# Patient Record
Sex: FEMALE | Race: WHITE | URBAN - METROPOLITAN AREA
[De-identification: names, ages, dates, MRNs, and addresses within clinical notes are randomized per-mention and may not be internally consistent; named-entity substitution may affect disease eponyms.]

---

## 2017-01-10 ENCOUNTER — OUTPATIENT (OUTPATIENT)
Dept: OUTPATIENT SERVICES | Facility: HOSPITAL | Age: 82
LOS: 1 days | Discharge: HOME | End: 2017-01-10

## 2017-01-26 ENCOUNTER — APPOINTMENT (OUTPATIENT)
Dept: CARDIOLOGY | Facility: CLINIC | Age: 82
End: 2017-01-26

## 2017-01-26 VITALS — DIASTOLIC BLOOD PRESSURE: 84 MMHG | SYSTOLIC BLOOD PRESSURE: 140 MMHG

## 2017-01-26 DIAGNOSIS — Z95.810 PRESENCE OF AUTOMATIC (IMPLANTABLE) CARDIAC DEFIBRILLATOR: ICD-10-CM

## 2017-06-15 ENCOUNTER — OUTPATIENT (OUTPATIENT)
Dept: OUTPATIENT SERVICES | Facility: HOSPITAL | Age: 82
LOS: 1 days | Discharge: HOME | End: 2017-06-15

## 2017-06-28 DIAGNOSIS — R73.01 IMPAIRED FASTING GLUCOSE: ICD-10-CM

## 2017-06-28 DIAGNOSIS — I10 ESSENTIAL (PRIMARY) HYPERTENSION: ICD-10-CM

## 2017-06-28 DIAGNOSIS — E78.5 HYPERLIPIDEMIA, UNSPECIFIED: ICD-10-CM

## 2017-07-03 ENCOUNTER — OUTPATIENT (OUTPATIENT)
Dept: OUTPATIENT SERVICES | Facility: HOSPITAL | Age: 82
LOS: 1 days | Discharge: HOME | End: 2017-07-03

## 2017-07-03 DIAGNOSIS — Z79.01 LONG TERM (CURRENT) USE OF ANTICOAGULANTS: ICD-10-CM

## 2017-07-03 DIAGNOSIS — I48.91 UNSPECIFIED ATRIAL FIBRILLATION: ICD-10-CM

## 2017-07-27 ENCOUNTER — APPOINTMENT (OUTPATIENT)
Dept: CARDIOLOGY | Facility: CLINIC | Age: 82
End: 2017-07-27

## 2017-07-27 VITALS — WEIGHT: 187 LBS | BODY MASS INDEX: 30.18 KG/M2 | DIASTOLIC BLOOD PRESSURE: 72 MMHG | SYSTOLIC BLOOD PRESSURE: 126 MMHG

## 2017-08-07 ENCOUNTER — OUTPATIENT (OUTPATIENT)
Dept: OUTPATIENT SERVICES | Facility: HOSPITAL | Age: 82
LOS: 1 days | Discharge: HOME | End: 2017-08-07

## 2017-08-07 DIAGNOSIS — I48.91 UNSPECIFIED ATRIAL FIBRILLATION: ICD-10-CM

## 2017-08-07 DIAGNOSIS — Z79.01 LONG TERM (CURRENT) USE OF ANTICOAGULANTS: ICD-10-CM

## 2017-09-11 ENCOUNTER — OUTPATIENT (OUTPATIENT)
Dept: OUTPATIENT SERVICES | Facility: HOSPITAL | Age: 82
LOS: 1 days | Discharge: HOME | End: 2017-09-11

## 2017-09-11 DIAGNOSIS — I48.91 UNSPECIFIED ATRIAL FIBRILLATION: ICD-10-CM

## 2017-09-11 DIAGNOSIS — Z79.01 LONG TERM (CURRENT) USE OF ANTICOAGULANTS: ICD-10-CM

## 2017-09-12 DIAGNOSIS — I48.91 UNSPECIFIED ATRIAL FIBRILLATION: ICD-10-CM

## 2017-09-12 DIAGNOSIS — Z79.01 LONG TERM (CURRENT) USE OF ANTICOAGULANTS: ICD-10-CM

## 2017-10-06 ENCOUNTER — OUTPATIENT (OUTPATIENT)
Dept: OUTPATIENT SERVICES | Facility: HOSPITAL | Age: 82
LOS: 1 days | Discharge: HOME | End: 2017-10-06

## 2017-10-06 ENCOUNTER — TRANSCRIPTION ENCOUNTER (OUTPATIENT)
Age: 82
End: 2017-10-06

## 2017-10-06 DIAGNOSIS — M71.22 SYNOVIAL CYST OF POPLITEAL SPACE [BAKER], LEFT KNEE: ICD-10-CM

## 2017-10-06 DIAGNOSIS — M25.472 EFFUSION, LEFT ANKLE: ICD-10-CM

## 2017-10-16 ENCOUNTER — OUTPATIENT (OUTPATIENT)
Dept: OUTPATIENT SERVICES | Facility: HOSPITAL | Age: 82
LOS: 1 days | Discharge: HOME | End: 2017-10-16

## 2017-10-16 DIAGNOSIS — I48.91 UNSPECIFIED ATRIAL FIBRILLATION: ICD-10-CM

## 2017-10-16 DIAGNOSIS — Z79.01 LONG TERM (CURRENT) USE OF ANTICOAGULANTS: ICD-10-CM

## 2017-10-20 ENCOUNTER — OUTPATIENT (OUTPATIENT)
Dept: OUTPATIENT SERVICES | Facility: HOSPITAL | Age: 82
LOS: 1 days | Discharge: HOME | End: 2017-10-20

## 2017-10-20 DIAGNOSIS — E78.5 HYPERLIPIDEMIA, UNSPECIFIED: ICD-10-CM

## 2017-10-20 DIAGNOSIS — E66.9 OBESITY, UNSPECIFIED: ICD-10-CM

## 2017-11-01 ENCOUNTER — OUTPATIENT (OUTPATIENT)
Dept: OUTPATIENT SERVICES | Facility: HOSPITAL | Age: 82
LOS: 1 days | Discharge: HOME | End: 2017-11-01

## 2017-11-01 DIAGNOSIS — E87.5 HYPERKALEMIA: ICD-10-CM

## 2017-11-20 ENCOUNTER — OUTPATIENT (OUTPATIENT)
Dept: OUTPATIENT SERVICES | Facility: HOSPITAL | Age: 82
LOS: 1 days | Discharge: HOME | End: 2017-11-20

## 2017-11-20 DIAGNOSIS — Z79.01 LONG TERM (CURRENT) USE OF ANTICOAGULANTS: ICD-10-CM

## 2017-11-20 DIAGNOSIS — I48.91 UNSPECIFIED ATRIAL FIBRILLATION: ICD-10-CM

## 2017-12-18 ENCOUNTER — OUTPATIENT (OUTPATIENT)
Dept: OUTPATIENT SERVICES | Facility: HOSPITAL | Age: 82
LOS: 1 days | Discharge: HOME | End: 2017-12-18

## 2017-12-18 DIAGNOSIS — Z79.01 LONG TERM (CURRENT) USE OF ANTICOAGULANTS: ICD-10-CM

## 2017-12-18 DIAGNOSIS — I48.91 UNSPECIFIED ATRIAL FIBRILLATION: ICD-10-CM

## 2018-01-22 ENCOUNTER — OUTPATIENT (OUTPATIENT)
Dept: OUTPATIENT SERVICES | Facility: HOSPITAL | Age: 83
LOS: 1 days | Discharge: HOME | End: 2018-01-22

## 2018-01-22 DIAGNOSIS — Z79.01 LONG TERM (CURRENT) USE OF ANTICOAGULANTS: ICD-10-CM

## 2018-01-22 DIAGNOSIS — I48.91 UNSPECIFIED ATRIAL FIBRILLATION: ICD-10-CM

## 2018-01-25 ENCOUNTER — APPOINTMENT (OUTPATIENT)
Dept: CARDIOLOGY | Facility: CLINIC | Age: 83
End: 2018-01-25

## 2018-01-25 VITALS — SYSTOLIC BLOOD PRESSURE: 124 MMHG | WEIGHT: 185 LBS | DIASTOLIC BLOOD PRESSURE: 84 MMHG | BODY MASS INDEX: 29.86 KG/M2

## 2018-02-20 ENCOUNTER — OUTPATIENT (OUTPATIENT)
Dept: OUTPATIENT SERVICES | Facility: HOSPITAL | Age: 83
LOS: 1 days | Discharge: HOME | End: 2018-02-20

## 2018-02-20 DIAGNOSIS — I48.91 UNSPECIFIED ATRIAL FIBRILLATION: ICD-10-CM

## 2018-02-20 DIAGNOSIS — Z79.01 LONG TERM (CURRENT) USE OF ANTICOAGULANTS: ICD-10-CM

## 2018-02-22 ENCOUNTER — OUTPATIENT (OUTPATIENT)
Dept: OUTPATIENT SERVICES | Facility: HOSPITAL | Age: 83
LOS: 1 days | Discharge: HOME | End: 2018-02-22

## 2018-02-22 DIAGNOSIS — E87.5 HYPERKALEMIA: ICD-10-CM

## 2018-02-22 DIAGNOSIS — E11.9 TYPE 2 DIABETES MELLITUS WITHOUT COMPLICATIONS: ICD-10-CM

## 2018-02-22 DIAGNOSIS — E55.9 VITAMIN D DEFICIENCY, UNSPECIFIED: ICD-10-CM

## 2018-03-26 ENCOUNTER — OUTPATIENT (OUTPATIENT)
Dept: OUTPATIENT SERVICES | Facility: HOSPITAL | Age: 83
LOS: 1 days | Discharge: HOME | End: 2018-03-26

## 2018-03-26 DIAGNOSIS — I48.91 UNSPECIFIED ATRIAL FIBRILLATION: ICD-10-CM

## 2018-03-26 DIAGNOSIS — Z79.01 LONG TERM (CURRENT) USE OF ANTICOAGULANTS: ICD-10-CM

## 2018-04-19 ENCOUNTER — OUTPATIENT (OUTPATIENT)
Dept: OUTPATIENT SERVICES | Facility: HOSPITAL | Age: 83
LOS: 1 days | Discharge: HOME | End: 2018-04-19

## 2018-04-19 DIAGNOSIS — Z12.31 ENCOUNTER FOR SCREENING MAMMOGRAM FOR MALIGNANT NEOPLASM OF BREAST: ICD-10-CM

## 2018-04-30 ENCOUNTER — OUTPATIENT (OUTPATIENT)
Dept: OUTPATIENT SERVICES | Facility: HOSPITAL | Age: 83
LOS: 1 days | Discharge: HOME | End: 2018-04-30

## 2018-04-30 DIAGNOSIS — Z79.01 LONG TERM (CURRENT) USE OF ANTICOAGULANTS: ICD-10-CM

## 2018-04-30 DIAGNOSIS — I48.91 UNSPECIFIED ATRIAL FIBRILLATION: ICD-10-CM

## 2018-06-04 ENCOUNTER — OUTPATIENT (OUTPATIENT)
Dept: OUTPATIENT SERVICES | Facility: HOSPITAL | Age: 83
LOS: 1 days | Discharge: HOME | End: 2018-06-04

## 2018-06-04 DIAGNOSIS — I48.91 UNSPECIFIED ATRIAL FIBRILLATION: ICD-10-CM

## 2018-06-04 DIAGNOSIS — Z79.01 LONG TERM (CURRENT) USE OF ANTICOAGULANTS: ICD-10-CM

## 2018-07-09 ENCOUNTER — OUTPATIENT (OUTPATIENT)
Dept: OUTPATIENT SERVICES | Facility: HOSPITAL | Age: 83
LOS: 1 days | Discharge: HOME | End: 2018-07-09

## 2018-07-09 DIAGNOSIS — Z79.01 LONG TERM (CURRENT) USE OF ANTICOAGULANTS: ICD-10-CM

## 2018-07-09 DIAGNOSIS — I48.91 UNSPECIFIED ATRIAL FIBRILLATION: ICD-10-CM

## 2018-07-26 ENCOUNTER — APPOINTMENT (OUTPATIENT)
Dept: CARDIOLOGY | Facility: CLINIC | Age: 83
End: 2018-07-26

## 2018-07-26 VITALS — WEIGHT: 183 LBS | BODY MASS INDEX: 29.54 KG/M2

## 2018-07-26 VITALS — DIASTOLIC BLOOD PRESSURE: 62 MMHG | SYSTOLIC BLOOD PRESSURE: 122 MMHG

## 2018-07-30 ENCOUNTER — OUTPATIENT (OUTPATIENT)
Dept: OUTPATIENT SERVICES | Facility: HOSPITAL | Age: 83
LOS: 1 days | Discharge: HOME | End: 2018-07-30

## 2018-07-30 DIAGNOSIS — Z79.01 LONG TERM (CURRENT) USE OF ANTICOAGULANTS: ICD-10-CM

## 2018-07-30 DIAGNOSIS — I48.91 UNSPECIFIED ATRIAL FIBRILLATION: ICD-10-CM

## 2018-08-06 ENCOUNTER — OUTPATIENT (OUTPATIENT)
Dept: OUTPATIENT SERVICES | Facility: HOSPITAL | Age: 83
LOS: 1 days | Discharge: HOME | End: 2018-08-06

## 2018-08-06 DIAGNOSIS — I50.22 CHRONIC SYSTOLIC (CONGESTIVE) HEART FAILURE: ICD-10-CM

## 2018-08-06 DIAGNOSIS — E11.9 TYPE 2 DIABETES MELLITUS WITHOUT COMPLICATIONS: ICD-10-CM

## 2018-08-06 DIAGNOSIS — E78.5 HYPERLIPIDEMIA, UNSPECIFIED: ICD-10-CM

## 2018-08-06 DIAGNOSIS — I48.2 CHRONIC ATRIAL FIBRILLATION: ICD-10-CM

## 2018-08-06 DIAGNOSIS — I10 ESSENTIAL (PRIMARY) HYPERTENSION: ICD-10-CM

## 2018-09-04 ENCOUNTER — OUTPATIENT (OUTPATIENT)
Dept: OUTPATIENT SERVICES | Facility: HOSPITAL | Age: 83
LOS: 1 days | Discharge: HOME | End: 2018-09-04

## 2018-09-04 DIAGNOSIS — I48.91 UNSPECIFIED ATRIAL FIBRILLATION: ICD-10-CM

## 2018-09-04 DIAGNOSIS — Z79.01 LONG TERM (CURRENT) USE OF ANTICOAGULANTS: ICD-10-CM

## 2018-09-04 LAB
POCT INR: 1.9 RATIO — HIGH (ref 0.9–1.2)
POCT PT: 23.2 SEC — HIGH (ref 10–13.4)

## 2018-09-17 ENCOUNTER — OUTPATIENT (OUTPATIENT)
Dept: OUTPATIENT SERVICES | Facility: HOSPITAL | Age: 83
LOS: 1 days | Discharge: HOME | End: 2018-09-17

## 2018-09-17 DIAGNOSIS — Z79.01 LONG TERM (CURRENT) USE OF ANTICOAGULANTS: ICD-10-CM

## 2018-09-17 DIAGNOSIS — I48.91 UNSPECIFIED ATRIAL FIBRILLATION: ICD-10-CM

## 2018-09-17 LAB
POCT INR: 1.7 RATIO — HIGH (ref 0.9–1.2)
POCT PT: 20.6 SEC — HIGH (ref 10–13.4)

## 2018-09-24 ENCOUNTER — OUTPATIENT (OUTPATIENT)
Dept: OUTPATIENT SERVICES | Facility: HOSPITAL | Age: 83
LOS: 1 days | Discharge: HOME | End: 2018-09-24

## 2018-09-24 DIAGNOSIS — I48.91 UNSPECIFIED ATRIAL FIBRILLATION: ICD-10-CM

## 2018-09-24 DIAGNOSIS — Z79.01 LONG TERM (CURRENT) USE OF ANTICOAGULANTS: ICD-10-CM

## 2018-09-24 LAB
POCT INR: 1.8 RATIO — HIGH (ref 0.9–1.2)
POCT PT: 21.2 SEC — HIGH (ref 10–13.4)

## 2018-10-01 ENCOUNTER — OUTPATIENT (OUTPATIENT)
Dept: OUTPATIENT SERVICES | Facility: HOSPITAL | Age: 83
LOS: 1 days | Discharge: HOME | End: 2018-10-01

## 2018-10-01 DIAGNOSIS — Z79.01 LONG TERM (CURRENT) USE OF ANTICOAGULANTS: ICD-10-CM

## 2018-10-01 DIAGNOSIS — I48.91 UNSPECIFIED ATRIAL FIBRILLATION: ICD-10-CM

## 2018-10-01 LAB
POCT INR: 3 RATIO — HIGH (ref 0.9–1.2)
POCT PT: 36.3 SEC — HIGH (ref 10–13.4)

## 2018-10-15 ENCOUNTER — OUTPATIENT (OUTPATIENT)
Dept: OUTPATIENT SERVICES | Facility: HOSPITAL | Age: 83
LOS: 1 days | Discharge: HOME | End: 2018-10-15

## 2018-10-15 DIAGNOSIS — I48.91 UNSPECIFIED ATRIAL FIBRILLATION: ICD-10-CM

## 2018-10-15 DIAGNOSIS — Z79.01 LONG TERM (CURRENT) USE OF ANTICOAGULANTS: ICD-10-CM

## 2018-10-15 LAB
POCT INR: 2.9 RATIO — HIGH (ref 0.9–1.2)
POCT PT: 35.1 SEC — HIGH (ref 10–13.4)

## 2018-11-05 ENCOUNTER — OUTPATIENT (OUTPATIENT)
Dept: OUTPATIENT SERVICES | Facility: HOSPITAL | Age: 83
LOS: 1 days | Discharge: HOME | End: 2018-11-05

## 2018-11-05 DIAGNOSIS — Z79.01 LONG TERM (CURRENT) USE OF ANTICOAGULANTS: ICD-10-CM

## 2018-11-05 DIAGNOSIS — I48.91 UNSPECIFIED ATRIAL FIBRILLATION: ICD-10-CM

## 2018-11-05 LAB
POCT INR: 2.7 RATIO — HIGH (ref 0.9–1.2)
POCT PT: 32.4 SEC — HIGH (ref 10–13.4)

## 2018-11-26 ENCOUNTER — OUTPATIENT (OUTPATIENT)
Dept: OUTPATIENT SERVICES | Facility: HOSPITAL | Age: 83
LOS: 1 days | Discharge: HOME | End: 2018-11-26

## 2018-11-26 DIAGNOSIS — Z79.01 LONG TERM (CURRENT) USE OF ANTICOAGULANTS: ICD-10-CM

## 2018-11-26 DIAGNOSIS — I48.91 UNSPECIFIED ATRIAL FIBRILLATION: ICD-10-CM

## 2018-11-26 LAB
POCT INR: 2.6 RATIO — HIGH (ref 0.9–1.2)
POCT PT: 31.4 SEC — HIGH (ref 10–13.4)

## 2018-12-10 ENCOUNTER — OUTPATIENT (OUTPATIENT)
Dept: OUTPATIENT SERVICES | Facility: HOSPITAL | Age: 83
LOS: 1 days | Discharge: HOME | End: 2018-12-10

## 2018-12-10 DIAGNOSIS — E78.5 HYPERLIPIDEMIA, UNSPECIFIED: ICD-10-CM

## 2018-12-10 DIAGNOSIS — Z85.831 PERSONAL HISTORY OF MALIGNANT NEOPLASM OF SOFT TISSUE: ICD-10-CM

## 2018-12-10 DIAGNOSIS — I50.22 CHRONIC SYSTOLIC (CONGESTIVE) HEART FAILURE: ICD-10-CM

## 2018-12-10 DIAGNOSIS — Z79.01 LONG TERM (CURRENT) USE OF ANTICOAGULANTS: ICD-10-CM

## 2018-12-10 DIAGNOSIS — I48.2 CHRONIC ATRIAL FIBRILLATION: ICD-10-CM

## 2018-12-10 DIAGNOSIS — I10 ESSENTIAL (PRIMARY) HYPERTENSION: ICD-10-CM

## 2018-12-17 ENCOUNTER — OUTPATIENT (OUTPATIENT)
Dept: OUTPATIENT SERVICES | Facility: HOSPITAL | Age: 83
LOS: 1 days | Discharge: HOME | End: 2018-12-17

## 2018-12-17 DIAGNOSIS — I48.91 UNSPECIFIED ATRIAL FIBRILLATION: ICD-10-CM

## 2018-12-17 DIAGNOSIS — Z79.01 LONG TERM (CURRENT) USE OF ANTICOAGULANTS: ICD-10-CM

## 2018-12-17 LAB
POCT INR: 2.9 RATIO — HIGH (ref 0.9–1.2)
POCT PT: 34.8 SEC — HIGH (ref 10–13.4)

## 2019-01-14 ENCOUNTER — OUTPATIENT (OUTPATIENT)
Dept: OUTPATIENT SERVICES | Facility: HOSPITAL | Age: 84
LOS: 1 days | Discharge: HOME | End: 2019-01-14

## 2019-01-14 DIAGNOSIS — I48.91 UNSPECIFIED ATRIAL FIBRILLATION: ICD-10-CM

## 2019-01-14 DIAGNOSIS — Z79.01 LONG TERM (CURRENT) USE OF ANTICOAGULANTS: ICD-10-CM

## 2019-01-14 LAB
POCT INR: 3 RATIO — HIGH (ref 0.9–1.2)
POCT PT: 35.5 SEC — HIGH (ref 10–13.4)

## 2019-01-24 ENCOUNTER — APPOINTMENT (OUTPATIENT)
Dept: CARDIOLOGY | Facility: CLINIC | Age: 84
End: 2019-01-24

## 2019-01-24 VITALS — DIASTOLIC BLOOD PRESSURE: 80 MMHG | SYSTOLIC BLOOD PRESSURE: 130 MMHG | WEIGHT: 183 LBS | BODY MASS INDEX: 29.54 KG/M2

## 2019-02-06 NOTE — PROCEDURE
[No] : not [Atrial Fibrillation] : atrial fibrillation [ICD] : Implantable cardioverter-defibrillator [AAIR] : AAIR [Voltage: ___ volts] : Voltage was [unfilled] volts [Charge Time: ___ sec] : charge time was [unfilled] seconds [Threshold Testing Performed] : Threshold testing was performed [Lead Imp:  ___ohms] : lead impedance was [unfilled] ohms [Sensing Amplitude ___mv] : sensing amplitude was [unfilled] mv [___V @] : [unfilled] V [___ ms] : [unfilled] ms [Programmed for Longevity] : output reprogrammed for improved battery longevity [Asense-Vsense ___ %] : Asense-Vsense [unfilled]% [Asense-Vpace ___ %] : Asense-Vpace [unfilled]% [Apace-Vsense ___ %] : Apace-Vsense [unfilled]% [Apace-Vpace ___ %] : Apace-Vpace [unfilled]% [de-identified] : Medtronic [de-identified] : Bea DWYER DR [de-identified] : [id number] [de-identified] : 6/25/13 [de-identified] : 57 [de-identified] : AT/AF [place] 64.1%

## 2019-02-08 ENCOUNTER — OUTPATIENT (OUTPATIENT)
Dept: OUTPATIENT SERVICES | Facility: HOSPITAL | Age: 84
LOS: 1 days | Discharge: HOME | End: 2019-02-08

## 2019-02-08 DIAGNOSIS — I48.91 UNSPECIFIED ATRIAL FIBRILLATION: ICD-10-CM

## 2019-02-08 DIAGNOSIS — Z79.01 LONG TERM (CURRENT) USE OF ANTICOAGULANTS: ICD-10-CM

## 2019-02-08 LAB
POCT INR: 3.3 RATIO — HIGH (ref 0.9–1.2)
POCT PT: 40.1 SEC — HIGH (ref 10–13.4)

## 2019-03-11 ENCOUNTER — OUTPATIENT (OUTPATIENT)
Dept: OUTPATIENT SERVICES | Facility: HOSPITAL | Age: 84
LOS: 1 days | Discharge: HOME | End: 2019-03-11

## 2019-03-11 DIAGNOSIS — I48.91 UNSPECIFIED ATRIAL FIBRILLATION: ICD-10-CM

## 2019-03-11 DIAGNOSIS — Z79.01 LONG TERM (CURRENT) USE OF ANTICOAGULANTS: ICD-10-CM

## 2019-03-11 LAB
POCT INR: 2.4 RATIO — HIGH (ref 0.9–1.2)
POCT PT: 29.1 SEC — HIGH (ref 10–13.4)

## 2019-03-28 ENCOUNTER — APPOINTMENT (OUTPATIENT)
Dept: CARDIOLOGY | Facility: CLINIC | Age: 84
End: 2019-03-28
Payer: MEDICARE

## 2019-03-28 VITALS — SYSTOLIC BLOOD PRESSURE: 100 MMHG | DIASTOLIC BLOOD PRESSURE: 70 MMHG

## 2019-03-28 PROCEDURE — 99213 OFFICE O/P EST LOW 20 MIN: CPT

## 2019-03-28 PROCEDURE — 93280 PM DEVICE PROGR EVAL DUAL: CPT

## 2019-04-16 ENCOUNTER — OUTPATIENT (OUTPATIENT)
Dept: OUTPATIENT SERVICES | Facility: HOSPITAL | Age: 84
LOS: 1 days | Discharge: HOME | End: 2019-04-16

## 2019-04-16 DIAGNOSIS — I48.91 UNSPECIFIED ATRIAL FIBRILLATION: ICD-10-CM

## 2019-04-16 DIAGNOSIS — Z79.01 LONG TERM (CURRENT) USE OF ANTICOAGULANTS: ICD-10-CM

## 2019-04-16 LAB
POCT INR: 3 RATIO — HIGH (ref 0.9–1.2)
POCT PT: 35.6 SEC — HIGH (ref 10–13.4)

## 2019-04-16 NOTE — PROCEDURE
[No] : not [NSR] : normal sinus rhythm [ICD] : Implantable cardioverter-defibrillator [DDD] : DDD [Voltage: ___ volts] : Voltage was [unfilled] volts [Longevity: ___ months] : The estimated remaining battery life is [unfilled] months [Threshold Testing Performed] : Threshold testing was performed [Atrial] : Atrial [Ventricular] : Ventricular [Lead Imp:  ___ohms] : lead impedance was [unfilled] ohms [Sensing Amplitude ___mv] : sensing amplitude was [unfilled] mv [___V @] : [unfilled] V [___ ms] : [unfilled] ms [None] : none [Counters Reset] : the counters were reset [Asense-Vsense ___ %] : Asense-Vsense [unfilled]% [Asense-Vpace ___ %] : Asense-Vpace [unfilled]% [Apace-Vsense ___ %] : Apace-Vsense [unfilled]% [Apace-Vpace ___ %] : Apace-Vpace [unfilled]% [de-identified] : Medtronic [de-identified] : Protecta YULIYA ROSE [id number] [de-identified] : w/ MVP [de-identified] : [id number] [de-identified] :  [de-identified] : 31 episodes of AT/A-flutter (AT/AF burden = 38.1%)\par -> longest episode 96.5hrs (1/30/19, v-rate 103bpm)\par * approx 2.5 months until battery LISANDRO

## 2019-04-16 NOTE — PHYSICAL EXAM
[Normal Appearance] : normal appearance [General Appearance - Well Developed] : well developed [No Deformities] : no deformities [Well Groomed] : well groomed [General Appearance - Well Nourished] : well nourished [General Appearance - In No Acute Distress] : no acute distress [Heart Rate And Rhythm] : heart rate and rhythm were normal [Heart Sounds] : normal S1 and S2 [Murmurs] : no murmurs present [Respiration, Rhythm And Depth] : normal respiratory rhythm and effort [Auscultation Breath Sounds / Voice Sounds] : lungs were clear to auscultation bilaterally [Exaggerated Use Of Accessory Muscles For Inspiration] : no accessory muscle use [Well-Healed] : well-healed [Clean] : clean [Dry] : dry [Abdomen Tenderness] : non-tender [Abdomen Soft] : soft [Abdomen Mass (___ Cm)] : no abdominal mass palpated [Nail Clubbing] : no clubbing of the fingernails [Petechial Hemorrhages (___cm)] : no petechial hemorrhages [Cyanosis, Localized] : no localized cyanosis [] : no ischemic changes

## 2019-04-16 NOTE — HISTORY OF PRESENT ILLNESS
[None] : The patient complains of no symptoms [Palpitations] : no palpitations [SOB] : no dyspnea [Syncope] : no syncope [Erythema at Site] : no erythema at device site [Swelling at Site] : no swelling at device site [de-identified] : Patient came for routine follow-up.  Patient has no complaints. His device is working fine.

## 2019-04-22 ENCOUNTER — OUTPATIENT (OUTPATIENT)
Dept: OUTPATIENT SERVICES | Facility: HOSPITAL | Age: 84
LOS: 1 days | Discharge: HOME | End: 2019-04-22
Payer: MEDICARE

## 2019-04-22 DIAGNOSIS — Z12.31 ENCOUNTER FOR SCREENING MAMMOGRAM FOR MALIGNANT NEOPLASM OF BREAST: ICD-10-CM

## 2019-04-22 PROCEDURE — 77067 SCR MAMMO BI INCL CAD: CPT | Mod: 26

## 2019-04-22 PROCEDURE — 77063 BREAST TOMOSYNTHESIS BI: CPT | Mod: 26

## 2019-05-20 ENCOUNTER — OUTPATIENT (OUTPATIENT)
Dept: OUTPATIENT SERVICES | Facility: HOSPITAL | Age: 84
LOS: 1 days | Discharge: HOME | End: 2019-05-20

## 2019-05-20 DIAGNOSIS — I48.91 UNSPECIFIED ATRIAL FIBRILLATION: ICD-10-CM

## 2019-05-20 DIAGNOSIS — Z79.01 LONG TERM (CURRENT) USE OF ANTICOAGULANTS: ICD-10-CM

## 2019-05-20 LAB
POCT INR: 2.4 RATIO — HIGH (ref 0.9–1.2)
POCT PT: 29.1 SEC — HIGH (ref 10–13.4)

## 2019-05-23 ENCOUNTER — APPOINTMENT (OUTPATIENT)
Dept: CARDIOLOGY | Facility: CLINIC | Age: 84
End: 2019-05-23
Payer: MEDICARE

## 2019-05-23 VITALS — HEIGHT: 66 IN | WEIGHT: 180 LBS | BODY MASS INDEX: 28.93 KG/M2

## 2019-05-23 VITALS — DIASTOLIC BLOOD PRESSURE: 78 MMHG | SYSTOLIC BLOOD PRESSURE: 108 MMHG

## 2019-05-23 VITALS — SYSTOLIC BLOOD PRESSURE: 138 MMHG | DIASTOLIC BLOOD PRESSURE: 68 MMHG

## 2019-05-23 DIAGNOSIS — Z45.02 ENCOUNTER FOR ADJUSTMENT AND MANAGEMENT OF AUTOMATIC IMPLANTABLE CARDIAC DEFIBRILLATOR: ICD-10-CM

## 2019-05-23 PROCEDURE — 99213 OFFICE O/P EST LOW 20 MIN: CPT

## 2019-05-23 PROCEDURE — 93283 PRGRMG EVAL IMPLANTABLE DFB: CPT

## 2019-05-23 NOTE — PHYSICAL EXAM
[General Appearance - Well Developed] : well developed [General Appearance - Well Nourished] : well nourished [Heart Rate And Rhythm] : heart rate and rhythm were normal [Heart Sounds] : normal S1 and S2 [Arterial Pulses Normal] : the arterial pulses were normal [Respiration, Rhythm And Depth] : normal respiratory rhythm and effort [Auscultation Breath Sounds / Voice Sounds] : lungs were clear to auscultation bilaterally [Left Infraclavicular] : left infraclavicular area [Clean] : clean [Dry] : dry [Well-Healed] : well-healed [Bowel Sounds] : normal bowel sounds [Abdomen Soft] : soft [Abdomen Tenderness] : non-tender [] : no hepato-splenomegaly [Nail Clubbing] : no clubbing of the fingernails [Normal Appearance] : normal appearance [No Deformities] : no deformities [Cyanosis, Localized] : no localized cyanosis

## 2019-05-24 NOTE — ASSESSMENT
[FreeTextEntry1] : AICD interrogation: Battery approaching  LISANDRO .  All  parameters stable.  Several episodes of Afib with RVR . \par \par She was recently seen by   , Metoprolol was increased to a total of 250 mg daily ( 100 md BID + 50 mg ) \par \par Auditory alert was demonstrated to the patient.  She is enrolled in remote monitoring services .  AP 24.8% ,  3.1% \par On Warfarin for CVA prevention , INRs monitored by the Coumadin clinic , no s/s bleeding reported. \par \par \par Plan \par -Continue current medications\par -Continue remote monitoring services \par -F/U INR with the Coumadin clinic \par -F/U in 2 months for device interrogation \par \par -

## 2019-05-24 NOTE — PROCEDURE
[NSR] : normal sinus rhythm [ICD] : Implantable cardioverter-defibrillator [Voltage: ___ volts] : Voltage was [unfilled] volts [Charge Time: ___ sec] : charge time was [unfilled] seconds [Longevity: ___ months] : The estimated remaining battery life is [unfilled] months [Threshold Testing Performed] : Threshold testing was performed [Atrial] : Atrial [Ventricular] : Ventricular [Sensing Amplitude ___mv] : sensing amplitude was [unfilled] mv [Lead Imp:  ___ohms] : lead impedance was [unfilled] ohms [___V @] : [unfilled] V [___ ms] : [unfilled] ms [Programmed for Longevity] : output reprogrammed for improved battery longevity [Asense-Vsense ___ %] : Asense-Vsense [unfilled]% [Asense-Vpace ___ %] : Asense-Vpace [unfilled]% [Apace-Vsense ___ %] : Apace-Vsense [unfilled]% [Apace-Vpace ___ %] : Apace-Vpace [unfilled]% [No] : not [See Scanned Paceart Report] : See scanned paceart report [See Device Printout] : See device printout [de-identified] : 63 BPM [de-identified] : Bea DWYER DR [de-identified] : RIRI [de-identified] : 6/25/2013 [de-identified] : [id number] [de-identified] : JUAN [de-identified] : Imminent LISANDRO, on [facility]\par AFL burden 17.7%\par Longest AT/AF 6 days, 11 NSVT episodes, \par Sensitivity set to 1.2 mV, T wave oversensing with shock x 2 12/23/13  [de-identified] :  bpm

## 2019-05-24 NOTE — HISTORY OF PRESENT ILLNESS
[de-identified] : 84 y/o female with an AICD for NICM , presents for a routine device interrogation . \par \par

## 2019-06-24 ENCOUNTER — OUTPATIENT (OUTPATIENT)
Dept: OUTPATIENT SERVICES | Facility: HOSPITAL | Age: 84
LOS: 1 days | Discharge: HOME | End: 2019-06-24

## 2019-06-24 DIAGNOSIS — I48.91 UNSPECIFIED ATRIAL FIBRILLATION: ICD-10-CM

## 2019-06-24 DIAGNOSIS — Z79.01 LONG TERM (CURRENT) USE OF ANTICOAGULANTS: ICD-10-CM

## 2019-06-24 LAB
POCT INR: 2.8 RATIO — HIGH (ref 0.9–1.2)
POCT PT: 34.2 SEC — HIGH (ref 10–13.4)

## 2019-07-16 ENCOUNTER — OUTPATIENT (OUTPATIENT)
Dept: OUTPATIENT SERVICES | Facility: HOSPITAL | Age: 84
LOS: 1 days | Discharge: HOME | End: 2019-07-16

## 2019-07-16 DIAGNOSIS — I10 ESSENTIAL (PRIMARY) HYPERTENSION: ICD-10-CM

## 2019-07-16 DIAGNOSIS — E78.00 PURE HYPERCHOLESTEROLEMIA, UNSPECIFIED: ICD-10-CM

## 2019-07-16 DIAGNOSIS — I50.22 CHRONIC SYSTOLIC (CONGESTIVE) HEART FAILURE: ICD-10-CM

## 2019-07-29 ENCOUNTER — OUTPATIENT (OUTPATIENT)
Dept: OUTPATIENT SERVICES | Facility: HOSPITAL | Age: 84
LOS: 1 days | Discharge: HOME | End: 2019-07-29

## 2019-07-29 DIAGNOSIS — I48.91 UNSPECIFIED ATRIAL FIBRILLATION: ICD-10-CM

## 2019-07-29 DIAGNOSIS — Z79.01 LONG TERM (CURRENT) USE OF ANTICOAGULANTS: ICD-10-CM

## 2019-07-29 LAB
POCT INR: 2.7 RATIO — HIGH (ref 0.9–1.2)
POCT PT: 32.4 SEC — HIGH (ref 10–13.4)

## 2019-08-01 ENCOUNTER — APPOINTMENT (OUTPATIENT)
Dept: CARDIOLOGY | Facility: CLINIC | Age: 84
End: 2019-08-01
Payer: MEDICARE

## 2019-08-01 VITALS — SYSTOLIC BLOOD PRESSURE: 118 MMHG | DIASTOLIC BLOOD PRESSURE: 64 MMHG

## 2019-08-01 PROCEDURE — 93283 PRGRMG EVAL IMPLANTABLE DFB: CPT

## 2019-08-01 PROCEDURE — 99213 OFFICE O/P EST LOW 20 MIN: CPT

## 2019-08-10 NOTE — ASSESSMENT
[FreeTextEntry1] : Generator Implant\par \par I have explained the risks and benefits of the procedure to the patient.  There is approximately 1% chance of any major cardiovascular complication to occur. Complications include, but are not limited to infection, bleeding, damage to the vessels, pneumothorax, stroke, death and heart attack.  The patient understands the risk and would like to proceed with the procedure. Patient indicated that all of his questions were answered to his satisfaction and verbalized understanding.\par

## 2019-08-10 NOTE — PHYSICAL EXAM
[General Appearance - Well Developed] : well developed [Normal Appearance] : normal appearance [Well Groomed] : well groomed [General Appearance - Well Nourished] : well nourished [General Appearance - In No Acute Distress] : no acute distress [No Deformities] : no deformities [Heart Sounds] : normal S1 and S2 [Heart Rate And Rhythm] : heart rate and rhythm were normal [Murmurs] : no murmurs present [Exaggerated Use Of Accessory Muscles For Inspiration] : no accessory muscle use [Respiration, Rhythm And Depth] : normal respiratory rhythm and effort [Clean] : clean [Auscultation Breath Sounds / Voice Sounds] : lungs were clear to auscultation bilaterally [Well-Healed] : well-healed [Dry] : dry [Abdomen Soft] : soft [Abdomen Tenderness] : non-tender [Abdomen Mass (___ Cm)] : no abdominal mass palpated [Nail Clubbing] : no clubbing of the fingernails [Cyanosis, Localized] : no localized cyanosis [] : no ischemic changes [Petechial Hemorrhages (___cm)] : no petechial hemorrhages

## 2019-08-10 NOTE — HISTORY OF PRESENT ILLNESS
[None] : The patient complains of no symptoms [Palpitations] : no palpitations [SOB] : no dyspnea [Syncope] : no syncope [Erythema at Site] : no erythema at device site [Swelling at Site] : no swelling at device site [de-identified] : Patient came for routine follow-up.  Patient has no complaints. His device is working fine.

## 2019-08-10 NOTE — PROCEDURE
[No] : not [Sinus Bradycardia] : sinus bradycardia [See Scanned Paceart Report] : See scanned paceart report [See Device Printout] : See device printout [ICD] : Implantable cardioverter-defibrillator [Voltage: ___ volts] : Voltage was [unfilled] volts [Charge Time: ___ sec] : charge time was [unfilled] seconds [Longevity: ___ months] : The estimated remaining battery life is [unfilled] months [Threshold Testing Performed] : Threshold testing was performed [Atrial] : Atrial [Ventricular] : Ventricular [Lead Imp:  ___ohms] : lead impedance was [unfilled] ohms [Sensing Amplitude ___mv] : sensing amplitude was [unfilled] mv [___V @] : [unfilled] V [___ ms] : [unfilled] ms [Programmed for Longevity] : output reprogrammed for improved battery longevity [Asense-Vsense ___ %] : Asense-Vsense [unfilled]% [Asense-Vpace ___ %] : Asense-Vpace [unfilled]% [Apace-Vsense ___ %] : Apace-Vsense [unfilled]% [Apace-Vpace ___ %] : Apace-Vpace [unfilled]% [de-identified] : 42 BPM [de-identified] : RIRI [de-identified] : Bea DWYER DR [de-identified] : [id number] [de-identified] : 6/25/2013 [de-identified] : JUAN [de-identified] :  bpm  [de-identified] : Imminent LISANDRO, on [facility]\par

## 2019-09-03 ENCOUNTER — OUTPATIENT (OUTPATIENT)
Dept: OUTPATIENT SERVICES | Facility: HOSPITAL | Age: 84
LOS: 1 days | Discharge: HOME | End: 2019-09-03

## 2019-09-03 DIAGNOSIS — Z79.01 LONG TERM (CURRENT) USE OF ANTICOAGULANTS: ICD-10-CM

## 2019-09-03 DIAGNOSIS — I48.91 UNSPECIFIED ATRIAL FIBRILLATION: ICD-10-CM

## 2019-09-03 LAB
POCT INR: 2.2 RATIO — HIGH (ref 0.9–1.2)
POCT PT: 26.5 SEC — HIGH (ref 10–13.4)

## 2019-10-07 ENCOUNTER — OUTPATIENT (OUTPATIENT)
Dept: OUTPATIENT SERVICES | Facility: HOSPITAL | Age: 84
LOS: 1 days | Discharge: HOME | End: 2019-10-07

## 2019-10-07 DIAGNOSIS — I48.91 UNSPECIFIED ATRIAL FIBRILLATION: ICD-10-CM

## 2019-10-07 DIAGNOSIS — Z79.01 LONG TERM (CURRENT) USE OF ANTICOAGULANTS: ICD-10-CM

## 2019-10-07 LAB
POCT INR: 3.1 RATIO — HIGH (ref 0.9–1.2)
POCT PT: 37.3 SEC — HIGH (ref 10–13.4)

## 2019-10-09 ENCOUNTER — OUTPATIENT (OUTPATIENT)
Dept: OUTPATIENT SERVICES | Facility: HOSPITAL | Age: 84
LOS: 1 days | Discharge: HOME | End: 2019-10-09
Payer: MEDICARE

## 2019-10-09 VITALS
TEMPERATURE: 96 F | DIASTOLIC BLOOD PRESSURE: 94 MMHG | SYSTOLIC BLOOD PRESSURE: 134 MMHG | OXYGEN SATURATION: 98 % | RESPIRATION RATE: 16 BRPM | HEART RATE: 92 BPM | HEIGHT: 66 IN | WEIGHT: 182.98 LBS

## 2019-10-09 DIAGNOSIS — Z95.2 PRESENCE OF PROSTHETIC HEART VALVE: Chronic | ICD-10-CM

## 2019-10-09 DIAGNOSIS — T82.111A BREAKDOWN (MECHANICAL) OF CARDIAC PULSE GENERATOR (BATTERY), INITIAL ENCOUNTER: ICD-10-CM

## 2019-10-09 DIAGNOSIS — Z98.890 OTHER SPECIFIED POSTPROCEDURAL STATES: Chronic | ICD-10-CM

## 2019-10-09 DIAGNOSIS — Z01.818 ENCOUNTER FOR OTHER PREPROCEDURAL EXAMINATION: ICD-10-CM

## 2019-10-09 LAB
ALBUMIN SERPL ELPH-MCNC: 4.2 G/DL — SIGNIFICANT CHANGE UP (ref 3.5–5.2)
ALP SERPL-CCNC: 98 U/L — SIGNIFICANT CHANGE UP (ref 30–115)
ALT FLD-CCNC: 15 U/L — SIGNIFICANT CHANGE UP (ref 0–41)
ANION GAP SERPL CALC-SCNC: 13 MMOL/L — SIGNIFICANT CHANGE UP (ref 7–14)
APPEARANCE UR: CLEAR — SIGNIFICANT CHANGE UP
APTT BLD: 45.6 SEC — HIGH (ref 27–39.2)
AST SERPL-CCNC: 23 U/L — SIGNIFICANT CHANGE UP (ref 0–41)
BACTERIA # UR AUTO: NEGATIVE — SIGNIFICANT CHANGE UP
BASOPHILS # BLD AUTO: 0.03 K/UL — SIGNIFICANT CHANGE UP (ref 0–0.2)
BASOPHILS NFR BLD AUTO: 0.4 % — SIGNIFICANT CHANGE UP (ref 0–1)
BILIRUB SERPL-MCNC: 0.4 MG/DL — SIGNIFICANT CHANGE UP (ref 0.2–1.2)
BILIRUB UR-MCNC: NEGATIVE — SIGNIFICANT CHANGE UP
BUN SERPL-MCNC: 14 MG/DL — SIGNIFICANT CHANGE UP (ref 10–20)
CALCIUM SERPL-MCNC: 9.4 MG/DL — SIGNIFICANT CHANGE UP (ref 8.5–10.1)
CHLORIDE SERPL-SCNC: 102 MMOL/L — SIGNIFICANT CHANGE UP (ref 98–110)
CO2 SERPL-SCNC: 26 MMOL/L — SIGNIFICANT CHANGE UP (ref 17–32)
COLOR SPEC: SIGNIFICANT CHANGE UP
CREAT SERPL-MCNC: 0.9 MG/DL — SIGNIFICANT CHANGE UP (ref 0.7–1.5)
DIFF PNL FLD: NEGATIVE — SIGNIFICANT CHANGE UP
EOSINOPHIL # BLD AUTO: 0.05 K/UL — SIGNIFICANT CHANGE UP (ref 0–0.7)
EOSINOPHIL NFR BLD AUTO: 0.6 % — SIGNIFICANT CHANGE UP (ref 0–8)
EPI CELLS # UR: 1 /HPF — SIGNIFICANT CHANGE UP (ref 0–5)
GLUCOSE SERPL-MCNC: 80 MG/DL — SIGNIFICANT CHANGE UP (ref 70–99)
GLUCOSE UR QL: NEGATIVE — SIGNIFICANT CHANGE UP
HCT VFR BLD CALC: 45.6 % — SIGNIFICANT CHANGE UP (ref 37–47)
HGB BLD-MCNC: 14.4 G/DL — SIGNIFICANT CHANGE UP (ref 12–16)
HYALINE CASTS # UR AUTO: 1 /LPF — SIGNIFICANT CHANGE UP (ref 0–7)
IMM GRANULOCYTES NFR BLD AUTO: 0.2 % — SIGNIFICANT CHANGE UP (ref 0.1–0.3)
INR BLD: 2.87 RATIO — HIGH (ref 0.65–1.3)
KETONES UR-MCNC: NEGATIVE — SIGNIFICANT CHANGE UP
LEUKOCYTE ESTERASE UR-ACNC: ABNORMAL
LYMPHOCYTES # BLD AUTO: 1.53 K/UL — SIGNIFICANT CHANGE UP (ref 1.2–3.4)
LYMPHOCYTES # BLD AUTO: 18.3 % — LOW (ref 20.5–51.1)
MCHC RBC-ENTMCNC: 26.8 PG — LOW (ref 27–31)
MCHC RBC-ENTMCNC: 31.6 G/DL — LOW (ref 32–37)
MCV RBC AUTO: 84.9 FL — SIGNIFICANT CHANGE UP (ref 81–99)
MONOCYTES # BLD AUTO: 0.67 K/UL — HIGH (ref 0.1–0.6)
MONOCYTES NFR BLD AUTO: 8 % — SIGNIFICANT CHANGE UP (ref 1.7–9.3)
MRSA PCR RESULT.: NEGATIVE — SIGNIFICANT CHANGE UP
NEUTROPHILS # BLD AUTO: 6.07 K/UL — SIGNIFICANT CHANGE UP (ref 1.4–6.5)
NEUTROPHILS NFR BLD AUTO: 72.5 % — SIGNIFICANT CHANGE UP (ref 42.2–75.2)
NITRITE UR-MCNC: NEGATIVE — SIGNIFICANT CHANGE UP
NRBC # BLD: 0 /100 WBCS — SIGNIFICANT CHANGE UP (ref 0–0)
PH UR: 6.5 — SIGNIFICANT CHANGE UP (ref 5–8)
PLATELET # BLD AUTO: 156 K/UL — SIGNIFICANT CHANGE UP (ref 130–400)
POTASSIUM SERPL-MCNC: 5.2 MMOL/L — HIGH (ref 3.5–5)
POTASSIUM SERPL-SCNC: 5.2 MMOL/L — HIGH (ref 3.5–5)
PROT SERPL-MCNC: 7 G/DL — SIGNIFICANT CHANGE UP (ref 6–8)
PROT UR-MCNC: NEGATIVE — SIGNIFICANT CHANGE UP
PROTHROM AB SERPL-ACNC: 32.7 SEC — HIGH (ref 9.95–12.87)
RBC # BLD: 5.37 M/UL — SIGNIFICANT CHANGE UP (ref 4.2–5.4)
RBC # FLD: 14.5 % — SIGNIFICANT CHANGE UP (ref 11.5–14.5)
RBC CASTS # UR COMP ASSIST: 2 /HPF — SIGNIFICANT CHANGE UP (ref 0–4)
SODIUM SERPL-SCNC: 141 MMOL/L — SIGNIFICANT CHANGE UP (ref 135–146)
SP GR SPEC: 1.01 — SIGNIFICANT CHANGE UP (ref 1.01–1.02)
UROBILINOGEN FLD QL: SIGNIFICANT CHANGE UP
WBC # BLD: 8.37 K/UL — SIGNIFICANT CHANGE UP (ref 4.8–10.8)
WBC # FLD AUTO: 8.37 K/UL — SIGNIFICANT CHANGE UP (ref 4.8–10.8)
WBC UR QL: 3 /HPF — SIGNIFICANT CHANGE UP (ref 0–5)

## 2019-10-09 PROCEDURE — 93010 ELECTROCARDIOGRAM REPORT: CPT

## 2019-10-09 NOTE — H&P PST ADULT - MALLAMPATI CLASS
Tdap Adacel 0 5ml   Lot [id number]  Exp 11/21/2020  NDC 14828-408-82  Left Deltoid      Rhogam Ultra-Filtered PLUS 300(1500IU)  Lot [id number]  Exp 03/02/2020  NDC 3170-0143-94  Right Buttock Class III - visualization of the soft palate and the base of the uvula

## 2019-10-09 NOTE — H&P PST ADULT - HISTORY OF PRESENT ILLNESS
84yr old female states " I am going to have battery change for my defibrillator". Denies c/o CP, PALP, SOB, URI, FEVER, RASH OR UTI SYMPTOMS. Exercise vaughn 1 flat block with cane -slowly

## 2019-10-10 ENCOUNTER — CLINICAL ADVICE (OUTPATIENT)
Age: 84
End: 2019-10-10

## 2019-10-10 LAB
CULTURE RESULTS: SIGNIFICANT CHANGE UP
SPECIMEN SOURCE: SIGNIFICANT CHANGE UP

## 2019-10-16 ENCOUNTER — OUTPATIENT (OUTPATIENT)
Dept: OUTPATIENT SERVICES | Facility: HOSPITAL | Age: 84
LOS: 1 days | Discharge: HOME | End: 2019-10-16

## 2019-10-16 DIAGNOSIS — I48.91 UNSPECIFIED ATRIAL FIBRILLATION: ICD-10-CM

## 2019-10-16 DIAGNOSIS — Z95.2 PRESENCE OF PROSTHETIC HEART VALVE: Chronic | ICD-10-CM

## 2019-10-16 DIAGNOSIS — Z79.01 LONG TERM (CURRENT) USE OF ANTICOAGULANTS: ICD-10-CM

## 2019-10-16 DIAGNOSIS — Z98.890 OTHER SPECIFIED POSTPROCEDURAL STATES: Chronic | ICD-10-CM

## 2019-10-16 PROBLEM — I50.9 HEART FAILURE, UNSPECIFIED: Chronic | Status: ACTIVE | Noted: 2019-10-09

## 2019-10-16 PROBLEM — I10 ESSENTIAL (PRIMARY) HYPERTENSION: Chronic | Status: ACTIVE | Noted: 2019-10-09

## 2019-10-16 LAB
POCT INR: 2.5 RATIO — HIGH (ref 0.9–1.2)
POCT PT: 29.9 SEC — HIGH (ref 10–13.4)

## 2019-10-18 ENCOUNTER — OUTPATIENT (OUTPATIENT)
Dept: OUTPATIENT SERVICES | Facility: HOSPITAL | Age: 84
LOS: 1 days | Discharge: HOME | End: 2019-10-18
Payer: MEDICARE

## 2019-10-18 VITALS
HEART RATE: 54 BPM | HEIGHT: 66 IN | RESPIRATION RATE: 16 BRPM | TEMPERATURE: 98 F | DIASTOLIC BLOOD PRESSURE: 80 MMHG | WEIGHT: 179.02 LBS | OXYGEN SATURATION: 100 % | SYSTOLIC BLOOD PRESSURE: 171 MMHG

## 2019-10-18 DIAGNOSIS — Z95.2 PRESENCE OF PROSTHETIC HEART VALVE: Chronic | ICD-10-CM

## 2019-10-18 DIAGNOSIS — Z98.890 OTHER SPECIFIED POSTPROCEDURAL STATES: Chronic | ICD-10-CM

## 2019-10-18 LAB
APTT BLD: 29.4 SEC — SIGNIFICANT CHANGE UP (ref 27–39.2)
INR BLD: 2.46 RATIO — HIGH (ref 0.65–1.3)
PROTHROM AB SERPL-ACNC: 28 SEC — HIGH (ref 9.95–12.87)

## 2019-10-18 PROCEDURE — 33263 RMVL & RPLCMT DFB GEN 2 LEAD: CPT

## 2019-10-18 RX ORDER — CEFAZOLIN SODIUM 1 G
2000 VIAL (EA) INJECTION ONCE
Refills: 0 | Status: DISCONTINUED | OUTPATIENT
Start: 2019-10-18 | End: 2019-11-04

## 2019-10-18 RX ORDER — CEPHALEXIN 500 MG
1 CAPSULE ORAL
Qty: 10 | Refills: 0
Start: 2019-10-18 | End: 2019-10-22

## 2019-10-18 RX ORDER — CEFAZOLIN SODIUM 1 G
1000 VIAL (EA) INJECTION ONCE
Refills: 0 | Status: DISCONTINUED | OUTPATIENT
Start: 2019-10-18 | End: 2019-11-04

## 2019-10-18 NOTE — PROGRESS NOTE ADULT - SUBJECTIVE AND OBJECTIVE BOX
Electrophysiology Brief Post-Op Note    I have personally seen and examined the patient.  I agree with the history and physical which I have reviewed and noted any changes below.  10-18-19 @ 8:30 AM    PRE-OP DIAGNOSIS: ICD at LISANDRO, NICM, paroxysmal AFib and MVR on coumadin    POST-OP DIAGNOSIS: ICD at LISANDRO, NICM, paroxysmal AFib and MVR on coumadin    PROCEDURE: DC ICD Generator Change    Physician: Charis Morgan MD  Assistant: None    ESTIMATED BLOOD LOSS:    5   mL    ANESTHESIA TYPE:  [  ] General Anesthesia  [ x ] Sedation  [ x ] Local/Regional    CONDITION  [  ] Critical  [  ] Serious  [  ] Fair  [ x ] Good      SPECIMENS REMOVED (IF APPLICABLE): old DC ICD Generator    IMPLANTS (IF APPLICABLE): DC ICD Generator (Medtronic)      PLAN OF CARE  - Keflex 500 mg PO BID x 5 days.  - No shower x 3 days.  - No lifting left arm above shoulder level x 1 month. No heavy lifting with left arm x 1 mo.  - No swimming or hot tubs x 1 month.  - May resume coumadin tonight  - Follow up with Dr. Matias in 3-4 weeks    Charis Morgan MD   Electrophysiology Attending

## 2019-10-18 NOTE — PRE-ANESTHESIA EVALUATION ADULT - NSANTHOSAYNRD_GEN_A_CORE
No. MARIA DE JESUS screening performed.  STOP BANG Legend: 0-2 = LOW Risk; 3-4 = INTERMEDIATE Risk; 5-8 = HIGH Risk

## 2019-10-18 NOTE — CHART NOTE - NSCHARTNOTEFT_GEN_A_CORE
Cardiac Electrophysiology Procedure Report    Date of procedure	10/18/2019  EP Attending	Charis Morgan MD  Anesthesiologist	Emeterio Wong MD  Referring Physician	Taurus Macdonald MD  Procedure	Generator Change of  DC ICD (Medtronic)    Indication: Generator at LISANDRO  85 yo F with history of NICM s/p DC ICD, paroxysmal AFib and MVR on coumadin with ICD now at LISANDRO presenting for DC ICD generator change.    EQUIPMENT AND BASELINE PARAMETERS  Pulse Generator Explanted   :    Medtronic	  Model Number:  [id number]  Serial Number:  [id number]   Implanted:          2013            Pulse Generator Implanted   :   Medtronic	  Model Number: [id number]   Serial Number:  [id number]  			  Right Atrial Lead  :     Medtronic      Model Number:	[id number]  Serial Number:    [id number]	  Date of implant:  2011 		  Location:	Right Atrial Appendage  Sensin.1 mV  Impedance:	    399 Ohms  Threshold:	0.75 V at 0.40 msec  			  Right Ventricular Lead  :    Medtronic     Model Number:  [id number]	  Serial Number:	[id number]  Date of implant: 	2013	  Location:	RV apical septum  Sensin.3 mV  Impedance:	    342 Ohms  RV Coil  68 Ohms  Threshold:	 1.0V at 0.40 msec      The patient was brought to the procedure room in a nonsedated and fasting state, having received preoperative antibiotics. Informed, written consent was obtained prior to the procedure. Conscious sedation was administered by the anesthesiologist. Blood pressure, oxygenation and level of comfort were stable throughout. The device was reprogrammed to MVP. Device was interrogated prior to procedure and patient was noted to have frequent episodes of AFib. The left shoulder region was cleaned and prepped with serial applications of Betadine and Chlorhexidine solution. Patient was then covered from head to toe with sterile drapes in the usual manner.     The left infraclavicular region was anesthetized with local anesthesia. An incision was made below the left clavicle along the prior incision site and the pocket was opened. The device was removed and the leads were detached. Bleeding points were cauterized. The visible portions of the leads had a normal appearance. The pocket was checked for hemostasis and then copiously irrigated with antibiotic solution. A new pulse generator was attached to the leads and appropriate function of the leads was verified through the device. The device and leads were placed into the pocket.     The wound margins were approximated well, without any tension or overlap. The wound was closed using an interrupted layer of 2-0 absorbable Vicryl suture for the deep fascial layer. This was followed by a continuous layer of 3-0 absorbable suture. The skin layer was approximated with Dermabond. Steri-strips were applied over this and a dry, sterile dressing was placed over this. A pressure dressing was placed over the dressing. Needle count was performed and found to be consistent at the end of the procedure. The patient was returned to a hospital room in stable condition.     COMPLICATIONS:  The patient tolerated the procedure well. There were no immediate complications.    CONCLUSIONS:  Successful generator change of DC ICD (Medtronic)    EBL:  5 cc       _______________________________  Charis Morgan MD  Cardiac Electrophysiology

## 2019-10-23 DIAGNOSIS — Z88.2 ALLERGY STATUS TO SULFONAMIDES: ICD-10-CM

## 2019-10-23 DIAGNOSIS — I10 ESSENTIAL (PRIMARY) HYPERTENSION: ICD-10-CM

## 2019-10-23 DIAGNOSIS — I48.0 PAROXYSMAL ATRIAL FIBRILLATION: ICD-10-CM

## 2019-10-23 DIAGNOSIS — Z79.82 LONG TERM (CURRENT) USE OF ASPIRIN: ICD-10-CM

## 2019-10-23 DIAGNOSIS — Z45.02 ENCOUNTER FOR ADJUSTMENT AND MANAGEMENT OF AUTOMATIC IMPLANTABLE CARDIAC DEFIBRILLATOR: ICD-10-CM

## 2019-10-23 DIAGNOSIS — Z95.2 PRESENCE OF PROSTHETIC HEART VALVE: ICD-10-CM

## 2019-10-23 DIAGNOSIS — I50.9 HEART FAILURE, UNSPECIFIED: ICD-10-CM

## 2019-10-23 DIAGNOSIS — Z79.01 LONG TERM (CURRENT) USE OF ANTICOAGULANTS: ICD-10-CM

## 2019-10-25 ENCOUNTER — OUTPATIENT (OUTPATIENT)
Dept: OUTPATIENT SERVICES | Facility: HOSPITAL | Age: 84
LOS: 1 days | Discharge: HOME | End: 2019-10-25

## 2019-10-25 DIAGNOSIS — Z79.01 LONG TERM (CURRENT) USE OF ANTICOAGULANTS: ICD-10-CM

## 2019-10-25 DIAGNOSIS — Z95.2 PRESENCE OF PROSTHETIC HEART VALVE: Chronic | ICD-10-CM

## 2019-10-25 DIAGNOSIS — I48.91 UNSPECIFIED ATRIAL FIBRILLATION: ICD-10-CM

## 2019-10-25 DIAGNOSIS — Z98.890 OTHER SPECIFIED POSTPROCEDURAL STATES: Chronic | ICD-10-CM

## 2019-10-25 LAB
POCT INR: 2.8 RATIO — HIGH (ref 0.9–1.2)
POCT PT: 34 SEC — HIGH (ref 10–13.4)

## 2019-11-11 ENCOUNTER — OUTPATIENT (OUTPATIENT)
Dept: OUTPATIENT SERVICES | Facility: HOSPITAL | Age: 84
LOS: 1 days | Discharge: HOME | End: 2019-11-11

## 2019-11-11 DIAGNOSIS — I48.91 UNSPECIFIED ATRIAL FIBRILLATION: ICD-10-CM

## 2019-11-11 DIAGNOSIS — Z98.890 OTHER SPECIFIED POSTPROCEDURAL STATES: Chronic | ICD-10-CM

## 2019-11-11 DIAGNOSIS — Z79.01 LONG TERM (CURRENT) USE OF ANTICOAGULANTS: ICD-10-CM

## 2019-11-11 DIAGNOSIS — Z95.2 PRESENCE OF PROSTHETIC HEART VALVE: Chronic | ICD-10-CM

## 2019-11-11 LAB
POCT INR: 3.2 RATIO — HIGH (ref 0.9–1.2)
POCT PT: 38.2 SEC — HIGH (ref 10–13.4)

## 2019-11-14 ENCOUNTER — APPOINTMENT (OUTPATIENT)
Dept: CARDIOLOGY | Facility: CLINIC | Age: 84
End: 2019-11-14
Payer: MEDICARE

## 2019-11-14 VITALS
DIASTOLIC BLOOD PRESSURE: 60 MMHG | HEIGHT: 66 IN | BODY MASS INDEX: 29.89 KG/M2 | WEIGHT: 186 LBS | SYSTOLIC BLOOD PRESSURE: 110 MMHG | HEART RATE: 55 BPM

## 2019-11-14 DIAGNOSIS — I10 ESSENTIAL (PRIMARY) HYPERTENSION: ICD-10-CM

## 2019-11-14 PROCEDURE — 99213 OFFICE O/P EST LOW 20 MIN: CPT

## 2019-11-14 PROCEDURE — 93280 PM DEVICE PROGR EVAL DUAL: CPT

## 2019-11-14 RX ORDER — FLUTICASONE PROPIONATE 50 UG/1
50 SPRAY, METERED NASAL
Qty: 16 | Refills: 0 | Status: COMPLETED | COMMUNITY
Start: 2017-03-17 | End: 2019-11-14

## 2019-11-14 NOTE — PROCEDURE
[No] : not [Sinus Bradycardia] : sinus bradycardia [ICD] : Implantable cardioverter-defibrillator [DDD] : DDD [Longevity: ___ months] : The estimated remaining battery life is [unfilled] months [Normal] : The battery status is normal. [Threshold Testing Performed] : Threshold testing was performed [Atrial] : Atrial [Ventricular] : Ventricular [Lead Imp:  ___ohms] : lead impedance was [unfilled] ohms [Sensing Amplitude ___mv] : sensing amplitude was [unfilled] mv [___V @] : [unfilled] V [___ ms] : [unfilled] ms [Programmed for Longevity] : output reprogrammed for improved battery longevity [Counters Reset] : the counters were reset [Asense-Vsense ___ %] : Asense-Vsense [unfilled]% [Asense-Vpace ___ %] : Asense-Vpace [unfilled]% [Apace-Vsense ___ %] : Apace-Vsense [unfilled]% [Apace-Vpace ___ %] : Apace-Vpace [unfilled]% [de-identified] : Timo MRI XT DR [de-identified] : Medtronic [de-identified] : 10/19/2019 [de-identified] : [id number] [de-identified] : w/ MVP [de-identified] :  [de-identified] : No recorded events. Normal device function.

## 2019-11-14 NOTE — HISTORY OF PRESENT ILLNESS
[None] : The patient complains of no symptoms [Palpitations] : no palpitations [SOB] : no dyspnea [Syncope] : no syncope [Swelling at Site] : no swelling at device site [Erythema at Site] : no erythema at device site [de-identified] : Patient came for routine follow-up.  Patient has no complaints. His device is working fine.

## 2019-11-14 NOTE — PHYSICAL EXAM
[General Appearance - Well Developed] : well developed [Normal Appearance] : normal appearance [No Deformities] : no deformities [Well Groomed] : well groomed [General Appearance - Well Nourished] : well nourished [Heart Rate And Rhythm] : heart rate and rhythm were normal [Heart Sounds] : normal S1 and S2 [General Appearance - In No Acute Distress] : no acute distress [Murmurs] : no murmurs present [Exaggerated Use Of Accessory Muscles For Inspiration] : no accessory muscle use [Respiration, Rhythm And Depth] : normal respiratory rhythm and effort [Auscultation Breath Sounds / Voice Sounds] : lungs were clear to auscultation bilaterally [Dry] : dry [Clean] : clean [Abdomen Tenderness] : non-tender [Abdomen Soft] : soft [Well-Healed] : well-healed [Abdomen Mass (___ Cm)] : no abdominal mass palpated [Nail Clubbing] : no clubbing of the fingernails [Cyanosis, Localized] : no localized cyanosis [] : no ischemic changes [Petechial Hemorrhages (___cm)] : no petechial hemorrhages

## 2019-11-22 ENCOUNTER — OUTPATIENT (OUTPATIENT)
Dept: OUTPATIENT SERVICES | Facility: HOSPITAL | Age: 84
LOS: 1 days | Discharge: HOME | End: 2019-11-22

## 2019-11-22 DIAGNOSIS — Z98.890 OTHER SPECIFIED POSTPROCEDURAL STATES: Chronic | ICD-10-CM

## 2019-11-22 DIAGNOSIS — I10 ESSENTIAL (PRIMARY) HYPERTENSION: ICD-10-CM

## 2019-11-22 DIAGNOSIS — I50.22 CHRONIC SYSTOLIC (CONGESTIVE) HEART FAILURE: ICD-10-CM

## 2019-11-22 DIAGNOSIS — Z95.2 PRESENCE OF PROSTHETIC HEART VALVE: Chronic | ICD-10-CM

## 2019-11-22 DIAGNOSIS — E78.5 HYPERLIPIDEMIA, UNSPECIFIED: ICD-10-CM

## 2019-11-25 ENCOUNTER — OUTPATIENT (OUTPATIENT)
Dept: OUTPATIENT SERVICES | Facility: HOSPITAL | Age: 84
LOS: 1 days | Discharge: HOME | End: 2019-11-25

## 2019-11-25 DIAGNOSIS — Z95.2 PRESENCE OF PROSTHETIC HEART VALVE: Chronic | ICD-10-CM

## 2019-11-25 DIAGNOSIS — Z98.890 OTHER SPECIFIED POSTPROCEDURAL STATES: Chronic | ICD-10-CM

## 2019-11-25 DIAGNOSIS — I48.91 UNSPECIFIED ATRIAL FIBRILLATION: ICD-10-CM

## 2019-11-25 DIAGNOSIS — Z79.01 LONG TERM (CURRENT) USE OF ANTICOAGULANTS: ICD-10-CM

## 2019-11-25 LAB
POCT INR: 2.6 RATIO — HIGH (ref 0.9–1.2)
POCT PT: 31.4 SEC — HIGH (ref 10–13.4)

## 2019-12-16 ENCOUNTER — OUTPATIENT (OUTPATIENT)
Dept: OUTPATIENT SERVICES | Facility: HOSPITAL | Age: 84
LOS: 1 days | Discharge: HOME | End: 2019-12-16

## 2019-12-16 DIAGNOSIS — Z79.01 LONG TERM (CURRENT) USE OF ANTICOAGULANTS: ICD-10-CM

## 2019-12-16 DIAGNOSIS — Z98.890 OTHER SPECIFIED POSTPROCEDURAL STATES: Chronic | ICD-10-CM

## 2019-12-16 DIAGNOSIS — Z95.2 PRESENCE OF PROSTHETIC HEART VALVE: Chronic | ICD-10-CM

## 2019-12-16 DIAGNOSIS — I48.91 UNSPECIFIED ATRIAL FIBRILLATION: ICD-10-CM

## 2019-12-16 LAB
INR PPP: 2.6 RATIO
POCT INR: 2.6 RATIO — HIGH (ref 0.9–1.2)
POCT PT: 31.5 SEC — HIGH (ref 10–13.4)
POCT-PROTHROMBIN TIME: 31.5 SECS

## 2020-01-13 ENCOUNTER — OUTPATIENT (OUTPATIENT)
Dept: OUTPATIENT SERVICES | Facility: HOSPITAL | Age: 85
LOS: 1 days | Discharge: HOME | End: 2020-01-13

## 2020-01-13 DIAGNOSIS — Z98.890 OTHER SPECIFIED POSTPROCEDURAL STATES: Chronic | ICD-10-CM

## 2020-01-13 DIAGNOSIS — I48.91 UNSPECIFIED ATRIAL FIBRILLATION: ICD-10-CM

## 2020-01-13 DIAGNOSIS — Z79.01 LONG TERM (CURRENT) USE OF ANTICOAGULANTS: ICD-10-CM

## 2020-01-13 DIAGNOSIS — Z95.2 PRESENCE OF PROSTHETIC HEART VALVE: Chronic | ICD-10-CM

## 2020-01-13 LAB
INR PPP: 2.1 RATIO
POCT INR: 2.1 RATIO — HIGH (ref 0.9–1.2)
POCT PT: 25.1 SEC — HIGH (ref 10–13.4)
POCT-PROTHROMBIN TIME: 25.1 SECS

## 2020-02-10 ENCOUNTER — OUTPATIENT (OUTPATIENT)
Dept: OUTPATIENT SERVICES | Facility: HOSPITAL | Age: 85
LOS: 1 days | Discharge: HOME | End: 2020-02-10

## 2020-02-10 DIAGNOSIS — Z95.2 PRESENCE OF PROSTHETIC HEART VALVE: Chronic | ICD-10-CM

## 2020-02-10 DIAGNOSIS — Z98.890 OTHER SPECIFIED POSTPROCEDURAL STATES: Chronic | ICD-10-CM

## 2020-02-10 DIAGNOSIS — I48.91 UNSPECIFIED ATRIAL FIBRILLATION: ICD-10-CM

## 2020-02-10 DIAGNOSIS — Z79.01 LONG TERM (CURRENT) USE OF ANTICOAGULANTS: ICD-10-CM

## 2020-02-10 LAB
POCT INR: 2 RATIO — HIGH (ref 0.9–1.2)
POCT PT: 24.5 SEC — HIGH (ref 10–13.4)

## 2020-02-11 LAB
INR PPP: 2 RATIO
POCT-PROTHROMBIN TIME: 24.5 SECS

## 2020-02-13 ENCOUNTER — APPOINTMENT (OUTPATIENT)
Dept: CARDIOLOGY | Facility: CLINIC | Age: 85
End: 2020-02-13
Payer: MEDICARE

## 2020-02-13 PROCEDURE — 93296 REM INTERROG EVL PM/IDS: CPT

## 2020-02-13 PROCEDURE — 93295 DEV INTERROG REMOTE 1/2/MLT: CPT

## 2020-03-09 ENCOUNTER — OUTPATIENT (OUTPATIENT)
Dept: OUTPATIENT SERVICES | Facility: HOSPITAL | Age: 85
LOS: 1 days | Discharge: HOME | End: 2020-03-09

## 2020-03-09 DIAGNOSIS — Z98.890 OTHER SPECIFIED POSTPROCEDURAL STATES: Chronic | ICD-10-CM

## 2020-03-09 DIAGNOSIS — I48.91 UNSPECIFIED ATRIAL FIBRILLATION: ICD-10-CM

## 2020-03-09 DIAGNOSIS — Z95.2 PRESENCE OF PROSTHETIC HEART VALVE: Chronic | ICD-10-CM

## 2020-03-09 DIAGNOSIS — Z79.01 LONG TERM (CURRENT) USE OF ANTICOAGULANTS: ICD-10-CM

## 2020-03-09 LAB
POCT INR: 2.2 RATIO — HIGH (ref 0.9–1.2)
POCT PT: 25.8 SEC — HIGH (ref 10–13.4)

## 2020-03-12 LAB
INR PPP: 2.2 RATIO
POCT-PROTHROMBIN TIME: 25.8 SECS
QUALITY CONTROL: YES

## 2020-04-14 ENCOUNTER — OUTPATIENT (OUTPATIENT)
Dept: OUTPATIENT SERVICES | Facility: HOSPITAL | Age: 85
LOS: 1 days | Discharge: HOME | End: 2020-04-14

## 2020-04-14 DIAGNOSIS — Z98.890 OTHER SPECIFIED POSTPROCEDURAL STATES: Chronic | ICD-10-CM

## 2020-04-14 DIAGNOSIS — Z79.01 LONG TERM (CURRENT) USE OF ANTICOAGULANTS: ICD-10-CM

## 2020-04-14 DIAGNOSIS — I48.91 UNSPECIFIED ATRIAL FIBRILLATION: ICD-10-CM

## 2020-04-14 DIAGNOSIS — Z95.2 PRESENCE OF PROSTHETIC HEART VALVE: Chronic | ICD-10-CM

## 2020-04-14 LAB
INR PPP: 2.3 RATIO
POCT INR: 2.3 RATIO — HIGH (ref 0.9–1.2)
POCT PT: 27.4 SEC — HIGH (ref 10–13.4)
POCT-PROTHROMBIN TIME: 27.4 SECS
QUALITY CONTROL: YES

## 2020-05-14 ENCOUNTER — APPOINTMENT (OUTPATIENT)
Dept: CARDIOLOGY | Facility: CLINIC | Age: 85
End: 2020-05-14
Payer: MEDICARE

## 2020-05-14 PROCEDURE — 93296 REM INTERROG EVL PM/IDS: CPT

## 2020-05-14 PROCEDURE — 93295 DEV INTERROG REMOTE 1/2/MLT: CPT

## 2020-05-26 ENCOUNTER — OUTPATIENT (OUTPATIENT)
Dept: OUTPATIENT SERVICES | Facility: HOSPITAL | Age: 85
LOS: 1 days | Discharge: HOME | End: 2020-05-26

## 2020-05-26 DIAGNOSIS — I48.91 UNSPECIFIED ATRIAL FIBRILLATION: ICD-10-CM

## 2020-05-26 DIAGNOSIS — Z79.01 LONG TERM (CURRENT) USE OF ANTICOAGULANTS: ICD-10-CM

## 2020-05-26 DIAGNOSIS — Z95.2 PRESENCE OF PROSTHETIC HEART VALVE: Chronic | ICD-10-CM

## 2020-05-26 DIAGNOSIS — Z98.890 OTHER SPECIFIED POSTPROCEDURAL STATES: Chronic | ICD-10-CM

## 2020-05-26 LAB
POCT INR: 2.7 RATIO — HIGH (ref 0.9–1.2)
POCT PT: 32.3 SEC — HIGH (ref 10–13.4)

## 2020-05-28 LAB
INR PPP: 2.7 RATIO
POCT-PROTHROMBIN TIME: 32.3 SECS
QUALITY CONTROL: YES

## 2020-06-10 ENCOUNTER — OUTPATIENT (OUTPATIENT)
Dept: OUTPATIENT SERVICES | Facility: HOSPITAL | Age: 85
LOS: 1 days | Discharge: HOME | End: 2020-06-10
Payer: MEDICARE

## 2020-06-10 DIAGNOSIS — Z98.890 OTHER SPECIFIED POSTPROCEDURAL STATES: Chronic | ICD-10-CM

## 2020-06-10 DIAGNOSIS — Z95.2 PRESENCE OF PROSTHETIC HEART VALVE: Chronic | ICD-10-CM

## 2020-06-10 DIAGNOSIS — Z12.31 ENCOUNTER FOR SCREENING MAMMOGRAM FOR MALIGNANT NEOPLASM OF BREAST: ICD-10-CM

## 2020-06-10 PROCEDURE — 77063 BREAST TOMOSYNTHESIS BI: CPT | Mod: 26

## 2020-06-10 PROCEDURE — 77067 SCR MAMMO BI INCL CAD: CPT | Mod: 26

## 2020-06-30 ENCOUNTER — OUTPATIENT (OUTPATIENT)
Dept: OUTPATIENT SERVICES | Facility: HOSPITAL | Age: 85
LOS: 1 days | Discharge: HOME | End: 2020-06-30

## 2020-06-30 DIAGNOSIS — Z98.890 OTHER SPECIFIED POSTPROCEDURAL STATES: Chronic | ICD-10-CM

## 2020-06-30 DIAGNOSIS — Z95.2 PRESENCE OF PROSTHETIC HEART VALVE: Chronic | ICD-10-CM

## 2020-06-30 DIAGNOSIS — I48.91 UNSPECIFIED ATRIAL FIBRILLATION: ICD-10-CM

## 2020-06-30 DIAGNOSIS — Z79.01 LONG TERM (CURRENT) USE OF ANTICOAGULANTS: ICD-10-CM

## 2020-06-30 LAB
INR PPP: 2.8 RATIO
POCT INR: 2.8 RATIO — HIGH (ref 0.9–1.2)
POCT PT: 34 SEC — HIGH (ref 10–13.4)
POCT-PROTHROMBIN TIME: 34 SECS
QUALITY CONTROL: YES

## 2020-08-05 DIAGNOSIS — H26.9 UNSPECIFIED CATARACT: ICD-10-CM

## 2020-08-05 DIAGNOSIS — I48.0 PAROXYSMAL ATRIAL FIBRILLATION: ICD-10-CM

## 2020-08-05 DIAGNOSIS — Z87.01 PERSONAL HISTORY OF PNEUMONIA (RECURRENT): ICD-10-CM

## 2020-08-05 DIAGNOSIS — Z86.73 PERSONAL HISTORY OF TRANSIENT ISCHEMIC ATTACK (TIA), AND CEREBRAL INFARCTION W/OUT RESIDUAL DEFICITS: ICD-10-CM

## 2020-08-05 DIAGNOSIS — Z95.0 PRESENCE OF CARDIAC PACEMAKER: ICD-10-CM

## 2020-08-05 DIAGNOSIS — Z86.79 PERSONAL HISTORY OF OTHER DISEASES OF THE CIRCULATORY SYSTEM: ICD-10-CM

## 2020-08-05 DIAGNOSIS — Z87.19 PERSONAL HISTORY OF OTHER DISEASES OF THE DIGESTIVE SYSTEM: ICD-10-CM

## 2020-08-10 ENCOUNTER — RESULT CHARGE (OUTPATIENT)
Age: 85
End: 2020-08-10

## 2020-08-10 ENCOUNTER — APPOINTMENT (OUTPATIENT)
Dept: MEDICATION MANAGEMENT | Facility: CLINIC | Age: 85
End: 2020-08-10

## 2020-08-10 ENCOUNTER — OUTPATIENT (OUTPATIENT)
Dept: OUTPATIENT SERVICES | Facility: HOSPITAL | Age: 85
LOS: 1 days | Discharge: HOME | End: 2020-08-10

## 2020-08-10 VITALS — HEART RATE: 60 BPM | RESPIRATION RATE: 16 BRPM | OXYGEN SATURATION: 98 %

## 2020-08-10 DIAGNOSIS — Z79.01 ENCOUNTER FOR THERAPEUTIC DRUG LVL MONITORING: ICD-10-CM

## 2020-08-10 DIAGNOSIS — Z51.81 ENCOUNTER FOR THERAPEUTIC DRUG LVL MONITORING: ICD-10-CM

## 2020-08-10 DIAGNOSIS — I48.91 UNSPECIFIED ATRIAL FIBRILLATION: ICD-10-CM

## 2020-08-10 DIAGNOSIS — Z79.01 LONG TERM (CURRENT) USE OF ANTICOAGULANTS: ICD-10-CM

## 2020-08-10 DIAGNOSIS — Z98.890 OTHER SPECIFIED POSTPROCEDURAL STATES: Chronic | ICD-10-CM

## 2020-08-10 DIAGNOSIS — Z95.2 PRESENCE OF PROSTHETIC HEART VALVE: Chronic | ICD-10-CM

## 2020-08-10 LAB
INR PPP: 2.9 RATIO
POCT-PROTHROMBIN TIME: 34.9 SECS
QUALITY CONTROL: YES

## 2020-08-13 ENCOUNTER — APPOINTMENT (OUTPATIENT)
Dept: CARDIOLOGY | Facility: CLINIC | Age: 85
End: 2020-08-13
Payer: MEDICARE

## 2020-08-13 PROCEDURE — 93295 DEV INTERROG REMOTE 1/2/MLT: CPT

## 2020-08-13 PROCEDURE — 93296 REM INTERROG EVL PM/IDS: CPT

## 2020-09-14 ENCOUNTER — OUTPATIENT (OUTPATIENT)
Dept: OUTPATIENT SERVICES | Facility: HOSPITAL | Age: 85
LOS: 1 days | Discharge: HOME | End: 2020-09-14

## 2020-09-14 ENCOUNTER — APPOINTMENT (OUTPATIENT)
Dept: MEDICATION MANAGEMENT | Facility: CLINIC | Age: 85
End: 2020-09-14

## 2020-09-14 VITALS — OXYGEN SATURATION: 98 % | HEART RATE: 55 BPM | RESPIRATION RATE: 18 BRPM

## 2020-09-14 DIAGNOSIS — Z98.890 OTHER SPECIFIED POSTPROCEDURAL STATES: Chronic | ICD-10-CM

## 2020-09-14 DIAGNOSIS — Z79.01 LONG TERM (CURRENT) USE OF ANTICOAGULANTS: ICD-10-CM

## 2020-09-14 DIAGNOSIS — I48.91 UNSPECIFIED ATRIAL FIBRILLATION: ICD-10-CM

## 2020-09-14 DIAGNOSIS — Z95.2 PRESENCE OF PROSTHETIC HEART VALVE: Chronic | ICD-10-CM

## 2020-09-14 LAB
INR PPP: 2.6 RATIO
POCT-PROTHROMBIN TIME: 31.4 SECS
QUALITY CONTROL: YES

## 2020-09-17 ENCOUNTER — APPOINTMENT (OUTPATIENT)
Dept: CARDIOLOGY | Facility: CLINIC | Age: 85
End: 2020-09-17
Payer: MEDICARE

## 2020-09-17 VITALS
WEIGHT: 185 LBS | BODY MASS INDEX: 29.73 KG/M2 | TEMPERATURE: 98.1 F | SYSTOLIC BLOOD PRESSURE: 120 MMHG | DIASTOLIC BLOOD PRESSURE: 60 MMHG | HEIGHT: 66 IN

## 2020-09-17 PROCEDURE — 93283 PRGRMG EVAL IMPLANTABLE DFB: CPT

## 2020-09-17 PROCEDURE — 93290 INTERROG DEV EVAL ICPMS IP: CPT

## 2020-09-17 PROCEDURE — 99213 OFFICE O/P EST LOW 20 MIN: CPT

## 2020-09-17 NOTE — PROCEDURE
[No] : not [Sinus Bradycardia] : sinus bradycardia [See Scanned Paceart Report] : See scanned paceart report [See Device Printout] : See device printout [ICD] : Implantable cardioverter-defibrillator [DDD] : DDD [Normal] : The battery status is normal. [Threshold Testing Performed] : Threshold testing was performed [Atrial] : Atrial [Ventricular] : Ventricular [Programmed for Longevity] : output reprogrammed for improved battery longevity [Counters Reset] : the counters were reset [Asense-Vsense ___ %] : Asense-Vsense [unfilled]% [Asense-Vpace ___ %] : Asense-Vpace [unfilled]% [Apace-Vsense ___ %] : Apace-Vsense [unfilled]% [Apace-Vpace ___ %] : Apace-Vpace [unfilled]% [de-identified] : Medtronic [de-identified] : Timo MRI XT DR [de-identified] : [id number] [de-identified] : 10/19/2019 [de-identified] : w/ MVP [de-identified] :  [de-identified] : No recorded events. Normal device function.

## 2020-09-17 NOTE — HISTORY OF PRESENT ILLNESS
[None] : The patient complains of no symptoms [Palpitations] : no palpitations [SOB] : no dyspnea [Syncope] : no syncope [Erythema at Site] : no erythema at device site [Swelling at Site] : no swelling at device site [de-identified] : Patient came for routine follow-up.  Patient has no complaints. His device is working fine.

## 2020-10-05 DIAGNOSIS — Z95.2 PRESENCE OF PROSTHETIC HEART VALVE: ICD-10-CM

## 2020-10-05 DIAGNOSIS — G45.9 TRANSIENT CEREBRAL ISCHEMIC ATTACK, UNSPECIFIED: ICD-10-CM

## 2020-10-19 ENCOUNTER — APPOINTMENT (OUTPATIENT)
Dept: MEDICATION MANAGEMENT | Facility: CLINIC | Age: 85
End: 2020-10-19

## 2020-10-19 ENCOUNTER — OUTPATIENT (OUTPATIENT)
Dept: OUTPATIENT SERVICES | Facility: HOSPITAL | Age: 85
LOS: 1 days | Discharge: HOME | End: 2020-10-19

## 2020-10-19 VITALS — RESPIRATION RATE: 14 BRPM | HEART RATE: 56 BPM | OXYGEN SATURATION: 98 %

## 2020-10-19 DIAGNOSIS — Z95.2 PRESENCE OF PROSTHETIC HEART VALVE: Chronic | ICD-10-CM

## 2020-10-19 DIAGNOSIS — I48.91 UNSPECIFIED ATRIAL FIBRILLATION: ICD-10-CM

## 2020-10-19 DIAGNOSIS — Z98.890 OTHER SPECIFIED POSTPROCEDURAL STATES: Chronic | ICD-10-CM

## 2020-10-19 DIAGNOSIS — Z79.01 LONG TERM (CURRENT) USE OF ANTICOAGULANTS: ICD-10-CM

## 2020-10-19 LAB
INR PPP: 2.4 RATIO
POCT-PROTHROMBIN TIME: 29.1 SECS
QUALITY CONTROL: YES

## 2020-11-30 ENCOUNTER — APPOINTMENT (OUTPATIENT)
Dept: MEDICATION MANAGEMENT | Facility: CLINIC | Age: 85
End: 2020-11-30

## 2020-12-17 ENCOUNTER — APPOINTMENT (OUTPATIENT)
Dept: CARDIOLOGY | Facility: CLINIC | Age: 85
End: 2020-12-17
Payer: MEDICARE

## 2020-12-17 PROCEDURE — 93295 DEV INTERROG REMOTE 1/2/MLT: CPT

## 2020-12-17 PROCEDURE — 93296 REM INTERROG EVL PM/IDS: CPT

## 2020-12-18 ENCOUNTER — NON-APPOINTMENT (OUTPATIENT)
Age: 85
End: 2020-12-18

## 2020-12-18 RX ORDER — METOPROLOL TARTRATE 50 MG/1
50 TABLET, FILM COATED ORAL DAILY
Refills: 0 | Status: DISCONTINUED | COMMUNITY
End: 2020-12-18

## 2020-12-23 ENCOUNTER — NON-APPOINTMENT (OUTPATIENT)
Age: 85
End: 2020-12-23

## 2020-12-28 ENCOUNTER — NON-APPOINTMENT (OUTPATIENT)
Age: 85
End: 2020-12-28

## 2021-01-19 ENCOUNTER — NON-APPOINTMENT (OUTPATIENT)
Age: 86
End: 2021-01-19

## 2021-03-17 ENCOUNTER — NON-APPOINTMENT (OUTPATIENT)
Age: 86
End: 2021-03-17

## 2021-03-17 ENCOUNTER — APPOINTMENT (OUTPATIENT)
Dept: CARDIOLOGY | Facility: CLINIC | Age: 86
End: 2021-03-17
Payer: MEDICARE

## 2021-03-17 PROCEDURE — 93296 REM INTERROG EVL PM/IDS: CPT

## 2021-03-17 PROCEDURE — 93295 DEV INTERROG REMOTE 1/2/MLT: CPT

## 2021-06-16 ENCOUNTER — OUTPATIENT (OUTPATIENT)
Dept: OUTPATIENT SERVICES | Facility: HOSPITAL | Age: 86
LOS: 1 days | Discharge: HOME | End: 2021-06-16
Payer: MEDICARE

## 2021-06-16 DIAGNOSIS — Z12.31 ENCOUNTER FOR SCREENING MAMMOGRAM FOR MALIGNANT NEOPLASM OF BREAST: ICD-10-CM

## 2021-06-16 DIAGNOSIS — Z98.890 OTHER SPECIFIED POSTPROCEDURAL STATES: Chronic | ICD-10-CM

## 2021-06-16 DIAGNOSIS — Z95.2 PRESENCE OF PROSTHETIC HEART VALVE: Chronic | ICD-10-CM

## 2021-06-16 PROCEDURE — 77067 SCR MAMMO BI INCL CAD: CPT | Mod: 26

## 2021-06-16 PROCEDURE — 77063 BREAST TOMOSYNTHESIS BI: CPT | Mod: 26

## 2021-06-24 ENCOUNTER — APPOINTMENT (OUTPATIENT)
Dept: CARDIOLOGY | Facility: CLINIC | Age: 86
End: 2021-06-24
Payer: MEDICARE

## 2021-06-24 VITALS
HEIGHT: 66 IN | DIASTOLIC BLOOD PRESSURE: 72 MMHG | BODY MASS INDEX: 29.73 KG/M2 | HEART RATE: 65 BPM | WEIGHT: 185 LBS | SYSTOLIC BLOOD PRESSURE: 116 MMHG | TEMPERATURE: 97.3 F

## 2021-06-24 PROCEDURE — 93284 PRGRMG EVAL IMPLANTABLE DFB: CPT

## 2021-06-24 PROCEDURE — 93290 INTERROG DEV EVAL ICPMS IP: CPT

## 2021-06-24 RX ORDER — ASPIRIN 81 MG/1
81 TABLET, COATED ORAL
Refills: 0 | Status: COMPLETED | COMMUNITY
End: 2021-06-24

## 2021-06-24 RX ORDER — ERGOCALCIFEROL (VITAMIN D2) 1250 MCG
50000 CAPSULE ORAL
Refills: 0 | Status: COMPLETED | COMMUNITY
End: 2021-06-24

## 2021-06-24 RX ORDER — FUROSEMIDE 40 MG/1
40 TABLET ORAL
Refills: 0 | Status: COMPLETED | COMMUNITY
End: 2021-06-24

## 2021-06-24 RX ORDER — ASPIRIN 325 MG/1
325 TABLET, FILM COATED ORAL
Refills: 0 | Status: COMPLETED | COMMUNITY
End: 2021-06-24

## 2021-06-24 NOTE — HISTORY OF PRESENT ILLNESS
[None] : The patient complains of no symptoms [Palpitations] : no palpitations [SOB] : no dyspnea [Syncope] : no syncope [Erythema at Site] : no erythema at device site [Swelling at Site] : no swelling at device site [de-identified] : Patient came for routine follow-up.  Patient has no complaints. His device is working fine.

## 2021-06-24 NOTE — PROCEDURE
[No] : not [Sinus Bradycardia] : sinus bradycardia [See Scanned Paceart Report] : See scanned paceart report [See Device Printout] : See device printout [ICD] : Implantable cardioverter-defibrillator [DDD] : DDD [Threshold Testing Performed] : Threshold testing was performed [Atrial] : Atrial [Ventricular] : Ventricular [Programmed for Longevity] : output reprogrammed for improved battery longevity [Counters Reset] : the counters were reset [Asense-Vsense ___ %] : Asense-Vsense [unfilled]% [Asense-Vpace ___ %] : Asense-Vpace [unfilled]% [Apace-Vsense ___ %] : Apace-Vsense [unfilled]% [Apace-Vpace ___ %] : Apace-Vpace [unfilled]% [de-identified] : Medtronic [de-identified] : Timo MRI XT DR [de-identified] : [id number] [de-identified] : 10/19/2019 [de-identified] :  [de-identified] : w/ MVP [de-identified] : No recorded events. Normal device function.

## 2021-09-24 ENCOUNTER — APPOINTMENT (OUTPATIENT)
Dept: CARDIOLOGY | Facility: CLINIC | Age: 86
End: 2021-09-24
Payer: MEDICARE

## 2021-09-24 ENCOUNTER — NON-APPOINTMENT (OUTPATIENT)
Age: 86
End: 2021-09-24

## 2021-09-24 PROCEDURE — 93295 DEV INTERROG REMOTE 1/2/MLT: CPT

## 2021-09-24 PROCEDURE — 93296 REM INTERROG EVL PM/IDS: CPT

## 2021-12-27 ENCOUNTER — NON-APPOINTMENT (OUTPATIENT)
Age: 86
End: 2021-12-27

## 2021-12-27 ENCOUNTER — APPOINTMENT (OUTPATIENT)
Dept: CARDIOLOGY | Facility: CLINIC | Age: 86
End: 2021-12-27
Payer: MEDICARE

## 2021-12-27 PROCEDURE — 93296 REM INTERROG EVL PM/IDS: CPT

## 2021-12-27 PROCEDURE — 93295 DEV INTERROG REMOTE 1/2/MLT: CPT

## 2022-03-28 ENCOUNTER — NON-APPOINTMENT (OUTPATIENT)
Age: 87
End: 2022-03-28

## 2022-03-28 ENCOUNTER — APPOINTMENT (OUTPATIENT)
Dept: CARDIOLOGY | Facility: CLINIC | Age: 87
End: 2022-03-28
Payer: MEDICARE

## 2022-03-28 PROCEDURE — 93295 DEV INTERROG REMOTE 1/2/MLT: CPT

## 2022-03-28 PROCEDURE — 93296 REM INTERROG EVL PM/IDS: CPT

## 2022-06-17 ENCOUNTER — OUTPATIENT (OUTPATIENT)
Dept: OUTPATIENT SERVICES | Facility: HOSPITAL | Age: 87
LOS: 1 days | Discharge: HOME | End: 2022-06-17
Payer: MEDICARE

## 2022-06-17 DIAGNOSIS — Z12.31 ENCOUNTER FOR SCREENING MAMMOGRAM FOR MALIGNANT NEOPLASM OF BREAST: ICD-10-CM

## 2022-06-17 DIAGNOSIS — Z98.890 OTHER SPECIFIED POSTPROCEDURAL STATES: Chronic | ICD-10-CM

## 2022-06-17 DIAGNOSIS — Z95.2 PRESENCE OF PROSTHETIC HEART VALVE: Chronic | ICD-10-CM

## 2022-06-17 PROCEDURE — 77067 SCR MAMMO BI INCL CAD: CPT | Mod: 26

## 2022-06-17 PROCEDURE — 77063 BREAST TOMOSYNTHESIS BI: CPT | Mod: 26

## 2022-06-23 ENCOUNTER — APPOINTMENT (OUTPATIENT)
Dept: CARDIOLOGY | Facility: CLINIC | Age: 87
End: 2022-06-23
Payer: MEDICARE

## 2022-06-23 VITALS
TEMPERATURE: 97.8 F | DIASTOLIC BLOOD PRESSURE: 70 MMHG | HEIGHT: 66 IN | SYSTOLIC BLOOD PRESSURE: 125 MMHG | HEART RATE: 64 BPM | WEIGHT: 183 LBS | BODY MASS INDEX: 29.41 KG/M2

## 2022-06-23 PROCEDURE — 99214 OFFICE O/P EST MOD 30 MIN: CPT

## 2022-06-23 PROCEDURE — 93284 PRGRMG EVAL IMPLANTABLE DFB: CPT

## 2022-06-23 PROCEDURE — 93290 INTERROG DEV EVAL ICPMS IP: CPT

## 2022-06-23 RX ORDER — LOSARTAN POTASSIUM 50 MG/1
50 TABLET, FILM COATED ORAL DAILY
Refills: 0 | Status: ACTIVE | COMMUNITY

## 2022-06-23 RX ORDER — FAMOTIDINE 20 MG/1
20 TABLET, FILM COATED ORAL DAILY
Refills: 0 | Status: ACTIVE | COMMUNITY

## 2022-06-23 RX ORDER — ATORVASTATIN CALCIUM 20 MG/1
20 TABLET, FILM COATED ORAL
Refills: 0 | Status: ACTIVE | COMMUNITY

## 2022-06-23 NOTE — PROCEDURE
[No] : not [Sinus Bradycardia] : sinus bradycardia [See Scanned Paceart Report] : See scanned paceart report [See Device Printout] : See device printout [ICD] : Implantable cardioverter-defibrillator [DDD] : DDD [Normal] : The battery status is normal. [Threshold Testing Performed] : Threshold testing was performed [Atrial] : Atrial [Ventricular] : Ventricular [Programmed for Longevity] : output reprogrammed for improved battery longevity [Counters Reset] : the counters were reset [Asense-Vsense ___ %] : Asense-Vsense [unfilled]% [Asense-Vpace ___ %] : Asense-Vpace [unfilled]% [Apace-Vsense ___ %] : Apace-Vsense [unfilled]% [Apace-Vpace ___ %] : Apace-Vpace [unfilled]% [de-identified] : Medtronic [de-identified] : Timo MRI XT DR [de-identified] : [id number] [de-identified] : 10/19/2019 [de-identified] : w/ MVP [de-identified] :  [de-identified] : No recorded events. Normal device function.

## 2022-06-23 NOTE — ASSESSMENT
[FreeTextEntry1] : AF - rate controlled\par - cont warfarin as directed\par \par CHF\par - cont metoprolol 100 mg qd\par ·	I have spent 35 minutes of time on the encounter excluding separately reported services.\par \par

## 2022-09-22 ENCOUNTER — APPOINTMENT (OUTPATIENT)
Dept: CARDIOLOGY | Facility: CLINIC | Age: 87
End: 2022-09-22

## 2022-09-22 ENCOUNTER — NON-APPOINTMENT (OUTPATIENT)
Age: 87
End: 2022-09-22

## 2022-09-22 PROCEDURE — 93296 REM INTERROG EVL PM/IDS: CPT

## 2022-09-22 PROCEDURE — 93295 DEV INTERROG REMOTE 1/2/MLT: CPT

## 2022-12-22 ENCOUNTER — NON-APPOINTMENT (OUTPATIENT)
Age: 87
End: 2022-12-22

## 2022-12-22 ENCOUNTER — APPOINTMENT (OUTPATIENT)
Dept: CARDIOLOGY | Facility: CLINIC | Age: 87
End: 2022-12-22

## 2022-12-22 PROCEDURE — 93295 DEV INTERROG REMOTE 1/2/MLT: CPT

## 2022-12-22 PROCEDURE — 93296 REM INTERROG EVL PM/IDS: CPT

## 2023-03-23 ENCOUNTER — APPOINTMENT (OUTPATIENT)
Dept: ELECTROPHYSIOLOGY | Facility: CLINIC | Age: 88
End: 2023-03-23
Payer: MEDICARE

## 2023-03-23 VITALS
TEMPERATURE: 98 F | HEART RATE: 62 BPM | BODY MASS INDEX: 26.36 KG/M2 | WEIGHT: 164 LBS | OXYGEN SATURATION: 95 % | DIASTOLIC BLOOD PRESSURE: 68 MMHG | HEIGHT: 66 IN | SYSTOLIC BLOOD PRESSURE: 128 MMHG

## 2023-03-23 DIAGNOSIS — I48.92 UNSPECIFIED ATRIAL FLUTTER: ICD-10-CM

## 2023-03-23 DIAGNOSIS — I50.22 CHRONIC SYSTOLIC (CONGESTIVE) HEART FAILURE: ICD-10-CM

## 2023-03-23 PROCEDURE — 93290 INTERROG DEV EVAL ICPMS IP: CPT

## 2023-03-23 PROCEDURE — 99214 OFFICE O/P EST MOD 30 MIN: CPT

## 2023-03-23 PROCEDURE — 93283 PRGRMG EVAL IMPLANTABLE DFB: CPT

## 2023-04-01 PROBLEM — I48.92 PAROXYSMAL ATRIAL FLUTTER: Status: ACTIVE | Noted: 2020-12-18

## 2023-04-01 NOTE — HISTORY OF PRESENT ILLNESS
[None] : The patient complains of no symptoms [Palpitations] : no palpitations [SOB] : no dyspnea [Syncope] : no syncope [Erythema at Site] : no erythema at device site [Swelling at Site] : no swelling at device site [de-identified] : Patient came for routine follow-up.  Patient has no complaints. His device is working fine.\par \par Patient is very pleasant 87-year-old woman with history of Hypothyroidism,\par Hypertension, Diabetes, Persistent Atrial Fibrillation S/P AF Ablation, Mitral Valve Replacement, and Mace Procedure S/P Dual Chamber Defibrillator Implant. Patient comes for routine follow-up. Patient has no complaints.

## 2023-04-01 NOTE — ASSESSMENT
[FreeTextEntry1] : Non-Ischemic Cardiomyopathy \par - Normal function Dual Chamber ICD\par  \par Chronic Systolic Heart Failure\par - Continue Cozaar 50 mg once a day. \par - Continue 100 mg twice a day.  \par \par Atrial Fibrillation/AFL\par - Continue Coumadin as directed.\par - Continue Metoprolol 100 mg twice a day.

## 2023-04-01 NOTE — ADDENDUM
[FreeTextEntry1] : I, Pio Turner assisted in documentation on 03/23/2023 acting as a scribe for Dr. Gm Matias.\par

## 2023-04-01 NOTE — PROCEDURE
[No] : not [Atrial Fibrillation] : atrial fibrillation [See Scanned Paceart Report] : See scanned paceart report [See Device Printout] : See device printout [ICD] : Implantable cardioverter-defibrillator [DDD] : DDD [Longevity: ___ months] : The estimated remaining battery life is [unfilled] months [Normal] : The battery status is normal. [Threshold Testing Performed] : Threshold testing was performed [Atrial] : Atrial [Ventricular] : Ventricular [Lead Imp:  ___ohms] : lead impedance was [unfilled] ohms [Sensing Amplitude ___mv] : sensing amplitude was [unfilled] mv [___V @] : [unfilled] V [___ ms] : [unfilled] ms [Programmed for Longevity] : output reprogrammed for improved battery longevity [Counters Reset] : the counters were reset [Asense-Vsense ___ %] : Asense-Vsense [unfilled]% [Asense-Vpace ___ %] : Asense-Vpace [unfilled]% [Apace-Vsense ___ %] : Apace-Vsense [unfilled]% [Apace-Vpace ___ %] : Apace-Vpace [unfilled]% [de-identified] : Medtronic [de-identified] : Timo MRI XT DR [de-identified] : [id number] [de-identified] : 10/19/2019 [de-identified] : w/ MVP [de-identified] :  [de-identified] : AFL Napoleon 100%\par Optivol WNL

## 2023-06-19 ENCOUNTER — OUTPATIENT (OUTPATIENT)
Dept: OUTPATIENT SERVICES | Facility: HOSPITAL | Age: 88
LOS: 1 days | End: 2023-06-19
Payer: MEDICARE

## 2023-06-19 DIAGNOSIS — Z12.31 ENCOUNTER FOR SCREENING MAMMOGRAM FOR MALIGNANT NEOPLASM OF BREAST: ICD-10-CM

## 2023-06-19 DIAGNOSIS — Z98.890 OTHER SPECIFIED POSTPROCEDURAL STATES: Chronic | ICD-10-CM

## 2023-06-19 DIAGNOSIS — Z95.2 PRESENCE OF PROSTHETIC HEART VALVE: Chronic | ICD-10-CM

## 2023-06-19 DIAGNOSIS — Z00.8 ENCOUNTER FOR OTHER GENERAL EXAMINATION: ICD-10-CM

## 2023-06-19 PROCEDURE — 77063 BREAST TOMOSYNTHESIS BI: CPT

## 2023-06-19 PROCEDURE — 77067 SCR MAMMO BI INCL CAD: CPT | Mod: 26

## 2023-06-19 PROCEDURE — 77063 BREAST TOMOSYNTHESIS BI: CPT | Mod: 26

## 2023-06-19 PROCEDURE — 77067 SCR MAMMO BI INCL CAD: CPT

## 2023-06-20 DIAGNOSIS — Z12.31 ENCOUNTER FOR SCREENING MAMMOGRAM FOR MALIGNANT NEOPLASM OF BREAST: ICD-10-CM

## 2023-06-26 ENCOUNTER — APPOINTMENT (OUTPATIENT)
Dept: CARDIOLOGY | Facility: CLINIC | Age: 88
End: 2023-06-26
Payer: MEDICARE

## 2023-06-26 ENCOUNTER — NON-APPOINTMENT (OUTPATIENT)
Age: 88
End: 2023-06-26

## 2023-06-26 PROCEDURE — 93296 REM INTERROG EVL PM/IDS: CPT

## 2023-06-26 PROCEDURE — 93295 DEV INTERROG REMOTE 1/2/MLT: CPT

## 2023-08-15 NOTE — HISTORY OF PRESENT ILLNESS
[None] : The patient complains of no symptoms [Palpitations] : no palpitations [SOB] : no dyspnea [Syncope] : no syncope [Erythema at Site] : no erythema at device site [Swelling at Site] : no swelling at device site [de-identified] : Patient came for routine follow-up.  Patient has no complaints. His device is working fine. Glycopyrrolate Pregnancy And Lactation Text: This medication is Pregnancy Category B and is considered safe during pregnancy. It is unknown if it is excreted breast milk.

## 2023-09-25 ENCOUNTER — APPOINTMENT (OUTPATIENT)
Dept: CARDIOLOGY | Facility: CLINIC | Age: 88
End: 2023-09-25
Payer: MEDICARE

## 2023-09-25 ENCOUNTER — NON-APPOINTMENT (OUTPATIENT)
Age: 88
End: 2023-09-25

## 2023-09-25 PROCEDURE — 93296 REM INTERROG EVL PM/IDS: CPT

## 2023-09-25 PROCEDURE — 93295 DEV INTERROG REMOTE 1/2/MLT: CPT

## 2023-10-06 ENCOUNTER — INPATIENT (INPATIENT)
Facility: HOSPITAL | Age: 88
LOS: 6 days | Discharge: HOME HEALTH | DRG: 291 | End: 2023-10-13
Attending: INTERNAL MEDICINE | Admitting: INTERNAL MEDICINE
Payer: MEDICARE

## 2023-10-06 VITALS
OXYGEN SATURATION: 99 % | HEART RATE: 120 BPM | RESPIRATION RATE: 22 BRPM | WEIGHT: 164.02 LBS | DIASTOLIC BLOOD PRESSURE: 81 MMHG | TEMPERATURE: 98 F | SYSTOLIC BLOOD PRESSURE: 192 MMHG | HEIGHT: 65 IN

## 2023-10-06 DIAGNOSIS — Z98.890 OTHER SPECIFIED POSTPROCEDURAL STATES: Chronic | ICD-10-CM

## 2023-10-06 DIAGNOSIS — E87.70 FLUID OVERLOAD, UNSPECIFIED: ICD-10-CM

## 2023-10-06 DIAGNOSIS — Z95.2 PRESENCE OF PROSTHETIC HEART VALVE: Chronic | ICD-10-CM

## 2023-10-06 LAB
ALBUMIN SERPL ELPH-MCNC: 3.6 G/DL — SIGNIFICANT CHANGE UP (ref 3.5–5.2)
ALP SERPL-CCNC: 103 U/L — SIGNIFICANT CHANGE UP (ref 30–115)
ALT FLD-CCNC: 18 U/L — SIGNIFICANT CHANGE UP (ref 0–41)
ANION GAP SERPL CALC-SCNC: 11 MMOL/L — SIGNIFICANT CHANGE UP (ref 7–14)
AST SERPL-CCNC: 34 U/L — SIGNIFICANT CHANGE UP (ref 0–41)
BASOPHILS # BLD AUTO: 0.04 K/UL — SIGNIFICANT CHANGE UP (ref 0–0.2)
BASOPHILS NFR BLD AUTO: 0.4 % — SIGNIFICANT CHANGE UP (ref 0–1)
BILIRUB SERPL-MCNC: 0.5 MG/DL — SIGNIFICANT CHANGE UP (ref 0.2–1.2)
BUN SERPL-MCNC: 19 MG/DL — SIGNIFICANT CHANGE UP (ref 10–20)
CALCIUM SERPL-MCNC: 9.4 MG/DL — SIGNIFICANT CHANGE UP (ref 8.4–10.5)
CHLORIDE SERPL-SCNC: 105 MMOL/L — SIGNIFICANT CHANGE UP (ref 98–110)
CO2 SERPL-SCNC: 28 MMOL/L — SIGNIFICANT CHANGE UP (ref 17–32)
CREAT SERPL-MCNC: 0.9 MG/DL — SIGNIFICANT CHANGE UP (ref 0.7–1.5)
EGFR: 62 ML/MIN/1.73M2 — SIGNIFICANT CHANGE UP
EOSINOPHIL # BLD AUTO: 0.05 K/UL — SIGNIFICANT CHANGE UP (ref 0–0.7)
EOSINOPHIL NFR BLD AUTO: 0.6 % — SIGNIFICANT CHANGE UP (ref 0–8)
GLUCOSE SERPL-MCNC: 93 MG/DL — SIGNIFICANT CHANGE UP (ref 70–99)
HCT VFR BLD CALC: 43.4 % — SIGNIFICANT CHANGE UP (ref 37–47)
HGB BLD-MCNC: 13.5 G/DL — SIGNIFICANT CHANGE UP (ref 12–16)
IMM GRANULOCYTES NFR BLD AUTO: 0.2 % — SIGNIFICANT CHANGE UP (ref 0.1–0.3)
LYMPHOCYTES # BLD AUTO: 1.85 K/UL — SIGNIFICANT CHANGE UP (ref 1.2–3.4)
LYMPHOCYTES # BLD AUTO: 20.4 % — LOW (ref 20.5–51.1)
MAGNESIUM SERPL-MCNC: 1.9 MG/DL — SIGNIFICANT CHANGE UP (ref 1.8–2.4)
MCHC RBC-ENTMCNC: 27.2 PG — SIGNIFICANT CHANGE UP (ref 27–31)
MCHC RBC-ENTMCNC: 31.1 G/DL — LOW (ref 32–37)
MCV RBC AUTO: 87.3 FL — SIGNIFICANT CHANGE UP (ref 81–99)
MONOCYTES # BLD AUTO: 0.82 K/UL — HIGH (ref 0.1–0.6)
MONOCYTES NFR BLD AUTO: 9 % — SIGNIFICANT CHANGE UP (ref 1.7–9.3)
NEUTROPHILS # BLD AUTO: 6.3 K/UL — SIGNIFICANT CHANGE UP (ref 1.4–6.5)
NEUTROPHILS NFR BLD AUTO: 69.4 % — SIGNIFICANT CHANGE UP (ref 42.2–75.2)
NRBC # BLD: 0 /100 WBCS — SIGNIFICANT CHANGE UP (ref 0–0)
NT-PROBNP SERPL-SCNC: 2874 PG/ML — HIGH (ref 0–300)
PLATELET # BLD AUTO: 238 K/UL — SIGNIFICANT CHANGE UP (ref 130–400)
PMV BLD: 11.2 FL — HIGH (ref 7.4–10.4)
POTASSIUM SERPL-MCNC: 4.4 MMOL/L — SIGNIFICANT CHANGE UP (ref 3.5–5)
POTASSIUM SERPL-SCNC: 4.4 MMOL/L — SIGNIFICANT CHANGE UP (ref 3.5–5)
PROT SERPL-MCNC: 6.2 G/DL — SIGNIFICANT CHANGE UP (ref 6–8)
RBC # BLD: 4.97 M/UL — SIGNIFICANT CHANGE UP (ref 4.2–5.4)
RBC # FLD: 16.5 % — HIGH (ref 11.5–14.5)
SODIUM SERPL-SCNC: 144 MMOL/L — SIGNIFICANT CHANGE UP (ref 135–146)
TROPONIN T SERPL-MCNC: <0.01 NG/ML — SIGNIFICANT CHANGE UP
WBC # BLD: 9.08 K/UL — SIGNIFICANT CHANGE UP (ref 4.8–10.8)
WBC # FLD AUTO: 9.08 K/UL — SIGNIFICANT CHANGE UP (ref 4.8–10.8)

## 2023-10-06 PROCEDURE — 83735 ASSAY OF MAGNESIUM: CPT

## 2023-10-06 PROCEDURE — 87070 CULTURE OTHR SPECIMN AEROBIC: CPT

## 2023-10-06 PROCEDURE — 87075 CULTR BACTERIA EXCEPT BLOOD: CPT

## 2023-10-06 PROCEDURE — 87205 SMEAR GRAM STAIN: CPT

## 2023-10-06 PROCEDURE — 83615 LACTATE (LD) (LDH) ENZYME: CPT

## 2023-10-06 PROCEDURE — 76770 US EXAM ABDO BACK WALL COMP: CPT

## 2023-10-06 PROCEDURE — 83986 ASSAY PH BODY FLUID NOS: CPT

## 2023-10-06 PROCEDURE — 93005 ELECTROCARDIOGRAM TRACING: CPT

## 2023-10-06 PROCEDURE — 85027 COMPLETE CBC AUTOMATED: CPT

## 2023-10-06 PROCEDURE — 84157 ASSAY OF PROTEIN OTHER: CPT

## 2023-10-06 PROCEDURE — 84145 PROCALCITONIN (PCT): CPT

## 2023-10-06 PROCEDURE — 82962 GLUCOSE BLOOD TEST: CPT

## 2023-10-06 PROCEDURE — 86901 BLOOD TYPING SEROLOGIC RH(D): CPT

## 2023-10-06 PROCEDURE — 82945 GLUCOSE OTHER FLUID: CPT

## 2023-10-06 PROCEDURE — 80048 BASIC METABOLIC PNL TOTAL CA: CPT

## 2023-10-06 PROCEDURE — 82042 OTHER SOURCE ALBUMIN QUAN EA: CPT

## 2023-10-06 PROCEDURE — 87102 FUNGUS ISOLATION CULTURE: CPT

## 2023-10-06 PROCEDURE — 85025 COMPLETE CBC W/AUTO DIFF WBC: CPT

## 2023-10-06 PROCEDURE — 85730 THROMBOPLASTIN TIME PARTIAL: CPT

## 2023-10-06 PROCEDURE — G1004: CPT

## 2023-10-06 PROCEDURE — 93306 TTE W/DOPPLER COMPLETE: CPT

## 2023-10-06 PROCEDURE — 89051 BODY FLUID CELL COUNT: CPT

## 2023-10-06 PROCEDURE — 84443 ASSAY THYROID STIM HORMONE: CPT

## 2023-10-06 PROCEDURE — 84100 ASSAY OF PHOSPHORUS: CPT

## 2023-10-06 PROCEDURE — 80053 COMPREHEN METABOLIC PANEL: CPT

## 2023-10-06 PROCEDURE — 71045 X-RAY EXAM CHEST 1 VIEW: CPT | Mod: 26

## 2023-10-06 PROCEDURE — 71045 X-RAY EXAM CHEST 1 VIEW: CPT

## 2023-10-06 PROCEDURE — 83036 HEMOGLOBIN GLYCOSYLATED A1C: CPT

## 2023-10-06 PROCEDURE — 86850 RBC ANTIBODY SCREEN: CPT

## 2023-10-06 PROCEDURE — 36415 COLL VENOUS BLD VENIPUNCTURE: CPT

## 2023-10-06 PROCEDURE — 86900 BLOOD TYPING SEROLOGIC ABO: CPT

## 2023-10-06 PROCEDURE — 71260 CT THORAX DX C+: CPT | Mod: 26,ME

## 2023-10-06 PROCEDURE — 85610 PROTHROMBIN TIME: CPT

## 2023-10-06 PROCEDURE — 80061 LIPID PANEL: CPT

## 2023-10-06 PROCEDURE — 83605 ASSAY OF LACTIC ACID: CPT

## 2023-10-06 PROCEDURE — 88305 TISSUE EXAM BY PATHOLOGIST: CPT

## 2023-10-06 PROCEDURE — 88112 CYTOPATH CELL ENHANCE TECH: CPT

## 2023-10-06 PROCEDURE — 97162 PT EVAL MOD COMPLEX 30 MIN: CPT | Mod: GP

## 2023-10-06 PROCEDURE — 93010 ELECTROCARDIOGRAM REPORT: CPT | Mod: 77

## 2023-10-06 PROCEDURE — 97116 GAIT TRAINING THERAPY: CPT | Mod: GP

## 2023-10-06 PROCEDURE — 93283 PRGRMG EVAL IMPLANTABLE DFB: CPT

## 2023-10-06 PROCEDURE — 99285 EMERGENCY DEPT VISIT HI MDM: CPT | Mod: FS,GC

## 2023-10-06 PROCEDURE — 97110 THERAPEUTIC EXERCISES: CPT | Mod: GP

## 2023-10-06 RX ORDER — METOPROLOL TARTRATE 50 MG
100 TABLET ORAL ONCE
Refills: 0 | Status: DISCONTINUED | OUTPATIENT
Start: 2023-10-06 | End: 2023-10-06

## 2023-10-06 RX ORDER — FUROSEMIDE 40 MG
40 TABLET ORAL ONCE
Refills: 0 | Status: COMPLETED | OUTPATIENT
Start: 2023-10-06 | End: 2023-10-06

## 2023-10-06 RX ORDER — FAMOTIDINE 10 MG/ML
20 INJECTION INTRAVENOUS DAILY
Refills: 0 | Status: DISCONTINUED | OUTPATIENT
Start: 2023-10-06 | End: 2023-10-13

## 2023-10-06 RX ORDER — LOSARTAN POTASSIUM 100 MG/1
50 TABLET, FILM COATED ORAL DAILY
Refills: 0 | Status: DISCONTINUED | OUTPATIENT
Start: 2023-10-06 | End: 2023-10-13

## 2023-10-06 RX ORDER — ASPIRIN/CALCIUM CARB/MAGNESIUM 324 MG
1 TABLET ORAL
Qty: 0 | Refills: 0 | DISCHARGE

## 2023-10-06 RX ORDER — METOPROLOL TARTRATE 50 MG
5 TABLET ORAL ONCE
Refills: 0 | Status: DISCONTINUED | OUTPATIENT
Start: 2023-10-06 | End: 2023-10-06

## 2023-10-06 RX ORDER — METOPROLOL TARTRATE 50 MG
50 TABLET ORAL EVERY 6 HOURS
Refills: 0 | Status: DISCONTINUED | OUTPATIENT
Start: 2023-10-06 | End: 2023-10-13

## 2023-10-06 RX ORDER — FUROSEMIDE 40 MG
60 TABLET ORAL
Refills: 0 | Status: DISCONTINUED | OUTPATIENT
Start: 2023-10-06 | End: 2023-10-08

## 2023-10-06 RX ORDER — METOPROLOL TARTRATE 50 MG
5 TABLET ORAL ONCE
Refills: 0 | Status: COMPLETED | OUTPATIENT
Start: 2023-10-06 | End: 2023-10-06

## 2023-10-06 RX ADMIN — Medication 40 MILLIGRAM(S): at 19:25

## 2023-10-06 NOTE — H&P ADULT - HISTORY OF PRESENT ILLNESS
88 year old female with a history of Afib and Mitral Valve replacement on Warfarin, CHF s/p AICD, HTN presents to the ED with shortness of breath, LE edema and fatigue was sent by Dr. Hidalgo.     CT chest with IV contrast showed :  * Moderate left pleural effusion with overlying compressive atelectasis.     EKG shows Aflutter with 3:1 av block vs Mobitz II     Labs noticeable for pro-BNP 2874     VS noticeable for tachycardia 120 , /81     Vital Signs Last 24 Hrs  T(C): 36.6 (06 Oct 2023 14:02), Max: 36.6 (06 Oct 2023 14:02)  T(F): 97.8 (06 Oct 2023 14:02), Max: 97.8 (06 Oct 2023 14:02)  HR: 120 (06 Oct 2023 14:02) (120 - 120)  BP: 192/81 (06 Oct 2023 14:02) (192/81 - 192/81)  RR: 22 (06 Oct 2023 14:02) (22 - 22)  SpO2: 99% (06 Oct 2023 14:02) (99% - 99%)  Patient On (Oxygen Delivery Method): room air         88 year old female with a history of Afib and Mitral Valve replacement on Warfarin, CHF s/p AICD, HTN presents to the ED with shortness of breath, LE edema and fatigue was sent by Dr. Hidalgo. History goes back to 3 weeks ago when the patient started having progressive shortness of breath and lower extremity edema. The patient denied any chest Pain , NV< diarrhea, constipation, lightheadedness or headache or any urinary symptoms. Patient was following with Dr. way today when he sent her to the ED. Patient is admitted for a left sided pleural effusion, SOB, and possible HF excacerbation     CT chest with IV contrast showed :  * Moderate left pleural effusion with overlying compressive atelectasis.     EKG shows Aflutter with block     Labs noticeable for pro-BNP 2874     VS noticeable for tachycardia 120 , /81     Vital Signs Last 24 Hrs  T(C): 36.6 (06 Oct 2023 14:02), Max: 36.6 (06 Oct 2023 14:02)  T(F): 97.8 (06 Oct 2023 14:02), Max: 97.8 (06 Oct 2023 14:02)  HR: 120 (06 Oct 2023 14:02) (120 - 120)  BP: 192/81 (06 Oct 2023 14:02) (192/81 - 192/81)  RR: 22 (06 Oct 2023 14:02) (22 - 22)  SpO2: 99% (06 Oct 2023 14:02) (99% - 99%)  Patient On (Oxygen Delivery Method): room air

## 2023-10-06 NOTE — ED PROVIDER NOTE - CONSIDERATION OF ADMISSION OBSERVATION
Consideration of Admission/Observation patient will be admitted for new pleural effusion symptomatic with exertional dyspnea

## 2023-10-06 NOTE — ED PROVIDER NOTE - ATTENDING APP SHARED VISIT CONTRIBUTION OF CARE
I personally evaluated the patient. I reviewed the Resident’s or Physician Assistant’s note (as assigned above), and agree with the findings and plan except as documented in my note.     88 female presents to ED for dyspnea worsening for several weeks without improvement. Unknown provocation. Cohabitants relate worsening ability to perform ADLs. CRUZ and mild dyspnea at rest.     PMH: CAD / CHF follows with Dr. Nielsen Rx compliant    PE: female in no distress. CV: pulses intact. CHEST: normal work of breathing. no accessory muscle use. left lung sounds diminisheed ABD: nondistended. SKIN: normal. EXT: FROM. NEURO: AAO 3 no focal deficits. HEENT: mucosa dry     Impression: dyspnea    Plan: IV labs imaging supportive care and reevaluation

## 2023-10-06 NOTE — H&P ADULT - NSHPLABSRESULTS_GEN_ALL_CORE
LABS:                        13.5   9.08  )-----------( 238      ( 06 Oct 2023 17:25 )             43.4     10-06    144  |  105  |  19  ----------------------------<  93  4.4   |  28  |  0.9    Ca    9.4      06 Oct 2023 17:25  Mg     1.9     10-06    TPro  6.2  /  Alb  3.6  /  TBili  0.5  /  DBili  x   /  AST  34  /  ALT  18  /  AlkPhos  103  10-06

## 2023-10-06 NOTE — ED PROVIDER NOTE - NS ED ATTENDING STATEMENT MOD
This was a shared visit with the REGINA. I reviewed and verified the documentation and independently performed the documented:

## 2023-10-06 NOTE — ED PROVIDER NOTE - PHYSICAL EXAMINATION
Physical Exam    Constitutional: No acute distress.   Eyes: Conjunctiva pink, Sclera clear, PERRLA, EOMI.  ENT: No sinus tenderness. No nasal discharge. No oropharyngeal erythema, edema, or exudates. Uvula midline.   Cardiovascular: tachycardic at rate of approximately 100, irregular rhythm.  Respiratory: some labored respiratory effort, crackles bilaterally  Gastrointestinal: Normal bowel sounds. soft, non distended, non-tender abdomen.   Musculoskeletal: supple neck, no midline tenderness. bilateral lower extremity edema   Integumentary: warm, dry, no rash  Neurologic: awake, alert, cranial nerves II-XII grossly intact, extremities’ motor and sensory functions grossly intact

## 2023-10-06 NOTE — H&P ADULT - ASSESSMENT
88 year old female with a history of Afib and Mitral Valve replacement on Warfarin, CHF s/p AICD, HTN presents to the ED with shortness of breath, LE edema and fatigue was sent by Dr. Hidalgo. History goes back to 3 weeks ago when the patient started having progressive shortness of breath and lower extremity edema. The patient denied any chest Pain , NV< diarrhea, constipation, lightheadedness or headache or any urinary symptoms. Patient was following with Dr. way today when he sent her to the ED. Patient is admitted for a left sided pleural effusion, SOB, and possible HF excacerbation      # SOB   # MVR   # Afib   # pleural fluid collection   - CT chest with IV contrast showed :  * Moderate left pleural effusion with overlying compressive atelectasis.   - EKG shows Aflutter with block   - HR 120s   - on admission /81   -  pro-BNP 2874   - SOB of 3 weeks duration  - follows Dr. hidalgo   - cardio fellow called for tachycardia, aflutter with block   - Fu pulm consult for thoracentesis   - will hold coumadin for now and switch to heparin in preparation for thracentesis   - Pulm consult for thora   - FU CBc, CMP TSH, A1c, lipid panel  - FU procal   - FU TTE   - EP consult for AICD interrogation   - Patient reports that she take warfarin 20 mg per day everyday except on wednesday she does not take warfarin (please confirm with [facility] in [phone])  - will contact cardiac fellow on call   - obtain INR PTT stat   - cardio consult follows with Dr. Rivera     # HTN:  - BP elevated  - restart home medications     Diet: DASH   Activity: as tolerated   DVT Prophylaxis: heparin  GI Prophylaxis: protonix   Code Status: full for now, patient is thinking about it   Disposition: medicine         88 year old female with a history of Afib and Mitral Valve replacement on Warfarin, CHF s/p AICD, HTN presents to the ED with shortness of breath, LE edema and fatigue was sent by Dr. Hidalgo. History goes back to 3 weeks ago when the patient started having progressive shortness of breath and lower extremity edema. The patient denied any chest Pain , NV< diarrhea, constipation, lightheadedness or headache or any urinary symptoms. Patient was following with Dr. way today when he sent her to the ED. Patient is admitted for a left sided pleural effusion, SOB, and possible HF excacerbation      # SOB   # MVR   # Afib   # pleural fluid collection   - CT chest with IV contrast showed :  * Moderate left pleural effusion with overlying compressive atelectasis.   - EKG shows Aflutter with block   - HR 120s   - on admission /81   -  pro-BNP 2874   - SOB of 3 weeks duration  - follows Dr. hidalgo   - cardio fellow called for tachycardia, aflutter with block   - will give lopressor push 5 now and start on lopressor 50 Q6,  - cautious with cardizem Ef is unkown  - Fu pulm consult for thoracentesis   - will hold coumadin for now and switch tomorrow to heparin in preparation for thracentesis tomorrow if needed   - Pulm consult for thora   - FU CBc, CMP TSH, A1c, lipid panel  - FU procal   - FU TTE   - EP consult for AICD interrogation   - Patient reports that she take warfarin 20 mg per day everyday except on wednesday she does not take warfarin (please confirm with [facility] in [phone])  - obtain INR PTT stat   - cardio consult follows with Dr. Rivera     # HTN:  - BP elevated  - restart home medications     Diet: DASH   Activity: as tolerated   DVT Prophylaxis: heparin  GI Prophylaxis: protonix   Code Status: full for now, patient is thinking about it   Disposition: medicine

## 2023-10-06 NOTE — ED PROVIDER NOTE - CARE PLAN
1 Principal Discharge DX:	Fluid overload  Secondary Diagnosis:	CHF exacerbation  Secondary Diagnosis:	Pleural effusion  Secondary Diagnosis:	Shortness of breath

## 2023-10-06 NOTE — H&P ADULT - ATTENDING COMMENTS
HPI as above.  Interval history: Pt seen and examined at bedside. No cp or sob.   Vital Signs (24 Hrs):  T(C): 36.9 (10-07-23 @ 07:37), Max: 36.9 (10-07-23 @ 07:37)  HR: 98 (10-07-23 @ 07:37) (98 - 140)  BP: 160/107 (10-07-23 @ 07:37) (128/90 - 189/122)  RR: 18 (10-07-23 @ 07:37) (18 - 22)  SpO2: 95% (10-07-23 @ 07:37) (94% - 96%)  Wt(kg): --  Daily Height in cm: 165.1 (06 Oct 2023 19:31)    Daily     I&O's Summary    PHYSICAL EXAM:  GENERAL: NAD, elderly  HEAD:  Atraumatic, Normocephalic  EYES: EOMI, PERRLA, conjunctiva and sclera clear  NECK: Supple, No JVD  CHEST/LUNG: left rales   HEART: Regular rate and rhythm; No murmurs, rubs, or gallops  ABDOMEN: Soft, Nontender, Nondistended; Bowel sounds present  EXTREMITIES:  2+ Peripheral Pulses, No clubbing, cyanosis, or edema  PSYCH: AAOx3  NEUROLOGY: non-focal  SKIN: No rashes or lesions  Labs reviewed  Imaging reviewed independently and reviewed read  < from: Xray Chest 1 View- PORTABLE-Routine (Xray Chest 1 View- PORTABLE-Routine .) (10.07.23 @ 11:31) >    IMPRESSION:    Unchanged left-sided opacity/effusion, better characterized on recent CT   chest.    EKG reviewed independently and reviewed read  < from: 12 Lead ECG (10.07.23 @ 00:36) >    Diagnosis Line Atrial flutter with variable A-V block  Minimal voltage criteria for LVH, may be normal variant ( Jose product )  ST & T wave abnormality, consider lateral ischemia  Abnormal ECG    < end of copied text >      Plan  #Acute resp failure 2/2 left side pleural effusion , possible CHF vs suspected malignancy, moderate AS  - lasix 60 mg bid  - cardiology appreciated  - echo reviewed   - pulm for thora, hold warfarin     #Afib- hold warfarin for thora, INr 2, follow INR daily   #hyperkalemia- lokelma, repeat BMP  #hyperna- encourage po, follow up BMP  #rest as above     #Progress Note Handoff  Pending (specify):  follow up cardiology, pulm for thora   Family discussion: house staff updated pt family  Disposition: cardiac telemonitoring   Decision to admit the pt is based on acuity as above

## 2023-10-06 NOTE — H&P ADULT - NSHPPHYSICALEXAM_GEN_ALL_CORE
General: AOx3, NAD   Chest: GBAE, decreased lung sounds on the left   heart : Tachycardic, irregular   abdomen: non-tender, non distended   Extremities: + PP, 2+ edema

## 2023-10-06 NOTE — ED PROVIDER NOTE - CLINICAL SUMMARY MEDICAL DECISION MAKING FREE TEXT BOX
88 female with known CAD valve disease and CHF presents for worsening dyspnea and reduced ADLs. Had screening labs imaging cardiac monitoring medications and reevaluation, plan is for inpatient admission to monitored setting for continued management. Found to have new left sided pleural effusion of unknown etiology, will get CT scan and admit for continued care.

## 2023-10-06 NOTE — ED PROVIDER NOTE - OBJECTIVE STATEMENT
88 year old female with a history of Afib and Mitral Valve replacement on Warfarin, CHF, HTN presents to the ED with shortness of breath. patient states that she has been short of breath for the past few weeks however her sister who lives with her noted that it has been worse as of recent and insisted she comes to the ED. Dyspnea present at rest and exertion not associated with chest pain or cough. She admits to worsening lower extremity edema and fatigue. States that she was seen by her Cardiologist Dr. Macdonald in Clinic today and was referred to the ED. Denies fever, chills, abdominal pain, nausea, vomiting, diarrhea, constipation, dysuria, hematuria, rash.

## 2023-10-07 LAB
A1C WITH ESTIMATED AVERAGE GLUCOSE RESULT: 5.7 % — HIGH (ref 4–5.6)
ALBUMIN SERPL ELPH-MCNC: 4 G/DL — SIGNIFICANT CHANGE UP (ref 3.5–5.2)
ALP SERPL-CCNC: 111 U/L — SIGNIFICANT CHANGE UP (ref 30–115)
ALT FLD-CCNC: 20 U/L — SIGNIFICANT CHANGE UP (ref 0–41)
ANION GAP SERPL CALC-SCNC: 16 MMOL/L — HIGH (ref 7–14)
APTT BLD: 36.3 SEC — SIGNIFICANT CHANGE UP (ref 27–39.2)
AST SERPL-CCNC: 32 U/L — SIGNIFICANT CHANGE UP (ref 0–41)
BASOPHILS # BLD AUTO: 0.06 K/UL — SIGNIFICANT CHANGE UP (ref 0–0.2)
BASOPHILS NFR BLD AUTO: 0.6 % — SIGNIFICANT CHANGE UP (ref 0–1)
BILIRUB SERPL-MCNC: 0.6 MG/DL — SIGNIFICANT CHANGE UP (ref 0.2–1.2)
BUN SERPL-MCNC: 20 MG/DL — SIGNIFICANT CHANGE UP (ref 10–20)
CALCIUM SERPL-MCNC: 10 MG/DL — SIGNIFICANT CHANGE UP (ref 8.4–10.5)
CHLORIDE SERPL-SCNC: 103 MMOL/L — SIGNIFICANT CHANGE UP (ref 98–110)
CHOLEST SERPL-MCNC: 161 MG/DL — SIGNIFICANT CHANGE UP
CO2 SERPL-SCNC: 29 MMOL/L — SIGNIFICANT CHANGE UP (ref 17–32)
CREAT SERPL-MCNC: 1 MG/DL — SIGNIFICANT CHANGE UP (ref 0.7–1.5)
EGFR: 55 ML/MIN/1.73M2 — LOW
EOSINOPHIL # BLD AUTO: 0.04 K/UL — SIGNIFICANT CHANGE UP (ref 0–0.7)
EOSINOPHIL NFR BLD AUTO: 0.4 % — SIGNIFICANT CHANGE UP (ref 0–8)
ESTIMATED AVERAGE GLUCOSE: 117 MG/DL — HIGH (ref 68–114)
GLUCOSE SERPL-MCNC: 99 MG/DL — SIGNIFICANT CHANGE UP (ref 70–99)
HCT VFR BLD CALC: 46.3 % — SIGNIFICANT CHANGE UP (ref 37–47)
HDLC SERPL-MCNC: 66 MG/DL — SIGNIFICANT CHANGE UP
HGB BLD-MCNC: 14.4 G/DL — SIGNIFICANT CHANGE UP (ref 12–16)
IMM GRANULOCYTES NFR BLD AUTO: 0.3 % — SIGNIFICANT CHANGE UP (ref 0.1–0.3)
INR BLD: 2.03 RATIO — HIGH (ref 0.65–1.3)
LACTATE SERPL-SCNC: 2.5 MMOL/L — HIGH (ref 0.7–2)
LIPID PNL WITH DIRECT LDL SERPL: 77 MG/DL — SIGNIFICANT CHANGE UP
LYMPHOCYTES # BLD AUTO: 1.71 K/UL — SIGNIFICANT CHANGE UP (ref 1.2–3.4)
LYMPHOCYTES # BLD AUTO: 16.9 % — LOW (ref 20.5–51.1)
MAGNESIUM SERPL-MCNC: 1.8 MG/DL — SIGNIFICANT CHANGE UP (ref 1.8–2.4)
MCHC RBC-ENTMCNC: 27 PG — SIGNIFICANT CHANGE UP (ref 27–31)
MCHC RBC-ENTMCNC: 31.1 G/DL — LOW (ref 32–37)
MCV RBC AUTO: 86.9 FL — SIGNIFICANT CHANGE UP (ref 81–99)
MONOCYTES # BLD AUTO: 0.96 K/UL — HIGH (ref 0.1–0.6)
MONOCYTES NFR BLD AUTO: 9.5 % — HIGH (ref 1.7–9.3)
NEUTROPHILS # BLD AUTO: 7.29 K/UL — HIGH (ref 1.4–6.5)
NEUTROPHILS NFR BLD AUTO: 72.3 % — SIGNIFICANT CHANGE UP (ref 42.2–75.2)
NON HDL CHOLESTEROL: 95 MG/DL — SIGNIFICANT CHANGE UP
NRBC # BLD: 0 /100 WBCS — SIGNIFICANT CHANGE UP (ref 0–0)
PHOSPHATE SERPL-MCNC: 4.7 MG/DL — SIGNIFICANT CHANGE UP (ref 2.1–4.9)
PLATELET # BLD AUTO: 240 K/UL — SIGNIFICANT CHANGE UP (ref 130–400)
PMV BLD: 11.6 FL — HIGH (ref 7.4–10.4)
POTASSIUM SERPL-MCNC: 5.8 MMOL/L — HIGH (ref 3.5–5)
POTASSIUM SERPL-SCNC: 5.8 MMOL/L — HIGH (ref 3.5–5)
PROCALCITONIN SERPL-MCNC: 0.07 NG/ML — SIGNIFICANT CHANGE UP (ref 0.02–0.1)
PROT SERPL-MCNC: 6.7 G/DL — SIGNIFICANT CHANGE UP (ref 6–8)
PROTHROM AB SERPL-ACNC: 23.7 SEC — HIGH (ref 9.95–12.87)
RBC # BLD: 5.33 M/UL — SIGNIFICANT CHANGE UP (ref 4.2–5.4)
RBC # FLD: 17.1 % — HIGH (ref 11.5–14.5)
SODIUM SERPL-SCNC: 148 MMOL/L — HIGH (ref 135–146)
TRIGL SERPL-MCNC: 94 MG/DL — SIGNIFICANT CHANGE UP
TSH SERPL-MCNC: 2.24 UIU/ML — SIGNIFICANT CHANGE UP (ref 0.27–4.2)
WBC # BLD: 10.09 K/UL — SIGNIFICANT CHANGE UP (ref 4.8–10.8)
WBC # FLD AUTO: 10.09 K/UL — SIGNIFICANT CHANGE UP (ref 4.8–10.8)

## 2023-10-07 PROCEDURE — 99223 1ST HOSP IP/OBS HIGH 75: CPT

## 2023-10-07 PROCEDURE — 93306 TTE W/DOPPLER COMPLETE: CPT | Mod: 26

## 2023-10-07 PROCEDURE — 93010 ELECTROCARDIOGRAM REPORT: CPT

## 2023-10-07 PROCEDURE — 71045 X-RAY EXAM CHEST 1 VIEW: CPT | Mod: 26

## 2023-10-07 PROCEDURE — 93283 PRGRMG EVAL IMPLANTABLE DFB: CPT | Mod: 26

## 2023-10-07 PROCEDURE — 99222 1ST HOSP IP/OBS MODERATE 55: CPT

## 2023-10-07 RX ORDER — SODIUM ZIRCONIUM CYCLOSILICATE 10 G/10G
5 POWDER, FOR SUSPENSION ORAL ONCE
Refills: 0 | Status: COMPLETED | OUTPATIENT
Start: 2023-10-07 | End: 2023-10-07

## 2023-10-07 RX ADMIN — Medication 60 MILLIGRAM(S): at 00:32

## 2023-10-07 RX ADMIN — Medication 50 MILLIGRAM(S): at 18:04

## 2023-10-07 RX ADMIN — Medication 50 MILLIGRAM(S): at 00:31

## 2023-10-07 RX ADMIN — SODIUM ZIRCONIUM CYCLOSILICATE 5 GRAM(S): 10 POWDER, FOR SUSPENSION ORAL at 11:32

## 2023-10-07 RX ADMIN — Medication 50 MILLIGRAM(S): at 23:01

## 2023-10-07 RX ADMIN — Medication 50 MILLIGRAM(S): at 11:33

## 2023-10-07 RX ADMIN — Medication 50 MILLIGRAM(S): at 05:59

## 2023-10-07 RX ADMIN — Medication 40 MILLIGRAM(S): at 13:47

## 2023-10-07 RX ADMIN — Medication 60 MILLIGRAM(S): at 05:55

## 2023-10-07 RX ADMIN — FAMOTIDINE 20 MILLIGRAM(S): 10 INJECTION INTRAVENOUS at 11:33

## 2023-10-07 RX ADMIN — LOSARTAN POTASSIUM 50 MILLIGRAM(S): 100 TABLET, FILM COATED ORAL at 05:55

## 2023-10-07 RX ADMIN — Medication 5 MILLIGRAM(S): at 00:32

## 2023-10-08 LAB
ALBUMIN SERPL ELPH-MCNC: 3.5 G/DL — SIGNIFICANT CHANGE UP (ref 3.5–5.2)
ALBUMIN SERPL ELPH-MCNC: 3.5 G/DL — SIGNIFICANT CHANGE UP (ref 3.5–5.2)
ALP SERPL-CCNC: 101 U/L — SIGNIFICANT CHANGE UP (ref 30–115)
ALP SERPL-CCNC: 107 U/L — SIGNIFICANT CHANGE UP (ref 30–115)
ALT FLD-CCNC: 20 U/L — SIGNIFICANT CHANGE UP (ref 0–41)
ALT FLD-CCNC: 20 U/L — SIGNIFICANT CHANGE UP (ref 0–41)
ANION GAP SERPL CALC-SCNC: 11 MMOL/L — SIGNIFICANT CHANGE UP (ref 7–14)
ANION GAP SERPL CALC-SCNC: 14 MMOL/L — SIGNIFICANT CHANGE UP (ref 7–14)
ANION GAP SERPL CALC-SCNC: 14 MMOL/L — SIGNIFICANT CHANGE UP (ref 7–14)
APTT BLD: 36.8 SEC — SIGNIFICANT CHANGE UP (ref 27–39.2)
AST SERPL-CCNC: 33 U/L — SIGNIFICANT CHANGE UP (ref 0–41)
AST SERPL-CCNC: 37 U/L — SIGNIFICANT CHANGE UP (ref 0–41)
BASOPHILS # BLD AUTO: 0.05 K/UL — SIGNIFICANT CHANGE UP (ref 0–0.2)
BASOPHILS NFR BLD AUTO: 0.4 % — SIGNIFICANT CHANGE UP (ref 0–1)
BILIRUB SERPL-MCNC: 0.4 MG/DL — SIGNIFICANT CHANGE UP (ref 0.2–1.2)
BILIRUB SERPL-MCNC: 0.7 MG/DL — SIGNIFICANT CHANGE UP (ref 0.2–1.2)
BUN SERPL-MCNC: 23 MG/DL — HIGH (ref 10–20)
BUN SERPL-MCNC: 24 MG/DL — HIGH (ref 10–20)
BUN SERPL-MCNC: 25 MG/DL — HIGH (ref 10–20)
CALCIUM SERPL-MCNC: 8.7 MG/DL — SIGNIFICANT CHANGE UP (ref 8.4–10.5)
CALCIUM SERPL-MCNC: 9.2 MG/DL — SIGNIFICANT CHANGE UP (ref 8.4–10.4)
CALCIUM SERPL-MCNC: 9.5 MG/DL — SIGNIFICANT CHANGE UP (ref 8.4–10.4)
CHLORIDE SERPL-SCNC: 100 MMOL/L — SIGNIFICANT CHANGE UP (ref 98–110)
CHLORIDE SERPL-SCNC: 99 MMOL/L — SIGNIFICANT CHANGE UP (ref 98–110)
CHLORIDE SERPL-SCNC: 99 MMOL/L — SIGNIFICANT CHANGE UP (ref 98–110)
CO2 SERPL-SCNC: 30 MMOL/L — SIGNIFICANT CHANGE UP (ref 17–32)
CO2 SERPL-SCNC: 31 MMOL/L — SIGNIFICANT CHANGE UP (ref 17–32)
CO2 SERPL-SCNC: 36 MMOL/L — HIGH (ref 17–32)
CREAT SERPL-MCNC: 1 MG/DL — SIGNIFICANT CHANGE UP (ref 0.7–1.5)
CREAT SERPL-MCNC: 1.2 MG/DL — SIGNIFICANT CHANGE UP (ref 0.7–1.5)
CREAT SERPL-MCNC: 1.2 MG/DL — SIGNIFICANT CHANGE UP (ref 0.7–1.5)
EGFR: 44 ML/MIN/1.73M2 — LOW
EGFR: 44 ML/MIN/1.73M2 — LOW
EGFR: 55 ML/MIN/1.73M2 — LOW
EOSINOPHIL # BLD AUTO: 0.08 K/UL — SIGNIFICANT CHANGE UP (ref 0–0.7)
EOSINOPHIL NFR BLD AUTO: 0.7 % — SIGNIFICANT CHANGE UP (ref 0–8)
GLUCOSE SERPL-MCNC: 102 MG/DL — HIGH (ref 70–99)
GLUCOSE SERPL-MCNC: 111 MG/DL — HIGH (ref 70–99)
GLUCOSE SERPL-MCNC: 115 MG/DL — HIGH (ref 70–99)
HCT VFR BLD CALC: 46.5 % — SIGNIFICANT CHANGE UP (ref 37–47)
HGB BLD-MCNC: 14.6 G/DL — SIGNIFICANT CHANGE UP (ref 12–16)
IMM GRANULOCYTES NFR BLD AUTO: 0.3 % — SIGNIFICANT CHANGE UP (ref 0.1–0.3)
INR BLD: 1.75 RATIO — HIGH (ref 0.65–1.3)
LACTATE SERPL-SCNC: 1.6 MMOL/L — SIGNIFICANT CHANGE UP (ref 0.7–2)
LYMPHOCYTES # BLD AUTO: 1.64 K/UL — SIGNIFICANT CHANGE UP (ref 1.2–3.4)
LYMPHOCYTES # BLD AUTO: 13.4 % — LOW (ref 20.5–51.1)
MAGNESIUM SERPL-MCNC: 1.6 MG/DL — LOW (ref 1.8–2.4)
MAGNESIUM SERPL-MCNC: 1.6 MG/DL — LOW (ref 1.8–2.4)
MCHC RBC-ENTMCNC: 27.2 PG — SIGNIFICANT CHANGE UP (ref 27–31)
MCHC RBC-ENTMCNC: 31.4 G/DL — LOW (ref 32–37)
MCV RBC AUTO: 86.6 FL — SIGNIFICANT CHANGE UP (ref 81–99)
MONOCYTES # BLD AUTO: 1.26 K/UL — HIGH (ref 0.1–0.6)
MONOCYTES NFR BLD AUTO: 10.3 % — HIGH (ref 1.7–9.3)
NEUTROPHILS # BLD AUTO: 9.16 K/UL — HIGH (ref 1.4–6.5)
NEUTROPHILS NFR BLD AUTO: 74.9 % — SIGNIFICANT CHANGE UP (ref 42.2–75.2)
NRBC # BLD: 0 /100 WBCS — SIGNIFICANT CHANGE UP (ref 0–0)
PLATELET # BLD AUTO: 229 K/UL — SIGNIFICANT CHANGE UP (ref 130–400)
PMV BLD: 11.7 FL — HIGH (ref 7.4–10.4)
POTASSIUM SERPL-MCNC: 3.5 MMOL/L — SIGNIFICANT CHANGE UP (ref 3.5–5)
POTASSIUM SERPL-MCNC: 4.1 MMOL/L — SIGNIFICANT CHANGE UP (ref 3.5–5)
POTASSIUM SERPL-MCNC: 5.3 MMOL/L — HIGH (ref 3.5–5)
POTASSIUM SERPL-SCNC: 3.5 MMOL/L — SIGNIFICANT CHANGE UP (ref 3.5–5)
POTASSIUM SERPL-SCNC: 4.1 MMOL/L — SIGNIFICANT CHANGE UP (ref 3.5–5)
POTASSIUM SERPL-SCNC: 5.3 MMOL/L — HIGH (ref 3.5–5)
PROT SERPL-MCNC: 6.4 G/DL — SIGNIFICANT CHANGE UP (ref 6–8)
PROT SERPL-MCNC: 6.4 G/DL — SIGNIFICANT CHANGE UP (ref 6–8)
PROTHROM AB SERPL-ACNC: 20.3 SEC — HIGH (ref 9.95–12.87)
RBC # BLD: 5.37 M/UL — SIGNIFICANT CHANGE UP (ref 4.2–5.4)
RBC # FLD: 16.1 % — HIGH (ref 11.5–14.5)
SODIUM SERPL-SCNC: 143 MMOL/L — SIGNIFICANT CHANGE UP (ref 135–146)
SODIUM SERPL-SCNC: 144 MMOL/L — SIGNIFICANT CHANGE UP (ref 135–146)
SODIUM SERPL-SCNC: 147 MMOL/L — HIGH (ref 135–146)
WBC # BLD: 12.23 K/UL — HIGH (ref 4.8–10.8)
WBC # FLD AUTO: 12.23 K/UL — HIGH (ref 4.8–10.8)

## 2023-10-08 PROCEDURE — 99233 SBSQ HOSP IP/OBS HIGH 50: CPT

## 2023-10-08 PROCEDURE — 71045 X-RAY EXAM CHEST 1 VIEW: CPT | Mod: 26

## 2023-10-08 PROCEDURE — 99232 SBSQ HOSP IP/OBS MODERATE 35: CPT

## 2023-10-08 RX ORDER — SODIUM CHLORIDE 9 MG/ML
1000 INJECTION, SOLUTION INTRAVENOUS
Refills: 0 | Status: DISCONTINUED | OUTPATIENT
Start: 2023-10-08 | End: 2023-10-09

## 2023-10-08 RX ORDER — FUROSEMIDE 40 MG
40 TABLET ORAL
Refills: 0 | Status: DISCONTINUED | OUTPATIENT
Start: 2023-10-09 | End: 2023-10-09

## 2023-10-08 RX ORDER — SODIUM ZIRCONIUM CYCLOSILICATE 10 G/10G
5 POWDER, FOR SUSPENSION ORAL ONCE
Refills: 0 | Status: COMPLETED | OUTPATIENT
Start: 2023-10-08 | End: 2023-10-08

## 2023-10-08 RX ORDER — MAGNESIUM SULFATE 500 MG/ML
2 VIAL (ML) INJECTION ONCE
Refills: 0 | Status: COMPLETED | OUTPATIENT
Start: 2023-10-08 | End: 2023-10-08

## 2023-10-08 RX ORDER — NIFEDIPINE 30 MG
60 TABLET, EXTENDED RELEASE 24 HR ORAL DAILY
Refills: 0 | Status: DISCONTINUED | OUTPATIENT
Start: 2023-10-08 | End: 2023-10-13

## 2023-10-08 RX ORDER — FUROSEMIDE 40 MG
20 TABLET ORAL ONCE
Refills: 0 | Status: COMPLETED | OUTPATIENT
Start: 2023-10-08 | End: 2023-10-08

## 2023-10-08 RX ORDER — METOPROLOL TARTRATE 50 MG
5 TABLET ORAL ONCE
Refills: 0 | Status: COMPLETED | OUTPATIENT
Start: 2023-10-08 | End: 2023-10-08

## 2023-10-08 RX ADMIN — Medication 50 MILLIGRAM(S): at 11:27

## 2023-10-08 RX ADMIN — Medication 5 MILLIGRAM(S): at 16:31

## 2023-10-08 RX ADMIN — Medication 50 MILLIGRAM(S): at 17:19

## 2023-10-08 RX ADMIN — Medication 25 GRAM(S): at 13:05

## 2023-10-08 RX ADMIN — Medication 50 MILLIGRAM(S): at 23:56

## 2023-10-08 RX ADMIN — Medication 25 GRAM(S): at 02:33

## 2023-10-08 RX ADMIN — FAMOTIDINE 20 MILLIGRAM(S): 10 INJECTION INTRAVENOUS at 11:27

## 2023-10-08 RX ADMIN — Medication 50 MILLIGRAM(S): at 05:37

## 2023-10-08 RX ADMIN — Medication 20 MILLIGRAM(S): at 17:19

## 2023-10-08 RX ADMIN — Medication 60 MILLIGRAM(S): at 05:38

## 2023-10-08 RX ADMIN — SODIUM ZIRCONIUM CYCLOSILICATE 5 GRAM(S): 10 POWDER, FOR SUSPENSION ORAL at 17:16

## 2023-10-08 RX ADMIN — SODIUM CHLORIDE 75 MILLILITER(S): 9 INJECTION, SOLUTION INTRAVENOUS at 18:09

## 2023-10-08 RX ADMIN — LOSARTAN POTASSIUM 50 MILLIGRAM(S): 100 TABLET, FILM COATED ORAL at 05:37

## 2023-10-08 NOTE — PROGRESS NOTE ADULT - ASSESSMENT
IMPRESSION:    SOB/ Large l pleural effusion  No evidence of pneumonia  A Fib  HF/ sp MVR    PLAN:    - laisx daily  - Echo noted  - plan thora tomorrow ( INR <1.5)  - Daily INR  - cardio fup  - Will follow

## 2023-10-08 NOTE — ED ADULT NURSE NOTE - NSFALLHARMRISKINTERV_ED_ALL_ED
Assistance OOB with selected safe patient handling equipment if applicable/Communicate risk of Fall with Harm to all staff, patient, and family/Provide visual cue: red socks, yellow wristband, yellow gown, etc/Reinforce activity limits and safety measures with patient and family/Bed in lowest position, wheels locked, appropriate side rails in place/Call bell, personal items and telephone in reach/Instruct patient to call for assistance before getting out of bed/chair/stretcher/Non-slip footwear applied when patient is off stretcher/Odd to call system/Physically safe environment - no spills, clutter or unnecessary equipment/Purposeful Proactive Rounding/Room/bathroom lighting operational, light cord in reach

## 2023-10-08 NOTE — PROGRESS NOTE ADULT - SUBJECTIVE AND OBJECTIVE BOX
Patient is a 88y old  Female who presents with a chief complaint of sob (10-08-23)      Pt seen and examined at bedside. No CP or SOB.      PAST MEDICAL & SURGICAL HISTORY:  Afib    HTN (hypertension)    Heart failure    H/O mitral valve replacement    H/O prior ablation treatment  afib        VITAL SIGNS (Last 24 hrs):  T(C): 36.5 (10-08-23 @ 15:18), Max: 36.6 (10-08-23 @ 07:52)  HR: 120 (10-08-23 @ 15:18) (89 - 120)  BP: 127/77 (10-08-23 @ 15:18) (124/76 - 215/175)  RR: 18 (10-08-23 @ 15:18) (18 - 19)  SpO2: 94% (10-08-23 @ 15:18) (94% - 97%)  Wt(kg): --  Daily     Daily     I&O's Summary    07 Oct 2023 07:01  -  08 Oct 2023 07:00  --------------------------------------------------------  IN: 120 mL / OUT: 0 mL / NET: 120 mL        PHYSICAL EXAM:  GENERAL: NAD, elderly   HEAD:  Atraumatic, Normocephalic  EYES: EOMI, PERRLA, conjunctiva and sclera clear  NECK: Supple, No JVD  CHEST/LUNG: rales and distant aeration on left   HEART: Regular rate and rhythm; No murmurs, rubs, or gallops  ABDOMEN: Soft, Nontender, Nondistended; Bowel sounds present  EXTREMITIES:  2+ Peripheral Pulses, No clubbing, cyanosis, or edema  PSYCH: AAOx3  NEUROLOGY: non-focal  SKIN: No rashes or lesions    Labs Reviewed  Spoke to patient in regards to abnormal labs.    CBC Full  -  ( 08 Oct 2023 08:11 )  WBC Count : 12.23 K/uL  Hemoglobin : 14.6 g/dL  Hematocrit : 46.5 %  Platelet Count - Automated : 229 K/uL  Mean Cell Volume : 86.6 fL  Mean Cell Hemoglobin : 27.2 pg  Mean Cell Hemoglobin Concentration : 31.4 g/dL  Auto Neutrophil # : 9.16 K/uL  Auto Lymphocyte # : 1.64 K/uL  Auto Monocyte # : 1.26 K/uL  Auto Eosinophil # : 0.08 K/uL  Auto Basophil # : 0.05 K/uL  Auto Neutrophil % : 74.9 %  Auto Lymphocyte % : 13.4 %  Auto Monocyte % : 10.3 %  Auto Eosinophil % : 0.7 %  Auto Basophil % : 0.4 %    BMP:    10-08 @ 08:11    Blood Urea Nitrogen - 23  Calcium - 9.5  Carbond Dioxide - 36  Chloride - 100  Creatinine - 1.0  Glucose - 111  Potassium - 5.3  Sodium - 147      Hemoglobin A1c -   PT/INR - ( 08 Oct 2023 08:11 )   PT: 20.30 sec;   INR: 1.75 ratio         PTT - ( 08 Oct 2023 08:11 )  PTT:36.8 sec  Urine Culture:        COVID Labs  CRP:    Procalcitonin, Serum: 0.07 ng/mL (10-07-23 @ 06:23)    D-Dimer:      Imaging reviewed independently and reviewed read    IMPRESSION:    Unchanged left-sided opacity/effusion, better characterized on recent CT   chest.      < from: TTE Echo Complete w/o Contrast w/ Doppler (10.07.23 @ 08:20) >  Summary:   1. Moderately decreased global left ventricular systolic function.   2. Severely enlarged left atrium.   3. LV Ejection Fraction by Sruesh's Method with a biplane EF of 41 %.   4. Increased LV wall thickness.   5. Large pleural effusion inthe left lateral region.   6. Prosthetic valve in place with mean gradient    3 mmHg suggetive no stenosis, no evidence of mitral regurigitation also.   7. Sclerotic aortic valve wtih decreased opening. mean gradient    10 mmhg suggestive of moderateaortic stenosis, however could be   underestimated, unable to evaluate adequately AS severity in this study.    < end of copied text >    MEDICATIONS  (STANDING):  famotidine    Tablet 20 milliGRAM(s) Oral daily  furosemide   Injectable 20 milliGRAM(s) IV Push once  losartan 50 milliGRAM(s) Oral daily  metoprolol tartrate 50 milliGRAM(s) Oral every 6 hours  NIFEdipine XL 60 milliGRAM(s) Oral daily    MEDICATIONS  (PRN):

## 2023-10-08 NOTE — PROGRESS NOTE ADULT - ASSESSMENT
88 year old female with a history of Afib and Mitral Valve replacement on Warfarin, CHF s/p AICD, HTN presents to the ED with shortness of breath, LE edema and fatigue was sent by Dr. Hidalgo. History goes back to 3 weeks ago when the patient started having progressive shortness of breath and lower extremity edema. The patient denied any chest Pain , NV< diarrhea, constipation, lightheadedness or headache or any urinary symptoms. Patient was following with Dr. way today when he sent her to the ED. 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, moderate AS  - lasix 60 mg bid--> decrease to 40 mg BID   - cardiology appreciated  - echo reviewed   - no events as per EP on PPM interrogation   - pulm for thora on 10/9, hold warfarin, INR 1.7  - continue Beta-Blocker therapy     #Chronic Afib- hold warfarin for thora, INr 1.7, follow INR daily, continue Beta-Blocker therapy     #hyperkalemia- lokelma, repeat BMP 2000    #hyperna- encourage po, started D5w, follow up BMP 2000    #Mag deff- repleted       #Progress Note Handoff  Pending (specify):  pulm thora in am , cardiology , electrolytes   Family discussion: house staff updated pt family  Disposition: cardiac telemonitoring   Decision to admit the pt is based on acuity as above .    88 year old female with a history of Afib and Mitral Valve replacement on Warfarin, CHF s/p AICD, HTN presents to the ED with shortness of breath, LE edema and fatigue was sent by Dr. Hidalgo. History goes back to 3 weeks ago when the patient started having progressive shortness of breath and lower extremity edema. The patient denied any chest Pain , NV< diarrhea, constipation, lightheadedness or headache or any urinary symptoms. Patient was following with Dr. way today when he sent her to the ED. 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, moderate AS  - lasix 60 mg bid--> decrease to 40 mg BID   - cardiology appreciated  - echo reviewed   - no events as per EP on PPM interrogation   - pulm for thora on 10/9, hold warfarin, INR 1.7  - continue Beta-Blocker therapy     #Chronic Afib- hold warfarin for thora, INr 1.7, follow INR daily, continue Beta-Blocker therapy     #hyperkalemia- lokelma, repeat BMP 2000    #hyperna- encourage po, started D5w, follow up BMP 2000    #Mag deff- repleted       #Progress Note Handoff  Pending (specify):  january kelleya in am , cardiology , electrolytes   Family discussion: attempted to call pt sister Ese Womack, [phone] multiple times but phone was busy   Disposition: cardiac telemonitoring   Decision to admit the pt is based on acuity as above .

## 2023-10-08 NOTE — PROGRESS NOTE ADULT - SUBJECTIVE AND OBJECTIVE BOX
Over Night Events: events noted, on RA, repeat CXR reviewed, afebrile    PHYSICAL EXAM    ICU Vital Signs Last 24 Hrs  T(C): 36.3 (08 Oct 2023 00:18), Max: 36.9 (07 Oct 2023 07:37)  T(F): 97.3 (08 Oct 2023 00:18), Max: 98.4 (07 Oct 2023 07:37)  HR: 100 (08 Oct 2023 05:30) (89 - 100)  BP: 215/175 (08 Oct 2023 05:30) (124/76 - 215/175)  BP(mean): 94 (08 Oct 2023 00:18) (94 - 94)-  RR: 19 (08 Oct 2023 05:30) (18 - 19)  SpO2: 95% (08 Oct 2023 05:30) (95% - 99%)    O2 Parameters below as of 08 Oct 2023 05:30  Patient On (Oxygen Delivery Method): room air            General: comfortable   Lungs: dec bs l base  Cardiovascular: BENI  2.6  Abdomen: Soft, Positive BS  Extremities: No clubbing   Neurological: Non focal       10-07-23 @ 07:01  -  10-08-23 @ 06:17  --------------------------------------------------------  IN:    Oral Fluid: 120 mL  Total IN: 120 mL    OUT:  Total OUT: 0 mL    Total NET: 120 mL          LABS:                          14.4   10.09 )-----------( 240      ( 07 Oct 2023 06:23 )             46.3                                               10-08    144  |  99  |  25<H>  ----------------------------<  115<H>  3.5   |  31  |  1.2    Ca    9.2      08 Oct 2023 00:35  Phos  4.7     10-07  Mg     1.6     10-08    TPro  6.4  /  Alb  3.5  /  TBili  0.4  /  DBili  x   /  AST  33  /  ALT  20  /  AlkPhos  101  10-08      PT/INR - ( 07 Oct 2023 06:23 )   PT: 23.70 sec;   INR: 2.03 ratio         PTT - ( 07 Oct 2023 06:23 )  PTT:36.3 sec                                       Urinalysis Basic - ( 08 Oct 2023 00:35 )    Color: x / Appearance: x / SG: x / pH: x  Gluc: 115 mg/dL / Ketone: x  / Bili: x / Urobili: x   Blood: x / Protein: x / Nitrite: x   Leuk Esterase: x / RBC: x / WBC x   Sq Epi: x / Non Sq Epi: x / Bacteria: x        CARDIAC MARKERS ( 06 Oct 2023 17:25 )  x     / <0.01 ng/mL / x     / x     / x                                                LIVER FUNCTIONS - ( 08 Oct 2023 00:35 )  Alb: 3.5 g/dL / Pro: 6.4 g/dL / ALK PHOS: 101 U/L / ALT: 20 U/L / AST: 33 U/L / GGT: x                                                                                                                                       MEDICATIONS  (STANDING):  famotidine    Tablet 20 milliGRAM(s) Oral daily  furosemide   Injectable 60 milliGRAM(s) IV Push two times a day  losartan 50 milliGRAM(s) Oral daily  metoprolol tartrate 50 milliGRAM(s) Oral every 6 hours

## 2023-10-08 NOTE — ED ADULT NURSE REASSESSMENT NOTE - NS ED NURSE REASSESS COMMENT FT1
per previous nurse, patient was alert, oriented x 4. during my assessment, patient is alert, oriented x 2 and trying to get out of the bed despite redirections, reoriented and counselling. safety maintained. writer reviewed chart - no diagnosis of dementia and alzhiemer's. Dr. Irizarry made aware and will review the chart. endorsed to nurse Vásquez.

## 2023-10-09 ENCOUNTER — RESULT REVIEW (OUTPATIENT)
Age: 88
End: 2023-10-09

## 2023-10-09 LAB
ALBUMIN SERPL ELPH-MCNC: 4.3 G/DL — SIGNIFICANT CHANGE UP (ref 3.5–5.2)
ALP SERPL-CCNC: 114 U/L — SIGNIFICANT CHANGE UP (ref 30–115)
ALT FLD-CCNC: 22 U/L — SIGNIFICANT CHANGE UP (ref 0–41)
ANION GAP SERPL CALC-SCNC: 17 MMOL/L — HIGH (ref 7–14)
APTT BLD: 33.1 SEC — SIGNIFICANT CHANGE UP (ref 27–39.2)
AST SERPL-CCNC: 37 U/L — SIGNIFICANT CHANGE UP (ref 0–41)
B PERT IGG+IGM PNL SER: ABNORMAL
BILIRUB SERPL-MCNC: 0.8 MG/DL — SIGNIFICANT CHANGE UP (ref 0.2–1.2)
BLD GP AB SCN SERPL QL: SIGNIFICANT CHANGE UP
BUN SERPL-MCNC: 23 MG/DL — HIGH (ref 10–20)
CALCIUM SERPL-MCNC: 9.4 MG/DL — SIGNIFICANT CHANGE UP (ref 8.4–10.5)
CHLORIDE SERPL-SCNC: 94 MMOL/L — LOW (ref 98–110)
CO2 SERPL-SCNC: 32 MMOL/L — SIGNIFICANT CHANGE UP (ref 17–32)
COLOR FLD: SIGNIFICANT CHANGE UP
CREAT SERPL-MCNC: 1 MG/DL — SIGNIFICANT CHANGE UP (ref 0.7–1.5)
EGFR: 55 ML/MIN/1.73M2 — LOW
FLUID INTAKE SUBSTANCE CLASS: SIGNIFICANT CHANGE UP
GLUCOSE SERPL-MCNC: 143 MG/DL — HIGH (ref 70–99)
HCT VFR BLD CALC: 49.7 % — HIGH (ref 37–47)
HGB BLD-MCNC: 15.8 G/DL — SIGNIFICANT CHANGE UP (ref 12–16)
INR BLD: 1.34 RATIO — HIGH (ref 0.65–1.3)
LDH SERPL L TO P-CCNC: 441 — HIGH (ref 50–242)
LYMPHOCYTES # FLD: 80 — SIGNIFICANT CHANGE UP
MAGNESIUM SERPL-MCNC: 2 MG/DL — SIGNIFICANT CHANGE UP (ref 1.8–2.4)
MCHC RBC-ENTMCNC: 27.3 PG — SIGNIFICANT CHANGE UP (ref 27–31)
MCHC RBC-ENTMCNC: 31.8 G/DL — LOW (ref 32–37)
MCV RBC AUTO: 85.8 FL — SIGNIFICANT CHANGE UP (ref 81–99)
MONOS+MACROS # FLD: 15 % — SIGNIFICANT CHANGE UP
NEUTROPHILS-BODY FLUID: 5 % — SIGNIFICANT CHANGE UP
NRBC # BLD: 0 /100 WBCS — SIGNIFICANT CHANGE UP (ref 0–0)
PLATELET # BLD AUTO: 267 K/UL — SIGNIFICANT CHANGE UP (ref 130–400)
PMV BLD: 11.4 FL — HIGH (ref 7.4–10.4)
POTASSIUM SERPL-MCNC: 3.1 MMOL/L — LOW (ref 3.5–5)
POTASSIUM SERPL-SCNC: 3.1 MMOL/L — LOW (ref 3.5–5)
PROT SERPL-MCNC: 7.1 G/DL — SIGNIFICANT CHANGE UP (ref 6–8)
PROTHROM AB SERPL-ACNC: 15.4 SEC — HIGH (ref 9.95–12.87)
RBC # BLD: 5.79 M/UL — HIGH (ref 4.2–5.4)
RBC # FLD: 16.9 % — HIGH (ref 11.5–14.5)
RCV VOL RI: HIGH /UL (ref 0–0)
SODIUM SERPL-SCNC: 143 MMOL/L — SIGNIFICANT CHANGE UP (ref 135–146)
TOTAL NUCLEATED CELL COUNT, BODY FLUID: 1542 /UL — SIGNIFICANT CHANGE UP
TUBE TYPE: SIGNIFICANT CHANGE UP
WBC # BLD: 11.83 K/UL — HIGH (ref 4.8–10.8)
WBC # FLD AUTO: 11.83 K/UL — HIGH (ref 4.8–10.8)

## 2023-10-09 PROCEDURE — 88305 TISSUE EXAM BY PATHOLOGIST: CPT | Mod: 26

## 2023-10-09 PROCEDURE — 93010 ELECTROCARDIOGRAM REPORT: CPT

## 2023-10-09 PROCEDURE — 71045 X-RAY EXAM CHEST 1 VIEW: CPT | Mod: 26

## 2023-10-09 PROCEDURE — 99233 SBSQ HOSP IP/OBS HIGH 50: CPT

## 2023-10-09 PROCEDURE — 88112 CYTOPATH CELL ENHANCE TECH: CPT | Mod: 26

## 2023-10-09 PROCEDURE — 99232 SBSQ HOSP IP/OBS MODERATE 35: CPT

## 2023-10-09 RX ORDER — POLYETHYLENE GLYCOL 3350 17 G/17G
17 POWDER, FOR SOLUTION ORAL DAILY
Refills: 0 | Status: DISCONTINUED | OUTPATIENT
Start: 2023-10-09 | End: 2023-10-13

## 2023-10-09 RX ORDER — SODIUM CHLORIDE 9 MG/ML
500 INJECTION, SOLUTION INTRAVENOUS ONCE
Refills: 0 | Status: COMPLETED | OUTPATIENT
Start: 2023-10-09 | End: 2023-10-09

## 2023-10-09 RX ORDER — SENNA PLUS 8.6 MG/1
2 TABLET ORAL AT BEDTIME
Refills: 0 | Status: DISCONTINUED | OUTPATIENT
Start: 2023-10-09 | End: 2023-10-13

## 2023-10-09 RX ORDER — POTASSIUM CHLORIDE 20 MEQ
40 PACKET (EA) ORAL ONCE
Refills: 0 | Status: COMPLETED | OUTPATIENT
Start: 2023-10-09 | End: 2023-10-09

## 2023-10-09 RX ADMIN — Medication 60 MILLIGRAM(S): at 05:45

## 2023-10-09 RX ADMIN — POLYETHYLENE GLYCOL 3350 17 GRAM(S): 17 POWDER, FOR SOLUTION ORAL at 12:26

## 2023-10-09 RX ADMIN — LOSARTAN POTASSIUM 50 MILLIGRAM(S): 100 TABLET, FILM COATED ORAL at 05:45

## 2023-10-09 RX ADMIN — FAMOTIDINE 20 MILLIGRAM(S): 10 INJECTION INTRAVENOUS at 12:24

## 2023-10-09 RX ADMIN — Medication 40 MILLIEQUIVALENT(S): at 17:48

## 2023-10-09 RX ADMIN — Medication 40 MILLIGRAM(S): at 10:04

## 2023-10-09 RX ADMIN — SENNA PLUS 2 TABLET(S): 8.6 TABLET ORAL at 23:03

## 2023-10-09 RX ADMIN — SODIUM CHLORIDE 2000 MILLILITER(S): 9 INJECTION, SOLUTION INTRAVENOUS at 12:45

## 2023-10-09 RX ADMIN — Medication 50 MILLIGRAM(S): at 23:05

## 2023-10-09 RX ADMIN — Medication 50 MILLIGRAM(S): at 05:45

## 2023-10-09 RX ADMIN — Medication 50 MILLIGRAM(S): at 12:25

## 2023-10-09 NOTE — PROGRESS NOTE ADULT - SUBJECTIVE AND OBJECTIVE BOX
JORDAN CHEEMA  88y Female    CHIEF COMPLAINT:    Patient is a 88y old  Female who presents with a chief complaint of sob (08 Oct 2023 06:17)      INTERVAL HPI/OVERNIGHT EVENTS:    Patient seen and examined.    ROS: All other systems are negative.    Vital Signs:    T(F): 97.6 (10-09-23 @ 05:00), Max: 97.8 (10-08-23 @ 07:52)  HR: 70 (10-09-23 @ 05:00) (70 - 138)  BP: 167/83 (10-09-23 @ 05:00) (122/55 - 177/77)  RR: 18 (10-09-23 @ 05:00) (18 - 18)  SpO2: 98% (10-08-23 @ 23:24) (93% - 98%)  I&O's Summary    08 Oct 2023 07:01  -  09 Oct 2023 07:00  --------------------------------------------------------  IN: 200 mL / OUT: 400 mL / NET: -200 mL      Daily     Daily   CAPILLARY BLOOD GLUCOSE          PHYSICAL EXAM:    GENERAL:  NAD  SKIN: No rashes or lesions  HENT: Atraumatic. Normocephalic. PERRL. Moist membranes.  NECK: Supple, No JVD. No lymphadenopathy.  PULMONARY: CTA B/L. No wheezing. No rales  CVS: Normal S1, S2. Rate and Rhythm are regular. No murmurs.  ABDOMEN/GI: Soft, Nontender, Nondistended; BS present  EXTREMITIES: Peripheral pulses intact. No edema B/L LE.  NEUROLOGIC:  No motor or sensory deficit.  PSYCH: Alert & oriented x 3    Consultant(s) Notes Reviewed:  [x ] YES  [ ] NO  Care Discussed with Consultants/Other Providers [ x] YES  [ ] NO    EKG reviewed  Telemetry reviewed    LABS:                        14.6   12.23 )-----------( 229      ( 08 Oct 2023 08:11 )             46.5     10-08    143  |  99  |  24<H>  ----------------------------<  102<H>  4.1   |  30  |  1.2    Ca    8.7      08 Oct 2023 20:41  Mg     1.6     10-08    TPro  6.4  /  Alb  3.5  /  TBili  0.7  /  DBili  x   /  AST  37  /  ALT  20  /  AlkPhos  107  10-08    PT/INR - ( 08 Oct 2023 08:11 )   PT: 20.30 sec;   INR: 1.75 ratio         PTT - ( 08 Oct 2023 08:11 )  PTT:36.8 sec    Trop <0.01, CKMB --, CK --, 10-06-23 @ 17:25        RADIOLOGY & ADDITIONAL TESTS:    < from: CT Chest w/ IV Cont (10.06.23 @ 19:01) >    IMPRESSION:  Moderate left pleural effusion with overlying compressive atelectasis.    < end of copied text >  < from: TTE Echo Complete w/o Contrast w/ Doppler (10.07.23 @ 08:20) >  Summary:   1. Moderately decreased global left ventricular systolic function.   2. Severely enlarged left atrium.   3. LV Ejection Fraction by Suresh's Method with a biplane EF of 41 %.   4. Increased LV wall thickness.   5. Large pleural effusion inthe left lateral region.   6. Prosthetic valve in place with mean gradient    3 mmHg suggetive no stenosis, no evidence of mitral regurigitation also.   7. Sclerotic aortic valve wtih decreased opening. mean gradient    10 mmhg suggestive of moderateaortic stenosis, however could be   underestimated, unable to evaluate adequately AS severity in this study.    < end of copied text >  < from: TTE Echo Complete w/o Contrast w/ Doppler (10.07.23 @ 08:20) >  Venous: The inferior vena cava was normal sized, with respiratory size   variation less than 50%.    < end of copied text >    Imaging or report Personally Reviewed:  [x ] YES  [ ] NO    Medications:  Standing  dextrose 5%. 1000 milliLiter(s) IV Continuous <Continuous>  famotidine    Tablet 20 milliGRAM(s) Oral daily  furosemide   Injectable 40 milliGRAM(s) IV Push two times a day  losartan 50 milliGRAM(s) Oral daily  metoprolol tartrate 50 milliGRAM(s) Oral every 6 hours  NIFEdipine XL 60 milliGRAM(s) Oral daily    PRN Meds      Case discussed with resident    Care discussed with pt/family           JORDAN CHEEMA  88y Female    CHIEF COMPLAINT:    Patient is a 88y old  Female who presents with a chief complaint of sob (08 Oct 2023 06:17)      INTERVAL HPI/OVERNIGHT EVENTS:    Patient seen and examined. C/O sob. No cough. No fever. No cp    ROS: All other systems are negative.    Vital Signs:    T(F): 97.6 (10-09-23 @ 05:00), Max: 97.8 (10-08-23 @ 07:52)  HR: 70 (10-09-23 @ 05:00) (70 - 138)  BP: 167/83 (10-09-23 @ 05:00) (122/55 - 177/77)  RR: 18 (10-09-23 @ 05:00) (18 - 18)  SpO2: 98% (10-08-23 @ 23:24) (93% - 98%)  I&O's Summary    08 Oct 2023 07:01  -  09 Oct 2023 07:00  --------------------------------------------------------  IN: 200 mL / OUT: 400 mL / NET: -200 mL      Daily     Daily   CAPILLARY BLOOD GLUCOSE          PHYSICAL EXAM:    GENERAL:  NAD  SKIN: No rashes or lesions  HENT: Atraumatic. Normocephalic. PERRL. Moist membranes.  NECK: Supple, No JVD. No lymphadenopathy.  PULMONARY: Decreased BS in the L lower chest post. No wheezing. No rales  CVS: Normal S1, S2. Rate and Rhythm are regular. No murmurs.  ABDOMEN/GI: Soft, Nontender, Nondistended; BS present  EXTREMITIES: Peripheral pulses intact. Trace edema B/L LE.  NEUROLOGIC:  No motor or sensory deficit.  PSYCH: Alert & oriented x 3    Consultant(s) Notes Reviewed:  [x ] YES  [ ] NO  Care Discussed with Consultants/Other Providers [ x] YES  [ ] NO    EKG reviewed  Telemetry reviewed    LABS:                        14.6   12.23 )-----------( 229      ( 08 Oct 2023 08:11 )             46.5     10-08    143  |  99  |  24<H>  ----------------------------<  102<H>  4.1   |  30  |  1.2    Ca    8.7      08 Oct 2023 20:41  Mg     1.6     10-08    TPro  6.4  /  Alb  3.5  /  TBili  0.7  /  DBili  x   /  AST  37  /  ALT  20  /  AlkPhos  107  10-08    PT/INR - ( 08 Oct 2023 08:11 )   PT: 20.30 sec;   INR: 1.75 ratio         PTT - ( 08 Oct 2023 08:11 )  PTT:36.8 sec    Trop <0.01, CKMB --, CK --, 10-06-23 @ 17:25        RADIOLOGY & ADDITIONAL TESTS:    < from: CT Chest w/ IV Cont (10.06.23 @ 19:01) >    IMPRESSION:  Moderate left pleural effusion with overlying compressive atelectasis.    < end of copied text >  < from: TTE Echo Complete w/o Contrast w/ Doppler (10.07.23 @ 08:20) >  Summary:   1. Moderately decreased global left ventricular systolic function.   2. Severely enlarged left atrium.   3. LV Ejection Fraction by Suresh's Method with a biplane EF of 41 %.   4. Increased LV wall thickness.   5. Large pleural effusion inthe left lateral region.   6. Prosthetic valve in place with mean gradient    3 mmHg suggetive no stenosis, no evidence of mitral regurigitation also.   7. Sclerotic aortic valve wtih decreased opening. mean gradient    10 mmhg suggestive of moderateaortic stenosis, however could be   underestimated, unable to evaluate adequately AS severity in this study.    < end of copied text >  < from: TTE Echo Complete w/o Contrast w/ Doppler (10.07.23 @ 08:20) >  Venous: The inferior vena cava was normal sized, with respiratory size   variation less than 50%.    < end of copied text >    Imaging or report Personally Reviewed:  [x ] YES  [ ] NO    Medications:  Standing  dextrose 5%. 1000 milliLiter(s) IV Continuous <Continuous>  famotidine    Tablet 20 milliGRAM(s) Oral daily  furosemide   Injectable 40 milliGRAM(s) IV Push two times a day  losartan 50 milliGRAM(s) Oral daily  metoprolol tartrate 50 milliGRAM(s) Oral every 6 hours  NIFEdipine XL 60 milliGRAM(s) Oral daily    PRN Meds      Case discussed with resident    Care discussed with pt/family

## 2023-10-09 NOTE — CHART NOTE - NSCHARTNOTEFT_GEN_A_CORE
Patient had episode A fib RVR HR: 140, BP 95/67 after she stood up   Patient was on lasix 40 BID for possible HF exacerbation   She has Left Pleural effusion with plan for TAP with pulm     500 bolus given and her home metoprolol dose was due    HR improved, pt asymptomatic     Will hold Lasix for today and reassess in AM
Electrophysiology    Pts device ___DC ICD___ was interrogated on 10-07-23  Device working properly  Events: AF RVR events 10/4 ongoing  Pt is ____NOT_ pacemaker dependent   AP   <0.1 % /   26.4  %  AT/AF burden    100 % (on coumadin)  Underlying rhythm AF 90s  Mode AAI-DDD   Battery 5.8yrs    results d/w with primary team  Reviewed by attending    Contact EP ACP with any questions [phone]

## 2023-10-09 NOTE — PROGRESS NOTE ADULT - ASSESSMENT
88 year old female with a history of Afib and Mitral Valve replacement on Warfarin, CHF s/p AICD, HTN presents to the ED with with progressively worsening shortness of breath and LE edema for the last three weeks.       L sided pleural effusion  S/P MVR  HTN  A-Fib              PLAN: 88 year old female with a history of Afib and Mitral Valve replacement on Warfarin, CHF s/p AICD, HTN presents to the ED with with progressively worsening shortness of breath and LE edema for the last three weeks.       Large L sided pleural effusion  S/P MVR  HFmrEF / s/p AICD  HTN  Chronic A-Fib              PLAN:    ·	Tele reviewed. A-Fib  ·	EKG: Atrial flutter 111/min with variable block (Interpreted by me)  ·	CT chest reviewed. Moderate L pleural effusion with overlying compressive atelectasis.   ·	Pulmonary eval for thoracentesis  ·	ECHO reviewed. EF if 41%. Prosthetic MV. Mod AS  ·	Cont IV Lasix 40 mg q 12h for now  ·	Check i's and o's and daily wt  ·	Low salt diet and water restriction to 1.5 L/D  ·	Cont Losartan, Metoprolol and Nifedipine-xl  ·	Hold Coumadin for thoracentesis. Can restart Coumadin after the procedure.     Progress Note Handoff    Pending (specify):  Consults__Pulmonary_______, Tests________, Test Results_______, Other_Waiting for L thoracentesis________  Family discussion:  Disposition: Home___/SNF___/Other________/Unknown at this time________    Tao Salmeron MD  Spectra: [phone]

## 2023-10-09 NOTE — PROGRESS NOTE ADULT - SUBJECTIVE AND OBJECTIVE BOX
Over Night Events: events noted, on RA, afebrile    PHYSICAL EXAM    ICU Vital Signs Last 24 Hrs  T(C): 34.6 (09 Oct 2023 12:31), Max: 36.4 (08 Oct 2023 22:20)  T(F): 94.3 (09 Oct 2023 12:31), Max: 97.6 (09 Oct 2023 05:00)  HR: 71 (09 Oct 2023 14:27) (70 - 140)  BP: 109/57 (09 Oct 2023 14:27) (95/67 - 167/83)  RR: 18 (09 Oct 2023 12:31) (18 - 18)  SpO2: 98% (08 Oct 2023 23:24) (93% - 98%)    O2 Parameters below as of 08 Oct 2023 23:24  Patient On (Oxygen Delivery Method): room air            General: ill looking   Lungs: dec bs l base  Cardiovascular: irregular  Abdomen: Soft, Positive BS  Extremities: No clubbing   Skin: Warm  Neurological: Non focal       10-08-23 @ 07:01  -  10-09-23 @ 07:00  --------------------------------------------------------  IN:    Oral Fluid: 200 mL  Total IN: 200 mL    OUT:    Voided (mL): 400 mL  Total OUT: 400 mL    Total NET: -200 mL      10-09-23 @ 07:01  -  10-09-23 @ 17:17  --------------------------------------------------------  IN:    Oral Fluid: 354 mL  Total IN: 354 mL    OUT:    Other (mL): 1000 mL    Voided (mL): 450 mL  Total OUT: 1450 mL    Total NET: -1096 mL          LABS:                          15.8   11.83 )-----------( 267      ( 09 Oct 2023 09:50 )             49.7                                               10-09    143  |  94<L>  |  23<H>  ----------------------------<  143<H>  3.1<L>   |  32  |  1.0    Ca    9.4      09 Oct 2023 09:50  Mg     2.0     10-09    TPro  7.1  /  Alb  4.3  /  TBili  0.8  /  DBili  x   /  AST  37  /  ALT  22  /  AlkPhos  114  10-09      PT/INR - ( 09 Oct 2023 09:50 )   PT: 15.40 sec;   INR: 1.34 ratio         PTT - ( 09 Oct 2023 09:50 )  PTT:33.1 sec                                       Urinalysis Basic - ( 09 Oct 2023 09:50 )    Color: x / Appearance: x / SG: x / pH: x  Gluc: 143 mg/dL / Ketone: x  / Bili: x / Urobili: x   Blood: x / Protein: x / Nitrite: x   Leuk Esterase: x / RBC: x / WBC x   Sq Epi: x / Non Sq Epi: x / Bacteria: x                                                  LIVER FUNCTIONS - ( 09 Oct 2023 09:50 )  Alb: 4.3 g/dL / Pro: 7.1 g/dL / ALK PHOS: 114 U/L / ALT: 22 U/L / AST: 37 U/L / GGT: x                                                                                                                                       MEDICATIONS  (STANDING):  famotidine    Tablet 20 milliGRAM(s) Oral daily  losartan 50 milliGRAM(s) Oral daily  metoprolol tartrate 50 milliGRAM(s) Oral every 6 hours  NIFEdipine XL 60 milliGRAM(s) Oral daily  polyethylene glycol 3350 17 Gram(s) Oral daily  potassium chloride    Tablet ER 40 milliEquivalent(s) Oral once  senna 2 Tablet(s) Oral at bedtime

## 2023-10-09 NOTE — PROGRESS NOTE ADULT - ASSESSMENT
IMPRESSION:    SOB/ Large l pleural effusion  No evidence of pneumonia  A Fib  HF/ sp MVR    PLAN:    - Hold lasix dc nifedipine  - Echo noted  - restart coumadin  - thora today  - Daily INR  - cardio fup  - Will follow

## 2023-10-10 LAB
ALBUMIN FLD-MCNC: 2 G/DL — SIGNIFICANT CHANGE UP
ANION GAP SERPL CALC-SCNC: 14 MMOL/L — SIGNIFICANT CHANGE UP (ref 7–14)
BUN SERPL-MCNC: 26 MG/DL — HIGH (ref 10–20)
CALCIUM SERPL-MCNC: 8.7 MG/DL — SIGNIFICANT CHANGE UP (ref 8.4–10.5)
CHLORIDE SERPL-SCNC: 99 MMOL/L — SIGNIFICANT CHANGE UP (ref 98–110)
CO2 SERPL-SCNC: 33 MMOL/L — HIGH (ref 17–32)
CREAT SERPL-MCNC: 0.9 MG/DL — SIGNIFICANT CHANGE UP (ref 0.7–1.5)
EGFR: 62 ML/MIN/1.73M2 — SIGNIFICANT CHANGE UP
GLUCOSE FLD-MCNC: 130 MG/DL — SIGNIFICANT CHANGE UP
GLUCOSE SERPL-MCNC: 91 MG/DL — SIGNIFICANT CHANGE UP (ref 70–99)
GRAM STN FLD: SIGNIFICANT CHANGE UP
HCT VFR BLD CALC: 48.2 % — HIGH (ref 37–47)
HGB BLD-MCNC: 14.9 G/DL — SIGNIFICANT CHANGE UP (ref 12–16)
LDH SERPL L TO P-CCNC: 184 U/L — SIGNIFICANT CHANGE UP
MAGNESIUM SERPL-MCNC: 1.8 MG/DL — SIGNIFICANT CHANGE UP (ref 1.8–2.4)
MCHC RBC-ENTMCNC: 26.9 PG — LOW (ref 27–31)
MCHC RBC-ENTMCNC: 30.9 G/DL — LOW (ref 32–37)
MCV RBC AUTO: 87 FL — SIGNIFICANT CHANGE UP (ref 81–99)
NRBC # BLD: 0 /100 WBCS — SIGNIFICANT CHANGE UP (ref 0–0)
PH FLD: 8 — SIGNIFICANT CHANGE UP
PLATELET # BLD AUTO: 214 K/UL — SIGNIFICANT CHANGE UP (ref 130–400)
PMV BLD: 11.8 FL — HIGH (ref 7.4–10.4)
POTASSIUM SERPL-MCNC: 4 MMOL/L — SIGNIFICANT CHANGE UP (ref 3.5–5)
POTASSIUM SERPL-SCNC: 4 MMOL/L — SIGNIFICANT CHANGE UP (ref 3.5–5)
PROT FLD-MCNC: 3.6 G/DL — SIGNIFICANT CHANGE UP
RBC # BLD: 5.54 M/UL — HIGH (ref 4.2–5.4)
RBC # FLD: 16.6 % — HIGH (ref 11.5–14.5)
SODIUM SERPL-SCNC: 146 MMOL/L — SIGNIFICANT CHANGE UP (ref 135–146)
SPECIMEN SOURCE: SIGNIFICANT CHANGE UP
WBC # BLD: 11.94 K/UL — HIGH (ref 4.8–10.8)
WBC # FLD AUTO: 11.94 K/UL — HIGH (ref 4.8–10.8)

## 2023-10-10 PROCEDURE — 99232 SBSQ HOSP IP/OBS MODERATE 35: CPT

## 2023-10-10 RX ORDER — ENOXAPARIN SODIUM 100 MG/ML
75 INJECTION SUBCUTANEOUS EVERY 12 HOURS
Refills: 0 | Status: DISCONTINUED | OUTPATIENT
Start: 2023-10-10 | End: 2023-10-13

## 2023-10-10 RX ORDER — WARFARIN SODIUM 2.5 MG/1
2.5 TABLET ORAL AT BEDTIME
Refills: 0 | Status: DISCONTINUED | OUTPATIENT
Start: 2023-10-10 | End: 2023-10-11

## 2023-10-10 RX ORDER — WARFARIN SODIUM 2.5 MG/1
2.5 TABLET ORAL AT BEDTIME
Refills: 0 | Status: DISCONTINUED | OUTPATIENT
Start: 2023-10-10 | End: 2023-10-10

## 2023-10-10 RX ADMIN — Medication 60 MILLIGRAM(S): at 05:47

## 2023-10-10 RX ADMIN — LOSARTAN POTASSIUM 50 MILLIGRAM(S): 100 TABLET, FILM COATED ORAL at 05:47

## 2023-10-10 RX ADMIN — ENOXAPARIN SODIUM 75 MILLIGRAM(S): 100 INJECTION SUBCUTANEOUS at 17:59

## 2023-10-10 RX ADMIN — FAMOTIDINE 20 MILLIGRAM(S): 10 INJECTION INTRAVENOUS at 12:09

## 2023-10-10 RX ADMIN — Medication 50 MILLIGRAM(S): at 12:09

## 2023-10-10 RX ADMIN — POLYETHYLENE GLYCOL 3350 17 GRAM(S): 17 POWDER, FOR SOLUTION ORAL at 12:09

## 2023-10-10 RX ADMIN — Medication 50 MILLIGRAM(S): at 05:48

## 2023-10-10 NOTE — PROGRESS NOTE ADULT - ASSESSMENT
IMPRESSION:    SOB/ Large l pleural effusion sp thora exudate  No evidence of pneumonia  A Fib  HF/ sp MVR    PLAN:      - Echo noted  - restart coumadin. full AC  - fup cyto  - Daily INR  - cardio fup  - Will follow  ambulate, pul standpoint op fup           IMPRESSION:    SOB/ Large l pleural effusion sp thora   No evidence of pneumonia  A Fib  HF/ sp MVR    PLAN:      - Echo noted  - restart coumadin. full AC  - fup cyto  - Daily INR  - cardio fup  - Will follow  ambulate, pul standpoint op fup

## 2023-10-10 NOTE — PROGRESS NOTE ADULT - SUBJECTIVE AND OBJECTIVE BOX
SUBJECTIVE:    Patient is a 88y old Female who presents with a chief complaint of sob (10 Oct 2023 08:56)    Currently admitted to medicine with the primary diagnosis of: sob    Today is hospital day 4d.     Overnight Events:     patient doing well. no complaints. no pain at the sight of the procedure.     PAST MEDICAL & SURGICAL HISTORY  Afib    HTN (hypertension)    Heart failure    H/O mitral valve replacement    H/O prior ablation treatment  afib    ALLERGIES:  sulfamethoxazole (Rash)    MEDICATIONS:  STANDING MEDICATIONS  enoxaparin Injectable 75 milliGRAM(s) SubCutaneous every 12 hours  famotidine    Tablet 20 milliGRAM(s) Oral daily  losartan 50 milliGRAM(s) Oral daily  metoprolol tartrate 50 milliGRAM(s) Oral every 6 hours  NIFEdipine XL 60 milliGRAM(s) Oral daily  polyethylene glycol 3350 17 Gram(s) Oral daily  senna 2 Tablet(s) Oral at bedtime  warfarin 2.5 milliGRAM(s) Oral at bedtime    PRN MEDICATIONS    VITALS:   ICU Vital Signs Last 24 Hrs  T(C): 32.3 (10 Oct 2023 12:49), Max: 36.4 (09 Oct 2023 20:29)  T(F): 90.1 (10 Oct 2023 12:49), Max: 97.6 (09 Oct 2023 20:29)  HR: 85 (10 Oct 2023 12:49) (84 - 132)  BP: 120/66 (10 Oct 2023 12:49) (118/72 - 121/72)  RR: 18 (10 Oct 2023 12:49) (17 - 18)  SpO2: --        LABS:                        14.9   11.94 )-----------( 214      ( 10 Oct 2023 06:51 )             48.2     10-10    146  |  99  |  26<H>  ----------------------------<  91  4.0   |  33<H>  |  0.9    Ca    8.7      10 Oct 2023 06:51  Mg     1.8     10-10    TPro  7.1  /  Alb  4.3  /  TBili  0.8  /  DBili  x   /  AST  37  /  ALT  22  /  AlkPhos  114  10-09    PT/INR - ( 09 Oct 2023 09:50 )   PT: 15.40 sec;   INR: 1.34 ratio         PTT - ( 09 Oct 2023 09:50 )  PTT:33.1 sec  Urinalysis Basic - ( 10 Oct 2023 06:51 )    Color: x / Appearance: x / SG: x / pH: x  Gluc: 91 mg/dL / Ketone: x  / Bili: x / Urobili: x   Blood: x / Protein: x / Nitrite: x   Leuk Esterase: x / RBC: x / WBC x   Sq Epi: x / Non Sq Epi: x / Bacteria: x            Culture - Fungal, Body Fluid (collected 09 Oct 2023 14:07)  Source: Pleural Fl Pleural Fluid  Preliminary Report (10 Oct 2023 06:54):    Testing in progress    Culture - Body Fluid with Gram Stain (collected 09 Oct 2023 14:07)  Source: Pleural Fl Pleural Fluid  Gram Stain (10 Oct 2023 01:57):    polymorphonuclear leukocytes seen    No organisms seen    by cytocentrifuge            RADIOLOGY:    < from: Xray Chest 1 View-PORTABLE IMMEDIATE (Xray Chest 1 View-PORTABLE IMMEDIATE .) (10.09.23 @ 15:50) >  Findings/  impression:    Left ICD stable position    Stable cardiac silhouette.    Diminishing left basilar opacity/effusion. No pneumothorax.    .    --- End of Report ---    < end of copied text >      PHYSICAL EXAM:  GENERAL: NAD, lying in bed comfortably  CHEST/LUNG: clear, bs b/l   HEART: Regular rate and rhythm; systolic murmur at apex  ABDOMEN: Bowel sounds present; Soft, Nontender, Nondistended. No hepatomegally  EXTREMITIES:  no edema

## 2023-10-10 NOTE — PROGRESS NOTE ADULT - ASSESSMENT
88 year old female with a history of Afib and Mitral Valve replacement on Warfarin, CHF s/p AICD, HTN presents to the ED with with progressively worsening shortness of breath and LE edema for the last three weeks.     Large L sided pleural effusion s/p thoracentesis   S/P bioprosthetic  MVR  HFmrEF / s/p AICD  HTN  Chronic A-Fib CHADVASC 5              PLAN:    Tele reviewed. A-Fib  EKG: Atrial flutter 111/min with variable block (Interpreted by me)  CT chest reviewed. Moderate L pleural effusion with overlying compressive atelectasis.   Pulmonary follow up outpatient   ECHO reviewed. EF if 41%. Prosthetic MV. Mod AS  Cont IV Lasix 40 mg q 12h for now  Check i's and o's and daily wt  Low salt diet and water restriction to 1.5 L/D  Cont Losartan, Metoprolol and Nifedipine-xl  restart coumadin with Lovenox bridge high risk of stroke due chadvasc    Progress Note Handoff    Pending (specify):  Consults______, Tests________, Test Results_______, Other  Family discussion: spoke with patient   Disposition: Home__x_/SNF___/Other________/Unknown at this time________

## 2023-10-10 NOTE — PROGRESS NOTE ADULT - SUBJECTIVE AND OBJECTIVE BOX
Over Night Events: barbara reyes feels better, afebrile    PHYSICAL EXAM    ICU Vital Signs Last 24 Hrs  T(C): 35.8 (10 Oct 2023 05:09), Max: 36.4 (09 Oct 2023 20:29)  T(F): 96.5 (10 Oct 2023 05:09), Max: 97.6 (09 Oct 2023 20:29)  HR: 132 (10 Oct 2023 05:09) (71 - 140)  BP: 118/72 (10 Oct 2023 05:09) (95/67 - 121/72)  RR: 18 (10 Oct 2023 05:09) (17 - 18)  SpO2: 95%        General: comfortable  Lungs: dec bs l side  Cardiovascular: Regular   Abdomen: Soft, Positive BS  Extremities: No clubbing   Skin: Warm  Neurological: Non focal       10-09-23 @ 07:01  -  10-10-23 @ 07:00  --------------------------------------------------------  IN:    IV PiggyBack: 180 mL    Oral Fluid: 354 mL  Total IN: 534 mL    OUT:    Other (mL): 1000 mL    Voided (mL): 700 mL  Total OUT: 1700 mL    Total NET: -1166 mL      10-10-23 @ 07:01  -  10-10-23 @ 08:56  --------------------------------------------------------  IN:    Oral Fluid: 210 mL  Total IN: 210 mL    OUT:  Total OUT: 0 mL    Total NET: 210 mL          LABS:                          14.9   11.94 )-----------( 214      ( 10 Oct 2023 06:51 )             48.2                                               10-10    146  |  99  |  26<H>  ----------------------------<  91  4.0   |  33<H>  |  0.9    Ca    8.7      10 Oct 2023 06:51  Mg     1.8     10-10    TPro  7.1  /  Alb  4.3  /  TBili  0.8  /  DBili  x   /  AST  37  /  ALT  22  /  AlkPhos  114  10-09      PT/INR - ( 09 Oct 2023 09:50 )   PT: 15.40 sec;   INR: 1.34 ratio         PTT - ( 09 Oct 2023 09:50 )  PTT:33.1 sec                                       Urinalysis Basic - ( 10 Oct 2023 06:51 )    Color: x / Appearance: x / SG: x / pH: x  Gluc: 91 mg/dL / Ketone: x  / Bili: x / Urobili: x   Blood: x / Protein: x / Nitrite: x   Leuk Esterase: x / RBC: x / WBC x   Sq Epi: x / Non Sq Epi: x / Bacteria: x                                                  LIVER FUNCTIONS - ( 09 Oct 2023 09:50 )  Alb: 4.3 g/dL / Pro: 7.1 g/dL / ALK PHOS: 114 U/L / ALT: 22 U/L / AST: 37 U/L / GGT: x                                                  Culture - Fungal, Body Fluid (collected 09 Oct 2023 14:07)  Source: Pleural Fl Pleural Fluid  Preliminary Report (10 Oct 2023 06:54):    Testing in progress    Culture - Body Fluid with Gram Stain (collected 09 Oct 2023 14:07)  Source: Pleural Fl Pleural Fluid  Gram Stain (10 Oct 2023 01:57):    polymorphonuclear leukocytes seen    No organisms seen    by cytocentrifuge                                                                                           MEDICATIONS  (STANDING):  famotidine    Tablet 20 milliGRAM(s) Oral daily  losartan 50 milliGRAM(s) Oral daily  metoprolol tartrate 50 milliGRAM(s) Oral every 6 hours  NIFEdipine XL 60 milliGRAM(s) Oral daily  polyethylene glycol 3350 17 Gram(s) Oral daily  senna 2 Tablet(s) Oral at bedtime

## 2023-10-11 ENCOUNTER — TRANSCRIPTION ENCOUNTER (OUTPATIENT)
Age: 88
End: 2023-10-11

## 2023-10-11 LAB
ANION GAP SERPL CALC-SCNC: 15 MMOL/L — HIGH (ref 7–14)
APTT BLD: 40.8 SEC — HIGH (ref 27–39.2)
APTT BLD: 42.1 SEC — HIGH (ref 27–39.2)
BUN SERPL-MCNC: 29 MG/DL — HIGH (ref 10–20)
CALCIUM SERPL-MCNC: 8.5 MG/DL — SIGNIFICANT CHANGE UP (ref 8.4–10.5)
CHLORIDE SERPL-SCNC: 95 MMOL/L — LOW (ref 98–110)
CO2 SERPL-SCNC: 28 MMOL/L — SIGNIFICANT CHANGE UP (ref 17–32)
CREAT SERPL-MCNC: 1 MG/DL — SIGNIFICANT CHANGE UP (ref 0.7–1.5)
EGFR: 55 ML/MIN/1.73M2 — LOW
GLUCOSE BLDC GLUCOMTR-MCNC: 87 MG/DL — SIGNIFICANT CHANGE UP (ref 70–99)
GLUCOSE SERPL-MCNC: 84 MG/DL — SIGNIFICANT CHANGE UP (ref 70–99)
HCT VFR BLD CALC: 42.4 % — SIGNIFICANT CHANGE UP (ref 37–47)
HGB BLD-MCNC: 13.6 G/DL — SIGNIFICANT CHANGE UP (ref 12–16)
INR BLD: 1.36 RATIO — HIGH (ref 0.65–1.3)
INR BLD: 1.39 RATIO — HIGH (ref 0.65–1.3)
MAGNESIUM SERPL-MCNC: 1.9 MG/DL — SIGNIFICANT CHANGE UP (ref 1.8–2.4)
MCHC RBC-ENTMCNC: 27.2 PG — SIGNIFICANT CHANGE UP (ref 27–31)
MCHC RBC-ENTMCNC: 32.1 G/DL — SIGNIFICANT CHANGE UP (ref 32–37)
MCV RBC AUTO: 84.8 FL — SIGNIFICANT CHANGE UP (ref 81–99)
NRBC # BLD: 0 /100 WBCS — SIGNIFICANT CHANGE UP (ref 0–0)
PLATELET # BLD AUTO: 214 K/UL — SIGNIFICANT CHANGE UP (ref 130–400)
PMV BLD: 12.1 FL — HIGH (ref 7.4–10.4)
POTASSIUM SERPL-MCNC: 3.7 MMOL/L — SIGNIFICANT CHANGE UP (ref 3.5–5)
POTASSIUM SERPL-SCNC: 3.7 MMOL/L — SIGNIFICANT CHANGE UP (ref 3.5–5)
PROTHROM AB SERPL-ACNC: 15.7 SEC — HIGH (ref 9.95–12.87)
PROTHROM AB SERPL-ACNC: 16 SEC — HIGH (ref 9.95–12.87)
RBC # BLD: 5 M/UL — SIGNIFICANT CHANGE UP (ref 4.2–5.4)
RBC # FLD: 16.1 % — HIGH (ref 11.5–14.5)
SODIUM SERPL-SCNC: 138 MMOL/L — SIGNIFICANT CHANGE UP (ref 135–146)
WBC # BLD: 12.67 K/UL — HIGH (ref 4.8–10.8)
WBC # FLD AUTO: 12.67 K/UL — HIGH (ref 4.8–10.8)

## 2023-10-11 PROCEDURE — 99232 SBSQ HOSP IP/OBS MODERATE 35: CPT

## 2023-10-11 RX ORDER — ATORVASTATIN CALCIUM 80 MG/1
20 TABLET, FILM COATED ORAL AT BEDTIME
Refills: 0 | Status: DISCONTINUED | OUTPATIENT
Start: 2023-10-11 | End: 2023-10-13

## 2023-10-11 RX ORDER — POTASSIUM CHLORIDE 20 MEQ
40 PACKET (EA) ORAL ONCE
Refills: 0 | Status: COMPLETED | OUTPATIENT
Start: 2023-10-11 | End: 2023-10-11

## 2023-10-11 RX ORDER — FUROSEMIDE 40 MG
40 TABLET ORAL DAILY
Refills: 0 | Status: DISCONTINUED | OUTPATIENT
Start: 2023-10-11 | End: 2023-10-12

## 2023-10-11 RX ORDER — WARFARIN SODIUM 2.5 MG/1
3 TABLET ORAL AT BEDTIME
Refills: 0 | Status: COMPLETED | OUTPATIENT
Start: 2023-10-11 | End: 2023-10-11

## 2023-10-11 RX ORDER — MAGNESIUM SULFATE 500 MG/ML
2 VIAL (ML) INJECTION ONCE
Refills: 0 | Status: COMPLETED | OUTPATIENT
Start: 2023-10-11 | End: 2023-10-11

## 2023-10-11 RX ORDER — NIFEDIPINE 30 MG
1 TABLET, EXTENDED RELEASE 24 HR ORAL
Qty: 30 | Refills: 0
Start: 2023-10-11 | End: 2023-11-09

## 2023-10-11 RX ORDER — SENNA PLUS 8.6 MG/1
2 TABLET ORAL
Qty: 60 | Refills: 0
Start: 2023-10-11 | End: 2023-11-09

## 2023-10-11 RX ORDER — POLYETHYLENE GLYCOL 3350 17 G/17G
17 POWDER, FOR SOLUTION ORAL
Qty: 1 | Refills: 0
Start: 2023-10-11 | End: 2023-11-09

## 2023-10-11 RX ORDER — FUROSEMIDE 40 MG
40 TABLET ORAL DAILY
Refills: 0 | Status: DISCONTINUED | OUTPATIENT
Start: 2023-10-11 | End: 2023-10-11

## 2023-10-11 RX ADMIN — ENOXAPARIN SODIUM 75 MILLIGRAM(S): 100 INJECTION SUBCUTANEOUS at 17:25

## 2023-10-11 RX ADMIN — FAMOTIDINE 20 MILLIGRAM(S): 10 INJECTION INTRAVENOUS at 11:31

## 2023-10-11 RX ADMIN — Medication 50 MILLIGRAM(S): at 17:24

## 2023-10-11 RX ADMIN — ENOXAPARIN SODIUM 75 MILLIGRAM(S): 100 INJECTION SUBCUTANEOUS at 05:25

## 2023-10-11 RX ADMIN — WARFARIN SODIUM 3 MILLIGRAM(S): 2.5 TABLET ORAL at 21:50

## 2023-10-11 RX ADMIN — Medication 40 MILLIEQUIVALENT(S): at 16:00

## 2023-10-11 RX ADMIN — Medication 40 MILLIGRAM(S): at 11:30

## 2023-10-11 RX ADMIN — Medication 50 MILLIGRAM(S): at 11:31

## 2023-10-11 RX ADMIN — ATORVASTATIN CALCIUM 20 MILLIGRAM(S): 80 TABLET, FILM COATED ORAL at 21:49

## 2023-10-11 RX ADMIN — Medication 50 MILLIGRAM(S): at 05:25

## 2023-10-11 RX ADMIN — LOSARTAN POTASSIUM 50 MILLIGRAM(S): 100 TABLET, FILM COATED ORAL at 05:25

## 2023-10-11 RX ADMIN — Medication 25 GRAM(S): at 16:00

## 2023-10-11 RX ADMIN — Medication 60 MILLIGRAM(S): at 05:25

## 2023-10-11 NOTE — DISCHARGE NOTE PROVIDER - NSDCMRMEDTOKEN_GEN_ALL_CORE_FT
atorvastatin 20 mg oral tablet: 1 tab(s) orally once a day  Coumadin: 2 milligram(s) orally once a day every day except Wednesdays (skip warfarin dose)  famotidine 20 mg oral tablet: 1 tab(s) orally once a day  furosemide 40 mg oral tablet: 0.5 tab(s) orally once a day  losartan 50 mg oral tablet: 1 tab(s) orally once a day  metoprolol tartrate 100 mg oral tablet: 1 tab(s) orally 2 times a day and 50mg mid-day  NIFEdipine 60 mg oral tablet, extended release: 1 tab(s) orally once a day  polyethylene glycol 3350 oral powder for reconstitution: 17 gram(s) orally once a day as needed for  constipation  senna leaf extract oral tablet: 2 tab(s) orally once a day (at bedtime) as needed for  constipation   atorvastatin 20 mg oral tablet: 1 tab(s) orally once a day  Coumadin: 2 milligram(s) orally once a day every day except Wednesdays (skip warfarin dose)  famotidine 20 mg oral tablet: 1 tab(s) orally once a day  furosemide 40 mg oral tablet: 0.5 tab(s) orally once a day  losartan 50 mg oral tablet: 1 tab(s) orally once a day  Lovenox 80 mg/0.8 mL injectable solution: 80 milligram(s) subcutaneously once a day bridging for coumadin  metoprolol tartrate 100 mg oral tablet: 1 tab(s) orally 2 times a day and 50mg mid-day  NIFEdipine 60 mg oral tablet, extended release: 1 tab(s) orally once a day  polyethylene glycol 3350 oral powder for reconstitution: 17 gram(s) orally once a day as needed for  constipation  senna leaf extract oral tablet: 2 tab(s) orally once a day (at bedtime) as needed for  constipation   atorvastatin 20 mg oral tablet: 1 tab(s) orally once a day  Coumadin: 3 milligram(s) orally once a day (at bedtime) Until apoitment with Coumadin clinic  famotidine 20 mg oral tablet: 1 tab(s) orally once a day  furosemide 40 mg oral tablet: 0.5 tab(s) orally once a day  losartan 50 mg oral tablet: 1 tab(s) orally once a day  Lovenox 80 mg/0.8 mL injectable solution: 80 milligram(s) subcutaneously once a day bridging for coumadin  metoprolol tartrate 100 mg oral tablet: 1 tab(s) orally 2 times a day and 50mg mid-day  NIFEdipine 60 mg oral tablet, extended release: 1 tab(s) orally once a day  polyethylene glycol 3350 oral powder for reconstitution: 17 gram(s) orally once a day as needed for  constipation  senna leaf extract oral tablet: 2 tab(s) orally once a day (at bedtime) as needed for  constipation   atorvastatin 20 mg oral tablet: 1 tab(s) orally once a day  famotidine 20 mg oral tablet: 1 tab(s) orally once a day  furosemide 40 mg oral tablet: 0.5 tab(s) orally once a day  losartan 50 mg oral tablet: 1 tab(s) orally once a day  Lovenox 100 mg/mL injectable solution: 100 milligram(s) subcutaneously once a day  metoprolol tartrate 100 mg oral tablet: 1 tab(s) orally 2 times a day and 50mg mid-day  NIFEdipine 60 mg oral tablet, extended release: 1 tab(s) orally once a day  polyethylene glycol 3350 oral powder for reconstitution: 17 gram(s) orally once a day as needed for  constipation  senna leaf extract oral tablet: 2 tab(s) orally once a day (at bedtime) as needed for  constipation  warfarin 3 mg oral tablet: 1 tab(s) orally once a day (at bedtime) Until Coumadin clinic appoittment   atorvastatin 20 mg oral tablet: 1 tab(s) orally once a day  famotidine 20 mg oral tablet: 1 tab(s) orally once a day  furosemide 40 mg oral tablet: 0.5 tab(s) orally once a day  losartan 50 mg oral tablet: 1 tab(s) orally once a day  Lovenox 100 mg/mL injectable solution: 100 milligram(s) subcutaneously once a day  metoprolol succinate 200 mg oral tablet, extended release: 1 tab(s) orally once a day  metoprolol succinate 50 mg oral capsule, extended release: 1 cap(s) orally once a day  NIFEdipine 60 mg oral tablet, extended release: 1 tab(s) orally once a day  polyethylene glycol 3350 oral powder for reconstitution: 17 gram(s) orally once a day as needed for  constipation  senna leaf extract oral tablet: 2 tab(s) orally once a day (at bedtime) as needed for  constipation  warfarin 3 mg oral tablet: 1 tab(s) orally once a day (at bedtime) Until Coumadin clinic appoittment

## 2023-10-11 NOTE — PROGRESS NOTE ADULT - ASSESSMENT
88 year old female with a history of Afib and Mitral Valve replacement on Warfarin, CHF s/p AICD, HTN presents to the ED with with progressively worsening shortness of breath and LE edema for the last three weeks.       Large L sided pleural effusion s/p thoracocentesis.   S/P MVR  Acute HFmrEF / s/p AICD  HTN  Chronic A-Fib  No acute respiratory failure              PLAN:    ·	Tele reviewed. A-Fib  ·	EKG: Atrial flutter 111/min with variable block (Interpreted by me)  ·	CT chest reviewed. Moderate L pleural effusion with overlying compressive atelectasis.   ·	S/P thoracocentesis on 10/9 and removal of 1L of fluid  ·	Albumin gradient is 2.3. Fluid is transudate based on this. Pt was on diuretics.   ·	Check fluid cytology  ·	Fluid cx is negative so far.   ·	Cont IV Lasix 40 mg daily. Will switch to PO Lasix in AM  ·	ECHO reviewed. EF if 41%. Prosthetic MV. Mod AS  ·	Check i's and o's and daily wt  ·	Low salt diet and water restriction to 1.5 L/D  ·	Cont Losartan, Metoprolol and Nifedipine-xl  ·	Restart Coumadin and bridge with Lovenox.   ·	INR is 1.36. Give Coumadin 5 mg po tonight. Check INR in AM    Progress Note Handoff    Pending (specify):  Consults________, Tests________, Test Results_______, Other_Anticipate d/c in AM________  Family discussion:  Disposition: Home___/SNF___/Other________/Unknown at this time________    Tao Salmeron MD  Spectra: [phone]

## 2023-10-11 NOTE — DISCHARGE NOTE PROVIDER - PROVIDER TOKENS
PROVIDER:[TOKEN:[[id number]],FOLLOWUP:[1 week]] PROVIDER:[TOKEN:[[id number]],FOLLOWUP:[1 week]],PROVIDER:[TOKEN:[[id number]],FOLLOWUP:[1-3 days]] PROVIDER:[TOKEN:[[id number]],FOLLOWUP:[1 week]],PROVIDER:[TOKEN:[[id number]],FOLLOWUP:[1-3 days]],PROVIDER:[TOKEN:[[id number]],FOLLOWUP:[2 weeks]]

## 2023-10-11 NOTE — DISCHARGE NOTE PROVIDER - NSDCFUSCHEDAPPT_GEN_ALL_CORE_FT
[facility]  ELECTROPH [address] [place] [address]  Scheduled Appointment: 12/21/2023     Jayjay Frazier  [facility]  UROLOGY [address]  Scheduled Appointment: 10/23/2023    [facility]  ELECTROPH [address] [place] [address] Av  Scheduled Appointment: 12/21/2023

## 2023-10-11 NOTE — DISCHARGE NOTE PROVIDER - NSDCCPCAREPLAN_GEN_ALL_CORE_FT
PRINCIPAL DISCHARGE DIAGNOSIS  Diagnosis: Fluid overload  Assessment and Plan of Treatment: Congestive Heart Failure (CHF)  Congestive heart failure is a chronic condition in which the heart has trouble pumping blood. In some cases of heart failure, fluid may back up into your lungs or you may have swelling (edema) in your lower legs. There are many causes of heart failure including high blood pressure, coronary artery disease, abnormal heart valves, heart muscle disease, lung disease, diabetes, etc. Symptoms include shortness of breath with activity or when lying flat, cough, swelling of the legs, fatigue, or increased urination during the night.   Treatment is aimed at managing the symptoms of heart failure and may include lifestyle changes, medications, or surgical procedures. Take medicines only as directed by your health care provider and do not stop unless instructed to do so. Eat heart-healthy foods with low or no trans/saturated fats, cholesterol and salt. Weigh yourself every day for early recognition of fluid accumulation.  SEEK IMMEDIATE MEDICAL CARE IF YOU HAVE ANY OF THE FOLLOWING SYMPTOMS: shortness of breath, change in mental status, chest pain, lightheadedness/dizziness/fainting, or worsening of symptoms including not being able to conduct normal physical activity.      SECONDARY DISCHARGE DIAGNOSES  Diagnosis: CHF exacerbation  Assessment and Plan of Treatment:     Diagnosis: Pleural effusion  Assessment and Plan of Treatment:     Diagnosis: Shortness of breath  Assessment and Plan of Treatment:      PRINCIPAL DISCHARGE DIAGNOSIS  Diagnosis: Fluid overload  Assessment and Plan of Treatment: Congestive Heart Failure (CHF)  Congestive heart failure is a chronic condition in which the heart has trouble pumping blood. In some cases of heart failure, fluid may back up into your lungs or you may have swelling (edema) in your lower legs. There are many causes of heart failure including high blood pressure, coronary artery disease, abnormal heart valves, heart muscle disease, lung disease, diabetes, etc. Symptoms include shortness of breath with activity or when lying flat, cough, swelling of the legs, fatigue, or increased urination during the night.   Treatment is aimed at managing the symptoms of heart failure and may include lifestyle changes, medications, or surgical procedures. Take medicines only as directed by your health care provider and do not stop unless instructed to do so. Eat heart-healthy foods with low or no trans/saturated fats, cholesterol and salt. Weigh yourself every day for early recognition of fluid accumulation.  SEEK IMMEDIATE MEDICAL CARE IF YOU HAVE ANY OF THE FOLLOWING SYMPTOMS: shortness of breath, change in mental status, chest pain, lightheadedness/dizziness/fainting, or worsening of symptoms including not being able to conduct normal physical activity.     PRINCIPAL DISCHARGE DIAGNOSIS  Diagnosis: Fluid overload  Assessment and Plan of Treatment: Congestive Heart Failure (CHF)  Congestive heart failure is a chronic condition in which the heart has trouble pumping blood. In some cases of heart failure, fluid may back up into your lungs or you may have swelling (edema) in your lower legs. There are many causes of heart failure including high blood pressure, coronary artery disease, abnormal heart valves, heart muscle disease, lung disease, diabetes, etc. Symptoms include shortness of breath with activity or when lying flat, cough, swelling of the legs, fatigue, or increased urination during the night.   Treatment is aimed at managing the symptoms of heart failure and may include lifestyle changes, medications, or surgical procedures. Take medicines only as directed by your health care provider and do not stop unless instructed to do so. Eat heart-healthy foods with low or no trans/saturated fats, cholesterol and salt. Weigh yourself every day for early recognition of fluid accumulation.  You developed urine retention and our urology team evaluated and recommend placing a quevedo catheter, you have an appointment with them on 10/23 at 9:30 with Dr. Frazier  SEEK IMMEDIATE MEDICAL CARE IF YOU HAVE ANY OF THE FOLLOWING SYMPTOMS: shortness of breath, change in mental status, chest pain, lightheadedness/dizziness/fainting, or worsening of symptoms including not being able to conduct normal physical activity.

## 2023-10-11 NOTE — DISCHARGE NOTE PROVIDER - HOSPITAL COURSE
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 Hidalgo. History goes back to 3 weeks ago when the patient started having progressive shortness of breath and lower extremity edema. The patient denied any chest Pain , NV< diarrhea, constipation, lightheadedness or headache or any urinary symptoms. Patient was following with Dr. way today when he sent her to the ED. Patient is admitted for a left sided pleural effusion, SOB, and possible HF exacerbation     CT chest with IV contrast showed :  * Moderate left pleural effusion with overlying compressive atelectasis.     EKG shows Aflutter with block     Labs noticeable for pro-BNP 2874     VS noticeable for tachycardia 120 , /81   Patient was admitted to Tele , thoracocentesis was done with 1 L of dark red fluid removed, fluid studies in favor of transudate   cytology pending     A&P:  Large L sided pleural effusion s/p thoracentesis 1L  S/P bioprosthetic  MVR  HFmrEF / s/p AICD  HTN  Chronic A-Fib CHADVASC 5              PLAN:    Tele reviewed. A-Fib  EKG: Atrial flutter 111/min with variable block (Interpreted by me)  CT chest reviewed. Moderate L pleural effusion with overlying compressive atelectasis.   Pulmonary follow up outpatient   ECHO reviewed. EF if 41%. Prosthetic MV. 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 Hidalgo. History goes back to 3 weeks ago when the patient started having progressive shortness of breath and lower extremity edema. The patient denied any chest Pain , NV< diarrhea, constipation, lightheadedness or headache or any urinary symptoms. Patient was following with Dr. way today when he sent her to the ED. Patient is admitted for a left sided pleural effusion, SOB, and possible HF exacerbation     CT chest with IV contrast showed :  * Moderate left pleural effusion with overlying compressive atelectasis.     EKG shows Aflutter with block     Labs noticeable for pro-BNP 2874     VS noticeable for tachycardia 120 , /81   Patient was admitted to Tele , thoracocentesis was done with 1 L of dark red fluid removed, fluid studies in favor of transudate   cytology pending     A&P:  Large L sided pleural effusion s/p thoracentesis 1L  S/P bioprosthetic  MVR  HFmrEF / s/p AICD  HTN  Chronic A-Fib CHADVASC 5              PLAN:    Tele reviewed. A-Fib  EKG: Atrial flutter 111/min with variable block (Interpreted by me)  CT chest reviewed. Moderate L pleural effusion with overlying compressive atelectasis.   Pulmonary follow up outpatient   ECHO reviewed. EF if 41%. Prosthetic MV. 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 Hidalgo. History goes back to 3 weeks ago when the patient started having progressive shortness of breath and lower extremity edema. The patient denied any chest Pain , NV< diarrhea, constipation, lightheadedness or headache or any urinary symptoms. Patient was following with Dr. way today when he sent her to the ED. Patient is admitted for a left sided pleural effusion, SOB, and possible HF exacerbation     CT chest with IV contrast showed :  * Moderate left pleural effusion with overlying compressive atelectasis.     EKG shows Aflutter with block     Labs noticeable for pro-BNP 2874     VS noticeable for tachycardia 120 , /81   Patient was admitted to Tele , thoracocentesis was done with 1 L of dark red fluid removed, fluid studies in favor of transudate   cytology pending     A&P:  Large L sided pleural effusion s/p thoracentesis 1L  S/P bioprosthetic  MVR  HFmrEF / s/p AICD  HTN  Chronic A-Fib CHADVASC 5              PLAN:    Tele reviewed. A-Fib  EKG: Atrial flutter 111/min with variable block (Interpreted by me)  CT chest reviewed. Moderate L pleural effusion with overlying compressive atelectasis.   Pulmonary follow up outpatient   ECHO reviewed. EF if 41%. Prosthetic MV. Mod AS  d/c on home Lasix 20 QD PO  Low salt diet and water restriction to 1.5 L/D  Cont Losartan, Metoprolol and Nifedipine-xl  restarted coumadin with Lovenox bridge high risk of stroke due chadvasc  Will do Lovenox 80 QD (as not able to set up nursing for 2 times daily  Will c/w Coumadin 5 mg HS  Dr. 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 Hidalgo. History goes back to 3 weeks ago when the patient started having progressive shortness of breath and lower extremity edema. The patient denied any chest Pain , NV< diarrhea, constipation, lightheadedness or headache or any urinary symptoms. Patient was following with Dr. way today when he sent her to the ED. Patient is admitted for a left sided pleural effusion, SOB, and possible HF exacerbation     CT chest with IV contrast showed :  * Moderate left pleural effusion with overlying compressive atelectasis.     EKG shows Aflutter with block     Labs noticeable for pro-BNP 2874     VS noticeable for tachycardia 120 , /81   Patient was admitted to Tele , thoracocentesis was done with 1 L of dark red fluid removed, fluid studies in favor of transudate   cytology pending     A&P:  Large L sided pleural effusion s/p thoracentesis 1L  S/P bioprosthetic  MVR  HFmrEF / s/p AICD  HTN  Chronic A-Fib CHADVASC 5              PLAN:    Tele reviewed. A-Fib  EKG: Atrial flutter 111/min with variable block (Interpreted by me)  CT chest reviewed. Moderate L pleural effusion with overlying compressive atelectasis.   Pulmonary follow up outpatient   ECHO reviewed. EF if 41%. Prosthetic MV. Mod AS  d/c on home Lasix 20 QD PO  Low salt diet and water restriction to 1.5 L/D  Cont Losartan, Metoprolol and Nifedipine-xl  restarted coumadin with Lovenox bridge high risk of stroke due chadvasc  Will do Lovenox 80 QD (as not able to set up nursing for 2 times daily  Will c/w Coumadin 5 mg HS  Dr. Price contacted, patient will follow with him on Monday     Patient developed urine retention and had to be straight cathed twice, urology consulted and....  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 Hidalgo. History goes back to 3 weeks ago when the patient started having progressive shortness of breath and lower extremity edema. The patient denied any chest Pain , NV< diarrhea, constipation, lightheadedness or headache or any urinary symptoms. Patient was following with Dr. way today when he sent her to the ED. Patient is admitted for a left sided pleural effusion, SOB, and possible HF exacerbation     CT chest with IV contrast showed :  * Moderate left pleural effusion with overlying compressive atelectasis.     EKG shows Aflutter with block     Labs noticeable for pro-BNP 2874     VS noticeable for tachycardia 120 , /81   Patient was admitted to Tele , thoracocentesis was done with 1 L of dark red fluid removed, fluid studies in favor of transudate   cytology pending     A&P:  Large L sided pleural effusion s/p thoracentesis 1L  S/P bioprosthetic  MVR  HFmrEF / s/p AICD  HTN  Chronic A-Fib CHADVASC 5              PLAN:    Tele reviewed. A-Fib  EKG: Atrial flutter 111/min with variable block (Interpreted by me)  CT chest reviewed. Moderate L pleural effusion with overlying compressive atelectasis.   Pulmonary follow up outpatient   ECHO reviewed. EF if 41%. Prosthetic MV. Mod AS  d/c on home Lasix 20 QD PO  Low salt diet and water restriction to 1.5 L/D  Cont Losartan, Metoprolol and Nifedipine-xl  restarted coumadin with Lovenox bridge high risk of stroke due chadvasc  Will do Lovenox 80 QD (as not able to set up nursing for 2 times daily  Will c/w Coumadin 5 mg HS  Dr. Price contacted, patient will follow with him on Monday     Patient developed urine retention and had to be straight cathed twice, urology consulted and recommended quevedo  follow up with urology outpatient

## 2023-10-11 NOTE — DISCHARGE NOTE NURSING/CASE MANAGEMENT/SOCIAL WORK - NSDCFUADDAPPT_GEN_ALL_CORE_FT
DR Frazier, Jayjay Cash,Urology ,F/U Appt on 10/23/23   [address] [place] [id number]  Phone: [phone]

## 2023-10-11 NOTE — CDI QUERY NOTE - NSCDIOTHERTXTBX_GEN_ALL_CORE_HH
Based on your professional judgment and the clinical indicators, please clarify if HFmrEF can be further specified as:    •   Acute on chronic HFmrEF was evaluated and ruled in  •   Acute on chronic HFmrEF was evaluated and ruled out  •   Other(please specify):  •   Clinically unable to specify      10/8 Progress Note Adult-Hospitalist Attending: … Assessment: 88-year-old female with a history of A fib and Mitral Valve replacement, CHF and HTN presents to the ED with shortness of breath, LE edema and fatigue. History goes back to 3 weeks ago when the patient started having progressive shortness of breath and lower extremity edema… Patient is admitted for a left sided pleural effusion, SOB, and possible HF exacerbation.  #Acute resp failure 2/2 left side pleural effusion , possible acute on chronic HFmrEF vs suspected malignancy…         10/7 Consult Note Adult-Pulmonology Attending: …Impression: SOB/ Large l pleural effusion… highly cardiac etiology… Plan: -Continue diuresis/ keep - balance accurate I/O…          10/9 Progress Note Adult-Hospitalist Attending: …Assessment: 1. Large L sided pleural effusion. 2. HFmrEF…          10/6 CT Chest with IV Contrast: … Impression: Moderate left pleural effusion with overlying compressive atelectasis…          10/7 TTE ECHO: …Summary: 1. Moderately decreased global left ventricular systolic function.   2. Severely enlarged left atrium. 3. LV Ejection Fraction by Suresh's Method with a biplane EF of 41 %. 4. Increased LV wall thickness. 5. Large pleural effusion in the left lateral region…            Medications: …        10/6 Lasix 40 mg IV push x 1, received   10/7-10/8 Lasix  60 mg IV  push 2 times per day, received 4 doses  10/8 Lasix 20 mg IV push x 1, received   10/9 Lasix  40 mg IV  push 2 times per day, received 1 dose  10/11 Lasix 40 mg IV push daily, received 1 dose …        Labs: …        10/6 BNP 2874 …

## 2023-10-11 NOTE — PHYSICAL THERAPY INITIAL EVALUATION ADULT - FOLLOWS COMMANDS/ANSWERS QUESTIONS, REHAB EVAL
100% of the time

## 2023-10-11 NOTE — DISCHARGE NOTE NURSING/CASE MANAGEMENT/SOCIAL WORK - CASE MANAGER'S NAME
Sameera Ma/NURA/EMMANUEL/[phone] Abdomen soft, nontender, nondistended, bowel sounds present in all 4 quadrants.

## 2023-10-11 NOTE — DISCHARGE NOTE PROVIDER - CARE PROVIDERS DIRECT ADDRESSES
,[email or web address],DirectAddress_Unknown ,[email or web address],DirectAddress_Unknown,DirectAddress_Unknown

## 2023-10-11 NOTE — PHYSICAL THERAPY INITIAL EVALUATION ADULT - GENERAL OBSERVATIONS, REHAB EVAL
+Patient had bowel movement during therapy. She reported she takes too much laxative; RN Kait cobb. PT IE 9:30-10am. Patient left in supine in NAD. +call bell, +bed alarm, +telemetry, +prima fit.

## 2023-10-11 NOTE — DISCHARGE NOTE NURSING/CASE MANAGEMENT/SOCIAL WORK - PATIENT PORTAL LINK FT
You can access the FollowMyHealth Patient Portal offered by [facility] by registering at the following website: [email or web address]. By joining [employer]’s FollowMyHealth portal, you will also be able to view your health information using other applications (apps) compatible with our system.

## 2023-10-11 NOTE — CDI QUERY NOTE - NSCDIOTHERTXTBX2_GEN_ALL_CORE_FT
Based on your professional judgement and the clinical indicators, please clarify if acute respiratory failure can be further specified as:    •   Acute respiratory failure was evaluated and ruled out  •   Other(please specify):  •   Clinically unable to specify        CLINICAL INDICATORS      10/6 H&P Adult: …Assessment: … 88-year-old female with a history of A fib , Mitral Valve replacement, and CHF presents to the ED with shortness of breath, LE edema and fatigue. History goes back to 3 weeks ago when the patient started having progressive shortness of breath and lower extremity edema. Patient is admitted for a left sided pleural effusion, SOB, and possible HF exacerbation… Attestation statement: Interval history: Pt seen and examine: … Assessment: d at bedside. No cp or sob… Physical exam: General: No apparent distress… Chest/Lungs: Left rales… #Acute resp failure 2/2 left side pleural effusion…      10/7 Consult Note Adult-Pulmonology Attending: …Impression: … SOB/ Large l pleural effusion. No evidence of pneumonia. Highly cardiac etiology… HF…         10/8 Progress Note Adult-Hospitalist Attending:… Physical exam: General: No apparent distress… Chest/Lungs:  Rales and distant aeration on left… Assessment: #Acute resp failure 2/2 left side pleural effusion…        10/9 Progress Note Adult-Hospitalist Attending:… Assessment: Large L sided pleural effusion. S/P MVR. HFmrEF / s/p AICD. HTN. Chronic A-Fib…          109-10/11 Progress Note Adult-Pulmonology Attending: … Impression: … SOB/ Large l pleural effusion. No evidence of pneumonia. Highly cardiac etiology… HF…         Labs:…        VBG’s or ABG’s: None        ED Nursing Flow Sheet: …        10/6 SpO2 99% on room air, Respiratory rate 22...        Nursing Flow Sheet: …        10/6 SpO2 96-94% on room air, Respiratory rate 22-18  10/7 SpO2 99% on 2 liters NC , Respiratory rate 18  10/7 SpO2 95% on room air, Respiratory rate 18  10/8 SpO2 98-93% on room air, Respiratory rate 18…      Thank you,  Sowmya Escalante, RN, BSN, CCDS, CCS  [phone]

## 2023-10-11 NOTE — DISCHARGE NOTE PROVIDER - ATTENDING ATTESTATION STATEMENT
I have personally seen and examined the patient. I have collaborated with and supervised the fair balance

## 2023-10-11 NOTE — PROGRESS NOTE ADULT - SUBJECTIVE AND OBJECTIVE BOX
Over Night Events: events noted, on RA, afebrile, cyto P      PHYSICAL EXAM    ICU Vital Signs Last 24 Hrs  T(C): 36.2 (11 Oct 2023 04:52), Max: 36.2 (11 Oct 2023 04:52)  T(F): 97.2 (11 Oct 2023 04:52), Max: 97.2 (11 Oct 2023 04:52)  HR: 122 (11 Oct 2023 04:52) (80 - 122)  BP: 126/73 (11 Oct 2023 04:52) (100/61 - 128/60)--  RR: 18 (11 Oct 2023 04:52) (18 - 18)  SpO2: 94%        General: comfortable  Lungs: Bilateral BS  Cardiovascular: irregular, BENI 2.6  Abdomen: Soft, Positive BS  Extremities: No clubbing   Neurological: Non focal       10-10-23 @ 07:01  -  10-11-23 @ 07:00  --------------------------------------------------------  IN:    Oral Fluid: 570 mL  Total IN: 570 mL    OUT:    Intermittent Catheterization - Urethral (mL): 600 mL    Voided (mL): 500 mL  Total OUT: 1100 mL    Total NET: -530 mL          LABS:                          13.6   12.67 )-----------( 214      ( 11 Oct 2023 07:13 )             42.4                                               10-10    146  |  99  |  26<H>  ----------------------------<  91  4.0   |  33<H>  |  0.9    Ca    8.7      10 Oct 2023 06:51  Mg     1.8     10-10    TPro  7.1  /  Alb  4.3  /  TBili  0.8  /  DBili  x   /  AST  37  /  ALT  22  /  AlkPhos  114  10-09      PT/INR - ( 09 Oct 2023 09:50 )   PT: 15.40 sec;   INR: 1.34 ratio         PTT - ( 09 Oct 2023 09:50 )  PTT:33.1 sec                                       Urinalysis Basic - ( 10 Oct 2023 06:51 )    Color: x / Appearance: x / SG: x / pH: x  Gluc: 91 mg/dL / Ketone: x  / Bili: x / Urobili: x   Blood: x / Protein: x / Nitrite: x   Leuk Esterase: x / RBC: x / WBC x   Sq Epi: x / Non Sq Epi: x / Bacteria: x                                                  LIVER FUNCTIONS - ( 09 Oct 2023 09:50 )  Alb: 4.3 g/dL / Pro: 7.1 g/dL / ALK PHOS: 114 U/L / ALT: 22 U/L / AST: 37 U/L / GGT: x                                                  Culture - Fungal, Body Fluid (collected 09 Oct 2023 14:07)  Source: Pleural Fl Pleural Fluid  Preliminary Report (10 Oct 2023 06:54):    Testing in progress    Culture - Body Fluid with Gram Stain (collected 09 Oct 2023 14:07)  Source: Pleural Fl Pleural Fluid  Gram Stain (10 Oct 2023 01:57):    polymorphonuclear leukocytes seen    No organisms seen    by cytocentrifuge  Preliminary Report (10 Oct 2023 19:02):    No growth to date.                                                                                           MEDICATIONS  (STANDING):  enoxaparin Injectable 75 milliGRAM(s) SubCutaneous every 12 hours  famotidine    Tablet 20 milliGRAM(s) Oral daily  furosemide   Injectable 40 milliGRAM(s) IV Push daily  losartan 50 milliGRAM(s) Oral daily  metoprolol tartrate 50 milliGRAM(s) Oral every 6 hours  NIFEdipine XL 60 milliGRAM(s) Oral daily  polyethylene glycol 3350 17 Gram(s) Oral daily  senna 2 Tablet(s) Oral at bedtime  warfarin 2.5 milliGRAM(s) Oral at bedtime

## 2023-10-11 NOTE — PROGRESS NOTE ADULT - SUBJECTIVE AND OBJECTIVE BOX
JORDAN CHEEMA  88y Female    CHIEF COMPLAINT:    Patient is a 88y old  Female who presents with a chief complaint of Pleural effusion  (11 Oct 2023 14:37)      INTERVAL HPI/OVERNIGHT EVENTS:    Patient seen and examined. Feels much better. Breathing has improved. On RA. No cp. No fever.     ROS: All other systems are negative.    Vital Signs:    T(F): 97.2 (10-11-23 @ 04:52), Max: 97.2 (10-11-23 @ 04:52)  HR: 85 (10-11-23 @ 12:10) (80 - 122)  BP: 130/57 (10-11-23 @ 12:10) (100/61 - 130/57)  RR: 18 (10-11-23 @ 12:10) (18 - 18)  SpO2: --  I&O's Summary    10 Oct 2023 07:  -  11 Oct 2023 07:00  --------------------------------------------------------  IN: 570 mL / OUT: 1100 mL / NET: -530 mL    11 Oct 2023 07:  -  11 Oct 2023 14:51  --------------------------------------------------------  IN: 240 mL / OUT: 0 mL / NET: 240 mL      Daily     Daily Weight in k (11 Oct 2023 04:52)  CAPILLARY BLOOD GLUCOSE      POCT Blood Glucose.: 87 mg/dL (11 Oct 2023 11:11)      PHYSICAL EXAM:    GENERAL:  NAD  SKIN: No rashes or lesions  HENT: Atraumatic. Normocephalic. PERRL. Moist membranes.  NECK: Supple, No JVD. No lymphadenopathy.  PULMONARY: Decreased BS in the L base. No wheezing. No rales  CVS: Normal S1, S2. Rate and Rhythm are regular. No murmurs.  ABDOMEN/GI: Soft, Nontender, Nondistended; BS present  EXTREMITIES: Peripheral pulses intact. No edema B/L LE.  NEUROLOGIC:  No motor or sensory deficit.  PSYCH: Alert & oriented x 3    Consultant(s) Notes Reviewed:  [x ] YES  [ ] NO  Care Discussed with Consultants/Other Providers [ x] YES  [ ] NO    EKG reviewed  Telemetry reviewed    LABS:                        13.6   12.67 )-----------( 214      ( 11 Oct 2023 07:13 )             42.4     10-11    138  |  95<L>  |  29<H>  ----------------------------<  84  3.7   |  28  |  1.0    Ca    8.5      11 Oct 2023 07:13  Mg     1.9     10-11      PT/INR - ( 11 Oct 2023 12:33 )   PT: 15.70 sec;   INR: 1.36 ratio         PTT - ( 11 Oct 2023 12:33 )  PTT:42.1 sec        Culture - Fungal, Body Fluid (collected 09 Oct 2023 14:07)  Source: Pleural Fl Pleural Fluid  Preliminary Report (10 Oct 2023 06:54):    Testing in progress    Culture - Body Fluid with Gram Stain (collected 09 Oct 2023 14:07)  Source: Pleural Fl Pleural Fluid  Gram Stain (10 Oct 2023 01:57):    polymorphonuclear leukocytes seen    No organisms seen    by cytocentrifuge  Preliminary Report (10 Oct 2023 19:02):    No growth to date.        RADIOLOGY & ADDITIONAL TESTS:    < from: Xray Chest 1 View-PORTABLE IMMEDIATE (Xray Chest 1 View-PORTABLE IMMEDIATE .) (10.09.23 @ 15:50) >  impression:    Left ICD stable position    Stable cardiac silhouette.    Diminishing left basilar opacity/effusion. No pneumothorax.    < end of copied text >    Imaging or report Personally Reviewed:  [x ] YES  [ ] NO    Medications:  Standing  atorvastatin 20 milliGRAM(s) Oral at bedtime  enoxaparin Injectable 75 milliGRAM(s) SubCutaneous every 12 hours  famotidine    Tablet 20 milliGRAM(s) Oral daily  furosemide   Injectable 40 milliGRAM(s) IV Push daily  losartan 50 milliGRAM(s) Oral daily  magnesium sulfate  IVPB 2 Gram(s) IV Intermittent once  metoprolol tartrate 50 milliGRAM(s) Oral every 6 hours  NIFEdipine XL 60 milliGRAM(s) Oral daily  polyethylene glycol 3350 17 Gram(s) Oral daily  potassium chloride   Powder 40 milliEquivalent(s) Oral once  senna 2 Tablet(s) Oral at bedtime  warfarin 3 milliGRAM(s) Oral at bedtime    PRN Meds      Case discussed with resident    Care discussed with pt/family

## 2023-10-11 NOTE — PHYSICAL THERAPY INITIAL EVALUATION ADULT - NSPTDISCHREC_GEN_A_CORE
Rehab facility versus Home PT depending on improvement of strength/endurance and availability of supervision at home.

## 2023-10-11 NOTE — DISCHARGE NOTE PROVIDER - NSDCFUADDINST_GEN_ALL_CORE_FT
Make sure to follow up with Coumadin Clinic  Appointment take for Monday, October 16 at 10:30 AM with Carmen Gallagher   Make sure to follow up with Dr. Biggs on Monday, to check your INR level and adjust your Warfarin  dose  As of now you are on 2 blood thinner until warfarin gives its effect , this is called "BRIDGING"  Make sure to follow up with Dr. Biggs as there's is increased risk of bleeding

## 2023-10-11 NOTE — PHYSICAL THERAPY INITIAL EVALUATION ADULT - NSACTIVITYREC_GEN_A_PT
Unable to educate patient on bed therex due to patient having a bowel movement. PCA Tonneda tended to hygiene care.

## 2023-10-11 NOTE — PROGRESS NOTE ADULT - ASSESSMENT
IMPRESSION:    SOB/ Large l pleural effusion sp thora exudate  No evidence of pneumonia  A Fib  HF/ sp MVR    PLAN:      - Echo noted  - Full AC  - fup cyto  - Repeat CXR  - cardio fup  - Will follow             IMPRESSION:    SOB/ Large l pleural effusion sp thora   No evidence of pneumonia  A Fib  HF/ sp MVR    PLAN:      - Echo noted  - Full AC  - fup cyto  - Repeat CXR  - cardio fup  - Will follow

## 2023-10-11 NOTE — DISCHARGE NOTE PROVIDER - CARE PROVIDER_API CALL
Chip Bobo  [address] [address]  Phone: [phone]  Fax: [phone]  Follow Up Time: 1 week   Chip Bobo  [facility] Medicine  [address] [place] [address]  Phone: [phone]  Fax: [phone]  Follow Up Time: 1 week    Taurus Macdonald  Cardiology  [address] [place] [address] [address]  [place] [address]  Phone: [phone]  Fax: [phone]  Follow Up Time: 1-3 days   Chip Bobo  Family Medicine  [address] [place] [address]  Phone: [phone]  Fax: [phone]  Follow Up Time: 1 week    Taurus Macdonald  Cardiology  [address] [place] [address] [address]  [place] [address]  Phone: [phone]  Fax: [phone]  Follow Up Time: 1-3 days    Jayjay Frazier  Urology  [address] [place] [address]  Phone: [phone]  Fax: [phone]  Follow Up Time: 2 weeks

## 2023-10-12 LAB
APTT BLD: 40.7 SEC — HIGH (ref 27–39.2)
HCT VFR BLD CALC: 42.5 % — SIGNIFICANT CHANGE UP (ref 37–47)
HGB BLD-MCNC: 13.7 G/DL — SIGNIFICANT CHANGE UP (ref 12–16)
INR BLD: 1.28 RATIO — SIGNIFICANT CHANGE UP (ref 0.65–1.3)
MCHC RBC-ENTMCNC: 27 PG — SIGNIFICANT CHANGE UP (ref 27–31)
MCHC RBC-ENTMCNC: 32.2 G/DL — SIGNIFICANT CHANGE UP (ref 32–37)
MCV RBC AUTO: 83.7 FL — SIGNIFICANT CHANGE UP (ref 81–99)
NON-GYNECOLOGICAL CYTOLOGY STUDY: SIGNIFICANT CHANGE UP
NRBC # BLD: 0 /100 WBCS — SIGNIFICANT CHANGE UP (ref 0–0)
PLATELET # BLD AUTO: 211 K/UL — SIGNIFICANT CHANGE UP (ref 130–400)
PMV BLD: 12.6 FL — HIGH (ref 7.4–10.4)
PROTHROM AB SERPL-ACNC: 14.7 SEC — HIGH (ref 9.95–12.87)
RBC # BLD: 5.08 M/UL — SIGNIFICANT CHANGE UP (ref 4.2–5.4)
RBC # FLD: 15.9 % — HIGH (ref 11.5–14.5)
WBC # BLD: 12.56 K/UL — HIGH (ref 4.8–10.8)
WBC # FLD AUTO: 12.56 K/UL — HIGH (ref 4.8–10.8)

## 2023-10-12 PROCEDURE — 99232 SBSQ HOSP IP/OBS MODERATE 35: CPT

## 2023-10-12 PROCEDURE — 99239 HOSP IP/OBS DSCHRG MGMT >30: CPT

## 2023-10-12 PROCEDURE — 71045 X-RAY EXAM CHEST 1 VIEW: CPT | Mod: 26

## 2023-10-12 RX ORDER — ENOXAPARIN SODIUM 100 MG/ML
75 INJECTION SUBCUTANEOUS
Qty: 30 | Refills: 0
Start: 2023-10-12 | End: 2023-10-26

## 2023-10-12 RX ORDER — WARFARIN SODIUM 2.5 MG/1
5 TABLET ORAL AT BEDTIME
Refills: 0 | Status: COMPLETED | OUTPATIENT
Start: 2023-10-12 | End: 2023-10-12

## 2023-10-12 RX ORDER — WARFARIN SODIUM 2.5 MG/1
1 TABLET ORAL
Qty: 30 | Refills: 0
Start: 2023-10-12 | End: 2023-11-10

## 2023-10-12 RX ORDER — ENOXAPARIN SODIUM 100 MG/ML
100 INJECTION SUBCUTANEOUS
Qty: 30 | Refills: 0
Start: 2023-10-12 | End: 2023-11-10

## 2023-10-12 RX ORDER — WARFARIN SODIUM 2.5 MG/1
3 TABLET ORAL
Qty: 0 | Refills: 0 | DISCHARGE

## 2023-10-12 RX ORDER — FUROSEMIDE 40 MG
40 TABLET ORAL DAILY
Refills: 0 | Status: DISCONTINUED | OUTPATIENT
Start: 2023-10-12 | End: 2023-10-13

## 2023-10-12 RX ORDER — ENOXAPARIN SODIUM 100 MG/ML
80 INJECTION SUBCUTANEOUS
Qty: 30 | Refills: 0
Start: 2023-10-12 | End: 2023-11-10

## 2023-10-12 RX ADMIN — Medication 60 MILLIGRAM(S): at 06:06

## 2023-10-12 RX ADMIN — ENOXAPARIN SODIUM 75 MILLIGRAM(S): 100 INJECTION SUBCUTANEOUS at 06:06

## 2023-10-12 RX ADMIN — ENOXAPARIN SODIUM 75 MILLIGRAM(S): 100 INJECTION SUBCUTANEOUS at 17:53

## 2023-10-12 RX ADMIN — ATORVASTATIN CALCIUM 20 MILLIGRAM(S): 80 TABLET, FILM COATED ORAL at 22:03

## 2023-10-12 RX ADMIN — LOSARTAN POTASSIUM 50 MILLIGRAM(S): 100 TABLET, FILM COATED ORAL at 06:06

## 2023-10-12 RX ADMIN — WARFARIN SODIUM 5 MILLIGRAM(S): 2.5 TABLET ORAL at 22:03

## 2023-10-12 RX ADMIN — Medication 40 MILLIGRAM(S): at 06:06

## 2023-10-12 RX ADMIN — Medication 50 MILLIGRAM(S): at 11:40

## 2023-10-12 RX ADMIN — FAMOTIDINE 20 MILLIGRAM(S): 10 INJECTION INTRAVENOUS at 11:40

## 2023-10-12 RX ADMIN — Medication 50 MILLIGRAM(S): at 06:06

## 2023-10-12 RX ADMIN — Medication 50 MILLIGRAM(S): at 17:53

## 2023-10-12 RX ADMIN — Medication 50 MILLIGRAM(S): at 23:45

## 2023-10-12 RX ADMIN — Medication 50 MILLIGRAM(S): at 00:27

## 2023-10-12 RX ADMIN — SENNA PLUS 2 TABLET(S): 8.6 TABLET ORAL at 22:03

## 2023-10-12 RX ADMIN — POLYETHYLENE GLYCOL 3350 17 GRAM(S): 17 POWDER, FOR SOLUTION ORAL at 11:39

## 2023-10-12 NOTE — PROGRESS NOTE ADULT - SUBJECTIVE AND OBJECTIVE BOX
JORDAN CHEEMA  88y Female    CHIEF COMPLAINT:    Patient is a 88y old  Female who presents with a chief complaint of sob (12 Oct 2023 08:18)      INTERVAL HPI/OVERNIGHT EVENTS:    Patient seen and examined. Sitting in a chair. Denies sob. On RA. No cough. No fever.     ROS: All other systems are negative.    Vital Signs:    T(F): 95.6 (10-12-23 @ 05:03), Max: 96.4 (10-11-23 @ 21:40)  HR: 117 (10-12-23 @ 05:03) (65 - 117)  BP: 131/80 (10-12-23 @ 05:03) (109/53 - 131/80)  RR: 18 (10-12-23 @ 05:03) (18 - 18)  SpO2: 98% (10-11-23 @ 21:53) (98% - 98%)  I&O's Summary    11 Oct 2023 07:01  -  12 Oct 2023 07:00  --------------------------------------------------------  IN: 720 mL / OUT: 480 mL / NET: 240 mL      Daily     Daily Weight in k (12 Oct 2023 05:03)  CAPILLARY BLOOD GLUCOSE      POCT Blood Glucose.: 87 mg/dL (11 Oct 2023 11:11)      PHYSICAL EXAM:    GENERAL:  NAD  SKIN: No rashes or lesions  HENT: Atraumatic. Normocephalic. PERRL. Moist membranes.  NECK: Supple, No JVD. No lymphadenopathy.  PULMONARY: Decreased BS in the L base. No wheezing. No rales  CVS: Normal S1, S2. Rate and Rhythm are regular. No murmurs.  ABDOMEN/GI: Soft, Nontender, Nondistended; BS present  EXTREMITIES: Peripheral pulses intact. No edema B/L LE.  NEUROLOGIC:  No motor or sensory deficit.  PSYCH: Alert & oriented x 3    Consultant(s) Notes Reviewed:  [x ] YES  [ ] NO  Care Discussed with Consultants/Other Providers [ x] YES  [ ] NO    EKG reviewed  Telemetry reviewed    LABS:                        13.7   12.56 )-----------( 211      ( 12 Oct 2023 05:42 )             42.5     10-11    138  |  95<L>  |  29<H>  ----------------------------<  84  3.7   |  28  |  1.0    Ca    8.5      11 Oct 2023 07:13  Mg     1.9     10-11      PT/INR - ( 12 Oct 2023 05:42 )   PT: 14.70 sec;   INR: 1.28 ratio         PTT - ( 12 Oct 2023 05:42 )  PTT:40.7 sec        Culture - Fungal, Body Fluid (collected 09 Oct 2023 14:07)  Source: Pleural Fl Pleural Fluid  Preliminary Report (11 Oct 2023 15:02):    Culture is being performed. Fungal cultures are held for 4 weeks.    Culture - Body Fluid with Gram Stain (collected 09 Oct 2023 14:07)  Source: Pleural Fl Pleural Fluid  Gram Stain (10 Oct 2023 01:57):    polymorphonuclear leukocytes seen    No organisms seen    by cytocentrifuge  Preliminary Report (10 Oct 2023 19:02):    No growth to date.        RADIOLOGY & ADDITIONAL TESTS:      Imaging or report Personally Reviewed:  [ ] YES  [ ] NO    Medications:  Standing  atorvastatin 20 milliGRAM(s) Oral at bedtime  enoxaparin Injectable 75 milliGRAM(s) SubCutaneous every 12 hours  famotidine    Tablet 20 milliGRAM(s) Oral daily  furosemide   Injectable 40 milliGRAM(s) IV Push daily  losartan 50 milliGRAM(s) Oral daily  metoprolol tartrate 50 milliGRAM(s) Oral every 6 hours  NIFEdipine XL 60 milliGRAM(s) Oral daily  polyethylene glycol 3350 17 Gram(s) Oral daily  senna 2 Tablet(s) Oral at bedtime  warfarin 5 milliGRAM(s) Oral at bedtime    PRN Meds      Case discussed with resident    Care discussed with pt/family

## 2023-10-12 NOTE — PROGRESS NOTE ADULT - SUBJECTIVE AND OBJECTIVE BOX
Over Night Events: events noted, on RA, afebrile    PHYSICAL EXAM    ICU Vital Signs Last 24 Hrs  T(C): 35.3 (12 Oct 2023 05:03), Max: 35.8 (11 Oct 2023 21:40)  T(F): 95.6 (12 Oct 2023 05:03), Max: 96.4 (11 Oct 2023 21:40)  HR: 117 (12 Oct 2023 05:03) (65 - 117)  BP: 131/80 (12 Oct 2023 05:03) (109/53 - 131/80)  RR: 18 (12 Oct 2023 05:03) (18 - 18)  SpO2: 98% (11 Oct 2023 21:53) (98% - 98%)    O2 Parameters below as of 11 Oct 2023 21:53  Patient On (Oxygen Delivery Method): room air            General: comfortable  Lungs: dec bs l base  Cardiovascular: irregular  Abdomen: Soft, Positive BS  Extremities: No clubbing   Neurological: Non focal       10-11-23 @ 07:01  -  10-12-23 @ 07:00  --------------------------------------------------------  IN:    Oral Fluid: 720 mL  Total IN: 720 mL    OUT:    Intermittent Catheterization - Urethral (mL): 480 mL    Voided (mL): 0 mL  Total OUT: 480 mL    Total NET: 240 mL          LABS:                          13.7   12.56 )-----------( 211      ( 12 Oct 2023 05:42 )             42.5                                               10-11    138  |  95<L>  |  29<H>  ----------------------------<  84  3.7   |  28  |  1.0    Ca    8.5      11 Oct 2023 07:13  Mg     1.9     10-11        PT/INR - ( 12 Oct 2023 05:42 )   PT: 14.70 sec;   INR: 1.28 ratio         PTT - ( 12 Oct 2023 05:42 )  PTT:40.7 sec                                       Urinalysis Basic - ( 11 Oct 2023 07:13 )    Color: x / Appearance: x / SG: x / pH: x  Gluc: 84 mg/dL / Ketone: x  / Bili: x / Urobili: x   Blood: x / Protein: x / Nitrite: x   Leuk Esterase: x / RBC: x / WBC x   Sq Epi: x / Non Sq Epi: x / Bacteria: x                                                                                           Culture - Fungal, Body Fluid (collected 09 Oct 2023 14:07)  Source: Pleural Fl Pleural Fluid  Preliminary Report (11 Oct 2023 15:02):    Culture is being performed. Fungal cultures are held for 4 weeks.    Culture - Body Fluid with Gram Stain (collected 09 Oct 2023 14:07)  Source: Pleural Fl Pleural Fluid  Gram Stain (10 Oct 2023 01:57):    polymorphonuclear leukocytes seen    No organisms seen    by cytocentrifuge  Preliminary Report (10 Oct 2023 19:02):    No growth to date.                                                                                           MEDICATIONS  (STANDING):  atorvastatin 20 milliGRAM(s) Oral at bedtime  enoxaparin Injectable 75 milliGRAM(s) SubCutaneous every 12 hours  famotidine    Tablet 20 milliGRAM(s) Oral daily  furosemide   Injectable 40 milliGRAM(s) IV Push daily  losartan 50 milliGRAM(s) Oral daily  metoprolol tartrate 50 milliGRAM(s) Oral every 6 hours  NIFEdipine XL 60 milliGRAM(s) Oral daily  polyethylene glycol 3350 17 Gram(s) Oral daily  senna 2 Tablet(s) Oral at bedtime  warfarin 5 milliGRAM(s) Oral at bedtime

## 2023-10-12 NOTE — PHARMACOTHERAPY INTERVENTION NOTE - COMMENTS
Patient currently on Lovenox bridge (75mg BID) and received warfarin 2.5mg last night with INR of 1.36 today. Per anticoagulation policy in highly sensitive patients, warfarin dose of 2-5mg is recommended. After discussion with provider, more conservative dose of 3mg ordered as 1x dose ordered for tonight 10/11.   Standing order of 2.5mg DC'd. Pharmacy to continue following warfarin dosing.     
Patient identified via STAR list; per med rec with [facility] pharmacy and patient, she is taking atorvastatin 20mg and warfarin 2mg 6/7 days of the week (does not take warfarin on Wednesdays) per doctor at [address]. Updated med rec appropriately and recommended to initiate atorvastatin 20mg. 
INR this morning (10/12) came back at 1.28. Because it takes 3 days for INR to be reflective of warfarin dose, recommended 5mg x 1 dose ok for tonight 10/12, then continue 3mg daily outpatient until follow up with warfarin clinic on Monday 10/16. 
Patient receiving coumadin INR not drawn since 10/9. Recommended ordering INR for today. Per policy need to dose coumadin based on daily INR readings.

## 2023-10-12 NOTE — PROGRESS NOTE ADULT - ASSESSMENT
88 year old female with a history of Afib and Mitral Valve replacement on Warfarin, CHF s/p AICD, HTN presents to the ED with with progressively worsening shortness of breath and LE edema for the last three weeks.       Large L sided pleural effusion s/p thoracocentesis.   S/P MVR  Acute HFmrEF / s/p AICD  HTN  Chronic A-Fib  No acute respiratory failure              PLAN:    ·	Repeat CXR from today noted. Decreasing L sided pleural effusion. Check official report  ·	Care d/w the pulmonary. Recommended out pt f/u  ·	Tele reviewed. A-Fib  ·	EKG: Atrial flutter 111/min with variable block (Interpreted by me)  ·	CT chest on admission reviewed. Moderate L pleural effusion with overlying compressive atelectasis.   ·	S/P thoracocentesis on 10/9 and removal of 1L of fluid  ·	Albumin gradient is 2.3. Fluid is transudate based on this. Pt was on diuretics.   ·	Check fluid cytology  ·	Fluid cx is negative so far.   ·	On d/c will switch her to Lasix 40 mg po daily  ·	ECHO reviewed. EF if 41%. Prosthetic MV. Mod AS  ·	Check i's and o's and daily wt  ·	Low salt diet and water restriction to 1.5 L/D  ·	Cont Losartan, Metoprolol and Nifedipine-xl  ·	Restarted Coumadin and bridging with Lovenox. INR today is 1.28. On discharge will need Lovenox bridging. D/W the  to make arrangements.     Progress Note Handoff    Pending (specify):  Consults________, Tests________, Test Results_______, Other_Social services for d/c planning________  Family discussion:  Disposition: Home___/SNF___/Other________/Unknown at this time________    Tao Salmeron MD  Spectra: [phone] 88 year old female with a history of Afib and Mitral Valve replacement on Warfarin, CHF s/p AICD, HTN presents to the ED with with progressively worsening shortness of breath and LE edema for the last three weeks.       Large L sided pleural effusion s/p thoracocentesis.   S/P MVR  Acute HFmrEF / s/p AICD  HTN  Chronic A-Fib  No acute respiratory failure              PLAN:    ·	Repeat CXR from today noted. Decreasing L sided pleural effusion. Check official report  ·	Care d/w the pulmonary. Recommended out pt f/u  ·	Tele reviewed. A-Fib  ·	EKG: Atrial flutter 111/min with variable block (Interpreted by me)  ·	CT chest on admission reviewed. Moderate L pleural effusion with overlying compressive atelectasis.   ·	S/P thoracocentesis on 10/9 and removal of 1L of fluid  ·	Albumin gradient is 2.3. Fluid is transudate based on this. Pt was on diuretics.   ·	Check fluid cytology  ·	Fluid cx is negative so far.   ·	On d/c will switch her to Lasix 40 mg po daily  ·	ECHO reviewed. EF if 41%. Prosthetic MV. Mod AS  ·	Check i's and o's and daily wt  ·	Low salt diet and water restriction to 1.5 L/D  ·	Cont Losartan, Metoprolol and Nifedipine-xl  ·	Restarted Coumadin and bridging with Lovenox. INR today is 1.28. On discharge will need Lovenox bridging. D/W the  to make arrangements.     * Med rec reviewed. Plan of care d/w the pt. Time spent 38 minutes.     Progress Note Handoff    Pending (specify):  Consults________, Tests________, Test Results_______, Other_Social services for d/c planning________  Family discussion:  Disposition: Home___/SNF___/Other________/Unknown at this time________    Tao Salmeron MD  Spectra: [phone]

## 2023-10-12 NOTE — PROGRESS NOTE ADULT - ASSESSMENT
IMPRESSION:    SOB/ Large l pleural effusion sp thora  No evidence of pneumonia  A Fib  HF/ sp MVR    PLAN:      - Echo noted  - Full AC  - fup cyto  - Repeat CXR  - cardio fup  - Will follow  - OP fup

## 2023-10-13 VITALS
DIASTOLIC BLOOD PRESSURE: 52 MMHG | RESPIRATION RATE: 18 BRPM | SYSTOLIC BLOOD PRESSURE: 99 MMHG | TEMPERATURE: 97 F | HEART RATE: 110 BPM

## 2023-10-13 LAB
ALBUMIN SERPL ELPH-MCNC: 3.6 G/DL — SIGNIFICANT CHANGE UP (ref 3.5–5.2)
ALP SERPL-CCNC: 86 U/L — SIGNIFICANT CHANGE UP (ref 30–115)
ALT FLD-CCNC: 27 U/L — SIGNIFICANT CHANGE UP (ref 0–41)
ANION GAP SERPL CALC-SCNC: 14 MMOL/L — SIGNIFICANT CHANGE UP (ref 7–14)
APTT BLD: 45.3 SEC — HIGH (ref 27–39.2)
AST SERPL-CCNC: 44 U/L — HIGH (ref 0–41)
BILIRUB SERPL-MCNC: 0.6 MG/DL — SIGNIFICANT CHANGE UP (ref 0.2–1.2)
BUN SERPL-MCNC: 37 MG/DL — HIGH (ref 10–20)
CALCIUM SERPL-MCNC: 8.7 MG/DL — SIGNIFICANT CHANGE UP (ref 8.4–10.5)
CHLORIDE SERPL-SCNC: 96 MMOL/L — LOW (ref 98–110)
CO2 SERPL-SCNC: 27 MMOL/L — SIGNIFICANT CHANGE UP (ref 17–32)
CREAT SERPL-MCNC: 1.2 MG/DL — SIGNIFICANT CHANGE UP (ref 0.7–1.5)
EGFR: 44 ML/MIN/1.73M2 — LOW
GLUCOSE SERPL-MCNC: 95 MG/DL — SIGNIFICANT CHANGE UP (ref 70–99)
INR BLD: 1.5 RATIO — HIGH (ref 0.65–1.3)
MAGNESIUM SERPL-MCNC: 1.8 MG/DL — SIGNIFICANT CHANGE UP (ref 1.8–2.4)
POTASSIUM SERPL-MCNC: 4.3 MMOL/L — SIGNIFICANT CHANGE UP (ref 3.5–5)
POTASSIUM SERPL-SCNC: 4.3 MMOL/L — SIGNIFICANT CHANGE UP (ref 3.5–5)
PROT SERPL-MCNC: 6.2 G/DL — SIGNIFICANT CHANGE UP (ref 6–8)
PROTHROM AB SERPL-ACNC: 17.3 SEC — HIGH (ref 9.95–12.87)
SODIUM SERPL-SCNC: 137 MMOL/L — SIGNIFICANT CHANGE UP (ref 135–146)

## 2023-10-13 PROCEDURE — 99232 SBSQ HOSP IP/OBS MODERATE 35: CPT

## 2023-10-13 PROCEDURE — 76770 US EXAM ABDO BACK WALL COMP: CPT | Mod: 26

## 2023-10-13 RX ORDER — LOSARTAN POTASSIUM 100 MG/1
1 TABLET, FILM COATED ORAL
Qty: 90 | Refills: 0
Start: 2023-10-13 | End: 2024-01-10

## 2023-10-13 RX ORDER — METOPROLOL TARTRATE 50 MG
1 TABLET ORAL
Qty: 90 | Refills: 0
Start: 2023-10-13 | End: 2024-01-10

## 2023-10-13 RX ORDER — FAMOTIDINE 10 MG/ML
1 INJECTION INTRAVENOUS
Qty: 0 | Refills: 0 | DISCHARGE

## 2023-10-13 RX ORDER — CHLORHEXIDINE GLUCONATE 213 G/1000ML
1 SOLUTION TOPICAL
Refills: 0 | Status: DISCONTINUED | OUTPATIENT
Start: 2023-10-13 | End: 2023-10-13

## 2023-10-13 RX ORDER — METOPROLOL TARTRATE 50 MG
200 TABLET ORAL ONCE
Refills: 0 | Status: COMPLETED | OUTPATIENT
Start: 2023-10-13 | End: 2023-10-13

## 2023-10-13 RX ORDER — METOPROLOL TARTRATE 50 MG
1 TABLET ORAL
Qty: 0 | Refills: 0 | DISCHARGE

## 2023-10-13 RX ORDER — POLYETHYLENE GLYCOL 3350 17 G/17G
17 POWDER, FOR SOLUTION ORAL
Qty: 1 | Refills: 0
Start: 2023-10-13 | End: 2023-11-11

## 2023-10-13 RX ORDER — METOPROLOL TARTRATE 50 MG
250 TABLET ORAL DAILY
Refills: 0 | Status: DISCONTINUED | OUTPATIENT
Start: 2023-10-14 | End: 2023-10-13

## 2023-10-13 RX ORDER — ATORVASTATIN CALCIUM 80 MG/1
1 TABLET, FILM COATED ORAL
Refills: 0 | DISCHARGE

## 2023-10-13 RX ORDER — FUROSEMIDE 40 MG
0.5 TABLET ORAL
Qty: 45 | Refills: 0
Start: 2023-10-13 | End: 2024-01-10

## 2023-10-13 RX ORDER — LOSARTAN POTASSIUM 100 MG/1
1 TABLET, FILM COATED ORAL
Qty: 0 | Refills: 0 | DISCHARGE

## 2023-10-13 RX ORDER — ENOXAPARIN SODIUM 100 MG/ML
100 INJECTION SUBCUTANEOUS
Qty: 30 | Refills: 0
Start: 2023-10-13 | End: 2023-10-14

## 2023-10-13 RX ORDER — FAMOTIDINE 10 MG/ML
1 INJECTION INTRAVENOUS
Qty: 90 | Refills: 0
Start: 2023-10-13 | End: 2024-01-10

## 2023-10-13 RX ORDER — NIFEDIPINE 30 MG
1 TABLET, EXTENDED RELEASE 24 HR ORAL
Qty: 30 | Refills: 0
Start: 2023-10-13 | End: 2023-11-11

## 2023-10-13 RX ORDER — SENNA PLUS 8.6 MG/1
2 TABLET ORAL
Qty: 60 | Refills: 0
Start: 2023-10-13 | End: 2023-11-11

## 2023-10-13 RX ORDER — FUROSEMIDE 40 MG
0.5 TABLET ORAL
Refills: 0 | DISCHARGE

## 2023-10-13 RX ORDER — ATORVASTATIN CALCIUM 80 MG/1
1 TABLET, FILM COATED ORAL
Qty: 90 | Refills: 0
Start: 2023-10-13 | End: 2024-01-10

## 2023-10-13 RX ADMIN — Medication 60 MILLIGRAM(S): at 05:55

## 2023-10-13 RX ADMIN — Medication 200 MILLIGRAM(S): at 08:49

## 2023-10-13 RX ADMIN — LOSARTAN POTASSIUM 50 MILLIGRAM(S): 100 TABLET, FILM COATED ORAL at 05:55

## 2023-10-13 RX ADMIN — FAMOTIDINE 20 MILLIGRAM(S): 10 INJECTION INTRAVENOUS at 11:27

## 2023-10-13 RX ADMIN — ENOXAPARIN SODIUM 75 MILLIGRAM(S): 100 INJECTION SUBCUTANEOUS at 05:55

## 2023-10-13 RX ADMIN — Medication 40 MILLIGRAM(S): at 05:56

## 2023-10-13 RX ADMIN — POLYETHYLENE GLYCOL 3350 17 GRAM(S): 17 POWDER, FOR SOLUTION ORAL at 11:27

## 2023-10-13 RX ADMIN — Medication 50 MILLIGRAM(S): at 05:55

## 2023-10-13 NOTE — PROGRESS NOTE ADULT - SUBJECTIVE AND OBJECTIVE BOX
Over Night Events: events noted, on RA, afebrile    PHYSICAL EXAM    ICU Vital Signs Last 24 Hrs  T(C): 35.4 (13 Oct 2023 04:40), Max: 35.9 (12 Oct 2023 20:15)  T(F): 95.8 (13 Oct 2023 04:40), Max: 96.6 (12 Oct 2023 20:15)  HR: 100 (13 Oct 2023 04:40) (66 - 130)  BP: 125/81 (13 Oct 2023 04:40) (97/52 - 125/81)  RR: 18 (13 Oct 2023 04:40) (18 - 18)  SpO2: 96% (13 Oct 2023 04:40) (96% - 96%)    O2 Parameters below as of 13 Oct 2023 04:40  Patient On (Oxygen Delivery Method): room air            General: ill looking  Lungs: dec bs l base  Cardiovascular: Regular   Abdomen: Soft, Positive BS  Extremities: No clubbing   Neurological: Non focal       10-12-23 @ 07:01  -  10-13-23 @ 07:00  --------------------------------------------------------  IN:    Oral Fluid: 280 mL  Total IN: 280 mL    OUT:    Intermittent Catheterization - Urethral (mL): 550 mL  Total OUT: 550 mL    Total NET: -270 mL          LABS:                          13.7   12.56 )-----------( 211      ( 12 Oct 2023 05:42 )             42.5                                                       PT/INR - ( 12 Oct 2023 05:42 )   PT: 14.70 sec;   INR: 1.28 ratio         PTT - ( 12 Oct 2023 05:42 )  PTT:40.7 sec                                                                                                                                                                                                                   MEDICATIONS  (STANDING):  atorvastatin 20 milliGRAM(s) Oral at bedtime  chlorhexidine 2% Cloths 1 Application(s) Topical <User Schedule>  enoxaparin Injectable 75 milliGRAM(s) SubCutaneous every 12 hours  famotidine    Tablet 20 milliGRAM(s) Oral daily  furosemide    Tablet 40 milliGRAM(s) Oral daily  losartan 50 milliGRAM(s) Oral daily  metoprolol succinate  milliGRAM(s) Oral once  NIFEdipine XL 60 milliGRAM(s) Oral daily  polyethylene glycol 3350 17 Gram(s) Oral daily  senna 2 Tablet(s) Oral at bedtime

## 2023-10-13 NOTE — CONSULT NOTE ADULT - ASSESSMENT
IMPRESSION:    SOB/ Large l pleural effusion  No evidence of pneumonia  highly cardiac etiology/ Doubt cant RO malignancy  A Fib  HF/ sp MVR    PLAN:    -Continue diuresis/ keep - balance accurate I/O  - Echo  - repeat CXR in AM if no improvement thora  - Daily INR  - cardio fup  - Will follow          
88 year old female with a history of Afib and Mitral Valve replacement on Warfarin, CHF s/p AICD, HTN presented with SOB, LE edema and fatigue admitted with Large L sided pleural effusion s/p thoracocentesis (1L of fluid) -  consulted for urinary retention. Patient straight catheterized x4, recent PVR 383cc and CIC output 450cc     Plan:   - Serial Bladder scan with prevoid and post void residual q 6hrs, if PVR >350, then continue CIC vs quevedo placement with outpatient TOV.   - Recommend UA, UCx  - Start Flomax if not contraindicated  - patient f/u OP w/ Dr. Diaz  for further urologic management and UDS, cystoscopy,   - Cont care per primary team  - Will discuss with attending 
History  CHF  AICD  AVR/MVR  AFIB      on coumadin    presents SOB      large  left  pleural effusion       pulm  consult

## 2023-10-13 NOTE — PROGRESS NOTE ADULT - SUBJECTIVE AND OBJECTIVE BOX
ANETTE JORDAN  88y Female    CHIEF COMPLAINT:    Patient is a 88y old  Female who presents with a chief complaint of sob (13 Oct 2023 08:32)      INTERVAL HPI/OVERNIGHT EVENTS:    Patient seen and examined. Having urinary retention. No sob.     ROS: All other systems are negative.    Vital Signs:    T(F): 95.8 (10-13-23 @ 04:40), Max: 96.6 (10-12-23 @ 20:15)  HR: 100 (10-13-23 @ 04:40) (66 - 130)  BP: 125/81 (10-13-23 @ 04:40) (97/52 - 125/81)  RR: 18 (10-13-23 @ 04:40) (18 - 18)  SpO2: 96% (10-13-23 @ 04:40) (96% - 96%)  I&O's Summary    12 Oct 2023 07:  -  13 Oct 2023 07:00  --------------------------------------------------------  IN: 280 mL / OUT: 550 mL / NET: -270 mL    13 Oct 2023 07:  -  13 Oct 2023 11:54  --------------------------------------------------------  IN: 0 mL / OUT: 928 mL / NET: -928 mL      Daily     Daily Weight in k.9 (13 Oct 2023 06:00)  CAPILLARY BLOOD GLUCOSE          PHYSICAL EXAM:    GENERAL:  NAD  SKIN: No rashes or lesions  HENT: Atraumatic. Normocephalic. PERRL. Moist membranes.  NECK: Supple, No JVD. No lymphadenopathy.  PULMONARY: Decreased BS in L base. No wheezing. No rales  CVS: Normal S1, S2. Rate and Rhythm are regular. No murmurs.  ABDOMEN/GI: Soft, Nontender, Nondistended; BS present  EXTREMITIES: Peripheral pulses intact. No edema B/L LE.  NEUROLOGIC:  No motor or sensory deficit.  PSYCH: Alert & oriented x 3    Consultant(s) Notes Reviewed:  [x ] YES  [ ] NO  Care Discussed with Consultants/Other Providers [ x] YES  [ ] NO    EKG reviewed  Telemetry reviewed    LABS:                        13.7   12.56 )-----------( 211      ( 12 Oct 2023 05:42 )             42.5           PT/INR - ( 12 Oct 2023 05:42 )   PT: 14.70 sec;   INR: 1.28 ratio         PTT - ( 12 Oct 2023 05:42 )  PTT:40.7 sec          RADIOLOGY & ADDITIONAL TESTS:    < from: US Kidney and Bladder (10.13.23 @ 10:10) >    IMPRESSION:    Postvoid residual volume of 126 cc    Multiple nonobstructing left renal calculi as above.    Incidentally noted left pleural effusion, partially visualized.    < end of copied text >  < from: Xray Chest 1 View- PORTABLE-Urgent (Xray Chest 1 View- PORTABLE-Urgent .) (10.12.23 @ 09:41) >  IMPRESSION:    Left effusion without change. Decreased left mid lung opacity. No   pneumothorax.    Stable cardiomediastinal silhouette. Unchanged left chest wall AICD.    Unchanged osseous structures.    < end of copied text >    Imaging or report Personally Reviewed:  [x ] YES  [ ] NO    Medications:  Standing  atorvastatin 20 milliGRAM(s) Oral at bedtime  chlorhexidine 2% Cloths 1 Application(s) Topical <User Schedule>  enoxaparin Injectable 75 milliGRAM(s) SubCutaneous every 12 hours  famotidine    Tablet 20 milliGRAM(s) Oral daily  furosemide    Tablet 40 milliGRAM(s) Oral daily  losartan 50 milliGRAM(s) Oral daily  NIFEdipine XL 60 milliGRAM(s) Oral daily  polyethylene glycol 3350 17 Gram(s) Oral daily  senna 2 Tablet(s) Oral at bedtime    PRN Meds      Case discussed with resident    Care discussed with pt/family

## 2023-10-13 NOTE — CONSULT NOTE ADULT - SUBJECTIVE AND OBJECTIVE BOX
UROLOGY CONSULT:     88 year old female with a history of Afib and Mitral Valve replacement on Warfarin, CHF s/p AICD, HTN presented with SOB, LE edema and fatigue admitted with Large L sided pleural effusion s/p thoracocentesis (1L of fluid) -  consulted for urinary retention. Patient seen and examined at bedside. Patient reports she had never any issues voiding prior to this admission. Admits to nocturia x 2. She does not follow an urologist for any urinary issues. Per RN, patient has been straight catheterized 4 times with PVR >350 and recently patient voided 100cc and bladder scan showed , straight catheterized 450cc. Patient also had RBUS this morning post catheterization noted with 126. Denies any frequency, urgency, incomplete bladder emptying. Denies any fever, chills, SOB/difficulty breathing, abdominal pain, flank pain, dysuria, hematuria.     HPI:  88 year old female with a history of Afib and Mitral Valve replacement on Warfarin, CHF s/p AICD, HTN presents to the ED with shortness of breath, LE edema and fatigue was sent by Dr. Hidalgo. History goes back to 3 weeks ago when the patient started having progressive shortness of breath and lower extremity edema. The patient denied any chest Pain , NV< diarrhea, constipation, lightheadedness or headache or any urinary symptoms. Patient was following with Dr. way today when he sent her to the ED. Patient is admitted for a left sided pleural effusion, SOB, and possible HF excacerbation     CT chest with IV contrast showed :  * Moderate left pleural effusion with overlying compressive atelectasis.     EKG shows Aflutter with block      (06 Oct 2023 20:59)      PAST MEDICAL & SURGICAL HISTORY:  Afib      HTN (hypertension)      Heart failure      H/O mitral valve replacement      H/O prior ablation treatment  afib          MEDICATIONS  (STANDING):  atorvastatin 20 milliGRAM(s) Oral at bedtime  chlorhexidine 2% Cloths 1 Application(s) Topical <User Schedule>  enoxaparin Injectable 75 milliGRAM(s) SubCutaneous every 12 hours  famotidine    Tablet 20 milliGRAM(s) Oral daily  furosemide    Tablet 40 milliGRAM(s) Oral daily  losartan 50 milliGRAM(s) Oral daily  NIFEdipine XL 60 milliGRAM(s) Oral daily  polyethylene glycol 3350 17 Gram(s) Oral daily  senna 2 Tablet(s) Oral at bedtime    MEDICATIONS  (PRN):      Allergies    sulfamethoxazole (Rash)    Intolerances        SOCIAL HISTORY: No illicit drug use    FAMILY HISTORY:  No pertinent family history in first degree relatives        REVIEW OF SYSTEMS:  [ ] A ten-point review of systems was otherwise negative except as noted.   [ ] Due to altered mental status/intubation, subjective information were not able to be obtained from patient. History was obtained, to the extent possible, from review of the chart and collateral sources of information.     Vital Signs Last 24 Hrs  T(C): 35.4 (13 Oct 2023 04:40), Max: 35.9 (12 Oct 2023 20:15)  T(F): 95.8 (13 Oct 2023 04:40), Max: 96.6 (12 Oct 2023 20:15)  HR: 100 (13 Oct 2023 04:40) (66 - 130)  BP: 125/81 (13 Oct 2023 04:40) (97/52 - 125/81)  BP(mean): --  RR: 18 (13 Oct 2023 04:40) (18 - 18)  SpO2: 96% (13 Oct 2023 04:40) (96% - 96%)    Parameters below as of 13 Oct 2023 04:40  Patient On (Oxygen Delivery Method): room air        PHYSICAL EXAM:    GEN: NAD, well-developed, awake and alert.  SKIN: Good color, non diaphoretic.  HEENT: NC/AT.  RESP: No dyspnea, non-labored breathing. No use of accessory muscles.  CARDIO: +S1/S2  ABDO: Soft, NT/ND, no palpable bladder, no suprapubic tenderness  BACK: No CVAT B/L  : + Premafit in place draining clear yellow urine, 200cc in cannister       I&O's Summary    12 Oct 2023 07:01  -  13 Oct 2023 07:00  --------------------------------------------------------  IN: 280 mL / OUT: 550 mL / NET: -270 mL    13 Oct 2023 07:01  -  13 Oct 2023 12:37  --------------------------------------------------------  IN: 0 mL / OUT: 928 mL / NET: -928 mL        LABS:                        13.7   12.56 )-----------( 211      ( 12 Oct 2023 05:42 )             42.5           PT/INR - ( 12 Oct 2023 05:42 )   PT: 14.70 sec;   INR: 1.28 ratio         PTT - ( 12 Oct 2023 05:42 )  PTT:40.7 sec    RADIOLOGY & ADDITIONAL STUDIES:  < from: US Kidney and Bladder (10.13.23 @ 10:10) >  ACC: [id number] EXAM:  US KIDNEYS AND BLADDER   ORDERED BY: WILFREDO MCMAHON     PROCEDURE DATE:  10/13/2023          INTERPRETATION:  CLINICAL INFORMATION: Urinary retention    COMPARISON: CT chest 10/6/2023    TECHNIQUE: Sonography of the kidneys and bladder.    FINDINGS:    Right kidney: 9.8 cm. No hydronephrosis or calculi. Right midpole   exophytic cyst measuring up to 2 cm    Left kidney:  9.2 cm. No hydronephrosis or calculi. Multiple   nonobstructing renal calculi measuring up to 0.3 cm.    Urinary bladder: No debris or calculus. Bilateral ureteral jets are   visualized. Patient voided immediately prior to exam. Postvoid volume of   approximately 126 cc.    Other: Partially visualized left pleural effusion.    IMPRESSION:    Postvoid residual volume of 126 cc    Multiple nonobstructing left renal calculi as above.    Incidentally noted left pleural effusion, partially visualized.    --- End of Report ---      DEO BROWN MD; Resident Radiologist  This document has been electronically signed.  DARNELL ZELAYA MD; Attending Interventional Radiologist  This document has been electronically signed. Oct 13 2023 11:18AM    < end of copied text >  
Patient is a 88y old  Female who presents with a chief complaint of SOB    HPI:  88 year old female with a history of Afib and Mitral Valve replacement on Warfarin, CHF s/p AICD, HTN presents to the ED with shortness of breath, LE edema and fatigue was sent by Dr. Hidalgo. History goes back to 3 weeks ago when the patient started having progressive shortness of breath and lower extremity edema. The patient denied any chest Pain , NV< diarrhea, constipation, lightheadedness or headache or any urinary symptoms. Patient was following with Dr. way today when he sent her to the ED. Patient is admitted for a left sided pleural effusion, SOB, and possible HF excacerbation     CT chest with IV contrast showed :  * Moderate left pleural effusion with overlying compressive atelectasis.     EKG shows Aflutter with block     Labs noticeable for pro-BNP 2874     VS noticeable for tachycardia 120 , /81     Vital Signs Last 24 Hrs  T(C): 36.6 (06 Oct 2023 14:02), Max: 36.6 (06 Oct 2023 14:02)  T(F): 97.8 (06 Oct 2023 14:02), Max: 97.8 (06 Oct 2023 14:02)  HR: 120 (06 Oct 2023 14:02) (120 - 120)  BP: 192/81 (06 Oct 2023 14:02) (192/81 - 192/81)  RR: 22 (06 Oct 2023 14:02) (22 - 22)  SpO2: 99% (06 Oct 2023 14:02) (99% - 99%)  Patient On (Oxygen Delivery Method): room air    called to evaluate for thora, patient on RA    PAST MEDICAL & SURGICAL HISTORY:  Afib      HTN (hypertension)      Heart failure      H/O mitral valve replacement      H/O prior ablation treatment  afib          SOCIAL HX:   Smoking -      REVIEW OF SYSTEMS SEE HPI    Allergies    sulfamethoxazole (Rash)    Intolerances        famotidine    Tablet 20 milliGRAM(s) Oral daily  furosemide   Injectable 60 milliGRAM(s) IV Push two times a day  losartan 50 milliGRAM(s) Oral daily  metoprolol tartrate 50 milliGRAM(s) Oral every 6 hours  : Home Meds:      PHYSICAL EXAM    ICU Vital Signs Last 24 Hrs  T(C): 36.4 (07 Oct 2023 00:30), Max: 36.6 (06 Oct 2023 14:02)  T(F): 97.6 (07 Oct 2023 00:30), Max: 97.8 (06 Oct 2023 14:02)  HR: 109 (07 Oct 2023 00:39) (109 - 140)  BP: 166/95 (07 Oct 2023 00:30) (128/90 - 192/81)  RR: 18 (07 Oct 2023 00:30) (18 - 22)  SpO2: 94% (07 Oct 2023 00:30) (94% - 99%)    O2 Parameters below as of 06 Oct 2023 21:15  Patient On (Oxygen Delivery Method): room air            General: comfortable, speaking full sentences  Lungs: dec bs l side  Cardiovascular: Irregular, BENI 2.6  Abdomen: Soft, Positive BS  Extremities: No clubbing  Skin: Warm  Neurological: Non focal         LABS:                          13.5   9.08  )-----------( 238      ( 06 Oct 2023 17:25 )             43.4                                               10-06    144  |  105  |  19  ----------------------------<  93  4.4   |  28  |  0.9    Ca    9.4      06 Oct 2023 17:25  Mg     1.9     10-06    TPro  6.2  /  Alb  3.6  /  TBili  0.5  /  DBili  x   /  AST  34  /  ALT  18  /  AlkPhos  103  10-06                                             Urinalysis Basic - ( 06 Oct 2023 17:25 )    Color: x / Appearance: x / SG: x / pH: x  Gluc: 93 mg/dL / Ketone: x  / Bili: x / Urobili: x   Blood: x / Protein: x / Nitrite: x   Leuk Esterase: x / RBC: x / WBC x   Sq Epi: x / Non Sq Epi: x / Bacteria: x        CARDIAC MARKERS ( 06 Oct 2023 17:25 )  x     / <0.01 ng/mL / x     / x     / x                                                LIVER FUNCTIONS - ( 06 Oct 2023 17:25 )  Alb: 3.6 g/dL / Pro: 6.2 g/dL / ALK PHOS: 103 U/L / ALT: 18 U/L / AST: 34 U/L / GGT: x                                                                                                        MEDICATIONS  (STANDING):  famotidine    Tablet 20 milliGRAM(s) Oral daily  furosemide   Injectable 60 milliGRAM(s) IV Push two times a day  losartan 50 milliGRAM(s) Oral daily  metoprolol tartrate 50 milliGRAM(s) Oral every 6 hours    MEDICATIONS  (PRN):      CXR/ CHEST CT noted  
Patient is a 88y old  Female who presents with a chief complaint of     HPI:      PAST MEDICAL & SURGICAL HISTORY:  Afib      HTN (hypertension)      Heart failure      H/O mitral valve replacement      H/O prior ablation treatment  afib          PREVIOUS DIAGNOSTIC TESTING:      ECHO  FINDINGS:    STRESS  FINDINGS:    CATHETERIZATION  FINDINGS:    MEDICATIONS  (STANDING):    MEDICATIONS  (PRN):      FAMILY HISTORY:      SOCIAL HISTORY:  CIGARETTES:    ALCOHOL:    REVIEW OF SYSTEMS:  CONSTITUTIONAL: No fever, weight loss, or fatigue  NECK: No pain or stiffness  RESPIRATORY: No cough, wheezing, chills or hemoptysis; No shortness of breath  CARDIOVASCULAR: No chest pain, palpitations, dizziness, or leg swelling  GASTROINTESTINAL: No abdominal or epigastric pain. No nausea, vomiting, or hematemesis; No diarrhea or constipation. No melena or hematochezia.  GENITOURINARY: No dysuria, frequency, hematuria, or incontinence  NEUROLOGICAL: No headaches, memory loss, loss of strength, numbness, or tremors  SKIN: No itching, burning, rashes, or lesions   ENDOCRINE: No heat or cold intolerance; No hair loss  MUSCULOSKELETAL: No joint pain or swelling; No muscle, back, or extremity pain  HEME/LYMPH: No easy bruising, or bleeding gums          Vital Signs Last 24 Hrs  T(C): 36.6 (06 Oct 2023 14:02), Max: 36.6 (06 Oct 2023 14:02)  T(F): 97.8 (06 Oct 2023 14:02), Max: 97.8 (06 Oct 2023 14:02)  HR: 120 (06 Oct 2023 14:02) (120 - 120)  BP: 192/81 (06 Oct 2023 14:02) (192/81 - 192/81)  BP(mean): --  RR: 22 (06 Oct 2023 14:02) (22 - 22)  SpO2: 99% (06 Oct 2023 14:02) (99% - 99%)    Parameters below as of 06 Oct 2023 14:02  Patient On (Oxygen Delivery Method): room air            PHYSICAL EXAM:  GENERAL: NAD, well-groomed, well-developed  HEAD:  Atraumatic, Normocephalic  NECK: Supple, No JVD, Normal thyroid  NERVOUS SYSTEM:  Alert & Oriented X3, Good concentration  CHEST/LUNG: Clear to percussion bilaterally; No rales, rhonchi, wheezing, or rubs  HEART: Regular rate and rhythm; No murmurs, rubs, or gallops  ABDOMEN: Soft, Nontender, Nondistended; Bowel sounds present  EXTREMITIES:  2+ Peripheral Pulses, No clubbing, cyanosis, or edema  SKIN: No rashes or lesions    INTERPRETATION OF TELEMETRY:    ECG:    I&O's Detail      LABS:                        13.5   9.08  )-----------( 238      ( 06 Oct 2023 17:25 )             43.4     10-06    144  |  105  |  19  ----------------------------<  93  4.4   |  28  |  0.9    Ca    9.4      06 Oct 2023 17:25  Mg     1.9     10-06    TPro  6.2  /  Alb  3.6  /  TBili  0.5  /  DBili  x   /  AST  34  /  ALT  18  /  AlkPhos  103  10-06    CARDIAC MARKERS ( 06 Oct 2023 17:25 )  x     / <0.01 ng/mL / x     / x     / x            Urinalysis Basic - ( 06 Oct 2023 17:25 )    Color: x / Appearance: x / SG: x / pH: x  Gluc: 93 mg/dL / Ketone: x  / Bili: x / Urobili: x   Blood: x / Protein: x / Nitrite: x   Leuk Esterase: x / RBC: x / WBC x   Sq Epi: x / Non Sq Epi: x / Bacteria: x      I&O's Summary      RADIOLOGY & ADDITIONAL STUDIES:

## 2023-10-13 NOTE — PROGRESS NOTE ADULT - ASSESSMENT
88 year old female with a history of Afib and Mitral Valve replacement on Warfarin, CHF s/p AICD, HTN presents to the ED with with progressively worsening shortness of breath and LE edema for the last three weeks.       Large L sided pleural effusion s/p thoracocentesis.   S/P MVR  Acute HFmrEF / s/p AICD  HTN  Chronic A-Fib  No acute respiratory failure  Urinary retention              PLAN:    ·	Having urinary retention. Straight cath showed 400 cc urine in the bladder.   ·	Urology eval.   ·	Repeat CXR from today noted. Decreased L lung opacity  ·	Pleural fluid cytology is negative for malignant cells.   ·	Care d/w the pulmonary. Recommended out pt f/u  ·	Tele reviewed. A-Fib  ·	EKG: Atrial flutter 111/min with variable block (Interpreted by me)  ·	CT chest on admission reviewed. Moderate L pleural effusion with overlying compressive atelectasis.   ·	S/P thoracocentesis on 10/9 and removal of 1L of fluid  ·	Albumin gradient is 2.3. Fluid is transudate based on this. Pt was on diuretics.   ·	Fluid cx is negative so far.   ·	Cont Lasix 40 mg po daily  ·	ECHO reviewed. EF if 41%. Prosthetic MV. Mod AS  ·	Check i's and o's and daily wt  ·	Low salt diet and water restriction to 1.5 L/D  ·	Cont Losartan, Metoprolol and Nifedipine-xl  ·	Check INR today and give her coumadin accordingly. Bridging with Lovenox  ·	Called pt's cardiologist. Has appt with him on Monday 10/16. INR will be checked in his office.   ·	Will d/c home after seen by Urology      * Med rec reviewed. Plan of care d/w the pt. Time spent 38 minutes.     Progress Note Handoff    Pending (specify):  Consults__Urology______, Tests________, Test Results_______, Other_Social services for d/c planning________  Family discussion:  Disposition: Home___/SNF___/Other________/Unknown at this time________    Tao Salmeron MD  Spectra: [phone]

## 2023-10-13 NOTE — PROGRESS NOTE ADULT - ASSESSMENT
IMPRESSION:    SOB/ Large l pleural effusion sp thora  No evidence of pneumonia  A Fib  HF/ sp MVR    PLAN:      - Echo noted  - Full AC  - fup cyto -  - Repeat CXR  - cardio fup  - Will follow  - OP fup

## 2023-10-13 NOTE — PROGRESS NOTE ADULT - PROVIDER SPECIALTY LIST ADULT
Hospitalist
Pulmonology
Hospitalist
Internal Medicine
Pulmonology
Hospitalist

## 2023-10-14 ENCOUNTER — TRANSCRIPTION ENCOUNTER (OUTPATIENT)
Age: 88
End: 2023-10-14

## 2023-10-14 LAB
CULTURE RESULTS: NO GROWTH — SIGNIFICANT CHANGE UP
SPECIMEN SOURCE: SIGNIFICANT CHANGE UP

## 2023-10-16 ENCOUNTER — TRANSCRIPTION ENCOUNTER (OUTPATIENT)
Age: 88
End: 2023-10-16

## 2023-10-17 ENCOUNTER — APPOINTMENT (OUTPATIENT)
Dept: CARE COORDINATION | Facility: HOME HEALTH | Age: 88
End: 2023-10-17
Payer: MEDICARE

## 2023-10-17 ENCOUNTER — TRANSCRIPTION ENCOUNTER (OUTPATIENT)
Age: 88
End: 2023-10-17

## 2023-10-17 VITALS — RESPIRATION RATE: 16 BRPM | SYSTOLIC BLOOD PRESSURE: 112 MMHG | DIASTOLIC BLOOD PRESSURE: 70 MMHG | HEART RATE: 150 BPM

## 2023-10-17 VITALS — HEART RATE: 120 BPM

## 2023-10-17 PROCEDURE — 99495 TRANSJ CARE MGMT MOD F2F 14D: CPT

## 2023-10-17 RX ORDER — ASPIRIN 81 MG/1
81 TABLET ORAL DAILY
Refills: 0 | Status: DISCONTINUED | COMMUNITY
End: 2023-10-17

## 2023-10-17 RX ORDER — METOPROLOL TARTRATE 100 MG/1
100 TABLET, FILM COATED ORAL TWICE DAILY
Refills: 0 | Status: DISCONTINUED | COMMUNITY
End: 2023-10-17

## 2023-10-17 RX ORDER — WARFARIN 3 MG/1
3 TABLET ORAL DAILY
Refills: 0 | Status: ACTIVE | COMMUNITY
Start: 2023-10-17

## 2023-10-17 RX ORDER — ENOXAPARIN SODIUM 100 MG/ML
100 INJECTION SUBCUTANEOUS
Refills: 0 | Status: ACTIVE | COMMUNITY
Start: 2023-10-17

## 2023-10-17 RX ORDER — METOPROLOL SUCCINATE 100 MG/1
100 TABLET, EXTENDED RELEASE ORAL DAILY
Refills: 0 | Status: ACTIVE | COMMUNITY
Start: 2023-10-17

## 2023-10-18 ENCOUNTER — TRANSCRIPTION ENCOUNTER (OUTPATIENT)
Age: 88
End: 2023-10-18

## 2023-10-19 DIAGNOSIS — I11.0 HYPERTENSIVE HEART DISEASE WITH HEART FAILURE: ICD-10-CM

## 2023-10-19 DIAGNOSIS — E87.0 HYPEROSMOLALITY AND HYPERNATREMIA: ICD-10-CM

## 2023-10-19 DIAGNOSIS — E87.5 HYPERKALEMIA: ICD-10-CM

## 2023-10-19 DIAGNOSIS — I48.20 CHRONIC ATRIAL FIBRILLATION, UNSPECIFIED: ICD-10-CM

## 2023-10-19 DIAGNOSIS — I50.23 ACUTE ON CHRONIC SYSTOLIC (CONGESTIVE) HEART FAILURE: ICD-10-CM

## 2023-10-19 DIAGNOSIS — E83.42 HYPOMAGNESEMIA: ICD-10-CM

## 2023-10-19 DIAGNOSIS — J90 PLEURAL EFFUSION, NOT ELSEWHERE CLASSIFIED: ICD-10-CM

## 2023-10-19 DIAGNOSIS — Z79.82 LONG TERM (CURRENT) USE OF ASPIRIN: ICD-10-CM

## 2023-10-19 DIAGNOSIS — Z95.3 PRESENCE OF XENOGENIC HEART VALVE: ICD-10-CM

## 2023-10-19 DIAGNOSIS — Z45.02 ENCOUNTER FOR ADJUSTMENT AND MANAGEMENT OF AUTOMATIC IMPLANTABLE CARDIAC DEFIBRILLATOR: ICD-10-CM

## 2023-10-19 DIAGNOSIS — Z79.01 LONG TERM (CURRENT) USE OF ANTICOAGULANTS: ICD-10-CM

## 2023-10-19 DIAGNOSIS — I35.0 NONRHEUMATIC AORTIC (VALVE) STENOSIS: ICD-10-CM

## 2023-10-19 DIAGNOSIS — Z88.2 ALLERGY STATUS TO SULFONAMIDES: ICD-10-CM

## 2023-10-23 ENCOUNTER — APPOINTMENT (OUTPATIENT)
Dept: UROLOGY | Facility: CLINIC | Age: 88
End: 2023-10-23
Payer: MEDICARE

## 2023-10-23 VITALS
OXYGEN SATURATION: 98 % | RESPIRATION RATE: 16 BRPM | BODY MASS INDEX: 27.32 KG/M2 | TEMPERATURE: 97.6 F | HEART RATE: 85 BPM | DIASTOLIC BLOOD PRESSURE: 72 MMHG | SYSTOLIC BLOOD PRESSURE: 113 MMHG | WEIGHT: 170 LBS | HEIGHT: 66 IN

## 2023-10-23 DIAGNOSIS — R33.9 RETENTION OF URINE, UNSPECIFIED: ICD-10-CM

## 2023-10-23 PROCEDURE — 99204 OFFICE O/P NEW MOD 45 MIN: CPT

## 2023-10-24 ENCOUNTER — TRANSCRIPTION ENCOUNTER (OUTPATIENT)
Age: 88
End: 2023-10-24

## 2023-10-24 ENCOUNTER — APPOINTMENT (OUTPATIENT)
Dept: CARE COORDINATION | Facility: HOME HEALTH | Age: 88
End: 2023-10-24
Payer: MEDICARE

## 2023-10-24 VITALS
SYSTOLIC BLOOD PRESSURE: 122 MMHG | DIASTOLIC BLOOD PRESSURE: 70 MMHG | HEART RATE: 62 BPM | OXYGEN SATURATION: 96 % | RESPIRATION RATE: 15 BRPM

## 2023-10-24 DIAGNOSIS — I10 ESSENTIAL (PRIMARY) HYPERTENSION: ICD-10-CM

## 2023-10-24 DIAGNOSIS — I50.9 HEART FAILURE, UNSPECIFIED: ICD-10-CM

## 2023-10-24 PROBLEM — R33.9 RETENTION OF URINE: Status: ACTIVE | Noted: 2023-10-24

## 2023-10-24 PROCEDURE — 99348 HOME/RES VST EST LOW MDM 30: CPT

## 2023-10-31 ENCOUNTER — APPOINTMENT (OUTPATIENT)
Dept: UROLOGY | Facility: CLINIC | Age: 88
End: 2023-10-31

## 2023-11-01 ENCOUNTER — TRANSCRIPTION ENCOUNTER (OUTPATIENT)
Age: 88
End: 2023-11-01

## 2023-11-08 LAB
CULTURE RESULTS: SIGNIFICANT CHANGE UP
CULTURE RESULTS: SIGNIFICANT CHANGE UP
SPECIMEN SOURCE: SIGNIFICANT CHANGE UP
SPECIMEN SOURCE: SIGNIFICANT CHANGE UP

## 2023-11-09 ENCOUNTER — TRANSCRIPTION ENCOUNTER (OUTPATIENT)
Age: 88
End: 2023-11-09

## 2023-11-15 ENCOUNTER — APPOINTMENT (OUTPATIENT)
Dept: PULMONOLOGY | Facility: CLINIC | Age: 88
End: 2023-11-15
Payer: MEDICARE

## 2023-11-15 VITALS
HEART RATE: 69 BPM | SYSTOLIC BLOOD PRESSURE: 120 MMHG | OXYGEN SATURATION: 94 % | BODY MASS INDEX: 25.07 KG/M2 | HEIGHT: 66 IN | RESPIRATION RATE: 12 BRPM | WEIGHT: 156 LBS | DIASTOLIC BLOOD PRESSURE: 80 MMHG

## 2023-11-15 DIAGNOSIS — J90 PLEURAL EFFUSION, NOT ELSEWHERE CLASSIFIED: ICD-10-CM

## 2023-11-15 PROCEDURE — 99214 OFFICE O/P EST MOD 30 MIN: CPT | Mod: 25

## 2023-11-15 PROCEDURE — 71046 X-RAY EXAM CHEST 2 VIEWS: CPT

## 2023-11-29 ENCOUNTER — APPOINTMENT (OUTPATIENT)
Dept: ORTHOPEDIC SURGERY | Facility: CLINIC | Age: 88
End: 2023-11-29
Payer: MEDICARE

## 2023-11-29 ENCOUNTER — RESULT CHARGE (OUTPATIENT)
Age: 88
End: 2023-11-29

## 2023-11-29 DIAGNOSIS — M17.11 UNILATERAL PRIMARY OSTEOARTHRITIS, RIGHT KNEE: ICD-10-CM

## 2023-11-29 DIAGNOSIS — Z79.01 LONG TERM (CURRENT) USE OF ANTICOAGULANTS: ICD-10-CM

## 2023-11-29 DIAGNOSIS — M85.89 OTHER SPECIFIED DISORDERS OF BONE DENSITY AND STRUCTURE, MULTIPLE SITES: ICD-10-CM

## 2023-11-29 PROCEDURE — 73560 X-RAY EXAM OF KNEE 1 OR 2: CPT | Mod: 50

## 2023-11-29 PROCEDURE — 20610 DRAIN/INJ JOINT/BURSA W/O US: CPT | Mod: LT

## 2023-11-29 PROCEDURE — 99204 OFFICE O/P NEW MOD 45 MIN: CPT | Mod: 25

## 2023-11-29 RX ORDER — CHOLECALCIFEROL (VITAMIN D3) 50 MCG
50 MCG TABLET ORAL
Qty: 60 | Refills: 0 | Status: ACTIVE | COMMUNITY
Start: 2023-11-29 | End: 1900-01-01

## 2023-12-18 RX ORDER — LIDOCAINE 5% 700 MG/1
5 PATCH TOPICAL
Qty: 30 | Refills: 1 | Status: ACTIVE | COMMUNITY
Start: 2023-11-29 | End: 1900-01-01

## 2023-12-21 ENCOUNTER — INPATIENT (INPATIENT)
Facility: HOSPITAL | Age: 88
LOS: 6 days | Discharge: NURSING, REHAB OR LONG-TERM CARE | DRG: 291 | End: 2023-12-28
Attending: INTERNAL MEDICINE | Admitting: INTERNAL MEDICINE
Payer: MEDICARE

## 2023-12-21 ENCOUNTER — APPOINTMENT (OUTPATIENT)
Dept: ELECTROPHYSIOLOGY | Facility: CLINIC | Age: 88
End: 2023-12-21

## 2023-12-21 VITALS
TEMPERATURE: 99 F | OXYGEN SATURATION: 100 % | HEART RATE: 151 BPM | RESPIRATION RATE: 28 BRPM | SYSTOLIC BLOOD PRESSURE: 138 MMHG | DIASTOLIC BLOOD PRESSURE: 95 MMHG

## 2023-12-21 DIAGNOSIS — J90 PLEURAL EFFUSION, NOT ELSEWHERE CLASSIFIED: ICD-10-CM

## 2023-12-21 DIAGNOSIS — Z98.890 OTHER SPECIFIED POSTPROCEDURAL STATES: Chronic | ICD-10-CM

## 2023-12-21 DIAGNOSIS — Z95.2 PRESENCE OF PROSTHETIC HEART VALVE: Chronic | ICD-10-CM

## 2023-12-21 LAB
ALBUMIN SERPL ELPH-MCNC: 3.7 G/DL — SIGNIFICANT CHANGE UP (ref 3.5–5.2)
ALBUMIN SERPL ELPH-MCNC: 3.7 G/DL — SIGNIFICANT CHANGE UP (ref 3.5–5.2)
ALP SERPL-CCNC: 91 U/L — SIGNIFICANT CHANGE UP (ref 30–115)
ALP SERPL-CCNC: 91 U/L — SIGNIFICANT CHANGE UP (ref 30–115)
ALT FLD-CCNC: 13 U/L — SIGNIFICANT CHANGE UP (ref 0–41)
ALT FLD-CCNC: 13 U/L — SIGNIFICANT CHANGE UP (ref 0–41)
ANION GAP SERPL CALC-SCNC: 10 MMOL/L — SIGNIFICANT CHANGE UP (ref 7–14)
ANION GAP SERPL CALC-SCNC: 10 MMOL/L — SIGNIFICANT CHANGE UP (ref 7–14)
APTT BLD: 29.6 SEC — SIGNIFICANT CHANGE UP (ref 27–39.2)
APTT BLD: 29.6 SEC — SIGNIFICANT CHANGE UP (ref 27–39.2)
AST SERPL-CCNC: 25 U/L — SIGNIFICANT CHANGE UP (ref 0–41)
AST SERPL-CCNC: 25 U/L — SIGNIFICANT CHANGE UP (ref 0–41)
BASOPHILS # BLD AUTO: 0.04 K/UL — SIGNIFICANT CHANGE UP (ref 0–0.2)
BASOPHILS # BLD AUTO: 0.04 K/UL — SIGNIFICANT CHANGE UP (ref 0–0.2)
BASOPHILS NFR BLD AUTO: 0.4 % — SIGNIFICANT CHANGE UP (ref 0–1)
BASOPHILS NFR BLD AUTO: 0.4 % — SIGNIFICANT CHANGE UP (ref 0–1)
BILIRUB SERPL-MCNC: 0.7 MG/DL — SIGNIFICANT CHANGE UP (ref 0.2–1.2)
BILIRUB SERPL-MCNC: 0.7 MG/DL — SIGNIFICANT CHANGE UP (ref 0.2–1.2)
BUN SERPL-MCNC: 29 MG/DL — HIGH (ref 10–20)
BUN SERPL-MCNC: 29 MG/DL — HIGH (ref 10–20)
CALCIUM SERPL-MCNC: 9.4 MG/DL — SIGNIFICANT CHANGE UP (ref 8.4–10.5)
CALCIUM SERPL-MCNC: 9.4 MG/DL — SIGNIFICANT CHANGE UP (ref 8.4–10.5)
CHLORIDE SERPL-SCNC: 109 MMOL/L — SIGNIFICANT CHANGE UP (ref 98–110)
CHLORIDE SERPL-SCNC: 109 MMOL/L — SIGNIFICANT CHANGE UP (ref 98–110)
CO2 SERPL-SCNC: 25 MMOL/L — SIGNIFICANT CHANGE UP (ref 17–32)
CO2 SERPL-SCNC: 25 MMOL/L — SIGNIFICANT CHANGE UP (ref 17–32)
CREAT SERPL-MCNC: 1.2 MG/DL — SIGNIFICANT CHANGE UP (ref 0.7–1.5)
CREAT SERPL-MCNC: 1.2 MG/DL — SIGNIFICANT CHANGE UP (ref 0.7–1.5)
EGFR: 44 ML/MIN/1.73M2 — LOW
EGFR: 44 ML/MIN/1.73M2 — LOW
EOSINOPHIL # BLD AUTO: 0.03 K/UL — SIGNIFICANT CHANGE UP (ref 0–0.7)
EOSINOPHIL # BLD AUTO: 0.03 K/UL — SIGNIFICANT CHANGE UP (ref 0–0.7)
EOSINOPHIL NFR BLD AUTO: 0.3 % — SIGNIFICANT CHANGE UP (ref 0–8)
EOSINOPHIL NFR BLD AUTO: 0.3 % — SIGNIFICANT CHANGE UP (ref 0–8)
FLUAV AG NPH QL: SIGNIFICANT CHANGE UP
FLUAV AG NPH QL: SIGNIFICANT CHANGE UP
FLUBV AG NPH QL: SIGNIFICANT CHANGE UP
FLUBV AG NPH QL: SIGNIFICANT CHANGE UP
GAS PNL BLDV: SIGNIFICANT CHANGE UP
GAS PNL BLDV: SIGNIFICANT CHANGE UP
GLUCOSE SERPL-MCNC: 94 MG/DL — SIGNIFICANT CHANGE UP (ref 70–99)
GLUCOSE SERPL-MCNC: 94 MG/DL — SIGNIFICANT CHANGE UP (ref 70–99)
HCT VFR BLD CALC: 37.9 % — SIGNIFICANT CHANGE UP (ref 37–47)
HCT VFR BLD CALC: 37.9 % — SIGNIFICANT CHANGE UP (ref 37–47)
HGB BLD-MCNC: 11.8 G/DL — LOW (ref 12–16)
HGB BLD-MCNC: 11.8 G/DL — LOW (ref 12–16)
IMM GRANULOCYTES NFR BLD AUTO: 0.3 % — SIGNIFICANT CHANGE UP (ref 0.1–0.3)
IMM GRANULOCYTES NFR BLD AUTO: 0.3 % — SIGNIFICANT CHANGE UP (ref 0.1–0.3)
INR BLD: 1.29 RATIO — SIGNIFICANT CHANGE UP (ref 0.65–1.3)
INR BLD: 1.29 RATIO — SIGNIFICANT CHANGE UP (ref 0.65–1.3)
LYMPHOCYTES # BLD AUTO: 1.3 K/UL — SIGNIFICANT CHANGE UP (ref 1.2–3.4)
LYMPHOCYTES # BLD AUTO: 1.3 K/UL — SIGNIFICANT CHANGE UP (ref 1.2–3.4)
LYMPHOCYTES # BLD AUTO: 14.3 % — LOW (ref 20.5–51.1)
LYMPHOCYTES # BLD AUTO: 14.3 % — LOW (ref 20.5–51.1)
MAGNESIUM SERPL-MCNC: 1.8 MG/DL — SIGNIFICANT CHANGE UP (ref 1.8–2.4)
MAGNESIUM SERPL-MCNC: 1.8 MG/DL — SIGNIFICANT CHANGE UP (ref 1.8–2.4)
MCHC RBC-ENTMCNC: 27.1 PG — SIGNIFICANT CHANGE UP (ref 27–31)
MCHC RBC-ENTMCNC: 27.1 PG — SIGNIFICANT CHANGE UP (ref 27–31)
MCHC RBC-ENTMCNC: 31.1 G/DL — LOW (ref 32–37)
MCHC RBC-ENTMCNC: 31.1 G/DL — LOW (ref 32–37)
MCV RBC AUTO: 86.9 FL — SIGNIFICANT CHANGE UP (ref 81–99)
MCV RBC AUTO: 86.9 FL — SIGNIFICANT CHANGE UP (ref 81–99)
MONOCYTES # BLD AUTO: 0.81 K/UL — HIGH (ref 0.1–0.6)
MONOCYTES # BLD AUTO: 0.81 K/UL — HIGH (ref 0.1–0.6)
MONOCYTES NFR BLD AUTO: 8.9 % — SIGNIFICANT CHANGE UP (ref 1.7–9.3)
MONOCYTES NFR BLD AUTO: 8.9 % — SIGNIFICANT CHANGE UP (ref 1.7–9.3)
NEUTROPHILS # BLD AUTO: 6.87 K/UL — HIGH (ref 1.4–6.5)
NEUTROPHILS # BLD AUTO: 6.87 K/UL — HIGH (ref 1.4–6.5)
NEUTROPHILS NFR BLD AUTO: 75.8 % — HIGH (ref 42.2–75.2)
NEUTROPHILS NFR BLD AUTO: 75.8 % — HIGH (ref 42.2–75.2)
NRBC # BLD: 0 /100 WBCS — SIGNIFICANT CHANGE UP (ref 0–0)
NRBC # BLD: 0 /100 WBCS — SIGNIFICANT CHANGE UP (ref 0–0)
PLATELET # BLD AUTO: 207 K/UL — SIGNIFICANT CHANGE UP (ref 130–400)
PLATELET # BLD AUTO: 207 K/UL — SIGNIFICANT CHANGE UP (ref 130–400)
PMV BLD: 10.8 FL — HIGH (ref 7.4–10.4)
PMV BLD: 10.8 FL — HIGH (ref 7.4–10.4)
POTASSIUM SERPL-MCNC: 4.8 MMOL/L — SIGNIFICANT CHANGE UP (ref 3.5–5)
POTASSIUM SERPL-MCNC: 4.8 MMOL/L — SIGNIFICANT CHANGE UP (ref 3.5–5)
POTASSIUM SERPL-SCNC: 4.8 MMOL/L — SIGNIFICANT CHANGE UP (ref 3.5–5)
POTASSIUM SERPL-SCNC: 4.8 MMOL/L — SIGNIFICANT CHANGE UP (ref 3.5–5)
PROT SERPL-MCNC: 6.5 G/DL — SIGNIFICANT CHANGE UP (ref 6–8)
PROT SERPL-MCNC: 6.5 G/DL — SIGNIFICANT CHANGE UP (ref 6–8)
PROTHROM AB SERPL-ACNC: 14.7 SEC — HIGH (ref 9.95–12.87)
PROTHROM AB SERPL-ACNC: 14.7 SEC — HIGH (ref 9.95–12.87)
RBC # BLD: 4.36 M/UL — SIGNIFICANT CHANGE UP (ref 4.2–5.4)
RBC # BLD: 4.36 M/UL — SIGNIFICANT CHANGE UP (ref 4.2–5.4)
RBC # FLD: 17.2 % — HIGH (ref 11.5–14.5)
RBC # FLD: 17.2 % — HIGH (ref 11.5–14.5)
RSV RNA NPH QL NAA+NON-PROBE: SIGNIFICANT CHANGE UP
RSV RNA NPH QL NAA+NON-PROBE: SIGNIFICANT CHANGE UP
SARS-COV-2 RNA SPEC QL NAA+PROBE: SIGNIFICANT CHANGE UP
SARS-COV-2 RNA SPEC QL NAA+PROBE: SIGNIFICANT CHANGE UP
SODIUM SERPL-SCNC: 144 MMOL/L — SIGNIFICANT CHANGE UP (ref 135–146)
SODIUM SERPL-SCNC: 144 MMOL/L — SIGNIFICANT CHANGE UP (ref 135–146)
TROPONIN T, HIGH SENSITIVITY RESULT: 43 NG/L — HIGH (ref 6–13)
TROPONIN T, HIGH SENSITIVITY RESULT: 43 NG/L — HIGH (ref 6–13)
TROPONIN T, HIGH SENSITIVITY RESULT: 49 NG/L — HIGH (ref 6–13)
TROPONIN T, HIGH SENSITIVITY RESULT: 49 NG/L — HIGH (ref 6–13)
WBC # BLD: 9.08 K/UL — SIGNIFICANT CHANGE UP (ref 4.8–10.8)
WBC # BLD: 9.08 K/UL — SIGNIFICANT CHANGE UP (ref 4.8–10.8)
WBC # FLD AUTO: 9.08 K/UL — SIGNIFICANT CHANGE UP (ref 4.8–10.8)
WBC # FLD AUTO: 9.08 K/UL — SIGNIFICANT CHANGE UP (ref 4.8–10.8)

## 2023-12-21 PROCEDURE — 84484 ASSAY OF TROPONIN QUANT: CPT

## 2023-12-21 PROCEDURE — 80048 BASIC METABOLIC PNL TOTAL CA: CPT

## 2023-12-21 PROCEDURE — 76604 US EXAM CHEST: CPT

## 2023-12-21 PROCEDURE — 76937 US GUIDE VASCULAR ACCESS: CPT | Mod: 26

## 2023-12-21 PROCEDURE — 71045 X-RAY EXAM CHEST 1 VIEW: CPT | Mod: 26

## 2023-12-21 PROCEDURE — 82945 GLUCOSE OTHER FLUID: CPT

## 2023-12-21 PROCEDURE — 84157 ASSAY OF PROTEIN OTHER: CPT

## 2023-12-21 PROCEDURE — 89051 BODY FLUID CELL COUNT: CPT

## 2023-12-21 PROCEDURE — 0225U NFCT DS DNA&RNA 21 SARSCOV2: CPT

## 2023-12-21 PROCEDURE — 99223 1ST HOSP IP/OBS HIGH 75: CPT

## 2023-12-21 PROCEDURE — 93005 ELECTROCARDIOGRAM TRACING: CPT

## 2023-12-21 PROCEDURE — 83615 LACTATE (LD) (LDH) ENZYME: CPT

## 2023-12-21 PROCEDURE — 87205 SMEAR GRAM STAIN: CPT

## 2023-12-21 PROCEDURE — 83986 ASSAY PH BODY FLUID NOS: CPT

## 2023-12-21 PROCEDURE — 93010 ELECTROCARDIOGRAM REPORT: CPT | Mod: 77

## 2023-12-21 PROCEDURE — 87635 SARS-COV-2 COVID-19 AMP PRB: CPT

## 2023-12-21 PROCEDURE — 85610 PROTHROMBIN TIME: CPT

## 2023-12-21 PROCEDURE — 88305 TISSUE EXAM BY PATHOLOGIST: CPT

## 2023-12-21 PROCEDURE — 83735 ASSAY OF MAGNESIUM: CPT

## 2023-12-21 PROCEDURE — 84100 ASSAY OF PHOSPHORUS: CPT

## 2023-12-21 PROCEDURE — 97162 PT EVAL MOD COMPLEX 30 MIN: CPT | Mod: GP

## 2023-12-21 PROCEDURE — 85025 COMPLETE CBC W/AUTO DIFF WBC: CPT

## 2023-12-21 PROCEDURE — 36415 COLL VENOUS BLD VENIPUNCTURE: CPT

## 2023-12-21 PROCEDURE — 87070 CULTURE OTHR SPECIMN AEROBIC: CPT

## 2023-12-21 PROCEDURE — 80053 COMPREHEN METABOLIC PANEL: CPT

## 2023-12-21 PROCEDURE — 88112 CYTOPATH CELL ENHANCE TECH: CPT

## 2023-12-21 PROCEDURE — 93308 TTE F-UP OR LMTD: CPT | Mod: 26

## 2023-12-21 PROCEDURE — 93308 TTE F-UP OR LMTD: CPT

## 2023-12-21 PROCEDURE — 85730 THROMBOPLASTIN TIME PARTIAL: CPT

## 2023-12-21 PROCEDURE — 76604 US EXAM CHEST: CPT | Mod: 26

## 2023-12-21 PROCEDURE — 71045 X-RAY EXAM CHEST 1 VIEW: CPT

## 2023-12-21 PROCEDURE — 99291 CRITICAL CARE FIRST HOUR: CPT

## 2023-12-21 PROCEDURE — 82962 GLUCOSE BLOOD TEST: CPT

## 2023-12-21 PROCEDURE — 76937 US GUIDE VASCULAR ACCESS: CPT

## 2023-12-21 PROCEDURE — 87075 CULTR BACTERIA EXCEPT BLOOD: CPT

## 2023-12-21 PROCEDURE — 82042 OTHER SOURCE ALBUMIN QUAN EA: CPT

## 2023-12-21 PROCEDURE — 85027 COMPLETE CBC AUTOMATED: CPT

## 2023-12-21 RX ORDER — DILTIAZEM HCL 120 MG
10 CAPSULE, EXT RELEASE 24 HR ORAL ONCE
Refills: 0 | Status: COMPLETED | OUTPATIENT
Start: 2023-12-21 | End: 2023-12-21

## 2023-12-21 RX ORDER — POLYETHYLENE GLYCOL 3350 17 G/17G
17 POWDER, FOR SOLUTION ORAL DAILY
Refills: 0 | Status: DISCONTINUED | OUTPATIENT
Start: 2023-12-21 | End: 2023-12-28

## 2023-12-21 RX ORDER — METOPROLOL TARTRATE 50 MG
50 TABLET ORAL EVERY 6 HOURS
Refills: 0 | Status: DISCONTINUED | OUTPATIENT
Start: 2023-12-21 | End: 2023-12-23

## 2023-12-21 RX ORDER — SENNA PLUS 8.6 MG/1
2 TABLET ORAL AT BEDTIME
Refills: 0 | Status: DISCONTINUED | OUTPATIENT
Start: 2023-12-21 | End: 2023-12-28

## 2023-12-21 RX ORDER — FUROSEMIDE 40 MG
40 TABLET ORAL ONCE
Refills: 0 | Status: DISCONTINUED | OUTPATIENT
Start: 2023-12-21 | End: 2023-12-21

## 2023-12-21 RX ORDER — HEPARIN SODIUM 5000 [USP'U]/ML
INJECTION INTRAVENOUS; SUBCUTANEOUS
Qty: 25000 | Refills: 0 | Status: DISCONTINUED | OUTPATIENT
Start: 2023-12-21 | End: 2023-12-21

## 2023-12-21 RX ORDER — PANTOPRAZOLE SODIUM 20 MG/1
40 TABLET, DELAYED RELEASE ORAL
Refills: 0 | Status: DISCONTINUED | OUTPATIENT
Start: 2023-12-21 | End: 2023-12-28

## 2023-12-21 RX ORDER — DILTIAZEM HCL 120 MG
5 CAPSULE, EXT RELEASE 24 HR ORAL
Qty: 125 | Refills: 0 | Status: DISCONTINUED | OUTPATIENT
Start: 2023-12-21 | End: 2023-12-21

## 2023-12-21 RX ORDER — DILTIAZEM HCL 120 MG
20 CAPSULE, EXT RELEASE 24 HR ORAL ONCE
Refills: 0 | Status: COMPLETED | OUTPATIENT
Start: 2023-12-21 | End: 2023-12-21

## 2023-12-21 RX ORDER — ENOXAPARIN SODIUM 100 MG/ML
80 INJECTION SUBCUTANEOUS EVERY 12 HOURS
Refills: 0 | Status: DISCONTINUED | OUTPATIENT
Start: 2023-12-21 | End: 2023-12-23

## 2023-12-21 RX ORDER — FUROSEMIDE 40 MG
40 TABLET ORAL DAILY
Refills: 0 | Status: DISCONTINUED | OUTPATIENT
Start: 2023-12-21 | End: 2023-12-25

## 2023-12-21 RX ORDER — FUROSEMIDE 40 MG
40 TABLET ORAL ONCE
Refills: 0 | Status: COMPLETED | OUTPATIENT
Start: 2023-12-21 | End: 2023-12-21

## 2023-12-21 RX ORDER — ATORVASTATIN CALCIUM 80 MG/1
20 TABLET, FILM COATED ORAL AT BEDTIME
Refills: 0 | Status: DISCONTINUED | OUTPATIENT
Start: 2023-12-21 | End: 2023-12-28

## 2023-12-21 RX ORDER — DILTIAZEM HCL 120 MG
10 CAPSULE, EXT RELEASE 24 HR ORAL
Qty: 125 | Refills: 0 | Status: DISCONTINUED | OUTPATIENT
Start: 2023-12-21 | End: 2023-12-22

## 2023-12-21 RX ORDER — WARFARIN SODIUM 2.5 MG/1
3 TABLET ORAL ONCE
Refills: 0 | Status: COMPLETED | OUTPATIENT
Start: 2023-12-21 | End: 2023-12-21

## 2023-12-21 RX ORDER — LOSARTAN POTASSIUM 100 MG/1
50 TABLET, FILM COATED ORAL DAILY
Refills: 0 | Status: DISCONTINUED | OUTPATIENT
Start: 2023-12-21 | End: 2023-12-28

## 2023-12-21 RX ADMIN — HEPARIN SODIUM 1400 UNIT(S)/HR: 5000 INJECTION INTRAVENOUS; SUBCUTANEOUS at 10:44

## 2023-12-21 RX ADMIN — WARFARIN SODIUM 3 MILLIGRAM(S): 2.5 TABLET ORAL at 21:18

## 2023-12-21 RX ADMIN — Medication 10 MILLIGRAM(S): at 11:44

## 2023-12-21 RX ADMIN — Medication 50 MILLIGRAM(S): at 23:38

## 2023-12-21 RX ADMIN — Medication 50 MILLIGRAM(S): at 17:17

## 2023-12-21 RX ADMIN — ENOXAPARIN SODIUM 80 MILLIGRAM(S): 100 INJECTION SUBCUTANEOUS at 17:20

## 2023-12-21 RX ADMIN — Medication 40 MILLIGRAM(S): at 10:44

## 2023-12-21 RX ADMIN — Medication 20 MILLIGRAM(S): at 09:22

## 2023-12-21 RX ADMIN — Medication 5 MG/HR: at 11:44

## 2023-12-21 RX ADMIN — Medication 10 MG/HR: at 12:50

## 2023-12-21 RX ADMIN — ATORVASTATIN CALCIUM 20 MILLIGRAM(S): 80 TABLET, FILM COATED ORAL at 21:18

## 2023-12-21 NOTE — ED PROVIDER NOTE - ATTENDING CONTRIBUTION TO CARE
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 No reported fevers or vomiting. Pt reports chronic LE edema but worsening today as well.    CONSTITUTIONAL: NAD.   SKIN: warm, dry  HEAD: Normocephalic; atraumatic.  EYES: no conjunctival injection.   ENT: dry MM.   NECK: Supple.  CARD: +tachycardia   RESP: No wheezes, rales or rhonchi. decreased breath sounds bilaterally.   ABD: soft ntnd.   EXT: Normal ROM.  +b/l 2+ pitting edema bilaterally. distal pulses intact b/l.   NEURO: Alert, oriented, grossly unremarkable.   PSYCH: Cooperative, appropriate.

## 2023-12-21 NOTE — ED PROVIDER NOTE - PHYSICAL EXAMINATION
VITAL SIGNS: I have reviewed nursing notes and confirm.  CONSTITUTIONAL: ill appearing male in NAD  SKIN: Skin exam is warm and dry, no acute rash.  EYES: PERRL, conjunctiva and sclera clear.  ENT: mmm  CARD: S1S2 tachycardic, irregular, well perfused  RESP: O2 sat 99% on RA, tachypneic, supraclavicular retractions, good air entry with clear lung sounds  ABD: soft, NT/ND.  EXT: normal ROM, 2+ pitting edema to b/l LE  NEURO: Alert, oriented. Grossly unremarkable. No focal deficits.  PSYCH: Cooperative, appropriate.

## 2023-12-21 NOTE — CHART NOTE - NSCHARTNOTEFT_GEN_A_CORE
Blayne is [facility]   Ahsan [facility] [phone] Blayne is [facility]   Ahsan [facility] [phone] Blayne is HCP   Ahsan Bucio [phone]    Dr. Bucio expresses that she has a prior DNR and wishes for trial of intubation.     Shirley filled out Blayne is HCP   Ahsan Bucio [phone]    Dr. Bucio expresses that she has a prior DNR and wishes for trial of intubation.     Shirley filled out

## 2023-12-21 NOTE — ED ADULT NURSE NOTE - NSFALLHARMRISKINTERV_ED_ALL_ED
Assistance OOB with selected safe patient handling equipment if applicable/Communicate risk of Fall with Harm to all staff, patient, and family/Monitor gait and stability/Provide patient with walking aids/Provide visual cue: red socks, yellow wristband, yellow gown, etc/Reinforce activity limits and safety measures with patient and family/Bed in lowest position, wheels locked, appropriate side rails in place/Call bell, personal items and telephone in reach/Instruct patient to call for assistance before getting out of bed/chair/stretcher/Non-slip footwear applied when patient is off stretcher/[place] to call system/Physically safe environment - no spills, clutter or unnecessary equipment/Purposeful Proactive Rounding/Room/bathroom lighting operational, light cord in reach Assistance OOB with selected safe patient handling equipment if applicable/Communicate risk of Fall with Harm to all staff, patient, and family/Monitor gait and stability/Provide patient with walking aids/Provide visual cue: red socks, yellow wristband, yellow gown, etc/Reinforce activity limits and safety measures with patient and family/Bed in lowest position, wheels locked, appropriate side rails in place/Call bell, personal items and telephone in reach/Instruct patient to call for assistance before getting out of bed/chair/stretcher/Non-slip footwear applied when patient is off stretcher/[place] to call system/Physically safe environment - no spills, clutter or unnecessary equipment/Purposeful Proactive Rounding/Room/bathroom lighting operational, light cord in reach

## 2023-12-21 NOTE — ED PROVIDER NOTE - CLINICAL SUMMARY MEDICAL DECISION MAKING FREE TEXT BOX
S/o received from ED attending Dr. Cesar    89 y/o F h/o AFib/AFlutter, mitral valve replacement, on warfarin, CHF s/p AICD, HTN, h/o large L pleural effusion s/p drainage p/w SOB and cough since this morning with worsening LE edema. No F/C, no N/V/D, no abd pain, no constipation, no urinary sxs, no rash, no falls.    Prior team ordered labs, EKG, CXR. I, Michi Cross, independently interpreted the Xray films and noted she has a large L pleural effusion. Pt was placed on BiPAP by prior team and was given cardizem as her EKG showed Aflutter rates of 150s. Sign out to me pending follow up full lab results.     Labs notable for elevated trop likely from tachycardia, will send repeat. BNP elevated. MOst c/w CHF and large L pleural effusion. She does not need emergent thoracentesis given her tachypnea improved with BIPAP, vitals improving. Will admit to med-tele. S/o received from ED attending Dr. Cesar    87 y/o F h/o AFib/AFlutter, mitral valve replacement, on warfarin, CHF s/p AICD, HTN, h/o large L pleural effusion s/p drainage p/w SOB and cough since this morning with worsening LE edema. No F/C, no N/V/D, no abd pain, no constipation, no urinary sxs, no rash, no falls.    Prior team ordered labs, EKG, CXR. I, Michi Cross, independently interpreted the Xray films and noted she has a large L pleural effusion. Pt was placed on BiPAP by prior team and was given cardizem as her EKG showed Aflutter rates of 150s. Sign out to me pending follow up full lab results.     Labs notable for elevated trop likely from tachycardia, will send repeat. BNP elevated. Most c/w CHF and large L pleural effusion. She does not need emergent thoracentesis given her tachypnea improved with BIPAP, vitals improving. Will admit to med-tele.

## 2023-12-21 NOTE — ED PROVIDER NOTE - OBJECTIVE STATEMENT
88-year-old female with PMH of A-fib/A-flutter on Coumadin, mitral valve replacement, CHF s/p AICD, HTN, BIBEMS from home for evaluation of shortness of breath since yesterday.  Patient reports shortness of breath worse than her baseline associated with increased lower extremity swelling.  Notes chronic dry cough that is not new or worsened from baseline.  States she has not taken her Lasix in 2 days because she is afraid it is going to hurt her.  Denies fever/chills, sore throat, cough productivity, CP, abdominal pain, N/V/D, urinary symptoms.  Lives at home with her sister.

## 2023-12-21 NOTE — H&P ADULT - ASSESSMENT
88-year-old female with PMH of A-fib/A-flutter on Coumadin, mitral valve replacement, CHF s/p AICD, HTN, BIBEMS from home for evaluation of shortness of breath since yesterday.   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     #Troponemia   -suspect TII NSTEMI   -trend CE     HTN   -continue with home nifedipine and proper metoprolol dose     HLD   continue home sattin (Lipitor 20)    Constipation  senna/miralax    DVT ppx, on heparin drip now   GI ppx protonix      88-year-old female with PMH of A-fib/A-flutter on Coumadin, mitral valve replacement, CHF s/p AICD, HTN, BIBEMS from home for evaluation of shortness of breath since yesterday.   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   -s/p thora on last admission transudate   -start lasix 40 iv daily, daily weights, i/o, and fluid restriction to 1.2L/24hr  -wean cardizem drip start metoprolol 50q6 first dose now.     #Troponemia   -suspect TII NSTEMI   -trend CE     HTN   -continue with home nifedipine and proper metoprolol dose     HLD   continue home sattin (Lipitor 20)    Constipation  senna/miralax    DVT ppx, on heparin drip now   GI ppx protonix   Diet: dash   Dispo TELEMETRY   GOC: DNR        88-year-old female with PMH of A-fib/A-flutter on Coumadin, mitral valve replacement, CHF s/p AICD, HTN, BIBEMS from home for evaluation of shortness of breath since yesterday.   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   -s/p thora on last admission transudate   -start lasix 40 iv daily, daily weights, i/o, and fluid restriction to 1.2L/24hr  -wean cardizem drip start metoprolol 50q6 first dose now.     #Troponemia   -suspect TII NSTEMI   -trend CE     HTN   -continue with home nifedipine and proper metoprolol dose     HLD   continue home sattin (Lipitor 20)    Constipation  senna/miralax    DVT ppx, Lovenox theraputic for now  GI ppx protonix   Diet: dash   Dispo TELEMETRY   GOC: DNR

## 2023-12-21 NOTE — H&P ADULT - NSHPPHYSICALEXAM_GEN_ALL_CORE
VITALS:     GENERAL: NAD, lying in bed comfortably  HEAD:  Atraumatic, Normocephalic  EYES: EOMI, PERRLA, conjunctiva and sclera clear  ENT: Moist mucous membranes  NECK: Supple, No JVD  CHEST/LUNG: Clear to auscultation bilaterally; No rales, rhonchi, wheezing, or rubs. Unlabored respirations  HEART: No murmurs, rubs, or gallops  ABDOMEN: Bowel sounds present; Soft, Nontender, Nondistended. No hepatomegaly  EXTREMITIES:  2+ Peripheral Pulses, brisk capillary refill. No clubbing, pitting edema lower extremities bilaterally  NERVOUS SYSTEM:  Alert & Oriented X3, speech clear. No deficits   MSK: FROM all 4 extremities, full and equal strength  SKIN: No rashes or lesions

## 2023-12-21 NOTE — ED ADULT NURSE NOTE - OBJECTIVE STATEMENT
Patient stated he is feeling better and slept well last night. Mood is improved. Medication compliant. Less irritable. Was seen by Dr. Newton. Patient's sister called to speak with psychiatrist. Message via text was given to Dr. Newton. Attended groups.   Pt presents to ED with c/o shortness of breath. Pt states shortness of breath started yesterday, denies fever, chest pain, N/V/D. Pt also states she has not taken her Lasix yesterday or today and reports increased lower extremity swelling.

## 2023-12-21 NOTE — ED PROVIDER NOTE - CARE PLAN
Assessment and plan of treatment:	plan- labs xr meds reassess   1 Principal Discharge DX:	Large pleural effusion  Assessment and plan of treatment:	plan- labs xr meds reassess  Secondary Diagnosis:	Acute on chronic systolic congestive heart failure

## 2023-12-21 NOTE — H&P ADULT - NSHPREVIEWOFSYSTEMS_GEN_ALL_CORE
REVIEW OF SYSTEMS:    CONSTITUTIONAL: No weakness, fevers or chills  EYES/ENT: No visual changes;  No vertigo or throat pain   NECK: No pain or stiffness  RESPIRATORY: shortness of breath X 3 days  CARDIOVASCULAR: No chest pain or palpitations  GASTROINTESTINAL: No abdominal or epigastric pain. No nausea, vomiting, or hematemesis; No diarrhea or constipation. No melena or hematochezia.  GENITOURINARY: No dysuria, frequency or hematuria  NEUROLOGICAL: No numbness or weakness  SKIN: No itching, rashes

## 2023-12-21 NOTE — H&P ADULT - ATTENDING COMMENTS
88 year old female with PMH of  HTN, AFIB/Flutter, Prosthetic MVR, AS, HFmrEF  s/p AICD was brought to ED from home for evaluation of shortness of breath for few days, she reports has SOB for many weeks, she was discharged from the hospital in Oct 2023 and since discharge she didn't take lasix, she has worsening leg edema, no chest pain, In the ED CXR showed worsening left pleural effusion, EKG showed Aflutter with Rapid Ventricular Response, she was started on Cardizem drip.     PHYSICAL EXAM:  GENERAL: NAD, well-developed.  HEAD:  Atraumatic, Normocephalic.  EYES: EOMI, PERRLA, conjunctiva and sclera clear.  NECK: Supple, No JVD.  CHEST/LUNG: bilateral lower lung rales, decrease breath sound on left lower lung  HEART: Irregular rate and rhythm; S1 S2.   ABDOMEN: Soft, Nontender, Nondistended; Bowel sounds present.  EXTREMITIES:  2+ Peripheral Pulses, leg edema 2+  PSYCH: AAOx3.  NEUROLOGY: non-focal.  SKIN: No rashes or lesions.    A/P:   Acute HFmrEF:   Prosthetic mitral valve Replacement:   Aortic Stenosis:   Patient with worsening SOB, leg edema, left pleural effusion, Pro-BNP 3800  Not compliant with medication, even not taking coumadin.  Start on Lasix 40mg Iv daily, increase the dose as needed, output goal >2L  Low sodium diet. Fluid restriction 1.2L/day  Start on Lovenox for MVR, bridge with Coumadin.     Chronic atrial Flutter with Rapid Ventricular Response:   on Cardizem drip, wean off and resume Metoprolol  Anticoagulation as above.     HTN:   On Nifedipine, switch to Losartan (avoid in low EF)

## 2023-12-21 NOTE — H&P ADULT - NSHPLABSRESULTS_GEN_ALL_CORE
.  LABS:                         11.8   9.08  )-----------( 207      ( 21 Dec 2023 08:50 )             37.9     12-21    144  |  109  |  29<H>  ----------------------------<  94  4.8   |  25  |  1.2    Ca    9.4      21 Dec 2023 08:50  Mg     1.8     12-21    TPro  6.5  /  Alb  3.7  /  TBili  0.7  /  DBili  x   /  AST  25  /  ALT  13  /  AlkPhos  91  12-21    PT/INR - ( 21 Dec 2023 08:50 )   PT: 14.70 sec;   INR: 1.29 ratio         PTT - ( 21 Dec 2023 08:50 )  PTT:29.6 sec  Urinalysis Basic - ( 21 Dec 2023 08:50 )    Color: x / Appearance: x / SG: x / pH: x  Gluc: 94 mg/dL / Ketone: x  / Bili: x / Urobili: x   Blood: x / Protein: x / Nitrite: x   Leuk Esterase: x / RBC: x / WBC x   Sq Epi: x / Non Sq Epi: x / Bacteria: x            RADIOLOGY, EKG & ADDITIONAL TESTS: Reviewed.

## 2023-12-21 NOTE — ED PROVIDER NOTE - EKG ADDITIONAL INFORMATION FREE TEXT
I Thalia Cesar interpreted and reviewed this EKG - rate 151 I Thalia Cesar interpreted and reviewed this EKG - rate 151, regular hx afib and a flutter likely flutter, qtc 510 will rpt ekg I Thalia Cesar interpreted and reviewed this EKG - rate 151, regular hx afib and a flutter likely flutter, qtc 510 will rpt ekg    2nd ekg at 09:24 shows rate 114bpm, atrial flutter, no st/t changes

## 2023-12-21 NOTE — PATIENT PROFILE ADULT - FALL HARM RISK - HARM RISK INTERVENTIONS
Assistance OOB with selected safe patient handling equipment/Communicate Risk of Fall with Harm to all staff/Discuss with provider need for PT consult/Monitor gait and stability/Provide patient with walking aids - walker, cane, crutches/Reinforce activity limits and safety measures with patient and family/Tailored Fall Risk Interventions/Visual Cue: Yellow wristband and red socks/Bed in lowest position, wheels locked, appropriate side rails in place/Call bell, personal items and telephone in reach/Instruct patient to call for assistance before getting out of bed or chair/Non-slip footwear when patient is out of bed/[place] to call system/Physically safe environment - no spills, clutter or unnecessary equipment/Purposeful Proactive Rounding/Room/bathroom lighting operational, light cord in reach Assistance OOB with selected safe patient handling equipment/Communicate Risk of Fall with Harm to all staff/Discuss with provider need for PT consult/Monitor gait and stability/Provide patient with walking aids - walker, cane, crutches/Reinforce activity limits and safety measures with patient and family/Tailored Fall Risk Interventions/Visual Cue: Yellow wristband and red socks/Bed in lowest position, wheels locked, appropriate side rails in place/Call bell, personal items and telephone in reach/Instruct patient to call for assistance before getting out of bed or chair/Non-slip footwear when patient is out of bed/[place] to call system/Physically safe environment - no spills, clutter or unnecessary equipment/Purposeful Proactive Rounding/Room/bathroom lighting operational, light cord in reach

## 2023-12-21 NOTE — H&P ADULT - HISTORY OF PRESENT ILLNESS
88-year-old female with PMH of HFmrEF, A-fib/A-flutter on Coumadin, mitral valve replacement, CHF s/p AICD, HTN, BIBEMS from home for evaluation of shortness of breath since yesterday.  Patient reports shortness of breath worse than her baseline associated with increased lower extremity swelling.  missed 2 days of Lasix.  Denies fever/chills, sore throat, cough productivity, CP, abdominal pain, N/V/D, urinary symptoms.  Lives at home with her sister.  CONVERSATION WITH PHARMACIST AT [facility] PATIENT DID NOT  NEW HIGHER DOSE OF METOPROLOL WHICH WAS PRESCRIBED IN OCTOBER. (250MG SUCCINATE)  In the ED   /95  afebrile spo2 91 on RA   CXR with obvious L sided large effusion .  ECG 2:1 aflutter   Prolonged QTC  510   [facility] INR 1.29, BNP 3847 RVP negative high sensitivity troponin 49   patient started on a cardizem drip now weaning  88-year-old female with PMH of HFmrEF, A-fib/A-flutter on Coumadin, mitral valve replacement, CHF s/p AICD, HTN, BIBEMS from home for evaluation of shortness of breath since yesterday.  Patient reports shortness of breath worse than her baseline associated with increased lower extremity swelling.  missed 2 days of Lasix.  Denies fever/chills, sore throat, cough productivity, CP, abdominal pain, N/V/D, urinary symptoms.  Lives at home with her sister.  CONVERSATION WITH PHARMACIST AT [facility] PATIENT DID NOT  NEW HIGHER DOSE OF METOPROLOL WHICH WAS PRESCRIBED IN OCTOBER. (250MG SUCCINATE)  In the ED   /95  afebrile spo2 91 on RA   CXR with obvious L sided large effusion .  ECG 2:1 aflutter   Prolonged QTC  510   [facility] INR 1.29, BNP 3847 RVP negative high sensitivity troponin 49   patient started on a cardizem drip now weaning  88-year-old female with PMH of HFmrEF, A-fib/A-flutter, prosthetic mitral valve replacement on Coumadin, CHF s/p AICD, HTN, BIBEMS from home for evaluation of shortness of breath since yesterday.  Patient reports shortness of breath worse than her baseline associated with increased lower extremity swelling.  missed 2 days of Lasix.  Denies fever/chills, sore throat, cough productivity, CP, abdominal pain, N/V/D, urinary symptoms.  Lives at home with her sister.  CONVERSATION WITH PHARMACIST AT [facility] PATIENT DID NOT  NEW HIGHER DOSE OF METOPROLOL WHICH WAS PRESCRIBED IN OCTOBER. (250MG SUCCINATE)  In the ED   /95  afebrile spo2 91 on RA   CXR with obvious L sided large effusion .  ECG 2:1 aflutter   Prolonged QTC  510   [facility] INR 1.29, BNP 3847 RVP negative high sensitivity troponin 49   patient started on a cardizem drip now weaning  88-year-old female with PMH of HFmrEF, A-fib/A-flutter, prosthetic mitral valve replacement on Coumadin, CHF s/p AICD, HTN, BIBEMS from home for evaluation of shortness of breath since yesterday.  Patient reports shortness of breath worse than her baseline associated with increased lower extremity swelling.  missed 2 days of Lasix.  Denies fever/chills, sore throat, cough productivity, CP, abdominal pain, N/V/D, urinary symptoms.  Lives at home with her sister.  CONVERSATION WITH PHARMACIST AT [facility] PATIENT DID NOT  NEW HIGHER DOSE OF METOPROLOL WHICH WAS PRESCRIBED IN OCTOBER. (250MG SUCCINATE)  In the ED   /95  afebrile spo2 91 on RA   CXR with obvious L sided large effusion .  ECG 2:1 aflutter   Prolonged QTC  510   [facility] INR 1.29, BNP 3847 RVP negative high sensitivity troponin 49   patient started on a cardizem drip now weaning

## 2023-12-22 DIAGNOSIS — I10 ESSENTIAL (PRIMARY) HYPERTENSION: ICD-10-CM

## 2023-12-22 DIAGNOSIS — Z95.2 PRESENCE OF PROSTHETIC HEART VALVE: ICD-10-CM

## 2023-12-22 DIAGNOSIS — Z79.899 OTHER LONG TERM (CURRENT) DRUG THERAPY: ICD-10-CM

## 2023-12-22 DIAGNOSIS — I48.92 UNSPECIFIED ATRIAL FLUTTER: ICD-10-CM

## 2023-12-22 DIAGNOSIS — R06.02 SHORTNESS OF BREATH: ICD-10-CM

## 2023-12-22 LAB
ALBUMIN SERPL ELPH-MCNC: 3.2 G/DL — LOW (ref 3.5–5.2)
ALBUMIN SERPL ELPH-MCNC: 3.2 G/DL — LOW (ref 3.5–5.2)
ALP SERPL-CCNC: 81 U/L — SIGNIFICANT CHANGE UP (ref 30–115)
ALP SERPL-CCNC: 81 U/L — SIGNIFICANT CHANGE UP (ref 30–115)
ALT FLD-CCNC: 12 U/L — SIGNIFICANT CHANGE UP (ref 0–41)
ALT FLD-CCNC: 12 U/L — SIGNIFICANT CHANGE UP (ref 0–41)
ANION GAP SERPL CALC-SCNC: 7 MMOL/L — SIGNIFICANT CHANGE UP (ref 7–14)
ANION GAP SERPL CALC-SCNC: 7 MMOL/L — SIGNIFICANT CHANGE UP (ref 7–14)
AST SERPL-CCNC: 24 U/L — SIGNIFICANT CHANGE UP (ref 0–41)
AST SERPL-CCNC: 24 U/L — SIGNIFICANT CHANGE UP (ref 0–41)
BASOPHILS # BLD AUTO: 0.05 K/UL — SIGNIFICANT CHANGE UP (ref 0–0.2)
BASOPHILS # BLD AUTO: 0.05 K/UL — SIGNIFICANT CHANGE UP (ref 0–0.2)
BASOPHILS NFR BLD AUTO: 0.7 % — SIGNIFICANT CHANGE UP (ref 0–1)
BASOPHILS NFR BLD AUTO: 0.7 % — SIGNIFICANT CHANGE UP (ref 0–1)
BILIRUB SERPL-MCNC: 0.6 MG/DL — SIGNIFICANT CHANGE UP (ref 0.2–1.2)
BILIRUB SERPL-MCNC: 0.6 MG/DL — SIGNIFICANT CHANGE UP (ref 0.2–1.2)
BUN SERPL-MCNC: 27 MG/DL — HIGH (ref 10–20)
BUN SERPL-MCNC: 27 MG/DL — HIGH (ref 10–20)
CALCIUM SERPL-MCNC: 8.7 MG/DL — SIGNIFICANT CHANGE UP (ref 8.4–10.5)
CALCIUM SERPL-MCNC: 8.7 MG/DL — SIGNIFICANT CHANGE UP (ref 8.4–10.5)
CHLORIDE SERPL-SCNC: 105 MMOL/L — SIGNIFICANT CHANGE UP (ref 98–110)
CHLORIDE SERPL-SCNC: 105 MMOL/L — SIGNIFICANT CHANGE UP (ref 98–110)
CO2 SERPL-SCNC: 28 MMOL/L — SIGNIFICANT CHANGE UP (ref 17–32)
CO2 SERPL-SCNC: 28 MMOL/L — SIGNIFICANT CHANGE UP (ref 17–32)
CREAT SERPL-MCNC: 1 MG/DL — SIGNIFICANT CHANGE UP (ref 0.7–1.5)
CREAT SERPL-MCNC: 1 MG/DL — SIGNIFICANT CHANGE UP (ref 0.7–1.5)
EGFR: 55 ML/MIN/1.73M2 — LOW
EGFR: 55 ML/MIN/1.73M2 — LOW
EOSINOPHIL # BLD AUTO: 0.19 K/UL — SIGNIFICANT CHANGE UP (ref 0–0.7)
EOSINOPHIL # BLD AUTO: 0.19 K/UL — SIGNIFICANT CHANGE UP (ref 0–0.7)
EOSINOPHIL NFR BLD AUTO: 2.6 % — SIGNIFICANT CHANGE UP (ref 0–8)
EOSINOPHIL NFR BLD AUTO: 2.6 % — SIGNIFICANT CHANGE UP (ref 0–8)
GLUCOSE SERPL-MCNC: 93 MG/DL — SIGNIFICANT CHANGE UP (ref 70–99)
GLUCOSE SERPL-MCNC: 93 MG/DL — SIGNIFICANT CHANGE UP (ref 70–99)
HCT VFR BLD CALC: 37.8 % — SIGNIFICANT CHANGE UP (ref 37–47)
HCT VFR BLD CALC: 37.8 % — SIGNIFICANT CHANGE UP (ref 37–47)
HGB BLD-MCNC: 11.8 G/DL — LOW (ref 12–16)
HGB BLD-MCNC: 11.8 G/DL — LOW (ref 12–16)
IMM GRANULOCYTES NFR BLD AUTO: 0.3 % — SIGNIFICANT CHANGE UP (ref 0.1–0.3)
IMM GRANULOCYTES NFR BLD AUTO: 0.3 % — SIGNIFICANT CHANGE UP (ref 0.1–0.3)
LYMPHOCYTES # BLD AUTO: 1.18 K/UL — LOW (ref 1.2–3.4)
LYMPHOCYTES # BLD AUTO: 1.18 K/UL — LOW (ref 1.2–3.4)
LYMPHOCYTES # BLD AUTO: 15.9 % — LOW (ref 20.5–51.1)
LYMPHOCYTES # BLD AUTO: 15.9 % — LOW (ref 20.5–51.1)
MAGNESIUM SERPL-MCNC: 1.7 MG/DL — LOW (ref 1.8–2.4)
MAGNESIUM SERPL-MCNC: 1.7 MG/DL — LOW (ref 1.8–2.4)
MCHC RBC-ENTMCNC: 26.8 PG — LOW (ref 27–31)
MCHC RBC-ENTMCNC: 26.8 PG — LOW (ref 27–31)
MCHC RBC-ENTMCNC: 31.2 G/DL — LOW (ref 32–37)
MCHC RBC-ENTMCNC: 31.2 G/DL — LOW (ref 32–37)
MCV RBC AUTO: 85.7 FL — SIGNIFICANT CHANGE UP (ref 81–99)
MCV RBC AUTO: 85.7 FL — SIGNIFICANT CHANGE UP (ref 81–99)
MONOCYTES # BLD AUTO: 0.76 K/UL — HIGH (ref 0.1–0.6)
MONOCYTES # BLD AUTO: 0.76 K/UL — HIGH (ref 0.1–0.6)
MONOCYTES NFR BLD AUTO: 10.2 % — HIGH (ref 1.7–9.3)
MONOCYTES NFR BLD AUTO: 10.2 % — HIGH (ref 1.7–9.3)
NEUTROPHILS # BLD AUTO: 5.23 K/UL — SIGNIFICANT CHANGE UP (ref 1.4–6.5)
NEUTROPHILS # BLD AUTO: 5.23 K/UL — SIGNIFICANT CHANGE UP (ref 1.4–6.5)
NEUTROPHILS NFR BLD AUTO: 70.3 % — SIGNIFICANT CHANGE UP (ref 42.2–75.2)
NEUTROPHILS NFR BLD AUTO: 70.3 % — SIGNIFICANT CHANGE UP (ref 42.2–75.2)
NRBC # BLD: 0 /100 WBCS — SIGNIFICANT CHANGE UP (ref 0–0)
NRBC # BLD: 0 /100 WBCS — SIGNIFICANT CHANGE UP (ref 0–0)
PHOSPHATE SERPL-MCNC: 3.3 MG/DL — SIGNIFICANT CHANGE UP (ref 2.1–4.9)
PHOSPHATE SERPL-MCNC: 3.3 MG/DL — SIGNIFICANT CHANGE UP (ref 2.1–4.9)
PLATELET # BLD AUTO: 220 K/UL — SIGNIFICANT CHANGE UP (ref 130–400)
PLATELET # BLD AUTO: 220 K/UL — SIGNIFICANT CHANGE UP (ref 130–400)
PMV BLD: 11.7 FL — HIGH (ref 7.4–10.4)
PMV BLD: 11.7 FL — HIGH (ref 7.4–10.4)
POTASSIUM SERPL-MCNC: 4.1 MMOL/L — SIGNIFICANT CHANGE UP (ref 3.5–5)
POTASSIUM SERPL-MCNC: 4.1 MMOL/L — SIGNIFICANT CHANGE UP (ref 3.5–5)
POTASSIUM SERPL-SCNC: 4.1 MMOL/L — SIGNIFICANT CHANGE UP (ref 3.5–5)
POTASSIUM SERPL-SCNC: 4.1 MMOL/L — SIGNIFICANT CHANGE UP (ref 3.5–5)
PROT SERPL-MCNC: 6 G/DL — SIGNIFICANT CHANGE UP (ref 6–8)
PROT SERPL-MCNC: 6 G/DL — SIGNIFICANT CHANGE UP (ref 6–8)
RBC # BLD: 4.41 M/UL — SIGNIFICANT CHANGE UP (ref 4.2–5.4)
RBC # BLD: 4.41 M/UL — SIGNIFICANT CHANGE UP (ref 4.2–5.4)
RBC # FLD: 17.2 % — HIGH (ref 11.5–14.5)
RBC # FLD: 17.2 % — HIGH (ref 11.5–14.5)
SODIUM SERPL-SCNC: 140 MMOL/L — SIGNIFICANT CHANGE UP (ref 135–146)
SODIUM SERPL-SCNC: 140 MMOL/L — SIGNIFICANT CHANGE UP (ref 135–146)
WBC # BLD: 7.43 K/UL — SIGNIFICANT CHANGE UP (ref 4.8–10.8)
WBC # BLD: 7.43 K/UL — SIGNIFICANT CHANGE UP (ref 4.8–10.8)
WBC # FLD AUTO: 7.43 K/UL — SIGNIFICANT CHANGE UP (ref 4.8–10.8)
WBC # FLD AUTO: 7.43 K/UL — SIGNIFICANT CHANGE UP (ref 4.8–10.8)

## 2023-12-22 PROCEDURE — 99233 SBSQ HOSP IP/OBS HIGH 50: CPT

## 2023-12-22 PROCEDURE — 71045 X-RAY EXAM CHEST 1 VIEW: CPT | Mod: 26

## 2023-12-22 RX ORDER — MAGNESIUM SULFATE 500 MG/ML
2 VIAL (ML) INJECTION ONCE
Refills: 0 | Status: COMPLETED | OUTPATIENT
Start: 2023-12-22 | End: 2023-12-22

## 2023-12-22 RX ORDER — METOPROLOL TARTRATE 50 MG
5 TABLET ORAL ONCE
Refills: 0 | Status: COMPLETED | OUTPATIENT
Start: 2023-12-22 | End: 2023-12-22

## 2023-12-22 RX ADMIN — Medication 50 MILLIGRAM(S): at 23:03

## 2023-12-22 RX ADMIN — Medication 40 MILLIGRAM(S): at 05:03

## 2023-12-22 RX ADMIN — ENOXAPARIN SODIUM 80 MILLIGRAM(S): 100 INJECTION SUBCUTANEOUS at 17:38

## 2023-12-22 RX ADMIN — ENOXAPARIN SODIUM 80 MILLIGRAM(S): 100 INJECTION SUBCUTANEOUS at 05:03

## 2023-12-22 RX ADMIN — Medication 50 MILLIGRAM(S): at 05:03

## 2023-12-22 RX ADMIN — Medication 50 MILLIGRAM(S): at 17:38

## 2023-12-22 RX ADMIN — LOSARTAN POTASSIUM 50 MILLIGRAM(S): 100 TABLET, FILM COATED ORAL at 05:03

## 2023-12-22 RX ADMIN — ATORVASTATIN CALCIUM 20 MILLIGRAM(S): 80 TABLET, FILM COATED ORAL at 23:03

## 2023-12-22 RX ADMIN — Medication 50 MILLIGRAM(S): at 11:11

## 2023-12-22 RX ADMIN — Medication 110 MILLIGRAM(S): at 18:01

## 2023-12-22 RX ADMIN — Medication 25 GRAM(S): at 11:02

## 2023-12-22 NOTE — CONSULT NOTE ADULT - SUBJECTIVE AND OBJECTIVE BOX
Patient is a 88y old  Female who presents with a chief complaint of HFpEF Exacerbation (22 Dec 2023 11:08)      HPI:  88-year-old female with PMH of HFmrEF, A-fib/A-flutter, prosthetic mitral valve replacement on Coumadin, CHF s/p AICD, HTN, BIBEMS from home for evaluation of shortness of breath since yesterday.  Patient reports shortness of breath worse than her baseline associated with increased lower extremity swelling.  missed 2 days of Lasix.  Denies fever/chills, sore throat, cough productivity, CP, abdominal pain, N/V/D, urinary symptoms.  Lives at home with her sister.  CONVERSATION WITH PHARMACIST AT [facility] PATIENT DID NOT  NEW HIGHER DOSE OF METOPROLOL WHICH WAS PRESCRIBED IN OCTOBER. (250MG SUCCINATE)  In the ED   /95  afebrile spo2 91 on RA   CXR with obvious L sided large effusion .  ECG 2:1 aflutter   Prolonged QTC  510   [facility] INR 1.29, BNP 3847 RVP negative high sensitivity troponin 49   patient started on a cardizem drip now weaning  (21 Dec 2023 15:45)      PAST MEDICAL & SURGICAL HISTORY:  Afib      HTN (hypertension)      Heart failure      H/O mitral valve replacement      H/O prior ablation treatment  afib          PREVIOUS DIAGNOSTIC TESTING:      ECHO  FINDINGS:    STRESS  FINDINGS:    CATHETERIZATION  FINDINGS:    MEDICATIONS  (STANDING):  atorvastatin 20 milliGRAM(s) Oral at bedtime  enoxaparin Injectable 80 milliGRAM(s) SubCutaneous every 12 hours  furosemide   Injectable 40 milliGRAM(s) IV Push daily  losartan 50 milliGRAM(s) Oral daily  metoprolol tartrate 50 milliGRAM(s) Oral every 6 hours  pantoprazole    Tablet 40 milliGRAM(s) Oral before breakfast    MEDICATIONS  (PRN):  polyethylene glycol 3350 17 Gram(s) Oral daily PRN for constipation  senna 2 Tablet(s) Oral at bedtime PRN for constipation      FAMILY HISTORY:      SOCIAL HISTORY:  CIGARETTES:    ALCOHOL:    REVIEW OF SYSTEMS:  CONSTITUTIONAL: No fever, weight loss, or fatigue  NECK: No pain or stiffness  RESPIRATORY: No cough, wheezing, chills or hemoptysis; No shortness of breath  CARDIOVASCULAR: No chest pain, palpitations, dizziness, or leg swelling  GASTROINTESTINAL: No abdominal or epigastric pain. No nausea, vomiting, or hematemesis; No diarrhea or constipation. No melena or hematochezia.  GENITOURINARY: No dysuria, frequency, hematuria, or incontinence  NEUROLOGICAL: No headaches, memory loss, loss of strength, numbness, or tremors  SKIN: No itching, burning, rashes, or lesions   ENDOCRINE: No heat or cold intolerance; No hair loss  MUSCULOSKELETAL: No joint pain or swelling; No muscle, back, or extremity pain  HEME/LYMPH: No easy bruising, or bleeding gums          Vital Signs Last 24 Hrs  T(C): 36.2 (22 Dec 2023 15:32), Max: 36.5 (21 Dec 2023 21:54)  T(F): 97.1 (22 Dec 2023 15:32), Max: 97.7 (21 Dec 2023 21:54)  HR: 116 (22 Dec 2023 15:32) (72 - 116)  BP: 151/97 (22 Dec 2023 15:32) (139/84 - 174/74)  BP(mean): --  RR: 18 (22 Dec 2023 15:32) (18 - 19)  SpO2: 99% (22 Dec 2023 15:32) (95% - 99%)    Parameters below as of 22 Dec 2023 15:32  Patient On (Oxygen Delivery Method): room air            PHYSICAL EXAM:  GENERAL: NAD, well-groomed, well-developed  HEAD:  Atraumatic, Normocephalic  NECK: Supple, No JVD, Normal thyroid  NERVOUS SYSTEM:  Alert & Oriented X3, Good concentration  CHEST/LUNG: Clear to percussion bilaterally; No rales, rhonchi, wheezing, or rubs  HEART: Regular rate and rhythm; No murmurs, rubs, or gallops  ABDOMEN: Soft, Nontender, Nondistended; Bowel sounds present  EXTREMITIES:  2+ Peripheral Pulses, No clubbing, cyanosis, or edema  SKIN: No rashes or lesions    INTERPRETATION OF TELEMETRY:    ECG:    I&O's Detail    21 Dec 2023 07:01  -  22 Dec 2023 07:00  --------------------------------------------------------  IN:  Total IN: 0 mL    OUT:    Voided (mL): 900 mL  Total OUT: 900 mL    Total NET: -900 mL          LABS:                        11.8   7.43  )-----------( 220      ( 22 Dec 2023 07:09 )             37.8     12-22    140  |  105  |  27<H>  ----------------------------<  93  4.1   |  28  |  1.0    Ca    8.7      22 Dec 2023 07:09  Phos  3.3     12-22  Mg     1.7     12-22    TPro  6.0  /  Alb  3.2<L>  /  TBili  0.6  /  DBili  x   /  AST  24  /  ALT  12  /  AlkPhos  81  12-22        PT/INR - ( 21 Dec 2023 08:50 )   PT: 14.70 sec;   INR: 1.29 ratio         PTT - ( 21 Dec 2023 08:50 )  PTT:29.6 sec  Urinalysis Basic - ( 22 Dec 2023 07:09 )    Color: x / Appearance: x / SG: x / pH: x  Gluc: 93 mg/dL / Ketone: x  / Bili: x / Urobili: x   Blood: x / Protein: x / Nitrite: x   Leuk Esterase: x / RBC: x / WBC x   Sq Epi: x / Non Sq Epi: x / Bacteria: x      I&O's Summary    21 Dec 2023 07:01  -  22 Dec 2023 07:00  --------------------------------------------------------  IN: 0 mL / OUT: 900 mL / NET: -900 mL        RADIOLOGY & ADDITIONAL STUDIES: Patient is a 88y old  Female who presents with a chief complaint of HFpEF Exacerbation (22 Dec 2023 11:08)      HPI:  88-year-old female with PMH of HFmrEF, A-fib/A-flutter, prosthetic mitral valve replacement on Coumadin, CHF s/p AICD, HTN, BIBEMS from home for evaluation of shortness of breath since yesterday.  Patient reports shortness of breath worse than her baseline associated with increased lower extremity swelling.  missed 2 days of Lasix.  Denies fever/chills, sore throat, cough productivity, CP, abdominal pain, N/V/D, urinary symptoms.  Lives at home with her sister.  CONVERSATION WITH PHARMACIST AT [facility] PATIENT DID NOT  NEW HIGHER DOSE OF METOPROLOL WHICH WAS PRESCRIBED IN OCTOBER. (250MG SUCCINATE)  In the ED   /95  afebrile spo2 91 on RA   CXR with obvious L sided large effusion .  ECG 2:1 aflutter   Prolonged QTC  510   [facility] INR 1.29, BNP 3847 RVP negative high sensitivity troponin 49   patient started on a cardizem drip now weaning  (21 Dec 2023 15:45)      PAST MEDICAL & SURGICAL HISTORY:  Afib      HTN (hypertension)      Heart failure      H/O mitral valve replacement      H/O prior ablation treatment  afib          PREVIOUS DIAGNOSTIC TESTING:      ECHO  FINDINGS:    STRESS  FINDINGS:    CATHETERIZATION  FINDINGS:    MEDICATIONS  (STANDING):  atorvastatin 20 milliGRAM(s) Oral at bedtime  enoxaparin Injectable 80 milliGRAM(s) SubCutaneous every 12 hours  furosemide   Injectable 40 milliGRAM(s) IV Push daily  losartan 50 milliGRAM(s) Oral daily  metoprolol tartrate 50 milliGRAM(s) Oral every 6 hours  pantoprazole    Tablet 40 milliGRAM(s) Oral before breakfast    MEDICATIONS  (PRN):  polyethylene glycol 3350 17 Gram(s) Oral daily PRN for constipation  senna 2 Tablet(s) Oral at bedtime PRN for constipation      FAMILY HISTORY:      SOCIAL HISTORY:  CIGARETTES:    ALCOHOL:    REVIEW OF SYSTEMS:  CONSTITUTIONAL: No fever, weight loss, or fatigue  NECK: No pain or stiffness  RESPIRATORY: No cough, wheezing, chills or hemoptysis; No shortness of breath  CARDIOVASCULAR: No chest pain, palpitations, dizziness, or leg swelling  GASTROINTESTINAL: No abdominal or epigastric pain. No nausea, vomiting, or hematemesis; No diarrhea or constipation. No melena or hematochezia.  GENITOURINARY: No dysuria, frequency, hematuria, or incontinence  NEUROLOGICAL: No headaches, memory loss, loss of strength, numbness, or tremors  SKIN: No itching, burning, rashes, or lesions   ENDOCRINE: No heat or cold intolerance; No hair loss  MUSCULOSKELETAL: No joint pain or swelling; No muscle, back, or extremity pain  HEME/LYMPH: No easy bruising, or bleeding gums          Vital Signs Last 24 Hrs  T(C): 36.2 (22 Dec 2023 15:32), Max: 36.5 (21 Dec 2023 21:54)  T(F): 97.1 (22 Dec 2023 15:32), Max: 97.7 (21 Dec 2023 21:54)  HR: 116 (22 Dec 2023 15:32) (72 - 116)  BP: 151/97 (22 Dec 2023 15:32) (139/84 - 174/74)  BP(mean): --  RR: 18 (22 Dec 2023 15:32) (18 - 19)  SpO2: 99% (22 Dec 2023 15:32) (95% - 99%)    Parameters below as of 22 Dec 2023 15:32  Patient On (Oxygen Delivery Method): room air            PHYSICAL EXAM:  GENERAL: NAD, well-groomed, well-developed  HEAD:  Atraumatic, Normocephalic  NECK: Supple, No JVD, Normal thyroid  NERVOUS SYSTEM:  Alert & Oriented X3, Good concentration  CHEST/LUNG: Clear to percussion bilaterally; No rales, rhonchi, wheezing, or rubs  HEART: Regular rate and rhythm; No murmurs, rubs, or gallops  ABDOMEN: Soft, Nontender, Nondistended; Bowel sounds present  EXTREMITIES:  2+ Peripheral Pulses, No clubbing, cyanosis, or edema  SKIN: No rashes or lesions    INTERPRETATION OF TELEMETRY:    ECG:    I&O's Detail    21 Dec 2023 07:01  -  22 Dec 2023 07:00  --------------------------------------------------------  IN:  Total IN: 0 mL    OUT:    Voided (mL): 900 mL  Total OUT: 900 mL    Total NET: -900 mL          LABS:                        11.8   7.43  )-----------( 220      ( 22 Dec 2023 07:09 )             37.8     12-22    140  |  105  |  27<H>  ----------------------------<  93  4.1   |  28  |  1.0    Ca    8.7      22 Dec 2023 07:09  Phos  3.3     12-22  Mg     1.7     12-22    TPro  6.0  /  Alb  3.2<L>  /  TBili  0.6  /  DBili  x   /  AST  24  /  ALT  12  /  AlkPhos  81  12-22        PT/INR - ( 21 Dec 2023 08:50 )   PT: 14.70 sec;   INR: 1.29 ratio         PTT - ( 21 Dec 2023 08:50 )  PTT:29.6 sec  Urinalysis Basic - ( 22 Dec 2023 07:09 )    Color: x / Appearance: x / SG: x / pH: x  Gluc: 93 mg/dL / Ketone: x  / Bili: x / Urobili: x   Blood: x / Protein: x / Nitrite: x   Leuk Esterase: x / RBC: x / WBC x   Sq Epi: x / Non Sq Epi: x / Bacteria: x      I&O's Summary    21 Dec 2023 07:01  -  22 Dec 2023 07:00  --------------------------------------------------------  IN: 0 mL / OUT: 900 mL / NET: -900 mL        RADIOLOGY & ADDITIONAL STUDIES:

## 2023-12-22 NOTE — PROGRESS NOTE ADULT - SUBJECTIVE AND OBJECTIVE BOX
INTERVAL HPI/OVERNIGHT EVENTS:    SUBJECTIVE: Patient seen and examined at bedside.     no cp, abd pain, fever  +sob, no cough, orthopnea, pnd    OBJECTIVE:    VITAL SIGNS:  Vital Signs Last 24 Hrs  T(C): 36.3 (22 Dec 2023 08:07), Max: 36.6 (21 Dec 2023 15:48)  T(F): 97.3 (22 Dec 2023 08:07), Max: 97.9 (21 Dec 2023 15:48)  HR: 72 (22 Dec 2023 08:07) (72 - 150)  BP: 139/84 (22 Dec 2023 08:07) (121/62 - 174/74)  BP(mean): --  RR: 19 (22 Dec 2023 08:07) (18 - 20)  SpO2: 95% (22 Dec 2023 08:07) (95% - 99%)    Parameters below as of 22 Dec 2023 08:07  Patient On (Oxygen Delivery Method): room air          PHYSICAL EXAM:    General: NAD  HEENT: NC/AT; PERRL, clear conjunctiva  Neck: supple  Respiratory: L crackles  Cardiovascular: +S1/S2; RRR  Abdomen: soft, NT/ND; +BS x4  Extremities: WWP, 2+ peripheral pulses b/l; no LE edema  Skin: normal color and turgor; no rash  Neurological:    MEDICATIONS:  MEDICATIONS  (STANDING):  atorvastatin 20 milliGRAM(s) Oral at bedtime  enoxaparin Injectable 80 milliGRAM(s) SubCutaneous every 12 hours  furosemide   Injectable 40 milliGRAM(s) IV Push daily  losartan 50 milliGRAM(s) Oral daily  metoprolol tartrate 50 milliGRAM(s) Oral every 6 hours  pantoprazole    Tablet 40 milliGRAM(s) Oral before breakfast    MEDICATIONS  (PRN):  polyethylene glycol 3350 17 Gram(s) Oral daily PRN for constipation  senna 2 Tablet(s) Oral at bedtime PRN for constipation      ALLERGIES:  Allergies    sulfamethoxazole (Rash)    Intolerances        LABS:                        11.8   7.43  )-----------( 220      ( 22 Dec 2023 07:09 )             37.8     Hemoglobin: 11.8 g/dL (12-22 @ 07:09)  Hemoglobin: 11.8 g/dL (12-21 @ 08:50)    CBC Full  -  ( 22 Dec 2023 07:09 )  WBC Count : 7.43 K/uL  RBC Count : 4.41 M/uL  Hemoglobin : 11.8 g/dL  Hematocrit : 37.8 %  Platelet Count - Automated : 220 K/uL  Mean Cell Volume : 85.7 fL  Mean Cell Hemoglobin : 26.8 pg  Mean Cell Hemoglobin Concentration : 31.2 g/dL  Auto Neutrophil # : 5.23 K/uL  Auto Lymphocyte # : 1.18 K/uL  Auto Monocyte # : 0.76 K/uL  Auto Eosinophil # : 0.19 K/uL  Auto Basophil # : 0.05 K/uL  Auto Neutrophil % : 70.3 %  Auto Lymphocyte % : 15.9 %  Auto Monocyte % : 10.2 %  Auto Eosinophil % : 2.6 %  Auto Basophil % : 0.7 %    12-22    140  |  105  |  27<H>  ----------------------------<  93  4.1   |  28  |  1.0    Ca    8.7      22 Dec 2023 07:09  Phos  3.3     12-22  Mg     1.7     12-22    TPro  6.0  /  Alb  3.2<L>  /  TBili  0.6  /  DBili  x   /  AST  24  /  ALT  12  /  AlkPhos  81  12-22    Creatinine Trend: 1.0<--, 1.2<--  LIVER FUNCTIONS - ( 22 Dec 2023 07:09 )  Alb: 3.2 g/dL / Pro: 6.0 g/dL / ALK PHOS: 81 U/L / ALT: 12 U/L / AST: 24 U/L / GGT: x           PT/INR - ( 21 Dec 2023 08:50 )   PT: 14.70 sec;   INR: 1.29 ratio         PTT - ( 21 Dec 2023 08:50 )  PTT:29.6 sec    hs Troponin:                43 <<== 12-21-23 @ 10:50                49 <<== 12-21-23 @ 08:50            Urinalysis Basic - ( 22 Dec 2023 07:09 )    Color: x / Appearance: x / SG: x / pH: x  Gluc: 93 mg/dL / Ketone: x  / Bili: x / Urobili: x   Blood: x / Protein: x / Nitrite: x   Leuk Esterase: x / RBC: x / WBC x   Sq Epi: x / Non Sq Epi: x / Bacteria: x      CSF:                      EKG:   MICROBIOLOGY:    IMAGING:      Labs, imaging, EKG personally reviewed    RADIOLOGY & ADDITIONAL TESTS: Reviewed.

## 2023-12-22 NOTE — CHART NOTE - NSCHARTNOTEFT_GEN_A_CORE
Patient with hx of chronic afib, was previously on cardizem drip, recently dced and transitioned to PO metoprolol. Was notified by nursing staff that patient's HR in the 140s, asymptomatic. Patient was stat IV metoprolol 5. Will continue to monitor. Patient with hx of chronic afib, was previously on cardizem drip, recently dced and transitioned to PO metoprolol. Was notified by nursing staff that patient's HR in the 140s, asymptomatic. Patient was stat IV metoprolol 5. Will continue to monitor.    Repeat HR s/p IV metoprolol 112. Patient saturating at 95% on RA.

## 2023-12-22 NOTE — PROGRESS NOTE ADULT - PROBLEM SELECTOR PLAN 1
possible acute on chronic HFpEF  cxr with L opacity/ effusion  cont lasix 40 iv; home dose 20 po daily  no hypoxia, satting well on ra  bipap prn, qhs  full rvp

## 2023-12-22 NOTE — CONSULT NOTE ADULT - ASSESSMENT
History   AIB   CHF  AICD     AVR/MVR     with CHF   Afib RVR        rate  control     keep INR  2.5-3.5          cont diuresis      trend  trops       echo

## 2023-12-22 NOTE — PROGRESS NOTE ADULT - NSPROGADDITIONALINFOA_GEN_ALL_CORE
#Progress Note Handoff:  Pending (specify):  Consults_________, Tests________, Test Results_______, Other____iv lasix_____  Family discussion: d/w pt at bedside  Disposition: Home___/SNF___/Other________/Unknown at this time_____x___

## 2023-12-22 NOTE — PROGRESS NOTE ADULT - ASSESSMENT
88F PMHx HFpEF s/p AICD, AFib/ flutter on coumadin, s/p MVR, HTN here with shortness of breath. AFlutter with RVR.

## 2023-12-23 LAB
INR BLD: 1.32 RATIO — HIGH (ref 0.65–1.3)
INR BLD: 1.32 RATIO — HIGH (ref 0.65–1.3)
PROTHROM AB SERPL-ACNC: 15.1 SEC — HIGH (ref 9.95–12.87)
PROTHROM AB SERPL-ACNC: 15.1 SEC — HIGH (ref 9.95–12.87)
RAPID RVP RESULT: SIGNIFICANT CHANGE UP
RAPID RVP RESULT: SIGNIFICANT CHANGE UP
SARS-COV-2 RNA SPEC QL NAA+PROBE: SIGNIFICANT CHANGE UP
SARS-COV-2 RNA SPEC QL NAA+PROBE: SIGNIFICANT CHANGE UP

## 2023-12-23 PROCEDURE — 93010 ELECTROCARDIOGRAM REPORT: CPT

## 2023-12-23 PROCEDURE — 99233 SBSQ HOSP IP/OBS HIGH 50: CPT

## 2023-12-23 RX ORDER — WARFARIN SODIUM 2.5 MG/1
5 TABLET ORAL ONCE
Refills: 0 | Status: DISCONTINUED | OUTPATIENT
Start: 2023-12-23 | End: 2023-12-23

## 2023-12-23 RX ORDER — ENOXAPARIN SODIUM 100 MG/ML
80 INJECTION SUBCUTANEOUS ONCE
Refills: 0 | Status: COMPLETED | OUTPATIENT
Start: 2023-12-23 | End: 2023-12-23

## 2023-12-23 RX ORDER — METOPROLOL TARTRATE 50 MG
75 TABLET ORAL
Refills: 0 | Status: DISCONTINUED | OUTPATIENT
Start: 2023-12-23 | End: 2023-12-28

## 2023-12-23 RX ORDER — DILTIAZEM HCL 120 MG
10 CAPSULE, EXT RELEASE 24 HR ORAL ONCE
Refills: 0 | Status: COMPLETED | OUTPATIENT
Start: 2023-12-23 | End: 2023-12-23

## 2023-12-23 RX ORDER — WARFARIN SODIUM 2.5 MG/1
3 TABLET ORAL ONCE
Refills: 0 | Status: DISCONTINUED | OUTPATIENT
Start: 2023-12-23 | End: 2023-12-23

## 2023-12-23 RX ADMIN — Medication 10 MILLIGRAM(S): at 10:06

## 2023-12-23 RX ADMIN — Medication 50 MILLIGRAM(S): at 05:22

## 2023-12-23 RX ADMIN — Medication 75 MILLIGRAM(S): at 12:56

## 2023-12-23 RX ADMIN — Medication 40 MILLIGRAM(S): at 05:23

## 2023-12-23 RX ADMIN — LOSARTAN POTASSIUM 50 MILLIGRAM(S): 100 TABLET, FILM COATED ORAL at 05:23

## 2023-12-23 RX ADMIN — ATORVASTATIN CALCIUM 20 MILLIGRAM(S): 80 TABLET, FILM COATED ORAL at 21:08

## 2023-12-23 RX ADMIN — Medication 75 MILLIGRAM(S): at 17:59

## 2023-12-23 RX ADMIN — WARFARIN SODIUM 3 MILLIGRAM(S): 2.5 TABLET ORAL at 05:23

## 2023-12-23 RX ADMIN — Medication 75 MILLIGRAM(S): at 23:57

## 2023-12-23 RX ADMIN — PANTOPRAZOLE SODIUM 40 MILLIGRAM(S): 20 TABLET, DELAYED RELEASE ORAL at 05:22

## 2023-12-23 RX ADMIN — ENOXAPARIN SODIUM 80 MILLIGRAM(S): 100 INJECTION SUBCUTANEOUS at 17:41

## 2023-12-23 RX ADMIN — ENOXAPARIN SODIUM 80 MILLIGRAM(S): 100 INJECTION SUBCUTANEOUS at 05:22

## 2023-12-23 NOTE — CONSULT NOTE ADULT - ASSESSMENT
IMPRESSION:     Moderate L pleural effusion   SP thora in 10/23 - transudative   Acute on chronic HF[EF exacerbation   Chronic a. fib on coumadin   Moderate AS  HF SP MVR  HO DVT     PLAN:     CXR noted with L effusion   Patient with with increased SOB at rest   TTE 10/2023 noted.   Optimize diuresis and cardiac status.   Rate control.  Check RVP and procalcitonin  Subtherapeutic INR on admission, check D-Dimer.   L lung POCUS with moderate anechoic effusion   Plan for possible thoracentesis tomorrow.  Will need to hold therapeutic lovenox in AM and coumadin prior to procedure.    DW patient, family, and primary.         IMPRESSION:     Moderate L pleural effusion   SP thora in 10/23 - transudative   Acute on chronic HFPEF exacerbation   Chronic a. fib on coumadin   Moderate AS  HF SP MVR  HO DVT     PLAN:     CXR noted with L effusion   TTE 10/2023 noted.   Optimize diuresis and cardiac status.   Rate control.  Check RVP and procalcitonin  Subtherapeutic INR on admission, check D-Dimer.   L lung POCUS with moderate anechoic effusion   Plan for possible thoracentesis tomorrow.  Will need to hold therapeutic lovenox in AM and coumadin prior to procedure.  DW patient, family, and primary.

## 2023-12-23 NOTE — CONSULT NOTE ADULT - ATTENDING COMMENTS
IMPRESSION:     Moderate L pleural effusion   SP thora in 10/23 - transudative   Acute on chronic HFPEF exacerbation   Chronic a. fib on coumadin   Moderate AS  HF SP MVR  HO DVT       Plan as outlined aberi

## 2023-12-23 NOTE — CONSULT NOTE ADULT - SUBJECTIVE AND OBJECTIVE BOX
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  Patient reports shortness of breath worse than her baseline associated with increased lower extremity swelling.  missed 2 days of Lasix.  Denies fever/chills, sore throat, cough productivity, CP, abdominal pain, N/V/D, urinary symptoms.  Lives at home with her sister.  CONVERSATION WITH PHARMACIST AT [facility] PATIENT DID NOT  NEW HIGHER DOSE OF METOPROLOL WHICH WAS PRESCRIBED IN OCTOBER. (250MG SUCCINATE)  In the ED   /95  afebrile spo2 91 on RA   CXR with obvious L sided large effusion .  ECG 2:1 aflutter   Prolonged QTC  510   [facility] INR 1.29, BNP 3847 RVP negative high sensitivity troponin 49   patient started on a cardizem drip now weaning  (21 Dec 2023 15:45)      PAST MEDICAL & SURGICAL HISTORY:  Afib      HTN (hypertension)      Heart failure      H/O mitral valve replacement      H/O prior ablation treatment  afib          SOCIAL HX:   Smoking        denies                 ETOH                            Other    FAMILY HISTORY:  .  No cardiovascular or pulmonary family history     REVIEW OF SYSTEMS:    All ROS are negative exept per HPI       Allergies    sulfamethoxazole (Rash)    Intolerances          PHYSICAL EXAM  Vital Signs Last 24 Hrs  T(C): 36.1 (23 Dec 2023 05:04), Max: 36.3 (22 Dec 2023 19:35)  T(F): 96.9 (23 Dec 2023 05:04), Max: 97.4 (22 Dec 2023 19:35)  HR: 70 (23 Dec 2023 10:25) (70 - 140)  BP: 142/77 (23 Dec 2023 09:39) (131/95 - 181/80)  BP(mean): --  RR: 20 (23 Dec 2023 09:39) (18 - 20)  SpO2: 98% (23 Dec 2023 09:39) (95% - 99%)    Parameters below as of 23 Dec 2023 09:39  Patient On (Oxygen Delivery Method): room air        CONSTITUTIONAL:  in NAD    ENT:   Airway patent,   No thrush    EYES:   Clear bilaterally,   pupils equal,   round and reactive to light.    CARDIAC:   Normal rate,   irregular   1+ LE edema     RESPIRATORY:   decreased BS on L   mildly increased WOB   Normal chest expansion  Not tachypneic,  No use of accessory muscles    GASTROINTESTINAL:  Abdomen soft, non-tender,   No guarding,   Positive BS    MUSCULOSKELETAL:   Range of motion is not limited,  No clubbing, cyanosis    NEUROLOGICAL:   Alert and oriented   No motor deficits.    SKIN:   Skin normal color for race,   No evidence of rash.        LABS:                          11.8   7.43  )-----------( 220      ( 22 Dec 2023 07:09 )             37.8                                               12-22    140  |  105  |  27<H>  ----------------------------<  93  4.1   |  28  |  1.0    Ca    8.7      22 Dec 2023 07:09  Phos  3.3     12-22  Mg     1.7     12-22    TPro  6.0  /  Alb  3.2<L>  /  TBili  0.6  /  DBili  x   /  AST  24  /  ALT  12  /  AlkPhos  81  12-22      PT/INR - ( 23 Dec 2023 01:59 )   PT: 15.10 sec;   INR: 1.32 ratio                                                Urinalysis Basic - ( 22 Dec 2023 07:09 )    Color: x / Appearance: x / SG: x / pH: x  Gluc: 93 mg/dL / Ketone: x  / Bili: x / Urobili: x   Blood: x / Protein: x / Nitrite: x   Leuk Esterase: x / RBC: x / WBC x   Sq Epi: x / Non Sq Epi: x / Bacteria: x                                                  LIVER FUNCTIONS - ( 22 Dec 2023 07:09 )  Alb: 3.2 g/dL / Pro: 6.0 g/dL / ALK PHOS: 81 U/L / ALT: 12 U/L / AST: 24 U/L / GGT: x                                                                                                MEDICATIONS  (STANDING):  atorvastatin 20 milliGRAM(s) Oral at bedtime  enoxaparin Injectable 80 milliGRAM(s) SubCutaneous once  furosemide   Injectable 40 milliGRAM(s) IV Push daily  losartan 50 milliGRAM(s) Oral daily  metoprolol tartrate 75 milliGRAM(s) Oral four times a day  pantoprazole    Tablet 40 milliGRAM(s) Oral before breakfast    MEDICATIONS  (PRN):  polyethylene glycol 3350 17 Gram(s) Oral daily PRN for constipation  senna 2 Tablet(s) Oral at bedtime PRN for constipation      X-Rays reviewed:     Patient is a 88y old  Female who presents with a chief complaint of HFpEF Exacerbation (23 Dec 2023 08:21)      HPI:  88-year-old female with PMH of HFmrEF, A-fib/A-flutter, prosthetic mitral valve replacement on Coumadin, CHF s/p AICD, HTN, BIBEMS from home for evaluation of shortness of breath since yesterday.  Patient reports shortness of breath worse than her baseline associated with increased lower extremity swelling.  missed 2 days of Lasix.  Denies fever/chills, sore throat, cough productivity, CP, abdominal pain, N/V/D, urinary symptoms.  Lives at home with her sister.  CONVERSATION WITH PHARMACIST AT [facility] PATIENT DID NOT  NEW HIGHER DOSE OF METOPROLOL WHICH WAS PRESCRIBED IN OCTOBER. (250MG SUCCINATE)  In the ED   /95  afebrile spo2 91 on RA   CXR with obvious L sided large effusion .  ECG 2:1 aflutter   Prolonged QTC  510   [facility] INR 1.29, BNP 3847 RVP negative high sensitivity troponin 49   patient started on a cardizem drip now weaning  (21 Dec 2023 15:45)      PAST MEDICAL & SURGICAL HISTORY:  Afib      HTN (hypertension)      Heart failure      H/O mitral valve replacement      H/O prior ablation treatment  afib          SOCIAL HX:   Smoking        denies                 ETOH                            Other    FAMILY HISTORY:  .  No cardiovascular or pulmonary family history     REVIEW OF SYSTEMS:    All ROS are negative exept per HPI       Allergies    sulfamethoxazole (Rash)    Intolerances          PHYSICAL EXAM  Vital Signs Last 24 Hrs  T(C): 36.1 (23 Dec 2023 05:04), Max: 36.3 (22 Dec 2023 19:35)  T(F): 96.9 (23 Dec 2023 05:04), Max: 97.4 (22 Dec 2023 19:35)  HR: 70 (23 Dec 2023 10:25) (70 - 140)  BP: 142/77 (23 Dec 2023 09:39) (131/95 - 181/80)  BP(mean): --  RR: 20 (23 Dec 2023 09:39) (18 - 20)  SpO2: 98% (23 Dec 2023 09:39) (95% - 99%)    Parameters below as of 23 Dec 2023 09:39  Patient On (Oxygen Delivery Method): room air        CONSTITUTIONAL:  in NAD    ENT:   Airway patent,   No thrush    EYES:   Clear bilaterally,   pupils equal,   round and reactive to light.    CARDIAC:   Normal rate,   irregular   1+ LE edema     RESPIRATORY:   decreased BS on L   mildly increased WOB   Normal chest expansion  Not tachypneic,  No use of accessory muscles    GASTROINTESTINAL:  Abdomen soft, non-tender,   No guarding,   Positive BS    MUSCULOSKELETAL:   Range of motion is not limited,  No clubbing, cyanosis    NEUROLOGICAL:   Alert and oriented   No motor deficits.    SKIN:   Skin normal color for race,   No evidence of rash.        LABS:                          11.8   7.43  )-----------( 220      ( 22 Dec 2023 07:09 )             37.8                                               12-22    140  |  105  |  27<H>  ----------------------------<  93  4.1   |  28  |  1.0    Ca    8.7      22 Dec 2023 07:09  Phos  3.3     12-22  Mg     1.7     12-22    TPro  6.0  /  Alb  3.2<L>  /  TBili  0.6  /  DBili  x   /  AST  24  /  ALT  12  /  AlkPhos  81  12-22      PT/INR - ( 23 Dec 2023 01:59 )   PT: 15.10 sec;   INR: 1.32 ratio                                                Urinalysis Basic - ( 22 Dec 2023 07:09 )    Color: x / Appearance: x / SG: x / pH: x  Gluc: 93 mg/dL / Ketone: x  / Bili: x / Urobili: x   Blood: x / Protein: x / Nitrite: x   Leuk Esterase: x / RBC: x / WBC x   Sq Epi: x / Non Sq Epi: x / Bacteria: x                                                  LIVER FUNCTIONS - ( 22 Dec 2023 07:09 )  Alb: 3.2 g/dL / Pro: 6.0 g/dL / ALK PHOS: 81 U/L / ALT: 12 U/L / AST: 24 U/L / GGT: x                                                                                                MEDICATIONS  (STANDING):  atorvastatin 20 milliGRAM(s) Oral at bedtime  enoxaparin Injectable 80 milliGRAM(s) SubCutaneous once  furosemide   Injectable 40 milliGRAM(s) IV Push daily  losartan 50 milliGRAM(s) Oral daily  metoprolol tartrate 75 milliGRAM(s) Oral four times a day  pantoprazole    Tablet 40 milliGRAM(s) Oral before breakfast    MEDICATIONS  (PRN):  polyethylene glycol 3350 17 Gram(s) Oral daily PRN for constipation  senna 2 Tablet(s) Oral at bedtime PRN for constipation      X-Rays reviewed:

## 2023-12-23 NOTE — PROGRESS NOTE ADULT - SUBJECTIVE AND OBJECTIVE BOX
JORDAN CHEEMA  88y Female    CHIEF COMPLAINT:    Patient is a 88y old  Female who presents with a chief complaint of HFpEF Exacerbation (22 Dec 2023 19:35)      INTERVAL HPI/OVERNIGHT EVENTS:    Patient seen and examined.    ROS: All other systems are negative.    Vital Signs:    T(F): 96.9 (23 @ 05:04), Max: 97.4 (23 @ 19:35)  HR: 91 (23 @ 06:34) (81 - 140)  BP: 167/80 (23 @ 06:34) (131/95 - 181/80)  RR: 18 (23 @ 05:04) (18 - 18)  SpO2: 95% (23 @ 19:35) (95% - 99%)  I&O's Summary    22 Dec 2023 07:01  -  23 Dec 2023 07:00  --------------------------------------------------------  IN: 0 mL / OUT: 400 mL / NET: -400 mL      Daily     Daily Weight in k (23 Dec 2023 05:04)  CAPILLARY BLOOD GLUCOSE          PHYSICAL EXAM:    GENERAL:  NAD  SKIN: No rashes or lesions  HENT: Atraumatic. Normocephalic. PERRL. Moist membranes.  NECK: Supple, No JVD. No lymphadenopathy.  PULMONARY: CTA B/L. No wheezing. No rales  CVS: Normal S1, S2. Rate and Rhythm are regular. No murmurs.  ABDOMEN/GI: Soft, Nontender, Nondistended; BS present  EXTREMITIES: Peripheral pulses intact. No edema B/L LE.  NEUROLOGIC:  No motor or sensory deficit.  PSYCH: Alert & oriented x 3    Consultant(s) Notes Reviewed:  [x ] YES  [ ] NO  Care Discussed with Consultants/Other Providers [ x] YES  [ ] NO    EKG reviewed  Telemetry reviewed    LABS:                        11.8   7.43  )-----------( 220      ( 22 Dec 2023 07:09 )             37.8         140  |  105  |  27<H>  ----------------------------<  93  4.1   |  28  |  1.0    Ca    8.7      22 Dec 2023 07:09  Phos  3.3     12-22  Mg     1.7     12-    TPro  6.0  /  Alb  3.2<L>  /  TBili  0.6  /  DBili  x   /  AST  24  /  ALT  12  /  AlkPhos  81  12-    PT/INR - ( 23 Dec 2023 01:59 )   PT: 15.10 sec;   INR: 1.32 ratio         PTT - ( 21 Dec 2023 08:50 )  PTT:29.6 sec          RADIOLOGY & ADDITIONAL TESTS:      Imaging or report Personally Reviewed:  [ ] YES  [ ] NO    Medications:  Standing  atorvastatin 20 milliGRAM(s) Oral at bedtime  enoxaparin Injectable 80 milliGRAM(s) SubCutaneous every 12 hours  furosemide   Injectable 40 milliGRAM(s) IV Push daily  losartan 50 milliGRAM(s) Oral daily  metoprolol tartrate 50 milliGRAM(s) Oral every 6 hours  pantoprazole    Tablet 40 milliGRAM(s) Oral before breakfast  warfarin 3 milliGRAM(s) Oral once    PRN Meds  polyethylene glycol 3350 17 Gram(s) Oral daily PRN  senna 2 Tablet(s) Oral at bedtime PRN      Case discussed with resident    Care discussed with pt/family           JORDAN CHEEMA  88y Female    CHIEF COMPLAINT:    Patient is a 88y old  Female who presents with a chief complaint of HFpEF Exacerbation (22 Dec 2023 19:35)      INTERVAL HPI/OVERNIGHT EVENTS:    Patient seen and examined. C/O sob and palpitations. No cp. No fever. BP is running high    ROS: All other systems are negative.    Vital Signs:    T(F): 96.9 (23 @ 05:04), Max: 97.4 (23 @ 19:35)  HR: 91 (23 @ 06:34) (81 - 140)  BP: 167/80 (23 @ 06:34) (131/95 - 181/80)  RR: 18 (23 @ 05:04) (18 - 18)  SpO2: 95% (23 @ 19:35) (95% - 99%)  I&O's Summary    22 Dec 2023 07:01  -  23 Dec 2023 07:00  --------------------------------------------------------  IN: 0 mL / OUT: 400 mL / NET: -400 mL      Daily     Daily Weight in k (23 Dec 2023 05:04)  CAPILLARY BLOOD GLUCOSE          PHYSICAL EXAM:    GENERAL:  NAD  SKIN: No rashes or lesions  HENT: Atraumatic. Normocephalic. PERRL. Moist membranes.  NECK: Supple, +ve JVD. No lymphadenopathy.  PULMONARY: Decreased BS in the L lower 2/3 of the chest. +ve rales in the R base. No wheezing.   CVS: Normal S1, S2. Rate and Rhythm are regular. No murmurs.  ABDOMEN/GI: Soft, Nontender, Nondistended; BS present  EXTREMITIES: Peripheral pulses intact. Trace edema B/L LE.  NEUROLOGIC:  No motor or sensory deficit.  PSYCH: Alert & oriented x 3    Consultant(s) Notes Reviewed:  [x ] YES  [ ] NO  Care Discussed with Consultants/Other Providers [ x] YES  [ ] NO    EKG reviewed  Telemetry reviewed    LABS:                        11.8   7.43  )-----------( 220      ( 22 Dec 2023 07:09 )             37.8     12    140  |  105  |  27<H>  ----------------------------<  93  4.1   |  28  |  1.0    Ca    8.7      22 Dec 2023 07:09  Phos  3.3     12  Mg     1.7         TPro  6.0  /  Alb  3.2<L>  /  TBili  0.6  /  DBili  x   /  AST  24  /  ALT  12  /  AlkPhos  81  12    PT/INR - ( 23 Dec 2023 01:59 )   PT: 15.10 sec;   INR: 1.32 ratio         PTT - ( 21 Dec 2023 08:50 )  PTT:29.6 sec          RADIOLOGY & ADDITIONAL TESTS:    < from: POCUS ED Chest (23 @ 10:58) >    Impression: Pulmonary edema  Pleural effusions    < end of copied text >  < from: Xray Chest 1 View- PORTABLE-Routine (23 @ 05:33) >    Impression:    Moderate left-sided pleural effusion with cardiomegaly. Unchanged.    < end of copied text >  < from: TTE Echo Complete w/o Contrast w/ Doppler (10.07.23 @ 08:20) >    Summary:   1. Moderately decreased global left ventricular systolic function.   2. Severely enlarged left atrium.   3. LV Ejection Fraction by Suresh's Method with a biplane EF of 41 %.   4. Increased LV wall thickness.   5. Large pleural effusion inthe left lateral region.   6. Prosthetic valve in place with mean gradient    3 mmHg suggetive no stenosis, no evidence of mitral regurigitation also.   7. Sclerotic aortic valve wtih decreased opening. mean gradient    10 mmhg suggestive of moderateaortic stenosis, however could be   underestimated, unable to evaluate adequately AS severity in this study.    < end of copied text >    Imaging or report Personally Reviewed:  [x ] YES  [ ] NO    Medications:  Standing  atorvastatin 20 milliGRAM(s) Oral at bedtime  enoxaparin Injectable 80 milliGRAM(s) SubCutaneous every 12 hours  furosemide   Injectable 40 milliGRAM(s) IV Push daily  losartan 50 milliGRAM(s) Oral daily  metoprolol tartrate 50 milliGRAM(s) Oral every 6 hours  pantoprazole    Tablet 40 milliGRAM(s) Oral before breakfast  warfarin 3 milliGRAM(s) Oral once    PRN Meds  polyethylene glycol 3350 17 Gram(s) Oral daily PRN  senna 2 Tablet(s) Oral at bedtime PRN      Case discussed with resident    Care discussed with pt/family

## 2023-12-23 NOTE — PROGRESS NOTE ADULT - ASSESSMENT
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  Patient reports shortness of breath worse than her baseline associated with increased lower extremity swelling. Missed 2 days of Lasix. 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  Patient reports shortness of breath worse than her baseline associated with increased lower extremity swelling. Missed 2 days of Lasix.    Acute HFmrEF  Large L sided pleural effusion  S/P AICD  Chronic A-fib/A-flutter  S/P MVR on Coumadin  HTN                  PLAN:    ·	Tele reviewed. Likely A-flutter  ·	EKG: A-Fib 131/min. Non specific ST, T changes  ·	Give her Cardizem 10 mg ivp x 1 dose now.   ·	Increase Metoprolol to 75 mg po q 6h.   ·	Check TSH level  ·	Old record reviewed. ECHO done in October this year showed EF is 41%. Mod AS  ·	O/E pt is fluid overload. Cont Lasix 40 mg iv daily  ·	Check i's and o's and daily wt  ·	Low salt diet and water restriction to 1.5 L/D  ·	CXR showed large L pleural effusion  ·	Pulmonary consult for possible L thoracentesis.   ·	INR today is 1.23. Will give Coumadin 5 mg po tonight and check INR in AM. Cont to bridge with Lovenox 80 mg sq q12h  ·	Cont her other meds.     Progress Note Handoff    Pending (specify):  Consults__Pulmonary_______, Tests________, Test Results_______, Other__Large L pleural effusion_______  Family discussion:  Disposition: Home___/SNF___/Other________/Unknown at this time________    Tao Salmeron MD  Spectra: [phone] 88-year-old female with PMH of HFmrEF, A-fib/A-flutter, prosthetic mitral valve replacement on Coumadin, CHF s/p AICD, HTN, BIBEMS from home for evaluation of shortness of breath for one day.  Patient reports shortness of breath worse than her baseline associated with increased lower extremity swelling. Missed 2 days of Lasix.    Acute HFmrEF  Large L sided pleural effusion  S/P AICD  Chronic A-fib/A-flutter  S/P MVR on Coumadin  HTN                  PLAN:    ·	Tele reviewed. Likely A-flutter  ·	EKG: A-Fib 131/min. Non specific ST, T changes  ·	Give her Cardizem 10 mg ivp x 1 dose now.   ·	Increase Metoprolol to 75 mg po q 6h.   ·	Check TSH level  ·	Old record reviewed. ECHO done in October this year showed EF is 41%. Mod AS  ·	O/E pt is fluid overload. Cont Lasix 40 mg iv daily  ·	Check i's and o's and daily wt  ·	Low salt diet and water restriction to 1.5 L/D  ·	CXR showed large L pleural effusion  ·	Pulmonary consult for possible L thoracentesis.   ·	INR today is 1.23. Will give Coumadin 5 mg po tonight and check INR in AM. Cont to bridge with Lovenox 80 mg sq q12h  ·	Cont her other meds.     Progress Note Handoff    Pending (specify):  Consults__Pulmonary_______, Tests________, Test Results_______, Other__Large L pleural effusion_______  Family discussion:  Disposition: Home___/SNF___/Other________/Unknown at this time________    Tao Salmeron MD  Spectra: [phone]

## 2023-12-24 ENCOUNTER — RESULT REVIEW (OUTPATIENT)
Age: 88
End: 2023-12-24

## 2023-12-24 LAB
ALBUMIN FLD-MCNC: 1.3 G/DL — SIGNIFICANT CHANGE UP
ALBUMIN FLD-MCNC: 1.3 G/DL — SIGNIFICANT CHANGE UP
ALBUMIN SERPL ELPH-MCNC: 3.2 G/DL — LOW (ref 3.5–5.2)
ALBUMIN SERPL ELPH-MCNC: 3.2 G/DL — LOW (ref 3.5–5.2)
ALP SERPL-CCNC: 76 U/L — SIGNIFICANT CHANGE UP (ref 30–115)
ALP SERPL-CCNC: 76 U/L — SIGNIFICANT CHANGE UP (ref 30–115)
ALT FLD-CCNC: 10 U/L — SIGNIFICANT CHANGE UP (ref 0–41)
ALT FLD-CCNC: 10 U/L — SIGNIFICANT CHANGE UP (ref 0–41)
ANION GAP SERPL CALC-SCNC: 9 MMOL/L — SIGNIFICANT CHANGE UP (ref 7–14)
ANION GAP SERPL CALC-SCNC: 9 MMOL/L — SIGNIFICANT CHANGE UP (ref 7–14)
APTT BLD: 36.5 SEC — SIGNIFICANT CHANGE UP (ref 27–39.2)
APTT BLD: 36.5 SEC — SIGNIFICANT CHANGE UP (ref 27–39.2)
AST SERPL-CCNC: 19 U/L — SIGNIFICANT CHANGE UP (ref 0–41)
AST SERPL-CCNC: 19 U/L — SIGNIFICANT CHANGE UP (ref 0–41)
B PERT IGG+IGM PNL SER: ABNORMAL
B PERT IGG+IGM PNL SER: ABNORMAL
BILIRUB SERPL-MCNC: 0.4 MG/DL — SIGNIFICANT CHANGE UP (ref 0.2–1.2)
BILIRUB SERPL-MCNC: 0.4 MG/DL — SIGNIFICANT CHANGE UP (ref 0.2–1.2)
BUN SERPL-MCNC: 25 MG/DL — HIGH (ref 10–20)
BUN SERPL-MCNC: 25 MG/DL — HIGH (ref 10–20)
CALCIUM SERPL-MCNC: 8.4 MG/DL — SIGNIFICANT CHANGE UP (ref 8.4–10.5)
CALCIUM SERPL-MCNC: 8.4 MG/DL — SIGNIFICANT CHANGE UP (ref 8.4–10.5)
CHLORIDE SERPL-SCNC: 102 MMOL/L — SIGNIFICANT CHANGE UP (ref 98–110)
CHLORIDE SERPL-SCNC: 102 MMOL/L — SIGNIFICANT CHANGE UP (ref 98–110)
CO2 SERPL-SCNC: 31 MMOL/L — SIGNIFICANT CHANGE UP (ref 17–32)
CO2 SERPL-SCNC: 31 MMOL/L — SIGNIFICANT CHANGE UP (ref 17–32)
COLOR FLD: YELLOW — SIGNIFICANT CHANGE UP
COLOR FLD: YELLOW — SIGNIFICANT CHANGE UP
CREAT SERPL-MCNC: 1.1 MG/DL — SIGNIFICANT CHANGE UP (ref 0.7–1.5)
CREAT SERPL-MCNC: 1.1 MG/DL — SIGNIFICANT CHANGE UP (ref 0.7–1.5)
EGFR: 49 ML/MIN/1.73M2 — LOW
EGFR: 49 ML/MIN/1.73M2 — LOW
FLUID INTAKE SUBSTANCE CLASS: SIGNIFICANT CHANGE UP
FLUID INTAKE SUBSTANCE CLASS: SIGNIFICANT CHANGE UP
GLUCOSE FLD-MCNC: 139 MG/DL — SIGNIFICANT CHANGE UP
GLUCOSE FLD-MCNC: 139 MG/DL — SIGNIFICANT CHANGE UP
GLUCOSE SERPL-MCNC: 112 MG/DL — HIGH (ref 70–99)
GLUCOSE SERPL-MCNC: 112 MG/DL — HIGH (ref 70–99)
GRAM STN FLD: SIGNIFICANT CHANGE UP
GRAM STN FLD: SIGNIFICANT CHANGE UP
HCT VFR BLD CALC: 39.5 % — SIGNIFICANT CHANGE UP (ref 37–47)
HCT VFR BLD CALC: 39.5 % — SIGNIFICANT CHANGE UP (ref 37–47)
HGB BLD-MCNC: 12.4 G/DL — SIGNIFICANT CHANGE UP (ref 12–16)
HGB BLD-MCNC: 12.4 G/DL — SIGNIFICANT CHANGE UP (ref 12–16)
INR BLD: 1.49 RATIO — HIGH (ref 0.65–1.3)
INR BLD: 1.49 RATIO — HIGH (ref 0.65–1.3)
LDH SERPL L TO P-CCNC: 104 U/L — SIGNIFICANT CHANGE UP
LDH SERPL L TO P-CCNC: 104 U/L — SIGNIFICANT CHANGE UP
LYMPHOCYTES # FLD: 88 — SIGNIFICANT CHANGE UP
LYMPHOCYTES # FLD: 88 — SIGNIFICANT CHANGE UP
MAGNESIUM SERPL-MCNC: 1.6 MG/DL — LOW (ref 1.8–2.4)
MAGNESIUM SERPL-MCNC: 1.6 MG/DL — LOW (ref 1.8–2.4)
MCHC RBC-ENTMCNC: 27 PG — SIGNIFICANT CHANGE UP (ref 27–31)
MCHC RBC-ENTMCNC: 27 PG — SIGNIFICANT CHANGE UP (ref 27–31)
MCHC RBC-ENTMCNC: 31.4 G/DL — LOW (ref 32–37)
MCHC RBC-ENTMCNC: 31.4 G/DL — LOW (ref 32–37)
MCV RBC AUTO: 85.9 FL — SIGNIFICANT CHANGE UP (ref 81–99)
MCV RBC AUTO: 85.9 FL — SIGNIFICANT CHANGE UP (ref 81–99)
MONOS+MACROS # FLD: 7 % — SIGNIFICANT CHANGE UP
MONOS+MACROS # FLD: 7 % — SIGNIFICANT CHANGE UP
NEUTROPHILS-BODY FLUID: 5 % — SIGNIFICANT CHANGE UP
NEUTROPHILS-BODY FLUID: 5 % — SIGNIFICANT CHANGE UP
NRBC # BLD: 0 /100 WBCS — SIGNIFICANT CHANGE UP (ref 0–0)
NRBC # BLD: 0 /100 WBCS — SIGNIFICANT CHANGE UP (ref 0–0)
PH FLD: 7.8 — SIGNIFICANT CHANGE UP
PH FLD: 7.8 — SIGNIFICANT CHANGE UP
PLATELET # BLD AUTO: 233 K/UL — SIGNIFICANT CHANGE UP (ref 130–400)
PLATELET # BLD AUTO: 233 K/UL — SIGNIFICANT CHANGE UP (ref 130–400)
PMV BLD: 11.4 FL — HIGH (ref 7.4–10.4)
PMV BLD: 11.4 FL — HIGH (ref 7.4–10.4)
POTASSIUM SERPL-MCNC: 3.7 MMOL/L — SIGNIFICANT CHANGE UP (ref 3.5–5)
POTASSIUM SERPL-MCNC: 3.7 MMOL/L — SIGNIFICANT CHANGE UP (ref 3.5–5)
POTASSIUM SERPL-SCNC: 3.7 MMOL/L — SIGNIFICANT CHANGE UP (ref 3.5–5)
POTASSIUM SERPL-SCNC: 3.7 MMOL/L — SIGNIFICANT CHANGE UP (ref 3.5–5)
PROT FLD-MCNC: 2.2 G/DL — SIGNIFICANT CHANGE UP
PROT FLD-MCNC: 2.2 G/DL — SIGNIFICANT CHANGE UP
PROT SERPL-MCNC: 5.6 G/DL — LOW (ref 6–8)
PROT SERPL-MCNC: 5.6 G/DL — LOW (ref 6–8)
PROTHROM AB SERPL-ACNC: 17 SEC — HIGH (ref 9.95–12.87)
PROTHROM AB SERPL-ACNC: 17 SEC — HIGH (ref 9.95–12.87)
RBC # BLD: 4.6 M/UL — SIGNIFICANT CHANGE UP (ref 4.2–5.4)
RBC # BLD: 4.6 M/UL — SIGNIFICANT CHANGE UP (ref 4.2–5.4)
RBC # FLD: 17.2 % — HIGH (ref 11.5–14.5)
RBC # FLD: 17.2 % — HIGH (ref 11.5–14.5)
RCV VOL RI: 1000 /UL — HIGH (ref 0–0)
RCV VOL RI: 1000 /UL — HIGH (ref 0–0)
SODIUM SERPL-SCNC: 142 MMOL/L — SIGNIFICANT CHANGE UP (ref 135–146)
SODIUM SERPL-SCNC: 142 MMOL/L — SIGNIFICANT CHANGE UP (ref 135–146)
SPECIMEN SOURCE FLD: SIGNIFICANT CHANGE UP
SPECIMEN SOURCE FLD: SIGNIFICANT CHANGE UP
SPECIMEN SOURCE: SIGNIFICANT CHANGE UP
SPECIMEN SOURCE: SIGNIFICANT CHANGE UP
TOTAL NUCLEATED CELL COUNT, BODY FLUID: 364 /UL — SIGNIFICANT CHANGE UP
TOTAL NUCLEATED CELL COUNT, BODY FLUID: 364 /UL — SIGNIFICANT CHANGE UP
TUBE TYPE: SIGNIFICANT CHANGE UP
TUBE TYPE: SIGNIFICANT CHANGE UP
WBC # BLD: 6.95 K/UL — SIGNIFICANT CHANGE UP (ref 4.8–10.8)
WBC # BLD: 6.95 K/UL — SIGNIFICANT CHANGE UP (ref 4.8–10.8)
WBC # FLD AUTO: 6.95 K/UL — SIGNIFICANT CHANGE UP (ref 4.8–10.8)
WBC # FLD AUTO: 6.95 K/UL — SIGNIFICANT CHANGE UP (ref 4.8–10.8)

## 2023-12-24 PROCEDURE — 99233 SBSQ HOSP IP/OBS HIGH 50: CPT

## 2023-12-24 PROCEDURE — 71045 X-RAY EXAM CHEST 1 VIEW: CPT | Mod: 26

## 2023-12-24 PROCEDURE — 99223 1ST HOSP IP/OBS HIGH 75: CPT

## 2023-12-24 PROCEDURE — 88305 TISSUE EXAM BY PATHOLOGIST: CPT | Mod: 26

## 2023-12-24 PROCEDURE — 88112 CYTOPATH CELL ENHANCE TECH: CPT | Mod: 26

## 2023-12-24 RX ORDER — WARFARIN SODIUM 2.5 MG/1
5 TABLET ORAL ONCE
Refills: 0 | Status: COMPLETED | OUTPATIENT
Start: 2023-12-24 | End: 2023-12-24

## 2023-12-24 RX ORDER — LANOLIN ALCOHOL/MO/W.PET/CERES
5 CREAM (GRAM) TOPICAL AT BEDTIME
Refills: 0 | Status: DISCONTINUED | OUTPATIENT
Start: 2023-12-24 | End: 2023-12-28

## 2023-12-24 RX ADMIN — LOSARTAN POTASSIUM 50 MILLIGRAM(S): 100 TABLET, FILM COATED ORAL at 05:11

## 2023-12-24 RX ADMIN — Medication 75 MILLIGRAM(S): at 11:58

## 2023-12-24 RX ADMIN — Medication 40 MILLIGRAM(S): at 05:11

## 2023-12-24 RX ADMIN — PANTOPRAZOLE SODIUM 40 MILLIGRAM(S): 20 TABLET, DELAYED RELEASE ORAL at 05:11

## 2023-12-24 RX ADMIN — Medication 75 MILLIGRAM(S): at 05:11

## 2023-12-24 RX ADMIN — Medication 75 MILLIGRAM(S): at 17:05

## 2023-12-24 RX ADMIN — WARFARIN SODIUM 5 MILLIGRAM(S): 2.5 TABLET ORAL at 21:35

## 2023-12-24 RX ADMIN — ATORVASTATIN CALCIUM 20 MILLIGRAM(S): 80 TABLET, FILM COATED ORAL at 21:35

## 2023-12-24 NOTE — PROGRESS NOTE ADULT - ASSESSMENT
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  Patient reports shortness of breath worse than her baseline associated with increased lower extremity swelling. Missed 2 days of Lasix.    Acute HFmrEF  Large L sided pleural effusion  S/P AICD  Chronic A-fib/A-flutter  S/P MVR on Coumadin  HTN                  PLAN:    ·	Tele reviewed. Likely A-Fib/A-flutter  ·	EKG: A-Fib 131/min. Non specific ST, T changes  ·	Cont Metoprolol 75 mg po q 6h. HR is better controlled.   ·	TSH done in October was normal  ·	Old record reviewed. ECHO done in October this year showed EF is 41%. Mod AS  ·	O/E pt is fluid overload. Cont Lasix 40 mg iv daily  ·	Check i's and o's and daily wt  ·	Check BMP and Mg  ·	Low salt diet and water restriction to 1.5 L/D  ·	CXR showed large L pleural effusion  ·	Care d/w the Pulmonary today. Will do thoracentesis today  ·	After thoracentesis give Coumadin 5 mg po tonight and check INR in AM. Also restart Lovenox 80 mg sq q12h  ·	Cont her other meds.     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  Patient reports shortness of breath worse than her baseline associated with increased lower extremity swelling. Missed 2 days of Lasix.    Acute HFmrEF  Large L sided pleural effusion  S/P AICD  Chronic A-fib/A-flutter  S/P MVR on Coumadin  HTN                  PLAN:    ·	Tele reviewed. Likely A-Fib/A-flutter  ·	EKG: A-Fib 131/min. Non specific ST, T changes  ·	Cont Metoprolol 75 mg po q 6h. HR is better controlled.   ·	TSH done in October was normal  ·	Old record reviewed. ECHO done in October this year showed EF is 41%. Mod AS  ·	O/E pt is fluid overload. Cont Lasix 40 mg iv daily  ·	Check i's and o's and daily wt  ·	Check BMP and Mg  ·	Low salt diet and water restriction to 1.5 L/D  ·	CXR showed large L pleural effusion  ·	Care d/w the Pulmonary today. Will do thoracentesis today  ·	After thoracentesis give Coumadin 5 mg po tonight and check INR in AM. Also restart Lovenox 80 mg sq q12h  ·	Cont her other meds.     Progress Note Handoff    Pending (specify):  Consults__Pulmonary_______, Tests________, Test Results_______, Other__Large L pleural effusion_______  Family discussion:  Disposition: Home___/SNF___/Other________/Unknown at this time________    Tao Salmeron MD  Spectra: [phone]

## 2023-12-24 NOTE — PROCEDURE NOTE - NSCOMPLICATION_GEN_A_CORE
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 Martin Who knows patient well   plan is to try palliative chemotherapy  I have reviewed this with the patient and the nurse manager and the patient would like to proceed with treatment    Plan  Insertion of port  IV fluids  Palliative chemotherapy with carboplatin and etoposide/-16  Palliative care consult to address pain and goals of care  Dietary consult in light of poor nutritional status  Monitor electrolytes closely
no complications

## 2023-12-24 NOTE — PROGRESS NOTE ADULT - SUBJECTIVE AND OBJECTIVE BOX
JORDAN CHEEMA  88y Female    CHIEF COMPLAINT:    Patient is a 88y old  Female who presents with a chief complaint of HFpEF Exacerbation (23 Dec 2023 13:58)      INTERVAL HPI/OVERNIGHT EVENTS:    Patient seen and examined. Pt states that her breathing is better today. Still having sob.     ROS: All other systems are negative.    Vital Signs:    T(F): 96.7 (12-24-23 @ 05:28), Max: 97.5 (12-23-23 @ 20:19)  HR: 73 (12-24-23 @ 05:28) (72 - 100)  BP: 161/77 (12-24-23 @ 05:28) (115/57 - 161/77)  RR: 18 (12-24-23 @ 05:28) (18 - 18)  SpO2: --  I&O's Summary    23 Dec 2023 07:01  -  24 Dec 2023 07:00  --------------------------------------------------------  IN: 278 mL / OUT: 2200 mL / NET: -1922 mL      Daily     Daily   CAPILLARY BLOOD GLUCOSE          PHYSICAL EXAM:    GENERAL:  NAD  SKIN: No rashes or lesions  HENT: Atraumatic. Normocephalic. PERRL. Moist membranes.  NECK: Supple, No JVD. No lymphadenopathy.  PULMONARY: Decreased BS in the L lower half of the chest. No wheezing. No rales  CVS: Normal S1, S2. Rate and Rhythm are regular. No murmurs.  ABDOMEN/GI: Soft, Nontender, Nondistended; BS present  EXTREMITIES: Peripheral pulses intact. Trace edema B/L LE.  NEUROLOGIC:  No motor or sensory deficit.  PSYCH: Alert & oriented x 3    Consultant(s) Notes Reviewed:  [x ] YES  [ ] NO  Care Discussed with Consultants/Other Providers [ x] YES  [ ] NO    EKG reviewed  Telemetry reviewed    LABS:                        12.4   6.95  )-----------( 233      ( 24 Dec 2023 10:36 )             39.5           PT/INR - ( 24 Dec 2023 10:36 )   PT: 17.00 sec;   INR: 1.49 ratio                   RADIOLOGY & ADDITIONAL TESTS:      Imaging or report Personally Reviewed:  [ ] YES  [ ] NO    Medications:  Standing  atorvastatin 20 milliGRAM(s) Oral at bedtime  furosemide   Injectable 40 milliGRAM(s) IV Push daily  losartan 50 milliGRAM(s) Oral daily  metoprolol tartrate 75 milliGRAM(s) Oral four times a day  pantoprazole    Tablet 40 milliGRAM(s) Oral before breakfast  warfarin 5 milliGRAM(s) Oral once    PRN Meds  polyethylene glycol 3350 17 Gram(s) Oral daily PRN  senna 2 Tablet(s) Oral at bedtime PRN      Case discussed with resident    Care discussed with pt/family

## 2023-12-25 LAB
ANION GAP SERPL CALC-SCNC: 11 MMOL/L — SIGNIFICANT CHANGE UP (ref 7–14)
ANION GAP SERPL CALC-SCNC: 11 MMOL/L — SIGNIFICANT CHANGE UP (ref 7–14)
APTT BLD: 21.2 SEC — CRITICAL LOW (ref 27–39.2)
APTT BLD: 21.2 SEC — CRITICAL LOW (ref 27–39.2)
BUN SERPL-MCNC: 24 MG/DL — HIGH (ref 10–20)
BUN SERPL-MCNC: 24 MG/DL — HIGH (ref 10–20)
CALCIUM SERPL-MCNC: 8.3 MG/DL — LOW (ref 8.4–10.4)
CALCIUM SERPL-MCNC: 8.3 MG/DL — LOW (ref 8.4–10.4)
CHLORIDE SERPL-SCNC: 104 MMOL/L — SIGNIFICANT CHANGE UP (ref 98–110)
CHLORIDE SERPL-SCNC: 104 MMOL/L — SIGNIFICANT CHANGE UP (ref 98–110)
CO2 SERPL-SCNC: 27 MMOL/L — SIGNIFICANT CHANGE UP (ref 17–32)
CO2 SERPL-SCNC: 27 MMOL/L — SIGNIFICANT CHANGE UP (ref 17–32)
CREAT SERPL-MCNC: 1.1 MG/DL — SIGNIFICANT CHANGE UP (ref 0.7–1.5)
CREAT SERPL-MCNC: 1.1 MG/DL — SIGNIFICANT CHANGE UP (ref 0.7–1.5)
EGFR: 49 ML/MIN/1.73M2 — LOW
EGFR: 49 ML/MIN/1.73M2 — LOW
GLUCOSE BLDC GLUCOMTR-MCNC: 115 MG/DL — HIGH (ref 70–99)
GLUCOSE BLDC GLUCOMTR-MCNC: 115 MG/DL — HIGH (ref 70–99)
GLUCOSE SERPL-MCNC: 89 MG/DL — SIGNIFICANT CHANGE UP (ref 70–99)
GLUCOSE SERPL-MCNC: 89 MG/DL — SIGNIFICANT CHANGE UP (ref 70–99)
HCT VFR BLD CALC: 43.2 % — SIGNIFICANT CHANGE UP (ref 37–47)
HCT VFR BLD CALC: 43.2 % — SIGNIFICANT CHANGE UP (ref 37–47)
HGB BLD-MCNC: 13.5 G/DL — SIGNIFICANT CHANGE UP (ref 12–16)
HGB BLD-MCNC: 13.5 G/DL — SIGNIFICANT CHANGE UP (ref 12–16)
INR BLD: 1.36 RATIO — HIGH (ref 0.65–1.3)
INR BLD: 1.36 RATIO — HIGH (ref 0.65–1.3)
MAGNESIUM SERPL-MCNC: 1.8 MG/DL — SIGNIFICANT CHANGE UP (ref 1.8–2.4)
MAGNESIUM SERPL-MCNC: 1.8 MG/DL — SIGNIFICANT CHANGE UP (ref 1.8–2.4)
MCHC RBC-ENTMCNC: 26.6 PG — LOW (ref 27–31)
MCHC RBC-ENTMCNC: 26.6 PG — LOW (ref 27–31)
MCHC RBC-ENTMCNC: 31.3 G/DL — LOW (ref 32–37)
MCHC RBC-ENTMCNC: 31.3 G/DL — LOW (ref 32–37)
MCV RBC AUTO: 85.2 FL — SIGNIFICANT CHANGE UP (ref 81–99)
MCV RBC AUTO: 85.2 FL — SIGNIFICANT CHANGE UP (ref 81–99)
NRBC # BLD: 0 /100 WBCS — SIGNIFICANT CHANGE UP (ref 0–0)
NRBC # BLD: 0 /100 WBCS — SIGNIFICANT CHANGE UP (ref 0–0)
PLATELET # BLD AUTO: 199 K/UL — SIGNIFICANT CHANGE UP (ref 130–400)
PLATELET # BLD AUTO: 199 K/UL — SIGNIFICANT CHANGE UP (ref 130–400)
PMV BLD: 11.6 FL — HIGH (ref 7.4–10.4)
PMV BLD: 11.6 FL — HIGH (ref 7.4–10.4)
POTASSIUM SERPL-MCNC: 3.9 MMOL/L — SIGNIFICANT CHANGE UP (ref 3.5–5)
POTASSIUM SERPL-MCNC: 3.9 MMOL/L — SIGNIFICANT CHANGE UP (ref 3.5–5)
POTASSIUM SERPL-SCNC: 3.9 MMOL/L — SIGNIFICANT CHANGE UP (ref 3.5–5)
POTASSIUM SERPL-SCNC: 3.9 MMOL/L — SIGNIFICANT CHANGE UP (ref 3.5–5)
PROTHROM AB SERPL-ACNC: 15.6 SEC — HIGH (ref 9.95–12.87)
PROTHROM AB SERPL-ACNC: 15.6 SEC — HIGH (ref 9.95–12.87)
RBC # BLD: 5.07 M/UL — SIGNIFICANT CHANGE UP (ref 4.2–5.4)
RBC # BLD: 5.07 M/UL — SIGNIFICANT CHANGE UP (ref 4.2–5.4)
RBC # FLD: 17.7 % — HIGH (ref 11.5–14.5)
RBC # FLD: 17.7 % — HIGH (ref 11.5–14.5)
SODIUM SERPL-SCNC: 142 MMOL/L — SIGNIFICANT CHANGE UP (ref 135–146)
SODIUM SERPL-SCNC: 142 MMOL/L — SIGNIFICANT CHANGE UP (ref 135–146)
WBC # BLD: 7.07 K/UL — SIGNIFICANT CHANGE UP (ref 4.8–10.8)
WBC # BLD: 7.07 K/UL — SIGNIFICANT CHANGE UP (ref 4.8–10.8)
WBC # FLD AUTO: 7.07 K/UL — SIGNIFICANT CHANGE UP (ref 4.8–10.8)
WBC # FLD AUTO: 7.07 K/UL — SIGNIFICANT CHANGE UP (ref 4.8–10.8)

## 2023-12-25 PROCEDURE — 99233 SBSQ HOSP IP/OBS HIGH 50: CPT

## 2023-12-25 RX ORDER — WARFARIN SODIUM 2.5 MG/1
5 TABLET ORAL ONCE
Refills: 0 | Status: COMPLETED | OUTPATIENT
Start: 2023-12-25 | End: 2023-12-25

## 2023-12-25 RX ORDER — FUROSEMIDE 40 MG
40 TABLET ORAL
Refills: 0 | Status: DISCONTINUED | OUTPATIENT
Start: 2023-12-25 | End: 2023-12-28

## 2023-12-25 RX ORDER — MAGNESIUM SULFATE 500 MG/ML
2 VIAL (ML) INJECTION ONCE
Refills: 0 | Status: COMPLETED | OUTPATIENT
Start: 2023-12-25 | End: 2023-12-25

## 2023-12-25 RX ORDER — ENOXAPARIN SODIUM 100 MG/ML
80 INJECTION SUBCUTANEOUS EVERY 12 HOURS
Refills: 0 | Status: DISCONTINUED | OUTPATIENT
Start: 2023-12-25 | End: 2023-12-26

## 2023-12-25 RX ADMIN — ATORVASTATIN CALCIUM 20 MILLIGRAM(S): 80 TABLET, FILM COATED ORAL at 21:24

## 2023-12-25 RX ADMIN — Medication 75 MILLIGRAM(S): at 00:35

## 2023-12-25 RX ADMIN — Medication 25 GRAM(S): at 12:04

## 2023-12-25 RX ADMIN — Medication 40 MILLIGRAM(S): at 05:43

## 2023-12-25 RX ADMIN — Medication 75 MILLIGRAM(S): at 05:42

## 2023-12-25 RX ADMIN — ENOXAPARIN SODIUM 80 MILLIGRAM(S): 100 INJECTION SUBCUTANEOUS at 17:04

## 2023-12-25 RX ADMIN — Medication 75 MILLIGRAM(S): at 12:05

## 2023-12-25 RX ADMIN — Medication 75 MILLIGRAM(S): at 17:04

## 2023-12-25 RX ADMIN — Medication 40 MILLIGRAM(S): at 14:28

## 2023-12-25 RX ADMIN — ENOXAPARIN SODIUM 80 MILLIGRAM(S): 100 INJECTION SUBCUTANEOUS at 12:05

## 2023-12-25 RX ADMIN — LOSARTAN POTASSIUM 50 MILLIGRAM(S): 100 TABLET, FILM COATED ORAL at 05:43

## 2023-12-25 RX ADMIN — WARFARIN SODIUM 5 MILLIGRAM(S): 2.5 TABLET ORAL at 21:24

## 2023-12-25 RX ADMIN — PANTOPRAZOLE SODIUM 40 MILLIGRAM(S): 20 TABLET, DELAYED RELEASE ORAL at 05:43

## 2023-12-25 NOTE — PROGRESS NOTE ADULT - SUBJECTIVE AND OBJECTIVE BOX
JORDAN CHEEMA  88y Female    CHIEF COMPLAINT:    Patient is a 88y old  Female who presents with a chief complaint of HFpEF Exacerbation (24 Dec 2023 11:02)      INTERVAL HPI/OVERNIGHT EVENTS:    Patient seen and examined.    ROS: All other systems are negative.    Vital Signs:    T(F): 94.4 (12-25-23 @ 04:16), Max: 97.8 (12-24-23 @ 14:10)  HR: 68 (12-25-23 @ 04:16) (68 - 77)  BP: 152/69 (12-25-23 @ 04:16) (96/52 - 152/69)  RR: 18 (12-24-23 @ 19:33) (18 - 18)  SpO2: --  I&O's Summary    24 Dec 2023 07:01  -  25 Dec 2023 07:00  --------------------------------------------------------  IN: 1190 mL / OUT: 1200 mL / NET: -10 mL      Daily     Daily   CAPILLARY BLOOD GLUCOSE          PHYSICAL EXAM:    GENERAL:  NAD  SKIN: No rashes or lesions  HENT: Atraumatic. Normocephalic. PERRL. Moist membranes.  NECK: Supple, No JVD. No lymphadenopathy.  PULMONARY: CTA B/L. No wheezing. No rales  CVS: Normal S1, S2. Rate and Rhythm are regular. No murmurs.  ABDOMEN/GI: Soft, Nontender, Nondistended; BS present  EXTREMITIES: Peripheral pulses intact. No edema B/L LE.  NEUROLOGIC:  No motor or sensory deficit.  PSYCH: Alert & oriented x 3    Consultant(s) Notes Reviewed:  [x ] YES  [ ] NO  Care Discussed with Consultants/Other Providers [ x] YES  [ ] NO    EKG reviewed  Telemetry reviewed    LABS:                        13.5   7.07  )-----------( 199      ( 25 Dec 2023 06:51 )             43.2     12-25    142  |  104  |  24<H>  ----------------------------<  89  3.9   |  27  |  1.1    Ca    8.3<L>      25 Dec 2023 06:51  Mg     1.8     12-25    TPro  5.6<L>  /  Alb  3.2<L>  /  TBili  0.4  /  DBili  x   /  AST  19  /  ALT  10  /  AlkPhos  76  12-24    PT/INR - ( 25 Dec 2023 06:51 )   PT: 15.60 sec;   INR: 1.36 ratio         PTT - ( 25 Dec 2023 06:51 )  PTT:21.2 sec        Culture - Body Fluid with Gram Stain (collected 24 Dec 2023 12:30)  Source: Pleural Fl Pleural Fluid  Gram Stain (24 Dec 2023 23:39):    polymorphonuclear leukocytes seen    No organisms seen    by cytocentrifuge        RADIOLOGY & ADDITIONAL TESTS:      Imaging or report Personally Reviewed:  [ ] YES  [ ] NO    Medications:  Standing  atorvastatin 20 milliGRAM(s) Oral at bedtime  furosemide   Injectable 40 milliGRAM(s) IV Push daily  losartan 50 milliGRAM(s) Oral daily  melatonin 5 milliGRAM(s) Oral at bedtime  metoprolol tartrate 75 milliGRAM(s) Oral four times a day  pantoprazole    Tablet 40 milliGRAM(s) Oral before breakfast    PRN Meds  polyethylene glycol 3350 17 Gram(s) Oral daily PRN  senna 2 Tablet(s) Oral at bedtime PRN      Case discussed with resident    Care discussed with pt/family

## 2023-12-25 NOTE — PROGRESS NOTE ADULT - SUBJECTIVE AND OBJECTIVE BOX
JORDAN CHEEMA  88y Female    CHIEF COMPLAINT:    Patient is a 88y old  Female who presents with a chief complaint of HFpEF Exacerbation (25 Dec 2023 09:40)      INTERVAL HPI/OVERNIGHT EVENTS:    Patient seen and examined. Sitting in a chair. On RA. Pt states that she feels a little better.     ROS: All other systems are negative.    Vital Signs:    T(F): 94.4 (12-25-23 @ 04:16), Max: 97.8 (12-24-23 @ 14:10)  HR: 68 (12-25-23 @ 04:16) (68 - 77)  BP: 152/69 (12-25-23 @ 04:16) (96/52 - 152/69)  RR: 18 (12-24-23 @ 19:33) (18 - 18)  SpO2: --  I&O's Summary    24 Dec 2023 07:01  -  25 Dec 2023 07:00  --------------------------------------------------------  IN: 1190 mL / OUT: 1200 mL / NET: -10 mL      Daily     Daily   CAPILLARY BLOOD GLUCOSE          PHYSICAL EXAM:    GENERAL:  NAD  SKIN: No rashes or lesions  HENT: Atraumatic. Normocephalic. PERRL. Moist membranes.  NECK: Supple, No JVD. No lymphadenopathy.  PULMONARY: Decreased BS in the R lower half of the chest. No wheezing. No rales  CVS: Normal S1, S2. Rate and Rhythm are regular. No murmurs.  ABDOMEN/GI: Soft, Nontender, Nondistended; BS present  EXTREMITIES: Peripheral pulses intact. No edema B/L LE.  NEUROLOGIC:  No motor or sensory deficit.  PSYCH: Alert & oriented x 3    Consultant(s) Notes Reviewed:  [x ] YES  [ ] NO  Care Discussed with Consultants/Other Providers [ x] YES  [ ] NO    EKG reviewed  Telemetry reviewed    LABS:                        13.5   7.07  )-----------( 199      ( 25 Dec 2023 06:51 )             43.2     12-25    142  |  104  |  24<H>  ----------------------------<  89  3.9   |  27  |  1.1    Ca    8.3<L>      25 Dec 2023 06:51  Mg     1.8     12-25    TPro  5.6<L>  /  Alb  3.2<L>  /  TBili  0.4  /  DBili  x   /  AST  19  /  ALT  10  /  AlkPhos  76  12-24    PT/INR - ( 25 Dec 2023 06:51 )   PT: 15.60 sec;   INR: 1.36 ratio         PTT - ( 25 Dec 2023 06:51 )  PTT:21.2 sec        Culture - Body Fluid with Gram Stain (collected 24 Dec 2023 12:30)  Source: Pleural Fl Pleural Fluid  Gram Stain (24 Dec 2023 23:39):    polymorphonuclear leukocytes seen    No organisms seen    by cytocentrifuge        RADIOLOGY & ADDITIONAL TESTS:      Imaging or report Personally Reviewed:  [ ] YES  [ ] NO    Medications:  Standing  atorvastatin 20 milliGRAM(s) Oral at bedtime  furosemide   Injectable 40 milliGRAM(s) IV Push daily  losartan 50 milliGRAM(s) Oral daily  melatonin 5 milliGRAM(s) Oral at bedtime  metoprolol tartrate 75 milliGRAM(s) Oral four times a day  pantoprazole    Tablet 40 milliGRAM(s) Oral before breakfast    PRN Meds  polyethylene glycol 3350 17 Gram(s) Oral daily PRN  senna 2 Tablet(s) Oral at bedtime PRN      Case discussed with resident    Care discussed with pt/family

## 2023-12-25 NOTE — PROGRESS NOTE ADULT - ASSESSMENT
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  Patient reports shortness of breath worse than her baseline associated with increased lower extremity swelling. Missed 2 days of Lasix.    Acute HFmrEF  Large L sided pleural effusion  S/P AICD  Chronic A-fib/A-flutter  S/P MVR on Coumadin  HTN                  PLAN:    ·	Tele reviewed. Likely A-Fib/A-flutter  ·	EKG: A-Fib 131/min. Non specific ST, T changes  ·	Cont Metoprolol 75 mg po q 6h. HR is better controlled.   ·	TSH done in October was normal  ·	Old record reviewed. ECHO done in October this year showed EF is 41%. Mod AS  ·	O/E pt is fluid overload. Cont Lasix 40 mg iv daily  ·	Check i's and o's and daily wt  ·	Check BMP and Mg  ·	Low salt diet and water restriction to 1.5 L/D  ·	CXR showed large L pleural effusion  ·	Care d/w the Pulmonary today. Will do thoracentesis today  ·	After thoracentesis give Coumadin 5 mg po tonight and check INR in AM. Also restart Lovenox 80 mg sq q12h  ·	Cont her other meds.     Progress Note Handoff    Pending (specify):  Consults__Pulmonary_______, Tests________, Test Results_______, Other__Large L pleural effusion_______  Family discussion:  Disposition: Home___/SNF___/Other________/Unknown at this time________    Tao Salmeron MD  Spectra: [phone] 88-year-old female with PMH of HFmrEF, A-fib/A-flutter, prosthetic mitral valve replacement on Coumadin, CHF s/p AICD, HTN, BIBEMS from home for evaluation of shortness of breath for one day.  Patient reports shortness of breath worse than her baseline associated with increased lower extremity swelling. Missed 2 days of Lasix.    Acute HFmrEF  Large L sided pleural effusion  S/P AICD  Chronic A-fib/A-flutter  S/P MVR on Coumadin  HTN                  PLAN:    ·	Tele reviewed. Likely A-Fib/A-flutter  ·	EKG: A-Fib 131/min. Non specific ST, T changes  ·	Cont Metoprolol 75 mg po q 6h. HR is better controlled.   ·	TSH done in October was normal  ·	Old record reviewed. ECHO done in October this year showed EF is 41%. Mod AS  ·	O/E pt is fluid overload. Cont Lasix 40 mg iv daily  ·	Check i's and o's and daily wt  ·	Check BMP and Mg  ·	Low salt diet and water restriction to 1.5 L/D  ·	CXR showed large L pleural effusion  ·	Care d/w the Pulmonary today. Will do thoracentesis today  ·	After thoracentesis give Coumadin 5 mg po tonight and check INR in AM. Also restart Lovenox 80 mg sq q12h  ·	Cont her other meds.     Progress Note Handoff    Pending (specify):  Consults__Pulmonary_______, Tests________, Test Results_______, Other__Large L pleural effusion_______  Family discussion:  Disposition: Home___/SNF___/Other________/Unknown at this time________    Tao Salmeron MD  Spectra: [phone]

## 2023-12-25 NOTE — PROGRESS NOTE ADULT - ASSESSMENT
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  Patient reports shortness of breath worse than her baseline associated with increased lower extremity swelling. Missed 2 days of Lasix.    Acute HFmrEF  Large L sided pleural effusion  S/P AICD  Chronic A-fib/A-flutter  S/P MVR on Coumadin  HTN                  PLAN:    ·	Tele reviewed. Likely A-Fib/A-flutter  ·	EKG: A-Fib 131/min. Non specific ST, T changes  ·	Cont Metoprolol 75 mg po q 6h. HR is better controlled.   ·	TSH done in October was normal  ·	Old record reviewed. ECHO done in October this year showed EF is 41%. Mod AS  ·	O/E pt is still fluid overload. Will increase Lasix to 40 mg iv q 12h  ·	Check i's and o's and daily wt  ·	S/P thoracentesis yesterday. Only 100 cc fluid was drained. Fluid protein is 2.2. Transudate.   ·	Will restart Coumadin 5 mg po qhs and check INR in AM. Will also restart bridging with Lovenox 80 mg sq q 12h.   ·	Low salt diet and water restriction to 1.5 L/D  ·	CXR on admission showed large L pleural effusion  ·	Cont her other meds.   ·	PT consult    Progress Note Handoff    Pending (specify):  Consults__Pulmonary_______, Tests________, Test Results_______, Other__Large L pleural effusion_______  Family discussion:  Disposition: Home___/SNF___/Other________/Unknown at this time________    Tao Salmeron MD  Spectra: [phone] 88-year-old female with PMH of HFmrEF, A-fib/A-flutter, prosthetic mitral valve replacement on Coumadin, CHF s/p AICD, HTN, BIBEMS from home for evaluation of shortness of breath for one day.  Patient reports shortness of breath worse than her baseline associated with increased lower extremity swelling. Missed 2 days of Lasix.    Acute HFmrEF  Large L sided pleural effusion  S/P AICD  Chronic A-fib/A-flutter  S/P MVR on Coumadin  HTN                  PLAN:    ·	Tele reviewed. Likely A-Fib/A-flutter  ·	EKG: A-Fib 131/min. Non specific ST, T changes  ·	Cont Metoprolol 75 mg po q 6h. HR is better controlled.   ·	TSH done in October was normal  ·	Old record reviewed. ECHO done in October this year showed EF is 41%. Mod AS  ·	O/E pt is still fluid overload. Will increase Lasix to 40 mg iv q 12h  ·	Check i's and o's and daily wt  ·	S/P thoracentesis yesterday. Only 100 cc fluid was drained. Fluid protein is 2.2. Transudate.   ·	Will restart Coumadin 5 mg po qhs and check INR in AM. Will also restart bridging with Lovenox 80 mg sq q 12h.   ·	Low salt diet and water restriction to 1.5 L/D  ·	CXR on admission showed large L pleural effusion  ·	Cont her other meds.   ·	PT consult    Progress Note Handoff    Pending (specify):  Consults__Pulmonary_______, Tests________, Test Results_______, Other__Large L pleural effusion_______  Family discussion:  Disposition: Home___/SNF___/Other________/Unknown at this time________    Tao Salmeron MD  Spectra: [phone]

## 2023-12-26 LAB
ALBUMIN SERPL ELPH-MCNC: 3.2 G/DL — LOW (ref 3.5–5.2)
ALBUMIN SERPL ELPH-MCNC: 3.2 G/DL — LOW (ref 3.5–5.2)
ALP SERPL-CCNC: 79 U/L — SIGNIFICANT CHANGE UP (ref 30–115)
ALP SERPL-CCNC: 79 U/L — SIGNIFICANT CHANGE UP (ref 30–115)
ALT FLD-CCNC: 13 U/L — SIGNIFICANT CHANGE UP (ref 0–41)
ALT FLD-CCNC: 13 U/L — SIGNIFICANT CHANGE UP (ref 0–41)
ANION GAP SERPL CALC-SCNC: 9 MMOL/L — SIGNIFICANT CHANGE UP (ref 7–14)
ANION GAP SERPL CALC-SCNC: 9 MMOL/L — SIGNIFICANT CHANGE UP (ref 7–14)
AST SERPL-CCNC: 25 U/L — SIGNIFICANT CHANGE UP (ref 0–41)
AST SERPL-CCNC: 25 U/L — SIGNIFICANT CHANGE UP (ref 0–41)
BASOPHILS # BLD AUTO: 0.03 K/UL — SIGNIFICANT CHANGE UP (ref 0–0.2)
BASOPHILS # BLD AUTO: 0.03 K/UL — SIGNIFICANT CHANGE UP (ref 0–0.2)
BASOPHILS NFR BLD AUTO: 0.3 % — SIGNIFICANT CHANGE UP (ref 0–1)
BASOPHILS NFR BLD AUTO: 0.3 % — SIGNIFICANT CHANGE UP (ref 0–1)
BILIRUB SERPL-MCNC: 0.3 MG/DL — SIGNIFICANT CHANGE UP (ref 0.2–1.2)
BILIRUB SERPL-MCNC: 0.3 MG/DL — SIGNIFICANT CHANGE UP (ref 0.2–1.2)
BUN SERPL-MCNC: 23 MG/DL — HIGH (ref 10–20)
BUN SERPL-MCNC: 23 MG/DL — HIGH (ref 10–20)
CALCIUM SERPL-MCNC: 8.4 MG/DL — SIGNIFICANT CHANGE UP (ref 8.4–10.5)
CALCIUM SERPL-MCNC: 8.4 MG/DL — SIGNIFICANT CHANGE UP (ref 8.4–10.5)
CHLORIDE SERPL-SCNC: 102 MMOL/L — SIGNIFICANT CHANGE UP (ref 98–110)
CHLORIDE SERPL-SCNC: 102 MMOL/L — SIGNIFICANT CHANGE UP (ref 98–110)
CO2 SERPL-SCNC: 31 MMOL/L — SIGNIFICANT CHANGE UP (ref 17–32)
CO2 SERPL-SCNC: 31 MMOL/L — SIGNIFICANT CHANGE UP (ref 17–32)
CREAT SERPL-MCNC: 1 MG/DL — SIGNIFICANT CHANGE UP (ref 0.7–1.5)
CREAT SERPL-MCNC: 1 MG/DL — SIGNIFICANT CHANGE UP (ref 0.7–1.5)
EGFR: 55 ML/MIN/1.73M2 — LOW
EGFR: 55 ML/MIN/1.73M2 — LOW
EOSINOPHIL # BLD AUTO: 0.09 K/UL — SIGNIFICANT CHANGE UP (ref 0–0.7)
EOSINOPHIL # BLD AUTO: 0.09 K/UL — SIGNIFICANT CHANGE UP (ref 0–0.7)
EOSINOPHIL NFR BLD AUTO: 0.8 % — SIGNIFICANT CHANGE UP (ref 0–8)
EOSINOPHIL NFR BLD AUTO: 0.8 % — SIGNIFICANT CHANGE UP (ref 0–8)
GLUCOSE SERPL-MCNC: 96 MG/DL — SIGNIFICANT CHANGE UP (ref 70–99)
GLUCOSE SERPL-MCNC: 96 MG/DL — SIGNIFICANT CHANGE UP (ref 70–99)
HCT VFR BLD CALC: 43.5 % — SIGNIFICANT CHANGE UP (ref 37–47)
HCT VFR BLD CALC: 43.5 % — SIGNIFICANT CHANGE UP (ref 37–47)
HGB BLD-MCNC: 13.5 G/DL — SIGNIFICANT CHANGE UP (ref 12–16)
HGB BLD-MCNC: 13.5 G/DL — SIGNIFICANT CHANGE UP (ref 12–16)
IMM GRANULOCYTES NFR BLD AUTO: 0.4 % — HIGH (ref 0.1–0.3)
IMM GRANULOCYTES NFR BLD AUTO: 0.4 % — HIGH (ref 0.1–0.3)
INR BLD: 2.72 RATIO — HIGH (ref 0.65–1.3)
INR BLD: 2.72 RATIO — HIGH (ref 0.65–1.3)
LYMPHOCYTES # BLD AUTO: 18.1 % — LOW (ref 20.5–51.1)
LYMPHOCYTES # BLD AUTO: 18.1 % — LOW (ref 20.5–51.1)
LYMPHOCYTES # BLD AUTO: 2.02 K/UL — SIGNIFICANT CHANGE UP (ref 1.2–3.4)
LYMPHOCYTES # BLD AUTO: 2.02 K/UL — SIGNIFICANT CHANGE UP (ref 1.2–3.4)
MAGNESIUM SERPL-MCNC: 1.4 MG/DL — LOW (ref 1.8–2.4)
MAGNESIUM SERPL-MCNC: 1.4 MG/DL — LOW (ref 1.8–2.4)
MCHC RBC-ENTMCNC: 26.5 PG — LOW (ref 27–31)
MCHC RBC-ENTMCNC: 26.5 PG — LOW (ref 27–31)
MCHC RBC-ENTMCNC: 31 G/DL — LOW (ref 32–37)
MCHC RBC-ENTMCNC: 31 G/DL — LOW (ref 32–37)
MCV RBC AUTO: 85.3 FL — SIGNIFICANT CHANGE UP (ref 81–99)
MCV RBC AUTO: 85.3 FL — SIGNIFICANT CHANGE UP (ref 81–99)
MONOCYTES # BLD AUTO: 0.86 K/UL — HIGH (ref 0.1–0.6)
MONOCYTES # BLD AUTO: 0.86 K/UL — HIGH (ref 0.1–0.6)
MONOCYTES NFR BLD AUTO: 7.7 % — SIGNIFICANT CHANGE UP (ref 1.7–9.3)
MONOCYTES NFR BLD AUTO: 7.7 % — SIGNIFICANT CHANGE UP (ref 1.7–9.3)
NEUTROPHILS # BLD AUTO: 8.14 K/UL — HIGH (ref 1.4–6.5)
NEUTROPHILS # BLD AUTO: 8.14 K/UL — HIGH (ref 1.4–6.5)
NEUTROPHILS NFR BLD AUTO: 72.7 % — SIGNIFICANT CHANGE UP (ref 42.2–75.2)
NEUTROPHILS NFR BLD AUTO: 72.7 % — SIGNIFICANT CHANGE UP (ref 42.2–75.2)
NRBC # BLD: 0 /100 WBCS — SIGNIFICANT CHANGE UP (ref 0–0)
NRBC # BLD: 0 /100 WBCS — SIGNIFICANT CHANGE UP (ref 0–0)
PLATELET # BLD AUTO: 122 K/UL — LOW (ref 130–400)
PLATELET # BLD AUTO: 122 K/UL — LOW (ref 130–400)
PMV BLD: 11.9 FL — HIGH (ref 7.4–10.4)
PMV BLD: 11.9 FL — HIGH (ref 7.4–10.4)
POTASSIUM SERPL-MCNC: 3.8 MMOL/L — SIGNIFICANT CHANGE UP (ref 3.5–5)
POTASSIUM SERPL-MCNC: 3.8 MMOL/L — SIGNIFICANT CHANGE UP (ref 3.5–5)
POTASSIUM SERPL-SCNC: 3.8 MMOL/L — SIGNIFICANT CHANGE UP (ref 3.5–5)
POTASSIUM SERPL-SCNC: 3.8 MMOL/L — SIGNIFICANT CHANGE UP (ref 3.5–5)
PROT SERPL-MCNC: 5.9 G/DL — LOW (ref 6–8)
PROT SERPL-MCNC: 5.9 G/DL — LOW (ref 6–8)
PROTHROM AB SERPL-ACNC: 31.3 SEC — HIGH (ref 9.95–12.87)
PROTHROM AB SERPL-ACNC: 31.3 SEC — HIGH (ref 9.95–12.87)
RBC # BLD: 5.1 M/UL — SIGNIFICANT CHANGE UP (ref 4.2–5.4)
RBC # BLD: 5.1 M/UL — SIGNIFICANT CHANGE UP (ref 4.2–5.4)
RBC # FLD: 17.2 % — HIGH (ref 11.5–14.5)
RBC # FLD: 17.2 % — HIGH (ref 11.5–14.5)
SODIUM SERPL-SCNC: 142 MMOL/L — SIGNIFICANT CHANGE UP (ref 135–146)
SODIUM SERPL-SCNC: 142 MMOL/L — SIGNIFICANT CHANGE UP (ref 135–146)
WBC # BLD: 11.18 K/UL — HIGH (ref 4.8–10.8)
WBC # BLD: 11.18 K/UL — HIGH (ref 4.8–10.8)
WBC # FLD AUTO: 11.18 K/UL — HIGH (ref 4.8–10.8)
WBC # FLD AUTO: 11.18 K/UL — HIGH (ref 4.8–10.8)

## 2023-12-26 PROCEDURE — 99233 SBSQ HOSP IP/OBS HIGH 50: CPT

## 2023-12-26 RX ORDER — POTASSIUM CHLORIDE 20 MEQ
40 PACKET (EA) ORAL ONCE
Refills: 0 | Status: COMPLETED | OUTPATIENT
Start: 2023-12-26 | End: 2023-12-26

## 2023-12-26 RX ORDER — POTASSIUM CHLORIDE 20 MEQ
40 PACKET (EA) ORAL ONCE
Refills: 0 | Status: DISCONTINUED | OUTPATIENT
Start: 2023-12-26 | End: 2023-12-26

## 2023-12-26 RX ORDER — WARFARIN SODIUM 2.5 MG/1
2 TABLET ORAL ONCE
Refills: 0 | Status: COMPLETED | OUTPATIENT
Start: 2023-12-26 | End: 2023-12-26

## 2023-12-26 RX ORDER — MAGNESIUM SULFATE 500 MG/ML
2 VIAL (ML) INJECTION
Refills: 0 | Status: COMPLETED | OUTPATIENT
Start: 2023-12-26 | End: 2023-12-26

## 2023-12-26 RX ORDER — MAGNESIUM SULFATE 500 MG/ML
2 VIAL (ML) INJECTION
Refills: 0 | Status: DISCONTINUED | OUTPATIENT
Start: 2023-12-26 | End: 2023-12-26

## 2023-12-26 RX ADMIN — Medication 25 GRAM(S): at 13:01

## 2023-12-26 RX ADMIN — Medication 25 GRAM(S): at 11:20

## 2023-12-26 RX ADMIN — Medication 40 MILLIGRAM(S): at 13:01

## 2023-12-26 RX ADMIN — Medication 40 MILLIGRAM(S): at 06:05

## 2023-12-26 RX ADMIN — Medication 75 MILLIGRAM(S): at 23:33

## 2023-12-26 RX ADMIN — PANTOPRAZOLE SODIUM 40 MILLIGRAM(S): 20 TABLET, DELAYED RELEASE ORAL at 06:04

## 2023-12-26 RX ADMIN — WARFARIN SODIUM 2 MILLIGRAM(S): 2.5 TABLET ORAL at 21:26

## 2023-12-26 RX ADMIN — Medication 75 MILLIGRAM(S): at 06:04

## 2023-12-26 RX ADMIN — Medication 75 MILLIGRAM(S): at 17:46

## 2023-12-26 RX ADMIN — ENOXAPARIN SODIUM 80 MILLIGRAM(S): 100 INJECTION SUBCUTANEOUS at 06:04

## 2023-12-26 RX ADMIN — LOSARTAN POTASSIUM 50 MILLIGRAM(S): 100 TABLET, FILM COATED ORAL at 06:05

## 2023-12-26 RX ADMIN — ATORVASTATIN CALCIUM 20 MILLIGRAM(S): 80 TABLET, FILM COATED ORAL at 21:26

## 2023-12-26 RX ADMIN — Medication 40 MILLIEQUIVALENT(S): at 11:17

## 2023-12-26 RX ADMIN — Medication 75 MILLIGRAM(S): at 01:02

## 2023-12-26 RX ADMIN — Medication 75 MILLIGRAM(S): at 11:17

## 2023-12-26 NOTE — PROGRESS NOTE ADULT - ASSESSMENT
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     #Acute HFmrEF s/p AICD   #HTN  -presented with dyspnea and LE swelling after missing 2 days of lasix   -BNP 3800  -ECHO done in October this year showed EF is 41%. Mod AS  Plan:  -c/w lasix 20mg IV BID  -repeat CXR in AM  -Low salt diet and water restriction to 1.5 L/day   -check daily I's and O's   -fu PT     #L sided pleural effusion  -transudative in nature  -s/p thoracentesis with 100cc drained. Follow up CXR shows decreased effusion and no PTX.     #Chronic A-fib/A-flutter  #S/P MVR on Coumadin  -EKG: A-Fib 131/min. Non specific ST, T changes  -TSH done in October was normal  Plan:  -Cont Metoprolol 75 mg po q 6h. HR is better controlled.   -INR increased from 1.3 to 2.7 -> will give coumadin 2mg today   -bridging to lovenox 80mg q12hrs     #hypomagnesemia  -repleted on 12/16     Inpatient checklist:  DVT prophlylaxis - lovenox  GI prophylaxis - PPI  Diet - DASH  Code - DNR with trial of intubation

## 2023-12-26 NOTE — PROGRESS NOTE ADULT - SUBJECTIVE AND OBJECTIVE BOX
24H events:    Patient is a 88y old Female who presents with a chief complaint of HFpEF Exacerbation (25 Dec 2023 09:49)    Primary diagnosis of Large pleural effusion    Today is hospital day 5d. This morning patient was seen and examined at bedside, resting comfortably in bed.    No acute or major events overnight.    Code Status: DNR, trial of intubation      PAST MEDICAL & SURGICAL HISTORY  Afib    HTN (hypertension)    Heart failure    H/O mitral valve replacement    H/O prior ablation treatment  afib      SOCIAL HISTORY:  Social History:      ALLERGIES:  sulfamethoxazole (Rash)    MEDICATIONS:  STANDING MEDICATIONS  atorvastatin 20 milliGRAM(s) Oral at bedtime  enoxaparin Injectable 80 milliGRAM(s) SubCutaneous every 12 hours  furosemide   Injectable 40 milliGRAM(s) IV Push two times a day  losartan 50 milliGRAM(s) Oral daily  magnesium sulfate  IVPB 2 Gram(s) IV Intermittent every 2 hours  melatonin 5 milliGRAM(s) Oral at bedtime  metoprolol tartrate 75 milliGRAM(s) Oral four times a day  pantoprazole    Tablet 40 milliGRAM(s) Oral before breakfast  warfarin 2 milliGRAM(s) Oral once    PRN MEDICATIONS  polyethylene glycol 3350 17 Gram(s) Oral daily PRN  senna 2 Tablet(s) Oral at bedtime PRN    VITALS:   T(F): 97  HR: 70  BP: 118/56  RR: 17  SpO2: --    PHYSICAL EXAM:  GENERAL:   (x  ) NAD, lying in bed comfortably       HEAD:   (  x) Atraumatic        HEART:  Rate -->     (  x) normal rate       Rhythm -->     (  x) regular       Murmurs -->     (x  ) normal s1s2         LUNGS:   (  x)Unlabored respirations       ( x ) B/L air entry       ( x ) no adventitious sound        ABDOMEN:   (  x) Soft   ( x ) nondistended  ( x ) nontender         EXTREMITIES:  (x  ) Normal    )    NERVOUS SYSTEM:    ( x ) A&Ox3         LABS:                        13.5   11.18 )-----------( 122      ( 26 Dec 2023 06:57 )             43.5     12-26    142  |  102  |  23<H>  ----------------------------<  96  3.8   |  31  |  1.0    Ca    8.4      26 Dec 2023 06:57  Mg     1.4     12-26    TPro  5.9<L>  /  Alb  3.2<L>  /  TBili  0.3  /  DBili  x   /  AST  25  /  ALT  13  /  AlkPhos  79  12-26    PT/INR - ( 26 Dec 2023 06:57 )   PT: 31.30 sec;   INR: 2.72 ratio         PTT - ( 25 Dec 2023 06:51 )  PTT:21.2 sec  Urinalysis Basic - ( 26 Dec 2023 06:57 )    Color: x / Appearance: x / SG: x / pH: x  Gluc: 96 mg/dL / Ketone: x  / Bili: x / Urobili: x   Blood: x / Protein: x / Nitrite: x   Leuk Esterase: x / RBC: x / WBC x   Sq Epi: x / Non Sq Epi: x / Bacteria: x            Culture - Body Fluid with Gram Stain (collected 24 Dec 2023 12:30)  Source: Pleural Fl Pleural Fluid  Gram Stain (24 Dec 2023 23:39):    polymorphonuclear leukocytes seen    No organisms seen    by cytocentrifuge  Preliminary Report (25 Dec 2023 18:05):    No growth          RADIOLOGY:

## 2023-12-26 NOTE — PHYSICAL THERAPY INITIAL EVALUATION ADULT - PHYSICAL ASSIST/NONPHYSICAL ASSIST: GAIT, REHAB EVAL
From: Maria Fernanda Fisher  To: Katie Pina  Sent: 3/16/2023 1:46 PM CDT  Subject: CT scan results     Hello,  I reviewed the CT scan results and see there are some necessary follow up, including a brain MRI. I do have my follow up with the ENT next Friday but would like to understand what other follow up are needed.    I'm also available via cellphone ([phone]).    Thank you,  Maria Fernanda Fisher    1 person assist

## 2023-12-26 NOTE — PROGRESS NOTE ADULT - ASSESSMENT
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  Patient reports shortness of breath worse than her baseline associated with increased lower extremity swelling. Missed 2 days of Lasix.    Acute HFmrEF  Large L sided pleural effusion  S/P AICD  Chronic A-fib/A-flutter  S/P MVR on Coumadin  HTN                  PLAN:    ·	Tele reviewed. Likely A-Fib/A-flutter  ·	EKG: A-Fib 131/min. Non specific ST, T changes  ·	Cont Metoprolol 75 mg po q 6h. HR is better controlled.   ·	TSH done in October was normal  ·	Old record reviewed. ECHO done in October this year showed EF is 41%. Mod AS  ·	O/E pt is still fluid overload. Will cont Lasix 40 mg iv q 12h  ·	Check i's and o's and daily wt  ·	S/P thoracentesis yesterday. Only 100 cc fluid was drained. Fluid protein is 2.2. Transudate.   ·	INR is 2.72. Give Coumadin 2 mg po tonight. Check INR in AM. D/C Lovenox.   ·	Low salt diet and water restriction to 1.5 L/D  ·	CXR on admission showed large L pleural effusion  ·	Repeat CXR in AM  ·	Cont her other meds.   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  Patient reports shortness of breath worse than her baseline associated with increased lower extremity swelling. Missed 2 days of Lasix.    Acute HFmrEF  Large L sided pleural effusion  S/P AICD  Chronic A-fib/A-flutter  S/P MVR on Coumadin  HTN                  PLAN:    ·	Tele reviewed. Likely A-Fib/A-flutter  ·	EKG: A-Fib 131/min. Non specific ST, T changes  ·	Cont Metoprolol 75 mg po q 6h. HR is better controlled.   ·	TSH done in October was normal  ·	Old record reviewed. ECHO done in October this year showed EF is 41%. Mod AS  ·	O/E pt is still fluid overload. Will cont Lasix 40 mg iv q 12h  ·	Check i's and o's and daily wt  ·	S/P thoracentesis yesterday. Only 100 cc fluid was drained. Fluid protein is 2.2. Transudate.   ·	INR is 2.72. Give Coumadin 2 mg po tonight. Check INR in AM. D/C Lovenox.   ·	Low salt diet and water restriction to 1.5 L/D  ·	CXR on admission showed large L pleural effusion  ·	Repeat CXR in AM  ·	Cont her other meds.   ·	PT consult    Progress Note Handoff    Pending (specify):  Consults__Pulmonary_______, Tests________, Test Results_______, Other__Large L pleural effusion_______  Family discussion:  Disposition: Home___/SNF___/Other________/Unknown at this time________    Tao Salmeron MD  Spectra: [phone]

## 2023-12-26 NOTE — PHYSICAL THERAPY INITIAL EVALUATION ADULT - GENERAL OBSERVATIONS, REHAB EVAL
11:30-12:00 pt encountere din bed, + tele, primafit, IV locked by RN. Pt is pleasant and cooperative.  Patient performed bed mobility, transfers, and liited ambulated with assistance, limited by decreased endurance and strength. Pt motivated to participate. Pt would benefit from continued PT to restore prior level of mobility and safety.

## 2023-12-26 NOTE — PROGRESS NOTE ADULT - SUBJECTIVE AND OBJECTIVE BOX
JORDAN CHEEMA  88y Female    CHIEF COMPLAINT:    Patient is a 88y old  Female who presents with a chief complaint of HFpEF Exacerbation (25 Dec 2023 09:49)      INTERVAL HPI/OVERNIGHT EVENTS:    Patient seen and examined. Pt states that her breathing is improving. No cough.     ROS: All other systems are negative.    Vital Signs:    T(F): 97 (12-26-23 @ 12:27), Max: 97.8 (12-26-23 @ 04:00)  HR: 70 (12-26-23 @ 12:27) (70 - 109)  BP: 118/56 (12-26-23 @ 12:27) (118/56 - 147/91)  RR: 17 (12-26-23 @ 12:27) (17 - 17)  SpO2: --  I&O's Summary    25 Dec 2023 07:01  -  26 Dec 2023 07:00  --------------------------------------------------------  IN: 294 mL / OUT: 1100 mL / NET: -806 mL      Daily     Daily   CAPILLARY BLOOD GLUCOSE          PHYSICAL EXAM:    GENERAL:  NAD  SKIN: No rashes or lesions  HENT: Atraumatic. Normocephalic. PERRL. Moist membranes.  NECK: Supple, No JVD. No lymphadenopathy.  PULMONARY: Decreased BS in the L lower chest post. No wheezing. No rales  CVS: Normal S1, S2. Rate and Rhythm are regular. No murmurs.  ABDOMEN/GI: Soft, Nontender, Nondistended; BS present  EXTREMITIES: Peripheral pulses intact. Trace edema B/L LE.  NEUROLOGIC:  No motor or sensory deficit.  PSYCH: Alert & oriented x 3    Consultant(s) Notes Reviewed:  [x ] YES  [ ] NO  Care Discussed with Consultants/Other Providers [ x] YES  [ ] NO    EKG reviewed  Telemetry reviewed    LABS:                        13.5   11.18 )-----------( 122      ( 26 Dec 2023 06:57 )             43.5     12-26    142  |  102  |  23<H>  ----------------------------<  96  3.8   |  31  |  1.0    Ca    8.4      26 Dec 2023 06:57  Mg     1.4     12-26    TPro  5.9<L>  /  Alb  3.2<L>  /  TBili  0.3  /  DBili  x   /  AST  25  /  ALT  13  /  AlkPhos  79  12-26    PT/INR - ( 26 Dec 2023 06:57 )   PT: 31.30 sec;   INR: 2.72 ratio         PTT - ( 25 Dec 2023 06:51 )  PTT:21.2 sec        Culture - Body Fluid with Gram Stain (collected 24 Dec 2023 12:30)  Source: Pleural Fl Pleural Fluid  Gram Stain (24 Dec 2023 23:39):    polymorphonuclear leukocytes seen    No organisms seen    by cytocentrifuge  Preliminary Report (25 Dec 2023 18:05):    No growth        RADIOLOGY & ADDITIONAL TESTS:      Imaging or report Personally Reviewed:  [ ] YES  [ ] NO    Medications:  Standing  atorvastatin 20 milliGRAM(s) Oral at bedtime  enoxaparin Injectable 80 milliGRAM(s) SubCutaneous every 12 hours  furosemide   Injectable 40 milliGRAM(s) IV Push two times a day  losartan 50 milliGRAM(s) Oral daily  melatonin 5 milliGRAM(s) Oral at bedtime  metoprolol tartrate 75 milliGRAM(s) Oral four times a day  pantoprazole    Tablet 40 milliGRAM(s) Oral before breakfast  warfarin 2 milliGRAM(s) Oral once    PRN Meds  polyethylene glycol 3350 17 Gram(s) Oral daily PRN  senna 2 Tablet(s) Oral at bedtime PRN      Case discussed with resident    Care discussed with pt/family

## 2023-12-27 LAB
ALBUMIN SERPL ELPH-MCNC: 3.2 G/DL — LOW (ref 3.5–5.2)
ALBUMIN SERPL ELPH-MCNC: 3.2 G/DL — LOW (ref 3.5–5.2)
ALP SERPL-CCNC: 77 U/L — SIGNIFICANT CHANGE UP (ref 30–115)
ALP SERPL-CCNC: 77 U/L — SIGNIFICANT CHANGE UP (ref 30–115)
ALT FLD-CCNC: 15 U/L — SIGNIFICANT CHANGE UP (ref 0–41)
ALT FLD-CCNC: 15 U/L — SIGNIFICANT CHANGE UP (ref 0–41)
ANION GAP SERPL CALC-SCNC: 8 MMOL/L — SIGNIFICANT CHANGE UP (ref 7–14)
ANION GAP SERPL CALC-SCNC: 8 MMOL/L — SIGNIFICANT CHANGE UP (ref 7–14)
AST SERPL-CCNC: 27 U/L — SIGNIFICANT CHANGE UP (ref 0–41)
AST SERPL-CCNC: 27 U/L — SIGNIFICANT CHANGE UP (ref 0–41)
BASOPHILS # BLD AUTO: 0.05 K/UL — SIGNIFICANT CHANGE UP (ref 0–0.2)
BASOPHILS # BLD AUTO: 0.05 K/UL — SIGNIFICANT CHANGE UP (ref 0–0.2)
BASOPHILS NFR BLD AUTO: 0.6 % — SIGNIFICANT CHANGE UP (ref 0–1)
BASOPHILS NFR BLD AUTO: 0.6 % — SIGNIFICANT CHANGE UP (ref 0–1)
BILIRUB SERPL-MCNC: 0.3 MG/DL — SIGNIFICANT CHANGE UP (ref 0.2–1.2)
BILIRUB SERPL-MCNC: 0.3 MG/DL — SIGNIFICANT CHANGE UP (ref 0.2–1.2)
BUN SERPL-MCNC: 23 MG/DL — HIGH (ref 10–20)
BUN SERPL-MCNC: 23 MG/DL — HIGH (ref 10–20)
CALCIUM SERPL-MCNC: 8.5 MG/DL — SIGNIFICANT CHANGE UP (ref 8.4–10.5)
CALCIUM SERPL-MCNC: 8.5 MG/DL — SIGNIFICANT CHANGE UP (ref 8.4–10.5)
CHLORIDE SERPL-SCNC: 99 MMOL/L — SIGNIFICANT CHANGE UP (ref 98–110)
CHLORIDE SERPL-SCNC: 99 MMOL/L — SIGNIFICANT CHANGE UP (ref 98–110)
CO2 SERPL-SCNC: 34 MMOL/L — HIGH (ref 17–32)
CO2 SERPL-SCNC: 34 MMOL/L — HIGH (ref 17–32)
CREAT SERPL-MCNC: 1.2 MG/DL — SIGNIFICANT CHANGE UP (ref 0.7–1.5)
CREAT SERPL-MCNC: 1.2 MG/DL — SIGNIFICANT CHANGE UP (ref 0.7–1.5)
EGFR: 44 ML/MIN/1.73M2 — LOW
EGFR: 44 ML/MIN/1.73M2 — LOW
EOSINOPHIL # BLD AUTO: 0.14 K/UL — SIGNIFICANT CHANGE UP (ref 0–0.7)
EOSINOPHIL # BLD AUTO: 0.14 K/UL — SIGNIFICANT CHANGE UP (ref 0–0.7)
EOSINOPHIL NFR BLD AUTO: 1.8 % — SIGNIFICANT CHANGE UP (ref 0–8)
EOSINOPHIL NFR BLD AUTO: 1.8 % — SIGNIFICANT CHANGE UP (ref 0–8)
GLUCOSE SERPL-MCNC: 86 MG/DL — SIGNIFICANT CHANGE UP (ref 70–99)
GLUCOSE SERPL-MCNC: 86 MG/DL — SIGNIFICANT CHANGE UP (ref 70–99)
HCT VFR BLD CALC: 42.9 % — SIGNIFICANT CHANGE UP (ref 37–47)
HCT VFR BLD CALC: 42.9 % — SIGNIFICANT CHANGE UP (ref 37–47)
HGB BLD-MCNC: 13.2 G/DL — SIGNIFICANT CHANGE UP (ref 12–16)
HGB BLD-MCNC: 13.2 G/DL — SIGNIFICANT CHANGE UP (ref 12–16)
IMM GRANULOCYTES NFR BLD AUTO: 0.3 % — SIGNIFICANT CHANGE UP (ref 0.1–0.3)
IMM GRANULOCYTES NFR BLD AUTO: 0.3 % — SIGNIFICANT CHANGE UP (ref 0.1–0.3)
INR BLD: 2.91 RATIO — HIGH (ref 0.65–1.3)
INR BLD: 2.91 RATIO — HIGH (ref 0.65–1.3)
LYMPHOCYTES # BLD AUTO: 1.98 K/UL — SIGNIFICANT CHANGE UP (ref 1.2–3.4)
LYMPHOCYTES # BLD AUTO: 1.98 K/UL — SIGNIFICANT CHANGE UP (ref 1.2–3.4)
LYMPHOCYTES # BLD AUTO: 25.3 % — SIGNIFICANT CHANGE UP (ref 20.5–51.1)
LYMPHOCYTES # BLD AUTO: 25.3 % — SIGNIFICANT CHANGE UP (ref 20.5–51.1)
MAGNESIUM SERPL-MCNC: 2.2 MG/DL — SIGNIFICANT CHANGE UP (ref 1.8–2.4)
MAGNESIUM SERPL-MCNC: 2.2 MG/DL — SIGNIFICANT CHANGE UP (ref 1.8–2.4)
MCHC RBC-ENTMCNC: 26.4 PG — LOW (ref 27–31)
MCHC RBC-ENTMCNC: 26.4 PG — LOW (ref 27–31)
MCHC RBC-ENTMCNC: 30.8 G/DL — LOW (ref 32–37)
MCHC RBC-ENTMCNC: 30.8 G/DL — LOW (ref 32–37)
MCV RBC AUTO: 85.8 FL — SIGNIFICANT CHANGE UP (ref 81–99)
MCV RBC AUTO: 85.8 FL — SIGNIFICANT CHANGE UP (ref 81–99)
MONOCYTES # BLD AUTO: 0.72 K/UL — HIGH (ref 0.1–0.6)
MONOCYTES # BLD AUTO: 0.72 K/UL — HIGH (ref 0.1–0.6)
MONOCYTES NFR BLD AUTO: 9.2 % — SIGNIFICANT CHANGE UP (ref 1.7–9.3)
MONOCYTES NFR BLD AUTO: 9.2 % — SIGNIFICANT CHANGE UP (ref 1.7–9.3)
NEUTROPHILS # BLD AUTO: 4.92 K/UL — SIGNIFICANT CHANGE UP (ref 1.4–6.5)
NEUTROPHILS # BLD AUTO: 4.92 K/UL — SIGNIFICANT CHANGE UP (ref 1.4–6.5)
NEUTROPHILS NFR BLD AUTO: 62.8 % — SIGNIFICANT CHANGE UP (ref 42.2–75.2)
NEUTROPHILS NFR BLD AUTO: 62.8 % — SIGNIFICANT CHANGE UP (ref 42.2–75.2)
NRBC # BLD: 0 /100 WBCS — SIGNIFICANT CHANGE UP (ref 0–0)
NRBC # BLD: 0 /100 WBCS — SIGNIFICANT CHANGE UP (ref 0–0)
PLATELET # BLD AUTO: 221 K/UL — SIGNIFICANT CHANGE UP (ref 130–400)
PLATELET # BLD AUTO: 221 K/UL — SIGNIFICANT CHANGE UP (ref 130–400)
PMV BLD: 12.2 FL — HIGH (ref 7.4–10.4)
PMV BLD: 12.2 FL — HIGH (ref 7.4–10.4)
POTASSIUM SERPL-MCNC: 4.6 MMOL/L — SIGNIFICANT CHANGE UP (ref 3.5–5)
POTASSIUM SERPL-MCNC: 4.6 MMOL/L — SIGNIFICANT CHANGE UP (ref 3.5–5)
POTASSIUM SERPL-SCNC: 4.6 MMOL/L — SIGNIFICANT CHANGE UP (ref 3.5–5)
POTASSIUM SERPL-SCNC: 4.6 MMOL/L — SIGNIFICANT CHANGE UP (ref 3.5–5)
PROT SERPL-MCNC: 5.7 G/DL — LOW (ref 6–8)
PROT SERPL-MCNC: 5.7 G/DL — LOW (ref 6–8)
PROTHROM AB SERPL-ACNC: 33.5 SEC — HIGH (ref 9.95–12.87)
PROTHROM AB SERPL-ACNC: 33.5 SEC — HIGH (ref 9.95–12.87)
RBC # BLD: 5 M/UL — SIGNIFICANT CHANGE UP (ref 4.2–5.4)
RBC # BLD: 5 M/UL — SIGNIFICANT CHANGE UP (ref 4.2–5.4)
RBC # FLD: 17.2 % — HIGH (ref 11.5–14.5)
RBC # FLD: 17.2 % — HIGH (ref 11.5–14.5)
SODIUM SERPL-SCNC: 141 MMOL/L — SIGNIFICANT CHANGE UP (ref 135–146)
SODIUM SERPL-SCNC: 141 MMOL/L — SIGNIFICANT CHANGE UP (ref 135–146)
WBC # BLD: 7.83 K/UL — SIGNIFICANT CHANGE UP (ref 4.8–10.8)
WBC # BLD: 7.83 K/UL — SIGNIFICANT CHANGE UP (ref 4.8–10.8)
WBC # FLD AUTO: 7.83 K/UL — SIGNIFICANT CHANGE UP (ref 4.8–10.8)
WBC # FLD AUTO: 7.83 K/UL — SIGNIFICANT CHANGE UP (ref 4.8–10.8)

## 2023-12-27 PROCEDURE — 71045 X-RAY EXAM CHEST 1 VIEW: CPT | Mod: 26

## 2023-12-27 PROCEDURE — 99232 SBSQ HOSP IP/OBS MODERATE 35: CPT

## 2023-12-27 RX ORDER — WARFARIN SODIUM 2.5 MG/1
2 TABLET ORAL ONCE
Refills: 0 | Status: COMPLETED | OUTPATIENT
Start: 2023-12-27 | End: 2023-12-27

## 2023-12-27 RX ADMIN — PANTOPRAZOLE SODIUM 40 MILLIGRAM(S): 20 TABLET, DELAYED RELEASE ORAL at 06:17

## 2023-12-27 RX ADMIN — Medication 75 MILLIGRAM(S): at 23:42

## 2023-12-27 RX ADMIN — Medication 40 MILLIGRAM(S): at 17:37

## 2023-12-27 RX ADMIN — Medication 5 MILLIGRAM(S): at 22:20

## 2023-12-27 RX ADMIN — WARFARIN SODIUM 2 MILLIGRAM(S): 2.5 TABLET ORAL at 22:20

## 2023-12-27 RX ADMIN — LOSARTAN POTASSIUM 50 MILLIGRAM(S): 100 TABLET, FILM COATED ORAL at 06:17

## 2023-12-27 RX ADMIN — Medication 75 MILLIGRAM(S): at 17:37

## 2023-12-27 RX ADMIN — ATORVASTATIN CALCIUM 20 MILLIGRAM(S): 80 TABLET, FILM COATED ORAL at 22:20

## 2023-12-27 RX ADMIN — Medication 40 MILLIGRAM(S): at 06:17

## 2023-12-27 RX ADMIN — Medication 75 MILLIGRAM(S): at 06:17

## 2023-12-27 NOTE — PROGRESS NOTE ADULT - ASSESSMENT
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     #Acute HFmrEF s/p AICD   #HTN  -presented with dyspnea and LE swelling after missing 2 days of lasix   -BNP 3800  -ECHO done in October this year showed EF is 41%. Mod AS  Plan:  -c/w lasix 20mg IV BID for 1 more day   -Low salt diet and water restriction to 1.5 L/day   -check daily I's and O's   -PT: RONNY     #L sided pleural effusion  -transudative in nature  -s/p thoracentesis with 100cc drained. Follow up CXR showed decreased effusion and no PTX.   -another repeat CXR on 12/27 looks improved   -cw lasix 40mg bid for 1 more day    #Chronic A-fib/A-flutter  #S/P MVR on Coumadin  -EKG: A-Fib 131/min. Non specific ST, T changes  -TSH done in October was normal  Plan:  -Cont Metoprolol 75 mg po q 6h. HR is better controlled.   -INR 2.91 today after last dose of warfarin 2mg -> will give coumadin 2mg again tonight. Patient with mechanical valve and INR goal is 2.5-3.5  -continue to trend INR and given warfarin accordingly   -dc'd lovenox     #hypomagnesemia - resolved   -repleted on 12/16     Inpatient checklist:  DVT prophlylaxis - warfarin   GI prophylaxis - PPI  Diet - DASH  Code - DNR with trial of intubation

## 2023-12-27 NOTE — PROGRESS NOTE ADULT - SUBJECTIVE AND OBJECTIVE BOX
JORDAN CHEEMA  88y Female    CHIEF COMPLAINT:    Patient is a 88y old  Female who presents with a chief complaint of HFpEF Exacerbation (26 Dec 2023 13:14)      INTERVAL HPI/OVERNIGHT EVENTS:    Patient seen and examined. Pt states that her breathing continues to improve.     ROS: All other systems are negative.    Vital Signs:    T(F): 96.1 (12-26-23 @ 20:25), Max: 97 (12-26-23 @ 12:27)  HR: 66 (12-27-23 @ 05:04) (65 - 70)  BP: 142/64 (12-27-23 @ 05:04) (118/56 - 142/64)  RR: 18 (12-27-23 @ 05:04) (17 - 18)  SpO2: 97% (12-27-23 @ 05:04) (97% - 97%)  I&O's Summary    26 Dec 2023 07:01  -  27 Dec 2023 07:00  --------------------------------------------------------  IN: 237 mL / OUT: 1700 mL / NET: -1463 mL    27 Dec 2023 07:01  -  27 Dec 2023 11:22  --------------------------------------------------------  IN: 0 mL / OUT: 1 mL / NET: -1 mL      Daily     Daily   CAPILLARY BLOOD GLUCOSE          PHYSICAL EXAM:    GENERAL:  NAD  SKIN: No rashes or lesions  HENT: Atraumatic. Normocephalic. PERRL. Moist membranes.  NECK: Supple, No JVD. No lymphadenopathy.  PULMONARY: Decreased BS in L base. No wheezing. No rales  CVS: Normal S1, S2. Rate and Rhythm are regular. No murmurs.  ABDOMEN/GI: Soft, Nontender, Nondistended; BS present  EXTREMITIES: Peripheral pulses intact. No edema B/L LE.  NEUROLOGIC:  No motor or sensory deficit.  PSYCH: Alert & oriented x 3    Consultant(s) Notes Reviewed:  [x ] YES  [ ] NO  Care Discussed with Consultants/Other Providers [ x] YES  [ ] NO    EKG reviewed  Telemetry reviewed    LABS:                        13.2   7.83  )-----------( 221      ( 27 Dec 2023 05:58 )             42.9     12-27    141  |  99  |  23<H>  ----------------------------<  86  4.6   |  34<H>  |  1.2    Ca    8.5      27 Dec 2023 05:58  Mg     2.2     12-27    TPro  5.7<L>  /  Alb  3.2<L>  /  TBili  0.3  /  DBili  x   /  AST  27  /  ALT  15  /  AlkPhos  77  12-27    PT/INR - ( 27 Dec 2023 05:58 )   PT: 33.50 sec;   INR: 2.91 ratio                 Culture - Body Fluid with Gram Stain (collected 24 Dec 2023 12:30)  Source: Pleural Fl Pleural Fluid  Gram Stain (24 Dec 2023 23:39):    polymorphonuclear leukocytes seen    No organisms seen    by cytocentrifuge  Preliminary Report (25 Dec 2023 18:05):    No growth        RADIOLOGY & ADDITIONAL TESTS:      Imaging or report Personally Reviewed:  [ ] YES  [ ] NO    Medications:  Standing  atorvastatin 20 milliGRAM(s) Oral at bedtime  furosemide   Injectable 40 milliGRAM(s) IV Push two times a day  losartan 50 milliGRAM(s) Oral daily  melatonin 5 milliGRAM(s) Oral at bedtime  metoprolol tartrate 75 milliGRAM(s) Oral four times a day  pantoprazole    Tablet 40 milliGRAM(s) Oral before breakfast  warfarin 2 milliGRAM(s) Oral once    PRN Meds  polyethylene glycol 3350 17 Gram(s) Oral daily PRN  senna 2 Tablet(s) Oral at bedtime PRN      Case discussed with resident    Care discussed with pt/family

## 2023-12-27 NOTE — PROGRESS NOTE ADULT - ASSESSMENT
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  Patient reports shortness of breath worse than her baseline associated with increased lower extremity swelling. Missed 2 days of Lasix.    Acute HFmrEF  Large L sided pleural effusion  S/P AICD  Chronic A-fib/A-flutter  S/P MVR on Coumadin  HTN                  PLAN:    ·	Tele reviewed. Likely A-Fib/A-flutter  ·	EKG: A-Fib 131/min. Non specific ST, T changes  ·	Cont Metoprolol 75 mg po q 6h. HR is better controlled.   ·	TSH done in October was normal  ·	Old record reviewed. ECHO done in October this year showed EF is 41%. Mod AS  ·	Repeat CXR from 12/27 noted. Decreasing LLL opacity. Check official report  ·	O/E pt is still fluid overload. Will cont Lasix 40 mg iv q 12h  ·	Check i's and o's and daily wt  ·	S/P thoracentesis yesterday. Only 100 cc fluid was drained. Fluid protein is 2.2. Transudate.   ·	INR is 2.91. Give Coumadin 2 mg po tonight. Check INR in AM  ·	Low salt diet and water restriction to 1.5 L/D  ·	CXR on admission showed large L pleural effusion  ·	Cont her other meds.   ·	PT consult noted. 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  Patient reports shortness of breath worse than her baseline associated with increased lower extremity swelling. Missed 2 days of Lasix.    Acute HFmrEF  Large L sided pleural effusion  S/P AICD  Chronic A-fib/A-flutter  S/P MVR on Coumadin  HTN                  PLAN:    ·	Tele reviewed. Likely A-Fib/A-flutter  ·	EKG: A-Fib 131/min. Non specific ST, T changes  ·	Cont Metoprolol 75 mg po q 6h. HR is better controlled.   ·	TSH done in October was normal  ·	Old record reviewed. ECHO done in October this year showed EF is 41%. Mod AS  ·	Repeat CXR from 12/27 noted. Decreasing LLL opacity. Check official report  ·	O/E pt is still fluid overload. Will cont Lasix 40 mg iv q 12h  ·	Check i's and o's and daily wt  ·	S/P thoracentesis yesterday. Only 100 cc fluid was drained. Fluid protein is 2.2. Transudate.   ·	INR is 2.91. Give Coumadin 2 mg po tonight. Check INR in AM  ·	Low salt diet and water restriction to 1.5 L/D  ·	CXR on admission showed large L pleural effusion  ·	Cont her other meds.   ·	PT consult noted. Recommended SNF placement     Progress Note Handoff    Pending (specify):  Consults________, Tests________, Test Results_______, Other__Fluid overload_______  Family discussion:  Disposition: Home___/SNF___/Other________/Unknown at this time________    Tao Salmeron MD  Spectra: [phone]

## 2023-12-27 NOTE — PROGRESS NOTE ADULT - SUBJECTIVE AND OBJECTIVE BOX
24H events:    Patient is a 88y old Female who presents with a chief complaint of HFpEF Exacerbation (27 Dec 2023 11:22)    Primary diagnosis of Large pleural effusion    Today is hospital day 6d. This morning patient was seen and examined at bedside, resting comfortably in bed.    No acute or major events overnight.  Pt states her breathing feels much better today. Denies chest pain, lightheadeness, dizziness.     Code Status: DNR with trial of intubation    PAST MEDICAL & SURGICAL HISTORY  Afib    HTN (hypertension)    Heart failure    H/O mitral valve replacement    H/O prior ablation treatment  afib      SOCIAL HISTORY:  Social History:      ALLERGIES:  sulfamethoxazole (Rash)    MEDICATIONS:  STANDING MEDICATIONS  atorvastatin 20 milliGRAM(s) Oral at bedtime  furosemide   Injectable 40 milliGRAM(s) IV Push two times a day  losartan 50 milliGRAM(s) Oral daily  melatonin 5 milliGRAM(s) Oral at bedtime  metoprolol tartrate 75 milliGRAM(s) Oral four times a day  pantoprazole    Tablet 40 milliGRAM(s) Oral before breakfast  warfarin 2 milliGRAM(s) Oral once    PRN MEDICATIONS  polyethylene glycol 3350 17 Gram(s) Oral daily PRN  senna 2 Tablet(s) Oral at bedtime PRN    VITALS:   T(F): 96.1  HR: 66  BP: 142/64  RR: 18  SpO2: 97%    PHYSICAL EXAM:  GENERAL:   (  x) NAD, lying in bed comfortably         HEAD:   ( x ) Atraumatic         HEART:  Rate -->     ( x ) normal rate    Rhythm -->     (x  ) regular       Murmurs -->     (x  ) normal s1s2         LUNGS:   ( x )Unlabored respirations     ( x ) B/L air entry    ( x ) no adventitious sound         ABDOMEN:   ( x ) Soft     ( x ) nondistended       ( x ) nontender         EXTREMITIES:  no LE edema     NERVOUS SYSTEM:    ( x ) A&Ox3       LABS:                        13.2   7.83  )-----------( 221      ( 27 Dec 2023 05:58 )             42.9     12-27    141  |  99  |  23<H>  ----------------------------<  86  4.6   |  34<H>  |  1.2    Ca    8.5      27 Dec 2023 05:58  Mg     2.2     12-27    TPro  5.7<L>  /  Alb  3.2<L>  /  TBili  0.3  /  DBili  x   /  AST  27  /  ALT  15  /  AlkPhos  77  12-27    PT/INR - ( 27 Dec 2023 05:58 )   PT: 33.50 sec;   INR: 2.91 ratio           Urinalysis Basic - ( 27 Dec 2023 05:58 )    Color: x / Appearance: x / SG: x / pH: x  Gluc: 86 mg/dL / Ketone: x  / Bili: x / Urobili: x   Blood: x / Protein: x / Nitrite: x   Leuk Esterase: x / RBC: x / WBC x   Sq Epi: x / Non Sq Epi: x / Bacteria: x            Culture - Body Fluid with Gram Stain (collected 24 Dec 2023 12:30)  Source: Pleural Fl Pleural Fluid  Gram Stain (24 Dec 2023 23:39):    polymorphonuclear leukocytes seen    No organisms seen    by cytocentrifuge  Preliminary Report (25 Dec 2023 18:05):    No growth          RADIOLOGY:

## 2023-12-27 NOTE — PHARMACOTHERAPY INTERVENTION NOTE - COMMENTS
Patient with mechanical valve and INR goal of 2.5-3.5. INR today is 2.9, received warfarin 5 12/23 12/24, 12/25 and warfarin 2 mg 12/26. Recommended to give a dose of warfarin 2 mg today and continue to follow INR daily.

## 2023-12-28 ENCOUNTER — TRANSCRIPTION ENCOUNTER (OUTPATIENT)
Age: 88
End: 2023-12-28

## 2023-12-28 VITALS
TEMPERATURE: 95 F | RESPIRATION RATE: 18 BRPM | SYSTOLIC BLOOD PRESSURE: 122 MMHG | DIASTOLIC BLOOD PRESSURE: 58 MMHG | HEART RATE: 66 BPM

## 2023-12-28 LAB
ALBUMIN SERPL ELPH-MCNC: 2.9 G/DL — LOW (ref 3.5–5.2)
ALBUMIN SERPL ELPH-MCNC: 2.9 G/DL — LOW (ref 3.5–5.2)
ALP SERPL-CCNC: 72 U/L — SIGNIFICANT CHANGE UP (ref 30–115)
ALP SERPL-CCNC: 72 U/L — SIGNIFICANT CHANGE UP (ref 30–115)
ALT FLD-CCNC: 13 U/L — SIGNIFICANT CHANGE UP (ref 0–41)
ALT FLD-CCNC: 13 U/L — SIGNIFICANT CHANGE UP (ref 0–41)
ANION GAP SERPL CALC-SCNC: 8 MMOL/L — SIGNIFICANT CHANGE UP (ref 7–14)
ANION GAP SERPL CALC-SCNC: 8 MMOL/L — SIGNIFICANT CHANGE UP (ref 7–14)
AST SERPL-CCNC: 21 U/L — SIGNIFICANT CHANGE UP (ref 0–41)
AST SERPL-CCNC: 21 U/L — SIGNIFICANT CHANGE UP (ref 0–41)
BILIRUB SERPL-MCNC: 0.3 MG/DL — SIGNIFICANT CHANGE UP (ref 0.2–1.2)
BILIRUB SERPL-MCNC: 0.3 MG/DL — SIGNIFICANT CHANGE UP (ref 0.2–1.2)
BUN SERPL-MCNC: 25 MG/DL — HIGH (ref 10–20)
BUN SERPL-MCNC: 25 MG/DL — HIGH (ref 10–20)
CALCIUM SERPL-MCNC: 8.3 MG/DL — LOW (ref 8.4–10.5)
CALCIUM SERPL-MCNC: 8.3 MG/DL — LOW (ref 8.4–10.5)
CHLORIDE SERPL-SCNC: 99 MMOL/L — SIGNIFICANT CHANGE UP (ref 98–110)
CHLORIDE SERPL-SCNC: 99 MMOL/L — SIGNIFICANT CHANGE UP (ref 98–110)
CO2 SERPL-SCNC: 33 MMOL/L — HIGH (ref 17–32)
CO2 SERPL-SCNC: 33 MMOL/L — HIGH (ref 17–32)
CREAT SERPL-MCNC: 1.1 MG/DL — SIGNIFICANT CHANGE UP (ref 0.7–1.5)
CREAT SERPL-MCNC: 1.1 MG/DL — SIGNIFICANT CHANGE UP (ref 0.7–1.5)
EGFR: 49 ML/MIN/1.73M2 — LOW
EGFR: 49 ML/MIN/1.73M2 — LOW
GLUCOSE SERPL-MCNC: 136 MG/DL — HIGH (ref 70–99)
GLUCOSE SERPL-MCNC: 136 MG/DL — HIGH (ref 70–99)
HCT VFR BLD CALC: 39.4 % — SIGNIFICANT CHANGE UP (ref 37–47)
HCT VFR BLD CALC: 39.4 % — SIGNIFICANT CHANGE UP (ref 37–47)
HGB BLD-MCNC: 12.4 G/DL — SIGNIFICANT CHANGE UP (ref 12–16)
HGB BLD-MCNC: 12.4 G/DL — SIGNIFICANT CHANGE UP (ref 12–16)
INR BLD: 3.22 RATIO — HIGH (ref 0.65–1.3)
INR BLD: 3.22 RATIO — HIGH (ref 0.65–1.3)
MCHC RBC-ENTMCNC: 26.3 PG — LOW (ref 27–31)
MCHC RBC-ENTMCNC: 26.3 PG — LOW (ref 27–31)
MCHC RBC-ENTMCNC: 31.5 G/DL — LOW (ref 32–37)
MCHC RBC-ENTMCNC: 31.5 G/DL — LOW (ref 32–37)
MCV RBC AUTO: 83.5 FL — SIGNIFICANT CHANGE UP (ref 81–99)
MCV RBC AUTO: 83.5 FL — SIGNIFICANT CHANGE UP (ref 81–99)
NON-GYNECOLOGICAL CYTOLOGY STUDY: SIGNIFICANT CHANGE UP
NON-GYNECOLOGICAL CYTOLOGY STUDY: SIGNIFICANT CHANGE UP
NRBC # BLD: 0 /100 WBCS — SIGNIFICANT CHANGE UP (ref 0–0)
NRBC # BLD: 0 /100 WBCS — SIGNIFICANT CHANGE UP (ref 0–0)
PLATELET # BLD AUTO: 219 K/UL — SIGNIFICANT CHANGE UP (ref 130–400)
PLATELET # BLD AUTO: 219 K/UL — SIGNIFICANT CHANGE UP (ref 130–400)
PMV BLD: 11.6 FL — HIGH (ref 7.4–10.4)
PMV BLD: 11.6 FL — HIGH (ref 7.4–10.4)
POTASSIUM SERPL-MCNC: 3.7 MMOL/L — SIGNIFICANT CHANGE UP (ref 3.5–5)
POTASSIUM SERPL-MCNC: 3.7 MMOL/L — SIGNIFICANT CHANGE UP (ref 3.5–5)
POTASSIUM SERPL-SCNC: 3.7 MMOL/L — SIGNIFICANT CHANGE UP (ref 3.5–5)
POTASSIUM SERPL-SCNC: 3.7 MMOL/L — SIGNIFICANT CHANGE UP (ref 3.5–5)
PROT SERPL-MCNC: 5.5 G/DL — LOW (ref 6–8)
PROT SERPL-MCNC: 5.5 G/DL — LOW (ref 6–8)
PROTHROM AB SERPL-ACNC: 37.1 SEC — HIGH (ref 9.95–12.87)
PROTHROM AB SERPL-ACNC: 37.1 SEC — HIGH (ref 9.95–12.87)
RBC # BLD: 4.72 M/UL — SIGNIFICANT CHANGE UP (ref 4.2–5.4)
RBC # BLD: 4.72 M/UL — SIGNIFICANT CHANGE UP (ref 4.2–5.4)
RBC # FLD: 16.9 % — HIGH (ref 11.5–14.5)
RBC # FLD: 16.9 % — HIGH (ref 11.5–14.5)
SARS-COV-2 RNA SPEC QL NAA+PROBE: SIGNIFICANT CHANGE UP
SARS-COV-2 RNA SPEC QL NAA+PROBE: SIGNIFICANT CHANGE UP
SODIUM SERPL-SCNC: 140 MMOL/L — SIGNIFICANT CHANGE UP (ref 135–146)
SODIUM SERPL-SCNC: 140 MMOL/L — SIGNIFICANT CHANGE UP (ref 135–146)
WBC # BLD: 8.11 K/UL — SIGNIFICANT CHANGE UP (ref 4.8–10.8)
WBC # BLD: 8.11 K/UL — SIGNIFICANT CHANGE UP (ref 4.8–10.8)
WBC # FLD AUTO: 8.11 K/UL — SIGNIFICANT CHANGE UP (ref 4.8–10.8)
WBC # FLD AUTO: 8.11 K/UL — SIGNIFICANT CHANGE UP (ref 4.8–10.8)

## 2023-12-28 PROCEDURE — 71045 X-RAY EXAM CHEST 1 VIEW: CPT | Mod: 26

## 2023-12-28 PROCEDURE — 99239 HOSP IP/OBS DSCHRG MGMT >30: CPT

## 2023-12-28 RX ORDER — FUROSEMIDE 40 MG
1 TABLET ORAL
Qty: 30 | Refills: 3
Start: 2023-12-28 | End: 2024-04-25

## 2023-12-28 RX ORDER — METOPROLOL TARTRATE 50 MG
3 TABLET ORAL
Qty: 0 | Refills: 0 | DISCHARGE
Start: 2023-12-28

## 2023-12-28 RX ORDER — METOPROLOL TARTRATE 50 MG
300 TABLET ORAL DAILY
Refills: 0 | Status: DISCONTINUED | OUTPATIENT
Start: 2023-12-28 | End: 2023-12-28

## 2023-12-28 RX ADMIN — PANTOPRAZOLE SODIUM 40 MILLIGRAM(S): 20 TABLET, DELAYED RELEASE ORAL at 05:16

## 2023-12-28 RX ADMIN — Medication 40 MILLIGRAM(S): at 05:14

## 2023-12-28 RX ADMIN — LOSARTAN POTASSIUM 50 MILLIGRAM(S): 100 TABLET, FILM COATED ORAL at 05:16

## 2023-12-28 RX ADMIN — Medication 75 MILLIGRAM(S): at 05:16

## 2023-12-28 RX ADMIN — Medication 40 MILLIGRAM(S): at 14:59

## 2023-12-28 NOTE — PROGRESS NOTE ADULT - SUBJECTIVE AND OBJECTIVE BOX
JORDAN CHEEMA  88y Female    CHIEF COMPLAINT:    Patient is a 88y old  Female who presents with a chief complaint of HFpEF Exacerbation (28 Dec 2023 11:06)      INTERVAL HPI/OVERNIGHT EVENTS:    Patient seen and examined. Feels better. Denies sob.     ROS: All other systems are negative.    Vital Signs:    T(F): 95 (12-28-23 @ 13:51), Max: 96 (12-27-23 @ 20:25)  HR: 66 (12-28-23 @ 13:51) (66 - 75)  BP: 122/58 (12-28-23 @ 13:51) (114/57 - 127/69)  RR: 18 (12-28-23 @ 13:51) (18 - 18)  SpO2: --  I&O's Summary    27 Dec 2023 07:01  -  28 Dec 2023 07:00  --------------------------------------------------------  IN: 697 mL / OUT: 1676 mL / NET: -979 mL    28 Dec 2023 07:01  -  28 Dec 2023 14:23  --------------------------------------------------------  IN: 0 mL / OUT: 1001 mL / NET: -1001 mL      Daily     Daily   CAPILLARY BLOOD GLUCOSE          PHYSICAL EXAM:    GENERAL:  NAD  SKIN: No rashes or lesions  HENT: Atraumatic. Normocephalic. PERRL. Moist membranes.  NECK: Supple, No JVD. No lymphadenopathy.  PULMONARY: Decreased BS in the L base post. No wheezing. No rales  CVS: Normal S1, S2. Rate and Rhythm are regular. No murmurs.  ABDOMEN/GI: Soft, Nontender, Nondistended; BS present  EXTREMITIES: Peripheral pulses intact. No edema B/L LE.  NEUROLOGIC:  No motor or sensory deficit.  PSYCH: Alert & oriented x 3    Consultant(s) Notes Reviewed:  [x ] YES  [ ] NO  Care Discussed with Consultants/Other Providers [ x] YES  [ ] NO    EKG reviewed  Telemetry reviewed    LABS:                        12.4   8.11  )-----------( 219      ( 28 Dec 2023 13:20 )             39.4     12-28    140  |  99  |  25<H>  ----------------------------<  136<H>  3.7   |  33<H>  |  1.1    Ca    8.3<L>      28 Dec 2023 13:20  Mg     2.2     12-27    TPro  5.5<L>  /  Alb  2.9<L>  /  TBili  0.3  /  DBili  x   /  AST  21  /  ALT  13  /  AlkPhos  72  12-28    PT/INR - ( 28 Dec 2023 13:20 )   PT: 37.10 sec;   INR: 3.22 ratio                   RADIOLOGY & ADDITIONAL TESTS:      Imaging or report Personally Reviewed:  [ ] YES  [ ] NO    Medications:  Standing  atorvastatin 20 milliGRAM(s) Oral at bedtime  furosemide   Injectable 40 milliGRAM(s) IV Push two times a day  losartan 50 milliGRAM(s) Oral daily  melatonin 5 milliGRAM(s) Oral at bedtime  metoprolol tartrate 75 milliGRAM(s) Oral four times a day  pantoprazole    Tablet 40 milliGRAM(s) Oral before breakfast    PRN Meds  polyethylene glycol 3350 17 Gram(s) Oral daily PRN  senna 2 Tablet(s) Oral at bedtime PRN      Case discussed with resident    Care discussed with pt/family

## 2023-12-28 NOTE — DISCHARGE NOTE PROVIDER - NSDCFUSCHEDAPPT_GEN_ALL_CORE_FT
[facility] Physician [facility]  CARDIOLOGY [address] [facility]  Scheduled Appointment: 03/25/2024     [facility] Physician [facility]  CARDIOLOGY [address] [facility]  Scheduled Appointment: 03/25/2024

## 2023-12-28 NOTE — DISCHARGE NOTE PROVIDER - CARE PROVIDER_API CALL
Taurus Macdonald  Cardiology  [address] [place] [address], [address]  [place] [address]  Phone: [phone]  Fax: [phone]  Follow Up Time: 1 week    Chip Bobo  [address] [address]  Phone: [phone]  Fax: [phone]  Follow Up Time: 1 week   Taurus Macdonald  Cardiology  [address] [place] [address], [address]  [place] [address]  Phone: [phone]  Fax: [phone]  Follow Up Time: 1 week    Chip Bobo  [address] [address]  Phone: [phone]  Fax: [phone]  Follow Up Time: 1 week

## 2023-12-28 NOTE — PROGRESS NOTE ADULT - PROVIDER SPECIALTY LIST ADULT
Hospitalist
Internal Medicine
Internal Medicine
Hospitalist
Hospitalist
Internal Medicine

## 2023-12-28 NOTE — DISCHARGE NOTE PROVIDER - PROVIDER TOKENS
PROVIDER:[TOKEN:[[id number]],FOLLOWUP:[1 week]],PROVIDER:[TOKEN:[[id number]],FOLLOWUP:[1 week]] PROVIDER:[TOKEN:[[id number]],FOLLOWUP:[1 week]],PROVIDER:[TOKEN:[[id number]],FOLLOWUP:[1 week]]

## 2023-12-28 NOTE — DISCHARGE NOTE NURSING/CASE MANAGEMENT/SOCIAL WORK - NSDCPEFALRISK_GEN_ALL_CORE
For information on Fall & Injury Prevention, visit: [email or web address] OR  [email or web address] OR  https://www.cdc.gov/steadi/patient.html For information on Fall & Injury Prevention, visit: [email or web address] OR  [email or web address] OR  https://www.cdc.gov/steadi/patient.html

## 2023-12-28 NOTE — DISCHARGE NOTE PROVIDER - CARE PROVIDERS DIRECT ADDRESSES
,DirectAddress_Unknown,[email or web address] ,DirectAddress_Unknown,[email or web address]

## 2023-12-28 NOTE — DISCHARGE NOTE PROVIDER - NSDCMRMEDTOKEN_GEN_ALL_CORE_FT
atorvastatin 20 mg oral tablet: 1 tab(s) orally once a day  famotidine 20 mg oral tablet: 1 tab(s) orally once a day  furosemide 40 mg oral tablet: 1 tab(s) orally once a day  losartan 50 mg oral tablet: 1 tab(s) orally once a day  polyethylene glycol 3350 oral powder for reconstitution: 17 gram(s) orally once a day as needed for  constipation  senna leaf extract oral tablet: 2 tab(s) orally once a day (at bedtime) as needed for  constipation  warfarin 3 mg oral tablet: 1 tab(s) orally once a day (at bedtime) Until Coumadin clinic appoittment

## 2023-12-28 NOTE — DISCHARGE NOTE PROVIDER - NSDCCPCAREPLAN_GEN_ALL_CORE_FT
PRINCIPAL DISCHARGE DIAGNOSIS  Diagnosis: Acute on chronic systolic congestive heart failure  Assessment and Plan of Treatment: You came to the hospital due to an episode of heart failure. You were evaluated by the cardiology team and treated with Lasix to help offload fluid. You were also found to have fluid in your lungs. You were evaluated by the pulmonology team and underwent a thoracentesis to help remove that fluid.   Please follow up with your primary care provider in the next 1-2 weeks, as well as with the cardiology team as an outpatient. Take your medications as prescribed. If your symptoms return, come back to the hospital for further evaluation.      SECONDARY DISCHARGE DIAGNOSES  Diagnosis: Acute on chronic systolic congestive heart failure  Assessment and Plan of Treatment:

## 2023-12-28 NOTE — DISCHARGE NOTE PROVIDER - HOSPITAL COURSE
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  Patient reports shortness of breath worse than her baseline associated with increased lower extremity swelling.  missed 2 days of Lasix.  Denies fever/chills, sore throat, cough productivity, CP, abdominal pain, N/V/D, urinary symptoms.  Lives at home with her sister.  CONVERSATION WITH PHARMACIST AT [facility] PATIENT DID NOT  NEW HIGHER DOSE OF METOPROLOL WHICH WAS PRESCRIBED IN OCTOBER. (250MG SUCCINATE)  In the ED   /95  afebrile spo2 91 on RA   CXR with obvious L sided large effusion .  ECG 2:1 aflutter   Prolonged QTC  510   [facility] INR 1.29, BNP 3847 RVP negative high sensitivity troponin 49   patient started on a cardizem drip for Chronic atrial Flutter with Rapid Ventricular Response, weaned off and started on metoprolol.   ECHO done in October this year showed EF is 41%. Mod AS.   Admitted for CHF exacerbation and L sided pleural effusion.    Floor course:  -evaluated by cardiology, recommended continuing diuresis with IV lasix, and monitoring trops  -troponin 49 -> 43  -evaluated by pulm for pleural effusion, underwent thoracentesis. 100cc drained. Transudative in nature. CXR and symptoms improved.   -trended INR as patient on warfarin. Goal INR 2.5-3.5.   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  Patient reports shortness of breath worse than her baseline associated with increased lower extremity swelling.  missed 2 days of Lasix.  Denies fever/chills, sore throat, cough productivity, CP, abdominal pain, N/V/D, urinary symptoms.  Lives at home with her sister.  CONVERSATION WITH PHARMACIST AT [facility] PATIENT DID NOT  NEW HIGHER DOSE OF METOPROLOL WHICH WAS PRESCRIBED IN OCTOBER. (250MG SUCCINATE)  In the ED   /95  afebrile spo2 91 on RA   CXR with obvious L sided large effusion .  ECG 2:1 aflutter   Prolonged QTC  510   [employer] INR 1.29, BNP 3847 RVP negative high sensitivity troponin 49   patient started on a cardizem drip for Chronic atrial Flutter with Rapid Ventricular Response, weaned off and started on metoprolol.   ECHO done in October this year showed EF is 41%. Mod AS.   Admitted for CHF exacerbation and L sided pleural effusion.    Floor course:  -evaluated by cardiology, recommended continuing diuresis with IV lasix, and monitoring trops  -troponin 49 -> 43  -evaluated by pulm for pleural effusion, underwent thoracentesis. 100cc drained. Transudative in nature. CXR and symptoms improved.   -trended INR as patient on warfarin. Goal INR 2.5-3.5.   -evaluated by PT, recommended RONNY  -pt improved symptomatically  -pt stable and medically ready for discharge

## 2023-12-28 NOTE — PROGRESS NOTE ADULT - REASON FOR ADMISSION
HFpEF Exacerbation

## 2023-12-28 NOTE — PROGRESS NOTE ADULT - ASSESSMENT
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  Patient reports shortness of breath worse than her baseline associated with increased lower extremity swelling. Missed 2 days of Lasix.    Acute HFmrEF  Large L sided pleural effusion  S/P AICD  Chronic A-fib/A-flutter  S/P MVR on Coumadin  HTN                  PLAN:    ·	Tele reviewed. Likely A-Fib/A-flutter  ·	EKG: A-Fib 131/min. Non specific ST, T changes  ·	On d/c switch her to Metoprolol Succinate 300 mg po daily  ·	TSH done in October was normal  ·	Old record reviewed. ECHO done in October this year showed EF is 41%. Mod AS  ·	Repeat CXR from 12/28 noted. Decreasing LLL opacity. Check official report  ·	Switch her to Torsemide 20 mg po daily  ·	S/P thoracentesis yesterday. Only 100 cc fluid was drained. Fluid protein is 2.2. Transudate.   ·	INR is 3.2 today. On d/c cont home dose of Coumadin  ·	Low salt diet and water restriction to 1.5 L/D  ·	CXR on admission showed large L pleural effusion  ·	Cont her other meds.   ·	PT consult noted. Recommended SNF placement   ·	Can d/c to SNF today    * Med rec reviewed. Plan of care d/w the pt. Time spent 38 minutes.

## 2023-12-29 LAB
CULTURE RESULTS: NO GROWTH — SIGNIFICANT CHANGE UP
CULTURE RESULTS: NO GROWTH — SIGNIFICANT CHANGE UP
SPECIMEN SOURCE: SIGNIFICANT CHANGE UP
SPECIMEN SOURCE: SIGNIFICANT CHANGE UP

## 2024-01-04 DIAGNOSIS — I50.23 ACUTE ON CHRONIC SYSTOLIC (CONGESTIVE) HEART FAILURE: ICD-10-CM

## 2024-01-04 DIAGNOSIS — I11.0 HYPERTENSIVE HEART DISEASE WITH HEART FAILURE: ICD-10-CM

## 2024-01-04 DIAGNOSIS — J90 PLEURAL EFFUSION, NOT ELSEWHERE CLASSIFIED: ICD-10-CM

## 2024-01-04 DIAGNOSIS — Z91.148 PATIENT'S OTHER NONCOMPLIANCE WITH MEDICATION REGIMEN FOR OTHER REASON: ICD-10-CM

## 2024-01-04 DIAGNOSIS — J96.91 RESPIRATORY FAILURE, UNSPECIFIED WITH HYPOXIA: ICD-10-CM

## 2024-01-04 DIAGNOSIS — Z95.2 PRESENCE OF PROSTHETIC HEART VALVE: ICD-10-CM

## 2024-01-04 DIAGNOSIS — I48.20 CHRONIC ATRIAL FIBRILLATION, UNSPECIFIED: ICD-10-CM

## 2024-01-04 DIAGNOSIS — E83.42 HYPOMAGNESEMIA: ICD-10-CM

## 2024-01-04 DIAGNOSIS — Z86.718 PERSONAL HISTORY OF OTHER VENOUS THROMBOSIS AND EMBOLISM: ICD-10-CM

## 2024-01-04 DIAGNOSIS — Z88.2 ALLERGY STATUS TO SULFONAMIDES: ICD-10-CM

## 2024-01-04 DIAGNOSIS — Z95.810 PRESENCE OF AUTOMATIC (IMPLANTABLE) CARDIAC DEFIBRILLATOR: ICD-10-CM

## 2024-01-04 DIAGNOSIS — I48.92 UNSPECIFIED ATRIAL FLUTTER: ICD-10-CM

## 2024-01-04 DIAGNOSIS — Z79.01 LONG TERM (CURRENT) USE OF ANTICOAGULANTS: ICD-10-CM

## 2024-01-04 DIAGNOSIS — K59.00 CONSTIPATION, UNSPECIFIED: ICD-10-CM

## 2024-01-10 RX ORDER — ACETAMINOPHEN 500 MG/1
500 TABLET ORAL 3 TIMES DAILY
Qty: 120 | Refills: 0 | Status: DISCONTINUED | COMMUNITY
Start: 2023-11-29 | End: 2024-01-10

## 2024-01-10 RX ORDER — NIFEDIPINE 60 MG/1
60 TABLET, EXTENDED RELEASE ORAL DAILY
Qty: 30 | Refills: 2 | Status: DISCONTINUED | COMMUNITY
Start: 2023-10-16 | End: 2024-01-10

## 2024-01-12 ENCOUNTER — APPOINTMENT (OUTPATIENT)
Dept: CARDIOLOGY | Facility: CLINIC | Age: 89
End: 2024-01-12
Payer: MEDICARE

## 2024-01-12 VITALS
BODY MASS INDEX: 25.23 KG/M2 | DIASTOLIC BLOOD PRESSURE: 62 MMHG | SYSTOLIC BLOOD PRESSURE: 122 MMHG | OXYGEN SATURATION: 94 % | HEIGHT: 66 IN | HEART RATE: 100 BPM | WEIGHT: 157 LBS

## 2024-01-12 DIAGNOSIS — I42.8 OTHER CARDIOMYOPATHIES: ICD-10-CM

## 2024-01-12 DIAGNOSIS — I50.22 CHRONIC SYSTOLIC (CONGESTIVE) HEART FAILURE: ICD-10-CM

## 2024-01-12 DIAGNOSIS — E87.70 FLUID OVERLOAD, UNSPECIFIED: ICD-10-CM

## 2024-01-12 DIAGNOSIS — I48.91 UNSPECIFIED ATRIAL FIBRILLATION: ICD-10-CM

## 2024-01-12 PROCEDURE — 93000 ELECTROCARDIOGRAM COMPLETE: CPT

## 2024-01-12 PROCEDURE — 99214 OFFICE O/P EST MOD 30 MIN: CPT

## 2024-01-12 PROCEDURE — 99204 OFFICE O/P NEW MOD 45 MIN: CPT

## 2024-01-16 PROBLEM — E87.70 FLUID OVERLOAD: Status: ACTIVE | Noted: 2024-01-16

## 2024-01-16 PROBLEM — I42.8 NON-ISCHEMIC CARDIOMYOPATHY: Status: ACTIVE | Noted: 2019-05-23

## 2024-01-16 PROBLEM — I50.22 HEART FAILURE WITH MILDLY REDUCED EJECTION FRACTION (HFMREF): Status: ACTIVE | Noted: 2024-01-16

## 2024-01-16 RX ORDER — TORSEMIDE 20 MG/1
20 TABLET ORAL DAILY
Refills: 0 | Status: ACTIVE | COMMUNITY

## 2024-01-16 RX ORDER — SENNOSIDES 8.6 MG TABLETS 8.6 MG/1
8.6 TABLET ORAL DAILY
Refills: 0 | Status: ACTIVE | COMMUNITY

## 2024-01-16 NOTE — ASSESSMENT
[FreeTextEntry1] : HFmrEF/AFIB/Fluid overload  She is slightly fluid overloaded on exam POCUS IVC 2.1 cm non-collapsing Increase Torsemide 20 mg to BID x 3 days. If weight gain of 2 lbs or more in a day, take an extra tablet for that day  Continue Losartan 50 mg daily Continue Metoprolol  mg daily Avoid SGLT2i due to UTI hx Follow up with PCP and general cardiology RTO PRN   The patient was counseled on lifestyle modifications to eat a heart-healthy diet, including sodium restriction and regular exercise. Also, she was instructed to keep a log of his blood pressure, heart rate, and daily weight. The weight is recommended to take first thing in the morning upon voiding. The blood pressure and heart rate can be taken anytime during the day.

## 2024-01-16 NOTE — HISTORY OF PRESENT ILLNESS
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 She was recently admitted to [facility] for ADHF and left-sided pleural effusion from 12/21 to 12/28. She is part of the [facility] program for heart failure at [facility] and is here for hospital follow-up.   The patient arrives in a wheelchair, accompanied by a family member. She is currently at a rehab, getting PT daily. She reports she walks about 100 feet without SOB; her main limitation is generalized weakness. Her weight has been stable at 157 lbs, and her SBP fluctuates from the 100s to the 120s. She denies experiencing chest pain, bleeding, shortness of breath at rest, paroxysmal nocturnal dyspnea (PND), abdominal discomfort, lightheadedness/dizziness, bilateral lower extremity edema, palpitations, and syncope. She takes her medications as indicated; however, she does not follow a low-sodium diet.

## 2024-01-17 ENCOUNTER — TRANSCRIPTION ENCOUNTER (OUTPATIENT)
Age: 89
End: 2024-01-17

## 2024-01-24 ENCOUNTER — TRANSCRIPTION ENCOUNTER (OUTPATIENT)
Age: 89
End: 2024-01-24

## 2024-03-24 ENCOUNTER — NON-APPOINTMENT (OUTPATIENT)
Age: 89
End: 2024-03-24

## 2024-03-25 ENCOUNTER — APPOINTMENT (OUTPATIENT)
Dept: CARDIOLOGY | Facility: CLINIC | Age: 89
End: 2024-03-25
Payer: MEDICARE

## 2024-03-25 PROCEDURE — 93295 DEV INTERROG REMOTE 1/2/MLT: CPT

## 2024-03-25 PROCEDURE — 93296 REM INTERROG EVL PM/IDS: CPT

## 2024-06-23 ENCOUNTER — NON-APPOINTMENT (OUTPATIENT)
Age: 89
End: 2024-06-23

## 2024-06-24 ENCOUNTER — APPOINTMENT (OUTPATIENT)
Dept: CARDIOLOGY | Facility: CLINIC | Age: 89
End: 2024-06-24
Payer: MEDICARE

## 2024-06-24 ENCOUNTER — OUTPATIENT (OUTPATIENT)
Dept: OUTPATIENT SERVICES | Facility: HOSPITAL | Age: 89
LOS: 1 days | End: 2024-06-24
Payer: MEDICARE

## 2024-06-24 DIAGNOSIS — Z95.2 PRESENCE OF PROSTHETIC HEART VALVE: Chronic | ICD-10-CM

## 2024-06-24 DIAGNOSIS — Z12.31 ENCOUNTER FOR SCREENING MAMMOGRAM FOR MALIGNANT NEOPLASM OF BREAST: ICD-10-CM

## 2024-06-24 PROCEDURE — 77067 SCR MAMMO BI INCL CAD: CPT | Mod: 26

## 2024-06-24 PROCEDURE — 77067 SCR MAMMO BI INCL CAD: CPT

## 2024-06-24 PROCEDURE — 77063 BREAST TOMOSYNTHESIS BI: CPT

## 2024-06-24 PROCEDURE — 77063 BREAST TOMOSYNTHESIS BI: CPT | Mod: 26

## 2024-06-24 PROCEDURE — 93296 REM INTERROG EVL PM/IDS: CPT

## 2024-06-24 PROCEDURE — 93295 DEV INTERROG REMOTE 1/2/MLT: CPT

## 2024-06-25 DIAGNOSIS — Z12.31 ENCOUNTER FOR SCREENING MAMMOGRAM FOR MALIGNANT NEOPLASM OF BREAST: ICD-10-CM

## 2024-07-19 ENCOUNTER — APPOINTMENT (OUTPATIENT)
Dept: MEDICATION MANAGEMENT | Facility: CLINIC | Age: 89
End: 2024-07-19

## 2024-07-23 ENCOUNTER — APPOINTMENT (OUTPATIENT)
Dept: MEDICATION MANAGEMENT | Facility: CLINIC | Age: 89
End: 2024-07-23

## 2024-07-23 LAB
INR PPP: 1.23
PT BLD: 13.8

## 2024-07-31 ENCOUNTER — APPOINTMENT (OUTPATIENT)
Dept: MEDICATION MANAGEMENT | Facility: CLINIC | Age: 89
End: 2024-07-31

## 2024-07-31 ENCOUNTER — OUTPATIENT (OUTPATIENT)
Dept: OUTPATIENT SERVICES | Facility: HOSPITAL | Age: 89
LOS: 1 days | End: 2024-07-31
Payer: MEDICARE

## 2024-07-31 DIAGNOSIS — Z98.890 OTHER SPECIFIED POSTPROCEDURAL STATES: Chronic | ICD-10-CM

## 2024-07-31 DIAGNOSIS — I48.91 UNSPECIFIED ATRIAL FIBRILLATION: ICD-10-CM

## 2024-07-31 DIAGNOSIS — Z79.01 LONG TERM (CURRENT) USE OF ANTICOAGULANTS: ICD-10-CM

## 2024-07-31 DIAGNOSIS — Z95.2 PRESENCE OF PROSTHETIC HEART VALVE: Chronic | ICD-10-CM

## 2024-07-31 LAB
INR PPP: 3.54
PT BLD: 38.6

## 2024-07-31 PROCEDURE — G0463: CPT

## 2024-08-01 DIAGNOSIS — Z79.01 LONG TERM (CURRENT) USE OF ANTICOAGULANTS: ICD-10-CM

## 2024-08-01 DIAGNOSIS — I48.91 UNSPECIFIED ATRIAL FIBRILLATION: ICD-10-CM

## 2024-08-07 ENCOUNTER — OUTPATIENT (OUTPATIENT)
Dept: OUTPATIENT SERVICES | Facility: HOSPITAL | Age: 89
LOS: 1 days | End: 2024-08-07
Payer: MEDICARE

## 2024-08-07 ENCOUNTER — APPOINTMENT (OUTPATIENT)
Dept: MEDICATION MANAGEMENT | Facility: CLINIC | Age: 89
End: 2024-08-07

## 2024-08-07 DIAGNOSIS — Z95.2 PRESENCE OF PROSTHETIC HEART VALVE: Chronic | ICD-10-CM

## 2024-08-07 DIAGNOSIS — Z98.890 OTHER SPECIFIED POSTPROCEDURAL STATES: Chronic | ICD-10-CM

## 2024-08-07 DIAGNOSIS — Z79.01 LONG TERM (CURRENT) USE OF ANTICOAGULANTS: ICD-10-CM

## 2024-08-07 DIAGNOSIS — I48.91 UNSPECIFIED ATRIAL FIBRILLATION: ICD-10-CM

## 2024-08-07 PROCEDURE — G0463: CPT

## 2024-08-08 DIAGNOSIS — Z79.01 LONG TERM (CURRENT) USE OF ANTICOAGULANTS: ICD-10-CM

## 2024-08-08 DIAGNOSIS — I48.91 UNSPECIFIED ATRIAL FIBRILLATION: ICD-10-CM

## 2024-08-13 ENCOUNTER — APPOINTMENT (OUTPATIENT)
Dept: MEDICATION MANAGEMENT | Facility: CLINIC | Age: 89
End: 2024-08-13

## 2024-08-13 LAB
INR PPP: 4.16
PT BLD: 45.1

## 2024-08-20 ENCOUNTER — OUTPATIENT (OUTPATIENT)
Dept: OUTPATIENT SERVICES | Facility: HOSPITAL | Age: 89
LOS: 1 days | End: 2024-08-20
Payer: MEDICARE

## 2024-08-20 ENCOUNTER — APPOINTMENT (OUTPATIENT)
Dept: MEDICATION MANAGEMENT | Facility: CLINIC | Age: 89
End: 2024-08-20

## 2024-08-20 DIAGNOSIS — Z79.01 LONG TERM (CURRENT) USE OF ANTICOAGULANTS: ICD-10-CM

## 2024-08-20 DIAGNOSIS — I48.91 UNSPECIFIED ATRIAL FIBRILLATION: ICD-10-CM

## 2024-08-20 LAB
INR PPP: 4.2
PT BLD: 45.5

## 2024-08-20 PROCEDURE — G0463: CPT

## 2024-08-20 RX ORDER — WARFARIN 1 MG/1
1 TABLET ORAL
Qty: 180 | Refills: 3 | Status: ACTIVE | COMMUNITY
Start: 2024-08-20 | End: 2024-12-18

## 2024-08-21 DIAGNOSIS — I48.91 UNSPECIFIED ATRIAL FIBRILLATION: ICD-10-CM

## 2024-08-21 DIAGNOSIS — Z79.01 LONG TERM (CURRENT) USE OF ANTICOAGULANTS: ICD-10-CM

## 2024-08-27 ENCOUNTER — OUTPATIENT (OUTPATIENT)
Dept: OUTPATIENT SERVICES | Facility: HOSPITAL | Age: 89
LOS: 1 days | End: 2024-08-27
Payer: MEDICARE

## 2024-08-27 ENCOUNTER — APPOINTMENT (OUTPATIENT)
Dept: MEDICATION MANAGEMENT | Facility: CLINIC | Age: 89
End: 2024-08-27

## 2024-08-27 DIAGNOSIS — I48.91 UNSPECIFIED ATRIAL FIBRILLATION: ICD-10-CM

## 2024-08-27 DIAGNOSIS — Z95.2 PRESENCE OF PROSTHETIC HEART VALVE: Chronic | ICD-10-CM

## 2024-08-27 DIAGNOSIS — Z79.01 LONG TERM (CURRENT) USE OF ANTICOAGULANTS: ICD-10-CM

## 2024-08-27 DIAGNOSIS — Z98.890 OTHER SPECIFIED POSTPROCEDURAL STATES: Chronic | ICD-10-CM

## 2024-08-27 LAB
INR PPP: 3.24
PT BLD: 35.4

## 2024-08-27 PROCEDURE — G0463: CPT

## 2024-08-28 DIAGNOSIS — I48.91 UNSPECIFIED ATRIAL FIBRILLATION: ICD-10-CM

## 2024-08-28 DIAGNOSIS — Z79.01 LONG TERM (CURRENT) USE OF ANTICOAGULANTS: ICD-10-CM

## 2024-09-04 ENCOUNTER — APPOINTMENT (OUTPATIENT)
Dept: MEDICATION MANAGEMENT | Facility: CLINIC | Age: 89
End: 2024-09-04

## 2024-09-04 ENCOUNTER — OUTPATIENT (OUTPATIENT)
Dept: OUTPATIENT SERVICES | Facility: HOSPITAL | Age: 89
LOS: 1 days | End: 2024-09-04
Payer: MEDICARE

## 2024-09-04 DIAGNOSIS — I48.91 UNSPECIFIED ATRIAL FIBRILLATION: ICD-10-CM

## 2024-09-04 DIAGNOSIS — Z79.01 LONG TERM (CURRENT) USE OF ANTICOAGULANTS: ICD-10-CM

## 2024-09-04 DIAGNOSIS — Z95.2 PRESENCE OF PROSTHETIC HEART VALVE: Chronic | ICD-10-CM

## 2024-09-04 LAB
INR PPP: 2.99
PT BLD: 33

## 2024-09-04 PROCEDURE — G0463: CPT

## 2024-09-05 DIAGNOSIS — Z79.01 LONG TERM (CURRENT) USE OF ANTICOAGULANTS: ICD-10-CM

## 2024-09-05 DIAGNOSIS — I48.91 UNSPECIFIED ATRIAL FIBRILLATION: ICD-10-CM

## 2024-09-10 ENCOUNTER — OUTPATIENT (OUTPATIENT)
Dept: OUTPATIENT SERVICES | Facility: HOSPITAL | Age: 89
LOS: 1 days | End: 2024-09-10
Payer: MEDICARE

## 2024-09-10 ENCOUNTER — APPOINTMENT (OUTPATIENT)
Dept: MEDICATION MANAGEMENT | Facility: CLINIC | Age: 89
End: 2024-09-10

## 2024-09-10 DIAGNOSIS — I48.91 UNSPECIFIED ATRIAL FIBRILLATION: ICD-10-CM

## 2024-09-10 DIAGNOSIS — Z98.890 OTHER SPECIFIED POSTPROCEDURAL STATES: Chronic | ICD-10-CM

## 2024-09-10 DIAGNOSIS — Z51.81 ENCOUNTER FOR THERAPEUTIC DRUG LVL MONITORING: ICD-10-CM

## 2024-09-10 DIAGNOSIS — Z79.01 ENCOUNTER FOR THERAPEUTIC DRUG LVL MONITORING: ICD-10-CM

## 2024-09-10 DIAGNOSIS — Z79.01 LONG TERM (CURRENT) USE OF ANTICOAGULANTS: ICD-10-CM

## 2024-09-10 DIAGNOSIS — I42.8 OTHER CARDIOMYOPATHIES: ICD-10-CM

## 2024-09-10 DIAGNOSIS — I50.9 HEART FAILURE, UNSPECIFIED: ICD-10-CM

## 2024-09-10 LAB
INR PPP: 2.41
PT BLD: 26.5

## 2024-09-10 PROCEDURE — G0463: CPT

## 2024-09-11 DIAGNOSIS — I48.91 UNSPECIFIED ATRIAL FIBRILLATION: ICD-10-CM

## 2024-09-11 DIAGNOSIS — Z79.01 LONG TERM (CURRENT) USE OF ANTICOAGULANTS: ICD-10-CM

## 2024-09-22 ENCOUNTER — NON-APPOINTMENT (OUTPATIENT)
Age: 89
End: 2024-09-22

## 2024-09-23 ENCOUNTER — APPOINTMENT (OUTPATIENT)
Dept: CARDIOLOGY | Facility: CLINIC | Age: 89
End: 2024-09-23
Payer: MEDICARE

## 2024-09-23 PROCEDURE — 93295 DEV INTERROG REMOTE 1/2/MLT: CPT

## 2024-09-23 PROCEDURE — 93296 REM INTERROG EVL PM/IDS: CPT

## 2024-09-25 ENCOUNTER — OUTPATIENT (OUTPATIENT)
Dept: OUTPATIENT SERVICES | Facility: HOSPITAL | Age: 89
LOS: 1 days | End: 2024-09-25
Payer: MEDICARE

## 2024-09-25 ENCOUNTER — APPOINTMENT (OUTPATIENT)
Dept: MEDICATION MANAGEMENT | Facility: CLINIC | Age: 89
End: 2024-09-25

## 2024-09-25 DIAGNOSIS — Z79.01 LONG TERM (CURRENT) USE OF ANTICOAGULANTS: ICD-10-CM

## 2024-09-25 DIAGNOSIS — I48.91 UNSPECIFIED ATRIAL FIBRILLATION: ICD-10-CM

## 2024-09-25 DIAGNOSIS — Z98.890 OTHER SPECIFIED POSTPROCEDURAL STATES: Chronic | ICD-10-CM

## 2024-09-25 LAB
INR PPP: 1.61
PT BLD: 19.1

## 2024-09-25 PROCEDURE — G0463: CPT

## 2024-09-26 DIAGNOSIS — Z79.01 LONG TERM (CURRENT) USE OF ANTICOAGULANTS: ICD-10-CM

## 2024-09-26 DIAGNOSIS — I48.91 UNSPECIFIED ATRIAL FIBRILLATION: ICD-10-CM

## 2024-10-02 ENCOUNTER — APPOINTMENT (OUTPATIENT)
Dept: MEDICATION MANAGEMENT | Facility: CLINIC | Age: 89
End: 2024-10-02

## 2024-10-02 ENCOUNTER — OUTPATIENT (OUTPATIENT)
Dept: OUTPATIENT SERVICES | Facility: HOSPITAL | Age: 89
LOS: 1 days | End: 2024-10-02
Payer: MEDICARE

## 2024-10-02 DIAGNOSIS — Z95.2 PRESENCE OF PROSTHETIC HEART VALVE: Chronic | ICD-10-CM

## 2024-10-02 DIAGNOSIS — Z98.890 OTHER SPECIFIED POSTPROCEDURAL STATES: Chronic | ICD-10-CM

## 2024-10-02 DIAGNOSIS — Z79.01 LONG TERM (CURRENT) USE OF ANTICOAGULANTS: ICD-10-CM

## 2024-10-02 DIAGNOSIS — I48.91 UNSPECIFIED ATRIAL FIBRILLATION: ICD-10-CM

## 2024-10-02 LAB
INR PPP: 1.94
PT BLD: 22.8

## 2024-10-02 PROCEDURE — G0463: CPT

## 2024-10-03 DIAGNOSIS — Z79.01 LONG TERM (CURRENT) USE OF ANTICOAGULANTS: ICD-10-CM

## 2024-10-03 DIAGNOSIS — I48.91 UNSPECIFIED ATRIAL FIBRILLATION: ICD-10-CM

## 2024-10-10 ENCOUNTER — APPOINTMENT (OUTPATIENT)
Dept: MEDICATION MANAGEMENT | Facility: CLINIC | Age: 89
End: 2024-10-10

## 2024-10-10 ENCOUNTER — OUTPATIENT (OUTPATIENT)
Dept: OUTPATIENT SERVICES | Facility: HOSPITAL | Age: 89
LOS: 1 days | End: 2024-10-10
Payer: MEDICARE

## 2024-10-10 DIAGNOSIS — I48.91 UNSPECIFIED ATRIAL FIBRILLATION: ICD-10-CM

## 2024-10-10 DIAGNOSIS — Z95.2 PRESENCE OF PROSTHETIC HEART VALVE: Chronic | ICD-10-CM

## 2024-10-10 DIAGNOSIS — Z79.01 LONG TERM (CURRENT) USE OF ANTICOAGULANTS: ICD-10-CM

## 2024-10-10 LAB
INR PPP: 2.57
PT BLD: 30.3

## 2024-10-10 PROCEDURE — G0463: CPT

## 2024-10-11 DIAGNOSIS — I48.91 UNSPECIFIED ATRIAL FIBRILLATION: ICD-10-CM

## 2024-10-11 DIAGNOSIS — Z79.01 LONG TERM (CURRENT) USE OF ANTICOAGULANTS: ICD-10-CM

## 2024-10-23 ENCOUNTER — APPOINTMENT (OUTPATIENT)
Dept: MEDICATION MANAGEMENT | Facility: CLINIC | Age: 89
End: 2024-10-23

## 2024-10-23 ENCOUNTER — OUTPATIENT (OUTPATIENT)
Dept: OUTPATIENT SERVICES | Facility: HOSPITAL | Age: 89
LOS: 1 days | End: 2024-10-23
Payer: MEDICARE

## 2024-10-23 DIAGNOSIS — Z79.01 LONG TERM (CURRENT) USE OF ANTICOAGULANTS: ICD-10-CM

## 2024-10-23 DIAGNOSIS — Z95.2 PRESENCE OF PROSTHETIC HEART VALVE: Chronic | ICD-10-CM

## 2024-10-23 DIAGNOSIS — I48.91 UNSPECIFIED ATRIAL FIBRILLATION: ICD-10-CM

## 2024-10-23 DIAGNOSIS — Z98.890 OTHER SPECIFIED POSTPROCEDURAL STATES: Chronic | ICD-10-CM

## 2024-10-23 LAB
INR PPP: 2.54
PT BLD: 30

## 2024-10-23 PROCEDURE — G0463: CPT

## 2024-10-24 DIAGNOSIS — Z79.01 LONG TERM (CURRENT) USE OF ANTICOAGULANTS: ICD-10-CM

## 2024-10-24 DIAGNOSIS — I48.91 UNSPECIFIED ATRIAL FIBRILLATION: ICD-10-CM

## 2024-10-31 NOTE — ED ADULT NURSE NOTE - NS ED NURSE PATIENT LEFT UNIT TIME
66 year old man with metastatic colon cancer, on chemo, recent thrombus on port, with recent colonoscopy (for narrowing of sigmoid) with inability to pass scope. Consulted for colon cancer with impending bowel obstruction. Sent in to ED for need for evaluation for colon stent.     Plan for colon stent based on prior endoscopic findings as impending large bowel obstruction. Patient not going for surgery and request by colorectal surgery, which is very reasonable.     Can bowel prep and plan for stent.
21:55

## 2024-11-06 ENCOUNTER — APPOINTMENT (OUTPATIENT)
Dept: MEDICATION MANAGEMENT | Facility: CLINIC | Age: 89
End: 2024-11-06

## 2024-11-06 ENCOUNTER — OUTPATIENT (OUTPATIENT)
Dept: OUTPATIENT SERVICES | Facility: HOSPITAL | Age: 89
LOS: 1 days | End: 2024-11-06
Payer: MEDICARE

## 2024-11-06 DIAGNOSIS — I48.91 UNSPECIFIED ATRIAL FIBRILLATION: ICD-10-CM

## 2024-11-06 DIAGNOSIS — Z79.01 LONG TERM (CURRENT) USE OF ANTICOAGULANTS: ICD-10-CM

## 2024-11-06 DIAGNOSIS — Z98.890 OTHER SPECIFIED POSTPROCEDURAL STATES: Chronic | ICD-10-CM

## 2024-11-06 LAB
INR PPP: 2.13
PT BLD: 24.9

## 2024-11-06 PROCEDURE — G0463: CPT

## 2024-11-07 DIAGNOSIS — I48.91 UNSPECIFIED ATRIAL FIBRILLATION: ICD-10-CM

## 2024-11-07 DIAGNOSIS — Z79.01 LONG TERM (CURRENT) USE OF ANTICOAGULANTS: ICD-10-CM

## 2024-11-20 ENCOUNTER — OUTPATIENT (OUTPATIENT)
Dept: OUTPATIENT SERVICES | Facility: HOSPITAL | Age: 89
LOS: 1 days | End: 2024-11-20
Payer: MEDICARE

## 2024-11-20 ENCOUNTER — APPOINTMENT (OUTPATIENT)
Dept: MEDICATION MANAGEMENT | Facility: CLINIC | Age: 89
End: 2024-11-20

## 2024-11-20 DIAGNOSIS — Z79.01 LONG TERM (CURRENT) USE OF ANTICOAGULANTS: ICD-10-CM

## 2024-11-20 DIAGNOSIS — Z95.2 PRESENCE OF PROSTHETIC HEART VALVE: Chronic | ICD-10-CM

## 2024-11-20 DIAGNOSIS — Z98.890 OTHER SPECIFIED POSTPROCEDURAL STATES: Chronic | ICD-10-CM

## 2024-11-20 DIAGNOSIS — I48.91 UNSPECIFIED ATRIAL FIBRILLATION: ICD-10-CM

## 2024-11-20 LAB
INR PPP: 2.44
PT BLD: 28.5

## 2024-11-20 PROCEDURE — G0463: CPT

## 2024-11-21 DIAGNOSIS — I48.91 UNSPECIFIED ATRIAL FIBRILLATION: ICD-10-CM

## 2024-11-21 DIAGNOSIS — Z79.01 LONG TERM (CURRENT) USE OF ANTICOAGULANTS: ICD-10-CM

## 2024-12-04 ENCOUNTER — APPOINTMENT (OUTPATIENT)
Dept: MEDICATION MANAGEMENT | Facility: CLINIC | Age: 89
End: 2024-12-04

## 2024-12-04 ENCOUNTER — OUTPATIENT (OUTPATIENT)
Dept: OUTPATIENT SERVICES | Facility: HOSPITAL | Age: 89
LOS: 1 days | End: 2024-12-04
Payer: MEDICARE

## 2024-12-04 DIAGNOSIS — Z79.01 LONG TERM (CURRENT) USE OF ANTICOAGULANTS: ICD-10-CM

## 2024-12-04 DIAGNOSIS — Z51.81 ENCOUNTER FOR THERAPEUTIC DRUG LVL MONITORING: ICD-10-CM

## 2024-12-04 DIAGNOSIS — Z79.01 ENCOUNTER FOR THERAPEUTIC DRUG LVL MONITORING: ICD-10-CM

## 2024-12-04 DIAGNOSIS — I48.91 UNSPECIFIED ATRIAL FIBRILLATION: ICD-10-CM

## 2024-12-04 DIAGNOSIS — I42.8 OTHER CARDIOMYOPATHIES: ICD-10-CM

## 2024-12-04 DIAGNOSIS — Z98.890 OTHER SPECIFIED POSTPROCEDURAL STATES: Chronic | ICD-10-CM

## 2024-12-04 LAB
INR PPP: 3.82
PT BLD: 44.5

## 2024-12-04 PROCEDURE — G0463: CPT

## 2024-12-05 DIAGNOSIS — I48.91 UNSPECIFIED ATRIAL FIBRILLATION: ICD-10-CM

## 2024-12-05 DIAGNOSIS — Z79.01 LONG TERM (CURRENT) USE OF ANTICOAGULANTS: ICD-10-CM

## 2024-12-10 ENCOUNTER — RESULT REVIEW (OUTPATIENT)
Age: 89
End: 2024-12-10

## 2024-12-10 ENCOUNTER — INPATIENT (INPATIENT)
Facility: HOSPITAL | Age: 89
LOS: 5 days | Discharge: HOME HEALTH | DRG: 293 | End: 2024-12-16
Attending: INTERNAL MEDICINE | Admitting: STUDENT IN AN ORGANIZED HEALTH CARE EDUCATION/TRAINING PROGRAM
Payer: MEDICARE

## 2024-12-10 VITALS
WEIGHT: 134.04 LBS | OXYGEN SATURATION: 99 % | HEART RATE: 83 BPM | RESPIRATION RATE: 22 BRPM | DIASTOLIC BLOOD PRESSURE: 72 MMHG | SYSTOLIC BLOOD PRESSURE: 117 MMHG | TEMPERATURE: 97 F | HEIGHT: 65 IN

## 2024-12-10 DIAGNOSIS — I50.9 HEART FAILURE, UNSPECIFIED: ICD-10-CM

## 2024-12-10 DIAGNOSIS — Z95.2 PRESENCE OF PROSTHETIC HEART VALVE: Chronic | ICD-10-CM

## 2024-12-10 DIAGNOSIS — Z98.890 OTHER SPECIFIED POSTPROCEDURAL STATES: Chronic | ICD-10-CM

## 2024-12-10 LAB
ALBUMIN SERPL ELPH-MCNC: 3.9 G/DL — SIGNIFICANT CHANGE UP (ref 3.5–5.2)
ALBUMIN SERPL ELPH-MCNC: 3.9 G/DL — SIGNIFICANT CHANGE UP (ref 3.5–5.2)
ALP SERPL-CCNC: 125 U/L — HIGH (ref 30–115)
ALP SERPL-CCNC: 141 U/L — HIGH (ref 30–115)
ALT FLD-CCNC: 15 U/L — SIGNIFICANT CHANGE UP (ref 0–41)
ALT FLD-CCNC: <5 U/L — SIGNIFICANT CHANGE UP (ref 0–41)
ANION GAP SERPL CALC-SCNC: 11 MMOL/L — SIGNIFICANT CHANGE UP (ref 7–14)
ANION GAP SERPL CALC-SCNC: 16 MMOL/L — HIGH (ref 7–14)
ANION GAP SERPL CALC-SCNC: 17 MMOL/L — HIGH (ref 7–14)
APPEARANCE UR: CLEAR — SIGNIFICANT CHANGE UP
AST SERPL-CCNC: 27 U/L — SIGNIFICANT CHANGE UP (ref 0–41)
AST SERPL-CCNC: 75 U/L — HIGH (ref 0–41)
BASOPHILS # BLD AUTO: 0 K/UL — SIGNIFICANT CHANGE UP (ref 0–0.2)
BASOPHILS NFR BLD AUTO: 0 % — SIGNIFICANT CHANGE UP (ref 0–1)
BILIRUB SERPL-MCNC: 0.8 MG/DL — SIGNIFICANT CHANGE UP (ref 0.2–1.2)
BILIRUB SERPL-MCNC: 0.8 MG/DL — SIGNIFICANT CHANGE UP (ref 0.2–1.2)
BILIRUB UR-MCNC: NEGATIVE — SIGNIFICANT CHANGE UP
BUN SERPL-MCNC: 78 MG/DL — CRITICAL HIGH (ref 10–20)
BUN SERPL-MCNC: 81 MG/DL — CRITICAL HIGH (ref 10–20)
BUN SERPL-MCNC: 82 MG/DL — CRITICAL HIGH (ref 10–20)
CALCIUM SERPL-MCNC: 8.7 MG/DL — SIGNIFICANT CHANGE UP (ref 8.4–10.5)
CALCIUM SERPL-MCNC: 9 MG/DL — SIGNIFICANT CHANGE UP (ref 8.4–10.4)
CALCIUM SERPL-MCNC: 9.1 MG/DL — SIGNIFICANT CHANGE UP (ref 8.4–10.5)
CHLORIDE SERPL-SCNC: 94 MMOL/L — LOW (ref 98–110)
CHLORIDE SERPL-SCNC: 98 MMOL/L — SIGNIFICANT CHANGE UP (ref 98–110)
CHLORIDE SERPL-SCNC: 99 MMOL/L — SIGNIFICANT CHANGE UP (ref 98–110)
CO2 SERPL-SCNC: 22 MMOL/L — SIGNIFICANT CHANGE UP (ref 17–32)
CO2 SERPL-SCNC: 23 MMOL/L — SIGNIFICANT CHANGE UP (ref 17–32)
CO2 SERPL-SCNC: 25 MMOL/L — SIGNIFICANT CHANGE UP (ref 17–32)
COLOR SPEC: YELLOW — SIGNIFICANT CHANGE UP
CREAT ?TM UR-MCNC: 67 MG/DL — SIGNIFICANT CHANGE UP
CREAT SERPL-MCNC: 2.5 MG/DL — HIGH (ref 0.7–1.5)
CREAT SERPL-MCNC: 2.5 MG/DL — HIGH (ref 0.7–1.5)
CREAT SERPL-MCNC: 2.6 MG/DL — HIGH (ref 0.7–1.5)
DIFF PNL FLD: ABNORMAL
EGFR: 17 ML/MIN/1.73M2 — LOW
EGFR: 18 ML/MIN/1.73M2 — LOW
EGFR: 18 ML/MIN/1.73M2 — LOW
EOSINOPHIL # BLD AUTO: 0 K/UL — SIGNIFICANT CHANGE UP (ref 0–0.7)
EOSINOPHIL NFR BLD AUTO: 0 % — SIGNIFICANT CHANGE UP (ref 0–8)
FLUAV AG NPH QL: SIGNIFICANT CHANGE UP
FLUBV AG NPH QL: SIGNIFICANT CHANGE UP
GAS PNL BLDV: SIGNIFICANT CHANGE UP
GLUCOSE BLDC GLUCOMTR-MCNC: 90 MG/DL — SIGNIFICANT CHANGE UP (ref 70–99)
GLUCOSE SERPL-MCNC: 102 MG/DL — HIGH (ref 70–99)
GLUCOSE SERPL-MCNC: 112 MG/DL — HIGH (ref 70–99)
GLUCOSE SERPL-MCNC: 96 MG/DL — SIGNIFICANT CHANGE UP (ref 70–99)
GLUCOSE UR QL: NEGATIVE MG/DL — SIGNIFICANT CHANGE UP
HCT VFR BLD CALC: 44.3 % — SIGNIFICANT CHANGE UP (ref 37–47)
HGB BLD-MCNC: 14.3 G/DL — SIGNIFICANT CHANGE UP (ref 12–16)
KETONES UR-MCNC: NEGATIVE MG/DL — SIGNIFICANT CHANGE UP
LEUKOCYTE ESTERASE UR-ACNC: ABNORMAL
LYMPHOCYTES # BLD AUTO: 1.75 K/UL — SIGNIFICANT CHANGE UP (ref 1.2–3.4)
LYMPHOCYTES # BLD AUTO: 7 % — LOW (ref 20.5–51.1)
MCHC RBC-ENTMCNC: 28.9 PG — SIGNIFICANT CHANGE UP (ref 27–31)
MCHC RBC-ENTMCNC: 32.3 G/DL — SIGNIFICANT CHANGE UP (ref 32–37)
MCV RBC AUTO: 89.5 FL — SIGNIFICANT CHANGE UP (ref 81–99)
MONOCYTES # BLD AUTO: 2.5 K/UL — HIGH (ref 0.1–0.6)
MONOCYTES NFR BLD AUTO: 10 % — HIGH (ref 1.7–9.3)
NEUTROPHILS # BLD AUTO: 20.75 K/UL — HIGH (ref 1.4–6.5)
NEUTROPHILS NFR BLD AUTO: 83 % — HIGH (ref 42.2–75.2)
NITRITE UR-MCNC: NEGATIVE — SIGNIFICANT CHANGE UP
NRBC # BLD: SIGNIFICANT CHANGE UP /100 WBCS (ref 0–0)
NT-PROBNP SERPL-SCNC: 9653 PG/ML — HIGH (ref 0–300)
OSMOLALITY UR: 357 MOS/KG — SIGNIFICANT CHANGE UP (ref 50–1200)
PH UR: 6.5 — SIGNIFICANT CHANGE UP (ref 5–8)
PLATELET # BLD AUTO: 186 K/UL — SIGNIFICANT CHANGE UP (ref 130–400)
PMV BLD: 11.4 FL — HIGH (ref 7.4–10.4)
POTASSIUM SERPL-MCNC: 5.8 MMOL/L — HIGH (ref 3.5–5)
POTASSIUM SERPL-MCNC: 5.9 MMOL/L — HIGH (ref 3.5–5)
POTASSIUM SERPL-MCNC: SIGNIFICANT CHANGE UP MMOL/L (ref 3.5–5)
POTASSIUM SERPL-SCNC: 5.8 MMOL/L — HIGH (ref 3.5–5)
POTASSIUM SERPL-SCNC: 5.9 MMOL/L — HIGH (ref 3.5–5)
POTASSIUM SERPL-SCNC: SIGNIFICANT CHANGE UP MMOL/L (ref 3.5–5)
POTASSIUM UR-SCNC: 42 MMOL/L — SIGNIFICANT CHANGE UP
PROT ?TM UR-MCNC: 18 MG/DLG/24H — SIGNIFICANT CHANGE UP
PROT SERPL-MCNC: 6.9 G/DL — SIGNIFICANT CHANGE UP (ref 6–8)
PROT SERPL-MCNC: 8.2 G/DL — HIGH (ref 6–8)
PROT UR-MCNC: SIGNIFICANT CHANGE UP MG/DL
PROT/CREAT UR-RTO: 0.3 RATIO — HIGH (ref 0–0.2)
RBC # BLD: 4.95 M/UL — SIGNIFICANT CHANGE UP (ref 4.2–5.4)
RBC # FLD: 14.5 % — SIGNIFICANT CHANGE UP (ref 11.5–14.5)
RSV RNA NPH QL NAA+NON-PROBE: SIGNIFICANT CHANGE UP
SARS-COV-2 RNA SPEC QL NAA+PROBE: SIGNIFICANT CHANGE UP
SODIUM SERPL-SCNC: 128 MMOL/L — LOW (ref 135–146)
SODIUM SERPL-SCNC: 137 MMOL/L — SIGNIFICANT CHANGE UP (ref 135–146)
SODIUM SERPL-SCNC: 140 MMOL/L — SIGNIFICANT CHANGE UP (ref 135–146)
SODIUM UR-SCNC: 44 MMOL/L — SIGNIFICANT CHANGE UP
SP GR SPEC: 1.01 — SIGNIFICANT CHANGE UP (ref 1–1.03)
TROPONIN SAMPLING TIME: SIGNIFICANT CHANGE UP
TROPONIN T, HIGH SENSITIVITY RESULT: 75 NG/L — CRITICAL HIGH (ref 6–13)
TROPONIN T, HIGH SENSITIVITY RESULT: 82 NG/L — CRITICAL HIGH (ref 6–13)
UROBILINOGEN FLD QL: 0.2 MG/DL — SIGNIFICANT CHANGE UP (ref 0.2–1)
UUN UR-MCNC: 474 MG/DL — SIGNIFICANT CHANGE UP
WBC # BLD: 25 K/UL — HIGH (ref 4.8–10.8)
WBC # FLD AUTO: 25 K/UL — HIGH (ref 4.8–10.8)

## 2024-12-10 PROCEDURE — 93005 ELECTROCARDIOGRAM TRACING: CPT

## 2024-12-10 PROCEDURE — 85025 COMPLETE CBC W/AUTO DIFF WBC: CPT

## 2024-12-10 PROCEDURE — 84156 ASSAY OF PROTEIN URINE: CPT

## 2024-12-10 PROCEDURE — 71045 X-RAY EXAM CHEST 1 VIEW: CPT

## 2024-12-10 PROCEDURE — 84133 ASSAY OF URINE POTASSIUM: CPT

## 2024-12-10 PROCEDURE — 93320 DOPPLER ECHO COMPLETE: CPT

## 2024-12-10 PROCEDURE — 97162 PT EVAL MOD COMPLEX 30 MIN: CPT | Mod: GP

## 2024-12-10 PROCEDURE — 87086 URINE CULTURE/COLONY COUNT: CPT

## 2024-12-10 PROCEDURE — 99285 EMERGENCY DEPT VISIT HI MDM: CPT

## 2024-12-10 PROCEDURE — 83935 ASSAY OF URINE OSMOLALITY: CPT

## 2024-12-10 PROCEDURE — 99223 1ST HOSP IP/OBS HIGH 75: CPT

## 2024-12-10 PROCEDURE — 86140 C-REACTIVE PROTEIN: CPT

## 2024-12-10 PROCEDURE — 81001 URINALYSIS AUTO W/SCOPE: CPT

## 2024-12-10 PROCEDURE — 80053 COMPREHEN METABOLIC PANEL: CPT

## 2024-12-10 PROCEDURE — 85610 PROTHROMBIN TIME: CPT

## 2024-12-10 PROCEDURE — 71250 CT THORAX DX C-: CPT | Mod: 26

## 2024-12-10 PROCEDURE — 82570 ASSAY OF URINE CREATININE: CPT

## 2024-12-10 PROCEDURE — 93312 ECHO TRANSESOPHAGEAL: CPT

## 2024-12-10 PROCEDURE — 93010 ELECTROCARDIOGRAM REPORT: CPT

## 2024-12-10 PROCEDURE — 87641 MR-STAPH DNA AMP PROBE: CPT

## 2024-12-10 PROCEDURE — 84540 ASSAY OF URINE/UREA-N: CPT

## 2024-12-10 PROCEDURE — 87040 BLOOD CULTURE FOR BACTERIA: CPT

## 2024-12-10 PROCEDURE — 94660 CPAP INITIATION&MGMT: CPT

## 2024-12-10 PROCEDURE — 80162 ASSAY OF DIGOXIN TOTAL: CPT

## 2024-12-10 PROCEDURE — 85730 THROMBOPLASTIN TIME PARTIAL: CPT

## 2024-12-10 PROCEDURE — 82962 GLUCOSE BLOOD TEST: CPT

## 2024-12-10 PROCEDURE — 93283 PRGRMG EVAL IMPLANTABLE DFB: CPT

## 2024-12-10 PROCEDURE — 93308 TTE F-UP OR LMTD: CPT | Mod: 26

## 2024-12-10 PROCEDURE — 93325 DOPPLER ECHO COLOR FLOW MAPG: CPT

## 2024-12-10 PROCEDURE — 85027 COMPLETE CBC AUTOMATED: CPT

## 2024-12-10 PROCEDURE — 71045 X-RAY EXAM CHEST 1 VIEW: CPT | Mod: 26

## 2024-12-10 PROCEDURE — 71250 CT THORAX DX C-: CPT | Mod: MC

## 2024-12-10 PROCEDURE — 80202 ASSAY OF VANCOMYCIN: CPT

## 2024-12-10 PROCEDURE — 93306 TTE W/DOPPLER COMPLETE: CPT

## 2024-12-10 PROCEDURE — 97110 THERAPEUTIC EXERCISES: CPT | Mod: GP

## 2024-12-10 PROCEDURE — 36415 COLL VENOUS BLD VENIPUNCTURE: CPT

## 2024-12-10 PROCEDURE — 85652 RBC SED RATE AUTOMATED: CPT

## 2024-12-10 PROCEDURE — 97116 GAIT TRAINING THERAPY: CPT | Mod: GP

## 2024-12-10 PROCEDURE — 83735 ASSAY OF MAGNESIUM: CPT

## 2024-12-10 PROCEDURE — 80048 BASIC METABOLIC PNL TOTAL CA: CPT

## 2024-12-10 PROCEDURE — 84484 ASSAY OF TROPONIN QUANT: CPT

## 2024-12-10 PROCEDURE — 84300 ASSAY OF URINE SODIUM: CPT

## 2024-12-10 PROCEDURE — 84145 PROCALCITONIN (PCT): CPT

## 2024-12-10 PROCEDURE — 87640 STAPH A DNA AMP PROBE: CPT

## 2024-12-10 PROCEDURE — 83605 ASSAY OF LACTIC ACID: CPT

## 2024-12-10 PROCEDURE — 93306 TTE W/DOPPLER COMPLETE: CPT | Mod: 26

## 2024-12-10 RX ORDER — INSULIN REG, HUM S-S BUFF 100/ML
5 VIAL (ML) INJECTION ONCE
Refills: 0 | Status: COMPLETED | OUTPATIENT
Start: 2024-12-10 | End: 2024-12-10

## 2024-12-10 RX ORDER — ACETAMINOPHEN 500MG 500 MG/1
650 TABLET, COATED ORAL EVERY 6 HOURS
Refills: 0 | Status: DISCONTINUED | OUTPATIENT
Start: 2024-12-10 | End: 2024-12-16

## 2024-12-10 RX ORDER — VANCOMYCIN HCL 900 MCG/MG
750 POWDER (GRAM) MISCELLANEOUS EVERY 24 HOURS
Refills: 0 | Status: DISCONTINUED | OUTPATIENT
Start: 2024-12-10 | End: 2024-12-10

## 2024-12-10 RX ORDER — SODIUM ZIRCONIUM CYCLOSILICATE 5 G/5G
10 POWDER, FOR SUSPENSION ORAL EVERY 8 HOURS
Refills: 0 | Status: DISCONTINUED | OUTPATIENT
Start: 2024-12-10 | End: 2024-12-15

## 2024-12-10 RX ORDER — SENNOSIDES 8.6 MG
2 TABLET ORAL AT BEDTIME
Refills: 0 | Status: DISCONTINUED | OUTPATIENT
Start: 2024-12-10 | End: 2024-12-16

## 2024-12-10 RX ORDER — FUROSEMIDE 40 MG/1
40 TABLET ORAL
Refills: 0 | Status: DISCONTINUED | OUTPATIENT
Start: 2024-12-11 | End: 2024-12-14

## 2024-12-10 RX ORDER — SODIUM ZIRCONIUM CYCLOSILICATE 5 G/5G
10 POWDER, FOR SUSPENSION ORAL ONCE
Refills: 0 | Status: DISCONTINUED | OUTPATIENT
Start: 2024-12-10 | End: 2024-12-10

## 2024-12-10 RX ORDER — POLYETHYLENE GLYCOL 3350 17 G/17G
17 POWDER, FOR SOLUTION ORAL DAILY
Refills: 0 | Status: DISCONTINUED | OUTPATIENT
Start: 2024-12-10 | End: 2024-12-16

## 2024-12-10 RX ORDER — METOPROLOL TARTRATE 100 MG/1
300 TABLET, FILM COATED ORAL DAILY
Refills: 0 | Status: DISCONTINUED | OUTPATIENT
Start: 2024-12-10 | End: 2024-12-16

## 2024-12-10 RX ORDER — ACETAMINOPHEN, DIPHENHYDRAMINE HCL, PHENYLEPHRINE HCL 325; 25; 5 MG/1; MG/1; MG/1
3 TABLET ORAL AT BEDTIME
Refills: 0 | Status: DISCONTINUED | OUTPATIENT
Start: 2024-12-10 | End: 2024-12-16

## 2024-12-10 RX ORDER — FAMOTIDINE 20 MG/1
20 TABLET, FILM COATED ORAL DAILY
Refills: 0 | Status: DISCONTINUED | OUTPATIENT
Start: 2024-12-10 | End: 2024-12-16

## 2024-12-10 RX ORDER — VANCOMYCIN HCL 900 MCG/MG
1000 POWDER (GRAM) MISCELLANEOUS
Refills: 0 | Status: DISCONTINUED | OUTPATIENT
Start: 2024-12-10 | End: 2024-12-10

## 2024-12-10 RX ORDER — FUROSEMIDE 40 MG/1
40 TABLET ORAL DAILY
Refills: 0 | Status: DISCONTINUED | OUTPATIENT
Start: 2024-12-10 | End: 2024-12-10

## 2024-12-10 RX ORDER — FUROSEMIDE 40 MG/1
40 TABLET ORAL ONCE
Refills: 0 | Status: COMPLETED | OUTPATIENT
Start: 2024-12-10 | End: 2024-12-10

## 2024-12-10 RX ORDER — SODIUM ZIRCONIUM CYCLOSILICATE 5 G/5G
10 POWDER, FOR SUSPENSION ORAL ONCE
Refills: 0 | Status: COMPLETED | OUTPATIENT
Start: 2024-12-10 | End: 2024-12-10

## 2024-12-10 RX ORDER — CEFTRIAXONE SODIUM 1 G
1000 VIAL (EA) INJECTION EVERY 24 HOURS
Refills: 0 | Status: DISCONTINUED | OUTPATIENT
Start: 2024-12-10 | End: 2024-12-11

## 2024-12-10 RX ORDER — MAGNESIUM, ALUMINUM HYDROXIDE 200-225/5
30 SUSPENSION, ORAL (FINAL DOSE FORM) ORAL EVERY 4 HOURS
Refills: 0 | Status: DISCONTINUED | OUTPATIENT
Start: 2024-12-10 | End: 2024-12-16

## 2024-12-10 RX ORDER — ONDANSETRON HYDROCHLORIDE 4 MG/1
4 TABLET, FILM COATED ORAL EVERY 8 HOURS
Refills: 0 | Status: DISCONTINUED | OUTPATIENT
Start: 2024-12-10 | End: 2024-12-16

## 2024-12-10 RX ADMIN — Medication 5 UNIT(S): at 15:32

## 2024-12-10 RX ADMIN — FUROSEMIDE 40 MILLIGRAM(S): 40 TABLET ORAL at 14:25

## 2024-12-10 RX ADMIN — SODIUM ZIRCONIUM CYCLOSILICATE 10 GRAM(S): 5 POWDER, FOR SUSPENSION ORAL at 21:39

## 2024-12-10 RX ADMIN — Medication 100 MILLIGRAM(S): at 15:33

## 2024-12-10 RX ADMIN — SODIUM ZIRCONIUM CYCLOSILICATE 10 GRAM(S): 5 POWDER, FOR SUSPENSION ORAL at 11:25

## 2024-12-10 RX ADMIN — FUROSEMIDE 40 MILLIGRAM(S): 40 TABLET ORAL at 05:38

## 2024-12-10 RX ADMIN — Medication 50 MILLILITER(S): at 15:32

## 2024-12-10 RX ADMIN — Medication 20 MILLIGRAM(S): at 21:39

## 2024-12-10 NOTE — ED PROVIDER NOTE - CARE PLAN
1 Principal Discharge DX:	Acute exacerbation of CHF (congestive heart failure)  Secondary Diagnosis:	Acute renal failure

## 2024-12-10 NOTE — H&P ADULT - NSHPPHYSICALEXAM_GEN_ALL_CORE
CONSTITUTIONAL: old thin lady not in distress   HEAD: Atraumatic, normocephalic  EYES: EOM intact, PERRLA, conjunctiva and sclera clear  ENT: Supple, no masses, no thyromegaly, no bruits, no JVD; moist mucous membranes  PULMONARY: dec air entry b/l, no wheezing ro crackles, no Resp distress  CARDIOVASCULAR: Regular rate and rhythm; no murmurs, rubs, or gallops  GASTROINTESTINAL: Soft, non-tender, non-distended; bowel sounds present  MUSCULOSKELETAL: 2+ peripheral pulses; no clubbing, no cyanosis, no edema  NEUROLOGY: AAOx3, non-focal  SKIN: No rashes or lesions; warm and dry CONSTITUTIONAL: old thin lady not in distress   HEAD: Atraumatic, normocephalic  EYES: EOM intact, PERRLA, conjunctiva and sclera clear  ENT: Supple, no masses, no thyromegaly, no bruits, no JVD; moist mucous membranes  PULMONARY: dec air entry b/l, no wheezing; basilar crackles, no Resp distress  CARDIOVASCULAR: Regular rate and rhythm; no murmurs, rubs, or gallops  GASTROINTESTINAL: Soft, non-tender, non-distended; bowel sounds present  MUSCULOSKELETAL: 2+ peripheral pulses; no clubbing, no cyanosis, no edema  NEUROLOGY: AAOx3, non-focal  SKIN: No rashes or lesions; warm and dry CONSTITUTIONAL: old thin lady not in distress   HEAD: Atraumatic, normocephalic  EYES: EOM intact, PERRLA, conjunctiva and sclera clear  ENT: Supple, no masses, no thyromegaly, no bruits, no JVD; moist mucous membranes  PULMONARY: dec air entry b/l, no wheezing; basilar crackles present, no Resp distress  CARDIOVASCULAR: Regular rate and rhythm; holosystolic murmur  GASTROINTESTINAL: Soft, non-tender, non-distended; bowel sounds present  MUSCULOSKELETAL: 2+ peripheral pulses; no clubbing, no cyanosis, no edema  NEUROLOGY: AAOx3, non-focal  SKIN: no splinter hemorrhages, palmar nodules; No rashes or lesions; warm and dry

## 2024-12-10 NOTE — ED PROVIDER NOTE - PROGRESS NOTE DETAILS
Bautista Azar, DO: Off BIPAP. On 2 LNC, saturating well. Decreased WOB. Admitted, sign out given to MAR.

## 2024-12-10 NOTE — ED PROVIDER NOTE - CLINICAL SUMMARY MEDICAL DECISION MAKING FREE TEXT BOX
88 yo F, hx of afib on coumadin, prosthetic mitral valve, CHF with decreased EF, most recently around 40% sp AICD placement here for assessment of dyspnea. Sx began 2 days ago, now worse, unable to ambulate or speak in full sentences without dyspnea. Associated with cough, congestion, diarrhea.     Patient does not use O2 at home, in ED was hypoxic to high 80s on RA, improved with NC and further with BIPAP.     Patient with tachypnea, speaking in full sentences once on BiPAP, RRR, soft, NT, ND abdomen, has bibasilar crackles, no LE edema.    CXR witth pulmonary vascular congestion, labs consistent with this plus new KATHY. Patient received IV lasix, was able to be weaned of BiPAP, stable for admission to floor for acute exacerbation of CHF.    Of note, labs with WBC of 25 and slightly elevated lactate, at this time patient has no signs of sepsis -- no PNA, fever, dysuria. No indication for full sepsis work up, abx and fluid resuscitation is contraindicated.

## 2024-12-10 NOTE — H&P ADULT - ASSESSMENT
The patient is an 89-year-old female with a significant past medical history of CHF with an EF of 41% as of October 2023, atrial fibrillation on Coumadin, a prosthetic mitral valve, and an AICD, presents with shortness of breath that began 2 days prior to admission.     She reports associated symptoms of cough, congestion, and diarrhea. On the evening of admission, she experienced severe dyspnea, prompting her visit to the ER. At baseline, she can walk and talk without breathing difficulties and does not use home oxygen.    In the ER, she was found to be hypoxic on room air, requiring 6L nasal cannula (NC) oxygen. She was tachypneic and initially unable to speak in full sentences due to dyspnea but improved with BiPAP support, allowing her to speak fully.  She has bibasilar crackles, and no lower extremity edema.    T(F): 96.7 (12-10-24 @ 07:56), Max: 97.4 (12-10-24 @ 02:15)  HR: 78 (12-10-24 @ 07:56) (76 - 83)  BP: 108/74 (12-10-24 @ 07:56) (108/74 - 117/72)  RR: 17 (12-10-24 @ 07:56) (17 - 22)  SpO2: 98% (12-10-24 @ 07:56) (88% - 99%)    Labs s/f  wbc 25, trop 75, BNP 9.6k, BUN 82 Cr 2.6 (1.1 baseline), Na 137, K 5.8  venous lactate 2.4 vbg pH: 7.30pCO2: 57 mmHgHCO3, Venous: 28   ekg showing sinus rhythm, no st elevation  CXR showed pulmonary vascular congestion w/ b/l costophrenic blunting, and lab results indicated new acute kidney injury (KATHY).     The patient received IV Lasix and was weaned off BiPAP, becoming stable enough for admission to the floor for acute CHF exacerbation.  On preliminary read of echo, possible Mitral valve vegetation was noted.    #Acute hypoxemic Resp failure  #Acute on chronic HFmrEF, s/p AICD, r/o ACS  #Possible PNA, GNR  - cxr c/w volume overload  - bnp 9.6k; on home torsemide 20  - c/w bipap as needed  - strict ins/outs, fluid restricted diet, AM CXR, f/u TTE   - trop 75 --> f/u repeat  - start 40 IV lasix daily    #Possible Mitral Valve Vegetation  - sepsis not present on admission  - wbc 25, venous lactate 2.4, pH 7.3  - f/u final Echo read; prelim showing possible vegetation  - f/u x2 stat blood cultures  - start Vanc and ceftriaxone  - ID consult  - f/u repeat lactate; avoid    #KATHY  #Hyper K  - cr 2.6 (baseline 1.1)  - trend creatinine and urine output as above  - hold losartan, restart when Cr and K improve  - s/p lokelma  - f/u repeat Potassium    #Chronic A-fib/A-flutter  #S/P MVR on Coumadin  #HTN  - c/w toprol 300  - daily INR and coumadin 3mg orders    -------------------------------------------------------------------------------------------------------------------------------------  #DVT PPX: Warfarin  #GI PPX: Panto  #Diet: fluid restrict  #Code Status: full code  #Activity Order: increase as tolerated   #Dispo/Needs: inpt tele, PT    #Handoff  - f/u Trop, Lactate, BMP for Hyper K; STAT cultures, ID recs, Final Echo read; daily INRs and warfarin (CHF vs PNA/Mitral Vegetation)     The patient is an 89-year-old female with a significant past medical history of CHF with an EF of 41% as of October 2023, atrial fibrillation on Coumadin, a prosthetic mitral valve, and an AICD, presents with shortness of breath that began 2 days prior to admission.     She reports associated symptoms of cough, congestion, and diarrhea. On the evening of admission, she experienced severe dyspnea, prompting her visit to the ER. At baseline, she can walk and talk without breathing difficulties and does not use home oxygen.    In the ER, she was found to be hypoxic on room air, requiring 6L nasal cannula (NC) oxygen. She was tachypneic and initially unable to speak in full sentences due to dyspnea but improved with BiPAP support, allowing her to speak fully.  She has bibasilar crackles, and no lower extremity edema.    T(F): 96.7 (12-10-24 @ 07:56), Max: 97.4 (12-10-24 @ 02:15)  HR: 78 (12-10-24 @ 07:56) (76 - 83)  BP: 108/74 (12-10-24 @ 07:56) (108/74 - 117/72)  RR: 17 (12-10-24 @ 07:56) (17 - 22)  SpO2: 98% (12-10-24 @ 07:56) (88% - 99%)    Labs s/f  wbc 25, trop 75, BNP 9.6k, BUN 82 Cr 2.6 (1.1 baseline), Na 137, K 5.8  venous lactate 2.4 vbg pH: 7.30pCO2: 57 mmHgHCO3, Venous: 28   ekg showing sinus rhythm, no st elevation  CXR showed pulmonary vascular congestion w/ b/l costophrenic blunting, and lab results indicated new acute kidney injury (KATHY).     The patient received IV Lasix and was weaned off BiPAP, becoming stable enough for admission to the floor for acute CHF exacerbation.  On preliminary read of echo, possible Mitral valve vegetation was noted.    #Acute hypoxemic Resp failure  #Acute on chronic HFmrEF, s/p AICD, r/o ACS  #Possible PNA, GNR  - cxr c/w volume overload  - bnp 9.6k; on home torsemide 20  - c/w bipap as needed  - strict ins/outs, fluid restricted diet, AM CXR, f/u TTE   - trop 75 --> f/u repeat  - start 40 IV lasix daily    #Possible Mitral Valve Vegetation  - sepsis not present on admission  - wbc 25, venous lactate 2.4, pH 7.3  - f/u final Echo read; prelim showing possible vegetation  - f/u x2 stat blood cultures  - start Vanc and ceftriaxone  - ID consult  - f/u repeat lactate; avoid volume overload    #KATHY  #Hyper K  - cr 2.6 (baseline 1.1)  - trend creatinine and urine output as above  - hold losartan, restart when Cr and K improve  - s/p lokelma  - f/u repeat Potassium    #Chronic A-fib/A-flutter  #S/P MVR on Coumadin  #HTN  - c/w toprol 300  - daily INR and coumadin 3mg orders    -------------------------------------------------------------------------------------------------------------------------------------  #DVT PPX: Warfarin  #GI PPX: Panto  #Diet: fluid restrict  #Code Status: full code  #Activity Order: increase as tolerated   #Dispo/Needs: inpt tele, PT    #Handoff  - f/u Trop, Lactate, BMP for Hyper K; STAT cultures, ID recs, Final Echo read; daily INRs and warfarin (CHF vs PNA/Mitral Vegetation)     The patient is an 89-year-old female with a significant past medical history of CHF with an EF of 41% as of October 2023, atrial fibrillation on Coumadin, a prosthetic mitral valve, and an AICD, presents with shortness of breath that began 2 days prior to admission.     She reports associated symptoms of cough, congestion, and diarrhea. On the evening of admission, she experienced severe dyspnea, prompting her visit to the ER. At baseline, she can walk and talk without breathing difficulties and does not use home oxygen.    In the ER, she was found to be hypoxic on room air, requiring 6L nasal cannula (NC) oxygen. She was tachypneic and initially unable to speak in full sentences due to dyspnea but improved with BiPAP support, allowing her to speak fully.  She has bibasilar crackles, and no lower extremity edema.    T(F): 96.7 (12-10-24 @ 07:56), Max: 97.4 (12-10-24 @ 02:15)  HR: 78 (12-10-24 @ 07:56) (76 - 83)  BP: 108/74 (12-10-24 @ 07:56) (108/74 - 117/72)  RR: 17 (12-10-24 @ 07:56) (17 - 22)  SpO2: 98% (12-10-24 @ 07:56) (88% - 99%)    Labs s/f  wbc 25, trop 75, BNP 9.6k, BUN 82 Cr 2.6 (1.1 baseline), Na 137, K 5.8  venous lactate 2.4 vbg pH: 7.30pCO2: 57 mmHgHCO3, Venous: 28   ekg showing sinus rhythm, no st elevation  CXR showed pulmonary vascular congestion w/ b/l costophrenic blunting, and lab results indicated new acute kidney injury (KATHY).     The patient received IV Lasix and was weaned off BiPAP, becoming stable enough for admission to the floor for acute CHF exacerbation.  On preliminary read of echo, possible Mitral valve vegetation was noted.    #Acute hypoxemic Resp failure  #Acute on chronic HFmrEF (ef 41%), s/p AICD, r/o ACS  #Possible PNA, GNR  - cxr c/w volume overload  - bnp 9.6k; on home torsemide 20  - c/w bipap as needed  - strict ins/outs, fluid restricted diet, AM CXR, f/u TTE   -  f/u sputum cultures, procal, mrsa,  - trop 75 --> 82 f/u repeat  - start 40 IV lasix daily    #Possible Mitral Valve Vegetation, rule out infective endocarditis  - sepsis not present on admission  - wbc 25, venous lactate 2.4, pH 7.3  - f/u final Echo read; prelim showing possible vegetation  - f/u x2 stat blood cultures  - start Vanc and ceftriaxone  - ID consult  - f/u repeat lactate; avoid volume overload  - f/u ESR, CRP, Procal    #KATHY  #Hyper K  - cr 2.6 (baseline 1.1)  - trend creatinine and urine output as above  - hold losartan, restart when Cr and K improve  - s/p lokelma  - ordered insulin/d50  - f/u repeat Potassium    #Chronic A-fib/A-flutter  #S/P MVR on Coumadin  #HTN  - c/w toprol 300  - daily INR and coumadin 3mg orders    -------------------------------------------------------------------------------------------------------------------------------------  #DVT PPX: Warfarin  #GI PPX: Famotidine  #Diet: fluid restrict  #Code Status: full code  #Activity Order: increase as tolerated   #Dispo/Needs: inpt tele, PT    #Handoff  - f/u Trop, Lactate, BMP for Hyper K; STAT cultures, ID recs, Final Echo read; daily INRs and warfarin (CHF vs PNA/Mitral Vegetation)    Patient is an 89-year-old female with a significant past medical history of CHF with an EF of 41% as of October 2023, atrial fibrillation on Coumadin, a prosthetic mitral valve, and an AICD, presents with shortness of breath that began 2 days prior to admission.     She reports associated symptoms of cough, congestion, and diarrhea. On the evening of admission, she experienced severe dyspnea, prompting her visit to the ER. At baseline, she can walk with a walker and talk without breathing difficulties and does not use home oxygen. She denies fevers, chills, recent sick contacts; denies orthopnea, swelling of extremities, or any rashes.    In the ER, she was found to be hypoxic on room air, requiring 6L nasal cannula (NC) oxygen. She was tachypneic and initially unable to speak in full sentences due to dyspnea but improved with BiPAP support, allowing her to speak fully.  She has bibasilar crackles, and no lower extremity edema.    T(F): 96.7 (12-10-24 @ 07:56), Max: 97.4 (12-10-24 @ 02:15)  HR: 78 (12-10-24 @ 07:56) (76 - 83)  BP: 108/74 (12-10-24 @ 07:56) (108/74 - 117/72)  RR: 17 (12-10-24 @ 07:56) (17 - 22)  SpO2: 98% (12-10-24 @ 07:56) (88% - 99%)    Labs s/f  wbc 25, trop 75, BNP 9.6k, BUN 82 Cr 2.6 (1.1 baseline), Na 137, K 5.8  venous lactate 2.4 vbg pH: 7.30pCO2: 57 mmHgHCO3, Venous: 28   ekg showing sinus rhythm, no st elevation  CXR showed pulmonary vascular congestion w/ b/l costophrenic blunting, and lab results indicated new acute kidney injury (KATHY).     On preliminary read of echo, possible Mitral valve vegetation was noted.    The patient received IV Lasix and was weaned off BiPAP, becoming stable enough for admission to the floor for acute CHF exacerbation r/o endocarditis.    Patient's Nephew is Ahsan Bucio who is an MD; he was called and updated on patient's clinical status and medical plan and requests to be updated. [phone]    #Acute hypoxemic Resp failure  #Acute on chronic HFmrEF (ef 41%), s/p AICD, r/o ACS  #Possible PNA, GNR  - cxr c/w volume overload  - bnp 9.6k; on home torsemide 20  - c/w bipap as needed  - strict ins/outs, fluid restricted diet, AM CXR, f/u TTE   -  f/u sputum cultures, procal, mrsa,  - trop 75 --> 82 f/u repeat  - start 40 IV lasix daily    #Possible Mitral Valve Vegetation, rule out infective endocarditis  - sepsis not present on admission; no janeway lesions, splinter hemorrhages, osler nodes  - Has prosthetic mitral valve  - afebrile  - wbc 25, venous lactate 2.4, pH 7.3  - f/u final Echo read; prelim showing possible vegetation  - f/u x2 stat blood cultures (drawn from 2 peripheral sites)  - start Vanc and ceftriaxone  - ID consult  - f/u repeat lactate; avoid volume overload  - f/u ESR, CRP, Procal    #KATHY  #Hyper K  - cr 2.6 (baseline 1.1)  - trend creatinine and urine output as above  - hold losartan, restart when Cr and K improve  - s/p lokelma  - ordered insulin/d50  - f/u repeat Potassium    #Chronic A-fib/A-flutter  #S/P MVR on Coumadin  #HTN  - c/w toprol 300  - daily INR and coumadin 3mg orders    -------------------------------------------------------------------------------------------------------------------------------------  #DVT PPX: Warfarin  #GI PPX: Famotidine  #Diet: fluid restrict  #Code Status: full code  #Activity Order: increase as tolerated   #Dispo/Needs: inpt tele, PT    #Handoff  - f/u Trop, Lactate, BMP for Hyper K; STAT cultures, ID recs, Final Echo read; daily INRs and warfarin (CHF vs PNA/Mitral Vegetation)

## 2024-12-10 NOTE — ED PROVIDER NOTE - OBJECTIVE STATEMENT
89-year-old female with significant past medical history of CHF last EF 10/2023 of 41%, AICD, hypertension A-fib, prosthetic mitral valve on Coumadin presents for shortness of breath that started 2 days ago.  Associated symptoms of cough, congestion, diarrhea.  Tonight she felt like she could not breathe and came to the ER for evaluation.  Baseline she is able to walk and talk without breathing issues.  No home oxygen.  In the ER hypoxic on room air, requiring NC 6 L.

## 2024-12-10 NOTE — H&P ADULT - NSHPLABSRESULTS_GEN_ALL_CORE
Sodium: 128 mmol/L *L* (12-10-24 @ 03:20)  Sodium: 137 mmol/L (12-10-24 @ 05:40)  Sodium: 140 mmol/L (12-10-24 @ 11:25)    Potassium: 5.8 mmol/L *H* (12-10-24 @ 05:40)  Potassium: 5.9 mmol/L *H* (12-10-24 @ 11:25)    Creatinine: 2.6 mg/dL *H* (12-10-24 @ 05:40)  Creatinine: 2.5 mg/dL *H* (12-10-24 @ 11:25)    Hemoglobin: 14.3 g/dL (12-10-24 @ 03:20)    Platelet Count - Automated: 186 K/uL (12-10-24 @ 03:20)      Bilirubin Total: 0.8 mg/dL (12-10-24 @ 03:20)  Bilirubin Total: 0.8 mg/dL (12-10-24 @ 05:40)    WBC Count: 25.00 K/uL *H* (12-10-24 @ 03:20)

## 2024-12-10 NOTE — H&P ADULT - CONVERSATION DETAILS
dw the patient the GOCs, current diagnosis, treatment options and prognosis  pt understands the concepts and ideas of GOCs, she wants to receive all medical measures including resuscitations and intubation if needed

## 2024-12-10 NOTE — ED ADULT NURSE NOTE - NSFALLHARMRISKINTERV_ED_ALL_ED

## 2024-12-10 NOTE — H&P ADULT - HISTORY OF PRESENT ILLNESS
The patient is an 89-year-old female with a significant past medical history of CHF with an EF of 41% as of October 2023, atrial fibrillation on Coumadin, a prosthetic mitral valve, and an AICD, presents with shortness of breath that began 2 days prior to admission.     She reports associated symptoms of cough, congestion, and diarrhea. On the evening of admission, she experienced severe dyspnea, prompting her visit to the ER. At baseline, she can walk and talk without breathing difficulties and does not use home oxygen.    In the ER, she was found to be hypoxic on room air, requiring 6L nasal cannula (NC) oxygen. She was tachypneic and initially unable to speak in full sentences due to dyspnea but improved with BiPAP support, allowing her to speak fully.  She has bibasilar crackles, and no lower extremity edema.    T(F): 96.7 (12-10-24 @ 07:56), Max: 97.4 (12-10-24 @ 02:15)  HR: 78 (12-10-24 @ 07:56) (76 - 83)  BP: 108/74 (12-10-24 @ 07:56) (108/74 - 117/72)  RR: 17 (12-10-24 @ 07:56) (17 - 22)  SpO2: 98% (12-10-24 @ 07:56) (88% - 99%)    Labs s/f  wbc 25, trop 75, BNP 9.6k, BUN 82 Cr 2.6 (1.1 baseline), Na 137, K 5.8  venous lactate 2.4 vbg pH: 7.30pCO2: 57 mmHgHCO3, Venous: 28   ekg showing sinus rhythm, no st elevation  CXR showed pulmonary vascular congestion w/ b/l costophrenic blunting, and lab results indicated new acute kidney injury (KATHY).     The patient received IV Lasix and was weaned off BiPAP, becoming stable enough for admission to the floor for acute CHF exacerbation.  On preliminary read of echo, possible Mitral valve vegetation was noted.   Patient is an 89-year-old female with a significant past medical history of CHF with an EF of 41% as of October 2023, atrial fibrillation on Coumadin, a prosthetic mitral valve, and an AICD, presents with shortness of breath that began 2 days prior to admission.     She reports associated symptoms of cough, congestion, and diarrhea. On the evening of admission, she experienced severe dyspnea, prompting her visit to the ER. At baseline, she can walk and talk without breathing difficulties and does not use home oxygen. She denies fevers, chills, recent sick contacts.    In the ER, she was found to be hypoxic on room air, requiring 6L nasal cannula (NC) oxygen. She was tachypneic and initially unable to speak in full sentences due to dyspnea but improved with BiPAP support, allowing her to speak fully.  She has bibasilar crackles, and no lower extremity edema.    T(F): 96.7 (12-10-24 @ 07:56), Max: 97.4 (12-10-24 @ 02:15)  HR: 78 (12-10-24 @ 07:56) (76 - 83)  BP: 108/74 (12-10-24 @ 07:56) (108/74 - 117/72)  RR: 17 (12-10-24 @ 07:56) (17 - 22)  SpO2: 98% (12-10-24 @ 07:56) (88% - 99%)    Labs s/f  wbc 25, trop 75, BNP 9.6k, BUN 82 Cr 2.6 (1.1 baseline), Na 137, K 5.8  venous lactate 2.4 vbg pH: 7.30pCO2: 57 mmHgHCO3, Venous: 28   ekg showing sinus rhythm, no st elevation  CXR showed pulmonary vascular congestion w/ b/l costophrenic blunting, and lab results indicated new acute kidney injury (KATHY).     On preliminary read of echo, possible Mitral valve vegetation was noted.    The patient received IV Lasix and was weaned off BiPAP, becoming stable enough for admission to the floor for acute CHF exacerbation in s/o suspected mitral valve vegetation.     Patient is an 89-year-old female with a significant past medical history of CHF with an EF of 41% as of October 2023, atrial fibrillation on Coumadin, a prosthetic mitral valve, and an AICD, presents with shortness of breath that began 2 days prior to admission.     She reports associated symptoms of cough, congestion, and diarrhea. On the evening of admission, she experienced severe dyspnea, prompting her visit to the ER. At baseline, she can walk with a walker and talk without breathing difficulties and does not use home oxygen. She denies fevers, chills, recent sick contacts; denies orthopnea, swelling of extremities, or any rashes.    In the ER, she was found to be hypoxic on room air, requiring 6L nasal cannula (NC) oxygen. She was tachypneic and initially unable to speak in full sentences due to dyspnea but improved with BiPAP support, allowing her to speak fully.  She has bibasilar crackles, and no lower extremity edema.    T(F): 96.7 (12-10-24 @ 07:56), Max: 97.4 (12-10-24 @ 02:15)  HR: 78 (12-10-24 @ 07:56) (76 - 83)  BP: 108/74 (12-10-24 @ 07:56) (108/74 - 117/72)  RR: 17 (12-10-24 @ 07:56) (17 - 22)  SpO2: 98% (12-10-24 @ 07:56) (88% - 99%)    Labs s/f  wbc 25, trop 75, BNP 9.6k, BUN 82 Cr 2.6 (1.1 baseline), Na 137, K 5.8  venous lactate 2.4 vbg pH: 7.30pCO2: 57 mmHgHCO3, Venous: 28   ekg showing sinus rhythm, no st elevation  CXR showed pulmonary vascular congestion w/ b/l costophrenic blunting, and lab results indicated new acute kidney injury (KATHY).     On preliminary read of echo, possible Mitral valve vegetation was noted.    The patient received IV Lasix and was weaned off BiPAP, becoming stable enough for admission to the floor for acute CHF exacerbation r/o endocarditis.    Patient's Nephew is Ahsan Bucio who is an MD; he was called and updated on patient's clinical status and medical plan and requests to be updated. [phone]

## 2024-12-10 NOTE — PHARMACOTHERAPY INTERVENTION NOTE - COMMENTS
I messaged Dr Banks and recomemmeded to adjust dose of Vancomycin from 750 mg q24h to 1000 mg q48h as per pk calculatiosn and gfr= 18. Dr Banks will adjust dose

## 2024-12-10 NOTE — H&P ADULT - ATTENDING COMMENTS
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    Acute hypoxemic Resp failure  Acute on chronic HFmrEF, s/p AICD  KATHY, Hyper K  Suspected PNA, GNR  Chronic A-fib/A-flutter  S/P MVR on Coumadin  HTN    PLANs:    - ddx is multifocal PNA VS Acute HF  - clinically euvolemic but likey HF w elevated BNP, and KATHY (not expected to have Cr 2.6 from PNA w no hypotension)  - pending CT chest to r/u PNA, for now would proceed w Diuresis plus Abx  - Lasix 40mg BID, Abx w rocephin/ azithro, check Procal   - monitor CMP (Na/ K, Cr), Lokelma one dose   - trend Trop, tele monitor for 24 hr   - dvt ppx coumadin   - full code  - spent 75 mins eval pt and coordinating care 89 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    Acute hypoxemic Resp failure  Acute on chronic HFmrEF, s/p AICD  KATHY, Hyper K  Suspected PNA, GNR  Chronic A-fib/A-flutter  S/P MVR on Coumadin  HTN    PLANs:    - ddx is multifocal PNA VS Acute HF  - clinically euvolemic but likey HF w elevated BNP, and KATHY (not expected to have Cr 2.6 from PNA w no hypotension)  - pending CT chest to r/u PNA, for now would proceed w Diuresis plus Abx  - Lasix 40mg BID, Abx w rocephin/ azithro, check Procal   - monitor CMP (Na/ K, Cr), Lokelma   - trend Trop, tele monitor for 24 hr   - dvt ppx coumadin   - full code  - spent 75 mins eval pt and coordinating care 88 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    Acute hypoxemic Resp failure  Acute on chronic HFmrEF, s/p AICD  KATHY, Hyper K  Suspected PNA, GNR  Chronic A-fib/A-flutter  S/P MVR on Coumadin  HTN    PLANs:    - Ddx is multifocal PNA VS Acute HF  - clinically euvolemic but likey HF w elevated BNP, and KATHY (not expected to have Cr 2.6 from PNA w no hypotension)  - pending CT chest to r/u PNA, for now would proceed w Diuresis plus Abx  - Lasix 40mg BID, Abx w Rocephin/ Azithro, check Procal   - TTE noted w moderate to severe AS, moderate MR, non mobile mass represents calcified   thrombus or healed endocarditis, moderate TR, severe Pulm HTN,    - check Bcx, Cardio eval for above findings, carolinaley need JAIME   - monitor CMP (Na/ K, Cr), Lokelma   - trend Trop, tele monitor for 24 hr   - dvt ppx coumadin   - full code  - spent 75 mins eval pt and coordinating care 89 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    Acute hypoxemic Resp failure  Hx of HFmrEF, Resolved s/p AICD  KATHY, Hyper K  Suspected PNA, GNR  Chronic A-fib/A-flutter  S/P MVR on Coumadin  HTN    PLANs:    - Ddx is multifocal PNA VS Fluid overload w elevated BNP, and KATHY (not expected to have Cr 2.6 from PNA w no hypotension)  - pending CT chest to r/u PNA, for now would proceed w Diuresis plus Abx  - Lasix 40mg BID, Abx w Rocephin/ Azithro, check Procal   - TTE noted w moderate to severe AS, moderate MR with non mobile mass represents calcified   thrombus or healed endocarditis, cant r/u acute ID, moderate TR, severe Pulm HTN,    - MR is new, likley caused by the Mass  - severe Pulm HTN likley from the MR   - check 2 sets of Bcx, Cardio eval for above findings, will need JAIME   - monitor CMP (Na/ K, Cr), Lokelma   - trend Trop, tele monitor for 24 hr   - dvt ppx coumadin   - full code  - spent 75 mins eval pt and coordinating care

## 2024-12-10 NOTE — ED PROVIDER NOTE - PHYSICAL EXAMINATION
Vitals: Reviewed, 86% RA.  General: NAD, comfortable.   Head: Atraumatic, normocephalic.  Cardio: Irregular rhythm.   Lungs: Good air movement throughout, no distress. LCTAB.   Abdomen: Soft. Bowel sounds present throughout. Nontender.   Extremities: Full AROM. No cyanosis, edema, or rash noted.

## 2024-12-11 LAB
ALBUMIN SERPL ELPH-MCNC: 3.3 G/DL — LOW (ref 3.5–5.2)
ALBUMIN SERPL ELPH-MCNC: 3.4 G/DL — LOW (ref 3.5–5.2)
ALP SERPL-CCNC: 109 U/L — SIGNIFICANT CHANGE UP (ref 30–115)
ALP SERPL-CCNC: 115 U/L — SIGNIFICANT CHANGE UP (ref 30–115)
ALT FLD-CCNC: 14 U/L — SIGNIFICANT CHANGE UP (ref 0–41)
ALT FLD-CCNC: 15 U/L — SIGNIFICANT CHANGE UP (ref 0–41)
ANION GAP SERPL CALC-SCNC: 11 MMOL/L — SIGNIFICANT CHANGE UP (ref 7–14)
ANION GAP SERPL CALC-SCNC: 13 MMOL/L — SIGNIFICANT CHANGE UP (ref 7–14)
APTT BLD: 43.6 SEC — HIGH (ref 27–39.2)
APTT BLD: 43.9 SEC — HIGH (ref 27–39.2)
AST SERPL-CCNC: 22 U/L — SIGNIFICANT CHANGE UP (ref 0–41)
AST SERPL-CCNC: 25 U/L — SIGNIFICANT CHANGE UP (ref 0–41)
BASOPHILS # BLD AUTO: 0.05 K/UL — SIGNIFICANT CHANGE UP (ref 0–0.2)
BASOPHILS NFR BLD AUTO: 0.3 % — SIGNIFICANT CHANGE UP (ref 0–1)
BILIRUB SERPL-MCNC: 0.8 MG/DL — SIGNIFICANT CHANGE UP (ref 0.2–1.2)
BILIRUB SERPL-MCNC: 1.1 MG/DL — SIGNIFICANT CHANGE UP (ref 0.2–1.2)
BUN SERPL-MCNC: 75 MG/DL — CRITICAL HIGH (ref 10–20)
BUN SERPL-MCNC: 76 MG/DL — CRITICAL HIGH (ref 10–20)
CALCIUM SERPL-MCNC: 8.5 MG/DL — SIGNIFICANT CHANGE UP (ref 8.4–10.5)
CALCIUM SERPL-MCNC: 9.1 MG/DL — SIGNIFICANT CHANGE UP (ref 8.4–10.5)
CHLORIDE SERPL-SCNC: 98 MMOL/L — SIGNIFICANT CHANGE UP (ref 98–110)
CHLORIDE SERPL-SCNC: 98 MMOL/L — SIGNIFICANT CHANGE UP (ref 98–110)
CO2 SERPL-SCNC: 27 MMOL/L — SIGNIFICANT CHANGE UP (ref 17–32)
CO2 SERPL-SCNC: 29 MMOL/L — SIGNIFICANT CHANGE UP (ref 17–32)
CREAT SERPL-MCNC: 1.9 MG/DL — HIGH (ref 0.7–1.5)
CREAT SERPL-MCNC: 1.9 MG/DL — HIGH (ref 0.7–1.5)
EGFR: 25 ML/MIN/1.73M2 — LOW
EGFR: 25 ML/MIN/1.73M2 — LOW
EOSINOPHIL # BLD AUTO: 0.09 K/UL — SIGNIFICANT CHANGE UP (ref 0–0.7)
EOSINOPHIL NFR BLD AUTO: 0.5 % — SIGNIFICANT CHANGE UP (ref 0–8)
GLUCOSE SERPL-MCNC: 100 MG/DL — HIGH (ref 70–99)
GLUCOSE SERPL-MCNC: 109 MG/DL — HIGH (ref 70–99)
HCT VFR BLD CALC: 33.4 % — LOW (ref 37–47)
HCT VFR BLD CALC: 35.1 % — LOW (ref 37–47)
HGB BLD-MCNC: 11 G/DL — LOW (ref 12–16)
HGB BLD-MCNC: 11.4 G/DL — LOW (ref 12–16)
IMM GRANULOCYTES NFR BLD AUTO: 0.5 % — HIGH (ref 0.1–0.3)
INR BLD: 3.79 RATIO — HIGH (ref 0.65–1.3)
INR BLD: 4.24 RATIO — HIGH (ref 0.65–1.3)
LACTATE SERPL-SCNC: 1.4 MMOL/L — SIGNIFICANT CHANGE UP (ref 0.7–2)
LYMPHOCYTES # BLD AUTO: 1.37 K/UL — SIGNIFICANT CHANGE UP (ref 1.2–3.4)
LYMPHOCYTES # BLD AUTO: 7.9 % — LOW (ref 20.5–51.1)
MAGNESIUM SERPL-MCNC: 1.8 MG/DL — SIGNIFICANT CHANGE UP (ref 1.8–2.4)
MCHC RBC-ENTMCNC: 28.6 PG — SIGNIFICANT CHANGE UP (ref 27–31)
MCHC RBC-ENTMCNC: 28.9 PG — SIGNIFICANT CHANGE UP (ref 27–31)
MCHC RBC-ENTMCNC: 32.5 G/DL — SIGNIFICANT CHANGE UP (ref 32–37)
MCHC RBC-ENTMCNC: 32.9 G/DL — SIGNIFICANT CHANGE UP (ref 32–37)
MCV RBC AUTO: 87 FL — SIGNIFICANT CHANGE UP (ref 81–99)
MCV RBC AUTO: 88.9 FL — SIGNIFICANT CHANGE UP (ref 81–99)
MONOCYTES # BLD AUTO: 1.84 K/UL — HIGH (ref 0.1–0.6)
MONOCYTES NFR BLD AUTO: 10.6 % — HIGH (ref 1.7–9.3)
MRSA PCR RESULT.: NEGATIVE — SIGNIFICANT CHANGE UP
NEUTROPHILS # BLD AUTO: 14 K/UL — HIGH (ref 1.4–6.5)
NEUTROPHILS NFR BLD AUTO: 80.2 % — HIGH (ref 42.2–75.2)
NRBC # BLD: 0 /100 WBCS — SIGNIFICANT CHANGE UP (ref 0–0)
NRBC # BLD: 0 /100 WBCS — SIGNIFICANT CHANGE UP (ref 0–0)
PLATELET # BLD AUTO: 181 K/UL — SIGNIFICANT CHANGE UP (ref 130–400)
PLATELET # BLD AUTO: 186 K/UL — SIGNIFICANT CHANGE UP (ref 130–400)
PMV BLD: 11.8 FL — HIGH (ref 7.4–10.4)
PMV BLD: 12 FL — HIGH (ref 7.4–10.4)
POTASSIUM SERPL-MCNC: 3.8 MMOL/L — SIGNIFICANT CHANGE UP (ref 3.5–5)
POTASSIUM SERPL-MCNC: 4.7 MMOL/L — SIGNIFICANT CHANGE UP (ref 3.5–5)
POTASSIUM SERPL-SCNC: 3.8 MMOL/L — SIGNIFICANT CHANGE UP (ref 3.5–5)
POTASSIUM SERPL-SCNC: 4.7 MMOL/L — SIGNIFICANT CHANGE UP (ref 3.5–5)
PROT SERPL-MCNC: 5.8 G/DL — LOW (ref 6–8)
PROT SERPL-MCNC: 6.1 G/DL — SIGNIFICANT CHANGE UP (ref 6–8)
PROTHROM AB SERPL-ACNC: >40 SEC — HIGH (ref 9.95–12.87)
PROTHROM AB SERPL-ACNC: >40 SEC — HIGH (ref 9.95–12.87)
RBC # BLD: 3.84 M/UL — LOW (ref 4.2–5.4)
RBC # BLD: 3.95 M/UL — LOW (ref 4.2–5.4)
RBC # FLD: 14.4 % — SIGNIFICANT CHANGE UP (ref 11.5–14.5)
RBC # FLD: 14.6 % — HIGH (ref 11.5–14.5)
SODIUM SERPL-SCNC: 138 MMOL/L — SIGNIFICANT CHANGE UP (ref 135–146)
SODIUM SERPL-SCNC: 138 MMOL/L — SIGNIFICANT CHANGE UP (ref 135–146)
TROPONIN T, HIGH SENSITIVITY RESULT: 108 NG/L — CRITICAL HIGH (ref 6–13)
TROPONIN T, HIGH SENSITIVITY RESULT: 110 NG/L — CRITICAL HIGH (ref 6–13)
WBC # BLD: 17.43 K/UL — HIGH (ref 4.8–10.8)
WBC # BLD: 17.62 K/UL — HIGH (ref 4.8–10.8)
WBC # FLD AUTO: 17.43 K/UL — HIGH (ref 4.8–10.8)
WBC # FLD AUTO: 17.62 K/UL — HIGH (ref 4.8–10.8)

## 2024-12-11 PROCEDURE — 71045 X-RAY EXAM CHEST 1 VIEW: CPT | Mod: 26

## 2024-12-11 PROCEDURE — 93283 PRGRMG EVAL IMPLANTABLE DFB: CPT | Mod: 26

## 2024-12-11 PROCEDURE — 93010 ELECTROCARDIOGRAM REPORT: CPT

## 2024-12-11 PROCEDURE — 99497 ADVNCD CARE PLAN 30 MIN: CPT | Mod: 25

## 2024-12-11 PROCEDURE — 99233 SBSQ HOSP IP/OBS HIGH 50: CPT

## 2024-12-11 RX ORDER — VANCOMYCIN HCL 900 MCG/MG
1000 POWDER (GRAM) MISCELLANEOUS ONCE
Refills: 0 | Status: COMPLETED | OUTPATIENT
Start: 2024-12-11 | End: 2024-12-12

## 2024-12-11 RX ORDER — VANCOMYCIN HCL 900 MCG/MG
750 POWDER (GRAM) MISCELLANEOUS EVERY 24 HOURS
Refills: 0 | Status: DISCONTINUED | OUTPATIENT
Start: 2024-12-11 | End: 2024-12-11

## 2024-12-11 RX ORDER — CEFTRIAXONE SODIUM 1 G
2000 VIAL (EA) INJECTION EVERY 24 HOURS
Refills: 0 | Status: DISCONTINUED | OUTPATIENT
Start: 2024-12-11 | End: 2024-12-13

## 2024-12-11 RX ORDER — VANCOMYCIN HCL 900 MCG/MG
1000 POWDER (GRAM) MISCELLANEOUS ONCE
Refills: 0 | Status: DISCONTINUED | OUTPATIENT
Start: 2024-12-11 | End: 2024-12-11

## 2024-12-11 RX ADMIN — Medication 20 MILLIGRAM(S): at 21:15

## 2024-12-11 RX ADMIN — FAMOTIDINE 20 MILLIGRAM(S): 20 TABLET, FILM COATED ORAL at 11:38

## 2024-12-11 RX ADMIN — Medication 100 MILLIGRAM(S): at 15:29

## 2024-12-11 RX ADMIN — FUROSEMIDE 40 MILLIGRAM(S): 40 TABLET ORAL at 14:01

## 2024-12-11 RX ADMIN — METOPROLOL TARTRATE 300 MILLIGRAM(S): 100 TABLET, FILM COATED ORAL at 05:18

## 2024-12-11 RX ADMIN — Medication 166.67 MILLIGRAM(S): at 11:39

## 2024-12-11 NOTE — PATIENT PROFILE ADULT - OVER THE PAST TWO WEEKS, HAVE YOU FELT LITTLE INTEREST OR PLEASURE IN DOING THINGS?
PHYSICAL THERAPY NOTE          Patient Name: Domi Olivas  SBHEH'F Date: 9/12/2017    Per RN, pt leaving unit for [facility] soon hence will defer PT tx at this time  Will continue to follow as approp  Nsg staff to continue to mobilized pt as tolerated to prevent decline in function  Nsg notified   Moody Trujillo, PT no

## 2024-12-11 NOTE — CHART NOTE - NSCHARTNOTEFT_GEN_A_CORE
[employer] AICD interrogated 12/11/24  Many episodes of AF RVR, currently in NSR. Known on Coumadin  Device functioning properly  Mode AAI<=>DDD  bpm  AF burden 93/1%    Recall EP as needed [id number]

## 2024-12-11 NOTE — PATIENT PROFILE ADULT - PUBLIC BENEFITS
Problem: Infant Inpatient Plan of Care  Goal: Plan of Care Review  Outcome: Met  Goal: Patient-Specific Goal (Individualized)  Outcome: Met  Goal: Absence of Hospital-Acquired Illness or Injury  Outcome: Met  Goal: Optimal Comfort and Wellbeing  Outcome: Met  Goal: Readiness for Transition of Care  Outcome: Met     
  Problem: Infant Inpatient Plan of Care  Goal: Plan of Care Review  Outcome: Ongoing, Progressing  Goal: Patient-Specific Goal (Individualized)  Outcome: Ongoing, Progressing  Goal: Absence of Hospital-Acquired Illness or Injury  Outcome: Ongoing, Progressing  Goal: Optimal Comfort and Wellbeing  Outcome: Ongoing, Progressing  Goal: Readiness for Transition of Care  Outcome: Ongoing, Progressing     
  Problem: Infant Inpatient Plan of Care  Goal: Plan of Care Review  Outcome: Ongoing, Progressing  Goal: Patient-Specific Goal (Individualized)  Outcome: Ongoing, Progressing  Goal: Absence of Hospital-Acquired Illness or Injury  Outcome: Ongoing, Progressing  Goal: Optimal Comfort and Wellbeing  Outcome: Ongoing, Progressing  Goal: Readiness for Transition of Care  Outcome: Ongoing, Progressing     
no

## 2024-12-11 NOTE — CHART NOTE - NSCHARTNOTEFT_GEN_A_CORE
called Pt's nephew, Dr. Bucio, at 15:11 and updated them about the pt's condition and the current plan of care.

## 2024-12-11 NOTE — CONSULT NOTE ADULT - ASSESSMENT
History  CHF  AICD    AFIB    MVR  with    volume     overload      cont  diuresis        echo   poss endocarditis    ID  consult

## 2024-12-11 NOTE — PHYSICAL THERAPY INITIAL EVALUATION ADULT - PERTINENT HX OF CURRENT PROBLEM, REHAB EVAL
89 y/o F pmh HFmrEF with an EF of 41% as of October 2023, atrial fibrillation on Coumadin, a prosthetic mitral valve, and an AICD, presents with shortness of breath that began 2 days prior to admission. Vital signs were significant for hypoxia and tachypnea which improved with Bipap. Found to have lactic acidoses (resolved), hyperkalemia (resolved), elevated troponins, elevated Bnp (9.6k), KATHY (cr2.6 on admission bl 1.1)  Increased pulmonary vascular congestion on CXR, and suspicion for mass on the prosthetic valve raising suspicion for endocarditis

## 2024-12-11 NOTE — CONSULT NOTE ADULT - ATTENDING COMMENTS
89-year-old female with a significant PMH of CHF with an EF of 41% as of October 2023, atrial fibrillation on Coumadin, a prosthetic mitral valve, and an AICD, presents with shortness of breath that began 2 days prior to admission.     She reports associated symptoms of cough, congestion, and diarrhea. On the evening of admission, she experienced severe dyspnea, prompting her visit to the ER. At baseline, she can walk with a walker and talk without breathing difficulties and does not use home oxygen. She denies fevers, chills, recent sick contacts; denies orthopnea, swelling of extremities, or any rashes.    ID consulted to r/o infective endocarditis.    IMPRESSION/RECOMMENDATIONS  Immunosuppression/Immunosenescence ( above age 60 yrs there is a exponential decline in immunity which could result in poor clinical outcomes.  Acute illness ( MV endocarditis )which poses a threat to life or bodily function without treatment   Acute Hypoxic respiratory failure on chronic CHF leading to initial Bipap  Mitral Valve Vegetation / possible infective endocarditis  12/10 CT chest : no focal opacity. Congestion . ( Independent interpretation of test : no PNA  12/11 CXR increased markings left . ( Independent interpretation of test : CHF  SOB secondary to cardiac issues ( MV ) and not secondary to a bacterial PNA  MV prosthetic valve non mobile mass with valvular failure leading to cute CHF  R/o infectious endocarditis  12/10 TTE :  S/p mitral valve replacement. There is a non-mobile echodense mass measuring 0.9 x 0.6cm attached anteriorly with resultant moderate, posteriorly directed valvular regurgitation. The mean transvalvular gradient is 5mmHg at 90bpm. Findings may be consistent with calcified thrombus or healed endocarditis. Acute endocarditis cannot be excluded.    CBC :  ( WBC 25  ), ( Hg 14.3   ), CMP ( Cr 1.9   ) reviewed .     #Acute hypoxemic Resp failure  #Acute on chronic HFmrEF (ef 41%), s/p AICD, r/o ACS  #Chronic A-fib/A-flutter  #S/P MVR on Coumadin  #HTN  #KATHY    -JAIME  -BCX x 3  -Start ceftriaxone 2g IV q 24h  -start Vancomycin 1 g IV x 1 dose, subsequent dose adjustments based on AUC/ONELIA per clinical pharmacist.  -FU with Cardiology    Discussion of management/test results/antibiotic regimen  with primary medical team.

## 2024-12-11 NOTE — CONSULT NOTE ADULT - SUBJECTIVE AND OBJECTIVE BOX
Patient is a 89y old  Female who presents with a chief complaint of Cough/SOB (11 Dec 2024 08:51)      HPI:  Patient is an 89-year-old female with a significant past medical history of CHF with an EF of 41% as of October 2023, atrial fibrillation on Coumadin, a prosthetic mitral valve, and an AICD, presents with shortness of breath that began 2 days prior to admission.     She reports associated symptoms of cough, congestion, and diarrhea. On the evening of admission, she experienced severe dyspnea, prompting her visit to the ER. At baseline, she can walk with a walker and talk without breathing difficulties and does not use home oxygen. She denies fevers, chills, recent sick contacts; denies orthopnea, swelling of extremities, or any rashes.    In the ER, she was found to be hypoxic on room air, requiring 6L nasal cannula (NC) oxygen. She was tachypneic and initially unable to speak in full sentences due to dyspnea but improved with BiPAP support, allowing her to speak fully.  She has bibasilar crackles, and no lower extremity edema.    T(F): 96.7 (12-10-24 @ 07:56), Max: 97.4 (12-10-24 @ 02:15)  HR: 78 (12-10-24 @ 07:56) (76 - 83)  BP: 108/74 (12-10-24 @ 07:56) (108/74 - 117/72)  RR: 17 (12-10-24 @ 07:56) (17 - 22)  SpO2: 98% (12-10-24 @ 07:56) (88% - 99%)    Labs s/f  wbc 25, trop 75, BNP 9.6k, BUN 82 Cr 2.6 (1.1 baseline), Na 137, K 5.8  venous lactate 2.4 vbg pH: 7.30pCO2: 57 mmHgHCO3, Venous: 28   ekg showing sinus rhythm, no st elevation  CXR showed pulmonary vascular congestion w/ b/l costophrenic blunting, and lab results indicated new acute kidney injury (KATHY).     On preliminary read of echo, possible Mitral valve vegetation was noted.    The patient received IV Lasix and was weaned off BiPAP, becoming stable enough for admission to the floor for acute CHF exacerbation r/o endocarditis.    Patient's Nephew is Ahsan Bucio who is an MD; he was called and updated on patient's clinical status and medical plan and requests to be updated. [phone] (10 Dec 2024 10:32)      PAST MEDICAL & SURGICAL HISTORY:  Afib      HTN (hypertension)      Heart failure      H/O mitral valve replacement      H/O prior ablation treatment  afib          PREVIOUS DIAGNOSTIC TESTING:      ECHO  FINDINGS:    STRESS  FINDINGS:    CATHETERIZATION  FINDINGS:    MEDICATIONS  (STANDING):  atorvastatin 20 milliGRAM(s) Oral at bedtime  cefTRIAXone   IVPB 1000 milliGRAM(s) IV Intermittent every 24 hours  famotidine    Tablet 20 milliGRAM(s) Oral daily  furosemide   Injectable 40 milliGRAM(s) IV Push two times a day  metoprolol succinate  milliGRAM(s) Oral daily  sodium zirconium cyclosilicate 10 Gram(s) Oral every 8 hours  vancomycin  IVPB 750 milliGRAM(s) IV Intermittent every 24 hours    MEDICATIONS  (PRN):  acetaminophen     Tablet .. 650 milliGRAM(s) Oral every 6 hours PRN Temp greater or equal to 38C (100.4F), Mild Pain (1 - 3)  aluminum hydroxide/magnesium hydroxide/simethicone Suspension 30 milliLiter(s) Oral every 4 hours PRN Dyspepsia  melatonin 3 milliGRAM(s) Oral at bedtime PRN Insomnia  ondansetron Injectable 4 milliGRAM(s) IV Push every 8 hours PRN Nausea and/or Vomiting  polyethylene glycol 3350 17 Gram(s) Oral daily PRN for constipation  senna 2 Tablet(s) Oral at bedtime PRN for constipation      FAMILY HISTORY:      SOCIAL HISTORY:  CIGARETTES:    ALCOHOL:    REVIEW OF SYSTEMS:  CONSTITUTIONAL: No fever, weight loss, or fatigue  NECK: No pain or stiffness  RESPIRATORY: No cough, wheezing, chills or hemoptysis; No shortness of breath  CARDIOVASCULAR: No chest pain, palpitations, dizziness, or leg swelling  GASTROINTESTINAL: No abdominal or epigastric pain. No nausea, vomiting, or hematemesis; No diarrhea or constipation. No melena or hematochezia.  GENITOURINARY: No dysuria, frequency, hematuria, or incontinence  NEUROLOGICAL: No headaches, memory loss, loss of strength, numbness, or tremors  SKIN: No itching, burning, rashes, or lesions   ENDOCRINE: No heat or cold intolerance; No hair loss  MUSCULOSKELETAL: No joint pain or swelling; No muscle, back, or extremity pain  HEME/LYMPH: No easy bruising, or bleeding gums          Vital Signs Last 24 Hrs  T(C): 36.4 (11 Dec 2024 07:28), Max: 36.6 (10 Dec 2024 15:46)  T(F): 97.6 (11 Dec 2024 07:28), Max: 97.8 (10 Dec 2024 15:46)  HR: 85 (11 Dec 2024 07:55) (85 - 98)  BP: 107/72 (11 Dec 2024 07:28) (90/59 - 111/70)  BP(mean): 69 (11 Dec 2024 04:53) (69 - 78)  RR: 18 (11 Dec 2024 07:28) (18 - 18)  SpO2: 97% (11 Dec 2024 07:55) (97% - 98%)    Parameters below as of 11 Dec 2024 07:28  Patient On (Oxygen Delivery Method): room air            PHYSICAL EXAM:  GENERAL: NAD, well-groomed, well-developed  HEAD:  Atraumatic, Normocephalic  NECK: Supple, No JVD, Normal thyroid  NERVOUS SYSTEM:  Alert & Oriented X3, Good concentration  CHEST/LUNG: Clear to percussion bilaterally; No rales, rhonchi, wheezing, or rubs  HEART: Regular rate and rhythm; No murmurs, rubs, or gallops  ABDOMEN: Soft, Nontender, Nondistended; Bowel sounds present  EXTREMITIES:  2+ Peripheral Pulses, No clubbing, cyanosis, or edema  SKIN: No rashes or lesions    INTERPRETATION OF TELEMETRY:    ECG:    I&O's Detail      LABS:                        11.4   17.43 )-----------( 181      ( 11 Dec 2024 07:57 )             35.1     12-11    138  |  98  |  76[HH]  ----------------------------<  100[H]  4.7   |  29  |  1.9[H]    Ca    9.1      11 Dec 2024 07:57  Mg     1.8     12-11    TPro  6.1  /  Alb  3.4[L]  /  TBili  1.1  /  DBili  x   /  AST  25  /  ALT  15  /  AlkPhos  115  12-11        PT/INR - ( 11 Dec 2024 07:57 )   PT: >40.00 sec;   INR: 3.79 ratio         PTT - ( 11 Dec 2024 07:57 )  PTT:43.6 sec  Urinalysis Basic - ( 11 Dec 2024 07:57 )    Color: x / Appearance: x / SG: x / pH: x  Gluc: 100 mg/dL / Ketone: x  / Bili: x / Urobili: x   Blood: x / Protein: x / Nitrite: x   Leuk Esterase: x / RBC: x / WBC x   Sq Epi: x / Non Sq Epi: x / Bacteria: x      I&O's Summary      RADIOLOGY & ADDITIONAL STUDIES:

## 2024-12-11 NOTE — PHYSICAL THERAPY INITIAL EVALUATION ADULT - REHAB POTENTIAL, PT EVAL
28/28 on the short blessed test indicating cognitive impairment/impaired
good, to achieve stated therapy goals

## 2024-12-11 NOTE — PROGRESS NOTE ADULT - SUBJECTIVE AND OBJECTIVE BOX
JORDAN BUCIO 89y Female  MRN#: [id number]   Hospital Day: 1d    HPI:  Patient is an 89-year-old female with a significant past medical history of CHF with an EF of 41% as of October 2023, atrial fibrillation on Coumadin, a prosthetic mitral valve, and an AICD, presents with shortness of breath that began 2 days prior to admission.     She reports associated symptoms of cough, congestion, and diarrhea. On the evening of admission, she experienced severe dyspnea, prompting her visit to the ER. At baseline, she can walk with a walker and talk without breathing difficulties and does not use home oxygen. She denies fevers, chills, recent sick contacts; denies orthopnea, swelling of extremities, or any rashes.    In the ER, she was found to be hypoxic on room air, requiring 6L nasal cannula (NC) oxygen. She was tachypneic and initially unable to speak in full sentences due to dyspnea but improved with BiPAP support, allowing her to speak fully.  She has bibasilar crackles, and no lower extremity edema.    T(F): 96.7 (12-10-24 @ 07:56), Max: 97.4 (12-10-24 @ 02:15)  HR: 78 (12-10-24 @ 07:56) (76 - 83)  BP: 108/74 (12-10-24 @ 07:56) (108/74 - 117/72)  RR: 17 (12-10-24 @ 07:56) (17 - 22)  SpO2: 98% (12-10-24 @ 07:56) (88% - 99%)    Labs s/f  wbc 25, trop 75, BNP 9.6k, BUN 82 Cr 2.6 (1.1 baseline), Na 137, K 5.8  venous lactate 2.4 vbg pH: 7.30pCO2: 57 mmHgHCO3, Venous: 28   ekg showing sinus rhythm, no st elevation  CXR showed pulmonary vascular congestion w/ b/l costophrenic blunting, and lab results indicated new acute kidney injury (KATHY).     On preliminary read of echo, possible Mitral valve vegetation was noted.    The patient received IV Lasix and was weaned off BiPAP, becoming stable enough for admission to the floor for acute CHF exacerbation r/o endocarditis.    Patient's Nephew is Ahsan Bucio who is an MD; he was called and updated on patient's clinical status and medical plan and requests to be updated. [phone] (10 Dec 2024 10:32)      SUBJECTIVE      OBJECTIVE  PAST MEDICAL & SURGICAL HISTORY  Afib    HTN (hypertension)    Heart failure    H/O mitral valve replacement    H/O prior ablation treatment  afib      ALLERGIES:  sulfamethoxazole (Rash)    MEDICATIONS:  STANDING MEDICATIONS  atorvastatin 20 milliGRAM(s) Oral at bedtime  cefTRIAXone   IVPB 1000 milliGRAM(s) IV Intermittent every 24 hours  famotidine    Tablet 20 milliGRAM(s) Oral daily  furosemide   Injectable 40 milliGRAM(s) IV Push two times a day  metoprolol succinate  milliGRAM(s) Oral daily  sodium zirconium cyclosilicate 10 Gram(s) Oral every 8 hours    PRN MEDICATIONS  acetaminophen     Tablet .. 650 milliGRAM(s) Oral every 6 hours PRN  aluminum hydroxide/magnesium hydroxide/simethicone Suspension 30 milliLiter(s) Oral every 4 hours PRN  melatonin 3 milliGRAM(s) Oral at bedtime PRN  ondansetron Injectable 4 milliGRAM(s) IV Push every 8 hours PRN  polyethylene glycol 3350 17 Gram(s) Oral daily PRN  senna 2 Tablet(s) Oral at bedtime PRN      VITAL SIGNS: Last 24 Hours  T(C): 36.6 (10 Dec 2024 15:46), Max: 36.6 (10 Dec 2024 15:46)  T(F): 97.8 (10 Dec 2024 15:46), Max: 97.8 (10 Dec 2024 15:46)  HR: 98 (11 Dec 2024 04:53) (78 - 98)  BP: 90/59 (11 Dec 2024 04:53) (90/59 - 111/70)  BP(mean): 69 (11 Dec 2024 04:53) (69 - 78)  RR: 18 (11 Dec 2024 04:53) (17 - 18)  SpO2: 97% (11 Dec 2024 04:53) (97% - 98%)    LABS:                        14.3   25.00 )-----------( 186      ( 10 Dec 2024 03:20 )             44.3     12-11    138  |  98  |  75[HH]  ----------------------------<  109[H]  3.8   |  27  |  1.9[H]    Ca    8.5      11 Dec 2024 00:23    TPro  5.8[L]  /  Alb  3.3[L]  /  TBili  0.8  /  DBili  x   /  AST  22  /  ALT  14  /  AlkPhos  109  12-11    PT/INR - ( 11 Dec 2024 00:23 )   PT: >40.00 sec;   INR: 4.24 ratio         PTT - ( 11 Dec 2024 00:23 )  PTT:43.9 sec  Urinalysis Basic - ( 11 Dec 2024 00:23 )    Color: x / Appearance: x / SG: x / pH: x  Gluc: 109 mg/dL / Ketone: x  / Bili: x / Urobili: x   Blood: x / Protein: x / Nitrite: x   Leuk Esterase: x / RBC: x / WBC x   Sq Epi: x / Non Sq Epi: x / Bacteria: x        Lactate, Blood: 1.4 mmol/L (12-11-24 @ 00:23)      Urinalysis with Rflx Culture (collected 10 Dec 2024 10:14)              PHYSICAL EXAM:  GENERAL: NAD, well-developed.  HEAD:  Atraumatic, Normocephalic.  EYES: conjunctiva and sclera clear  CHEST/LUNG: GBAE. No wheezing or crackles   HEART: regular rate and rhythm; S1/S2.  ABDOMEN: Soft, Nontender, Nondistended  EXTREMITIES: No edema.   PSYCH: AAOx3.  NEUROLOGY: non-focal; moves all extremities     JORDAN BUCIO 89y Female  MRN#: [id number]   Hospital Day: 1d    HPI:  Patient is an 89-year-old female with a significant past medical history of CHF with an EF of 41% as of October 2023, atrial fibrillation on Coumadin, a prosthetic mitral valve, and an AICD, presents with shortness of breath that began 2 days prior to admission.     She reports associated symptoms of cough, congestion, and diarrhea. On the evening of admission, she experienced severe dyspnea, prompting her visit to the ER. At baseline, she can walk with a walker and talk without breathing difficulties and does not use home oxygen. She denies fevers, chills, recent sick contacts; denies orthopnea, swelling of extremities, or any rashes.    In the ER, she was found to be hypoxic on room air, requiring 6L nasal cannula (NC) oxygen. She was tachypneic and initially unable to speak in full sentences due to dyspnea but improved with BiPAP support, allowing her to speak fully.  She has bibasilar crackles, and no lower extremity edema.    T(F): 96.7 (12-10-24 @ 07:56), Max: 97.4 (12-10-24 @ 02:15)  HR: 78 (12-10-24 @ 07:56) (76 - 83)  BP: 108/74 (12-10-24 @ 07:56) (108/74 - 117/72)  RR: 17 (12-10-24 @ 07:56) (17 - 22)  SpO2: 98% (12-10-24 @ 07:56) (88% - 99%)    Labs s/f  wbc 25, trop 75, BNP 9.6k, BUN 82 Cr 2.6 (1.1 baseline), Na 137, K 5.8  venous lactate 2.4 vbg pH: 7.30pCO2: 57 mmHgHCO3, Venous: 28   ekg showing sinus rhythm, no st elevation  CXR showed pulmonary vascular congestion w/ b/l costophrenic blunting, and lab results indicated new acute kidney injury (KATHY).     On preliminary read of echo, possible Mitral valve vegetation was noted.    The patient received IV Lasix and was weaned off BiPAP, becoming stable enough for admission to the floor for acute CHF exacerbation r/o endocarditis.    Patient's Nephew is Ahsan Bucio who is an MD; he was called and updated on patient's clinical status and medical plan and requests to be updated. [phone] (10 Dec 2024 10:32)      SUBJECTIVE  Pt was seen and evaluated at bedside, no acute overnight events     OBJECTIVE  PAST MEDICAL & SURGICAL HISTORY  Afib    HTN (hypertension)    Heart failure    H/O mitral valve replacement    H/O prior ablation treatment  afib      ALLERGIES:  sulfamethoxazole (Rash)    MEDICATIONS:  STANDING MEDICATIONS  atorvastatin 20 milliGRAM(s) Oral at bedtime  cefTRIAXone   IVPB 1000 milliGRAM(s) IV Intermittent every 24 hours  famotidine    Tablet 20 milliGRAM(s) Oral daily  furosemide   Injectable 40 milliGRAM(s) IV Push two times a day  metoprolol succinate  milliGRAM(s) Oral daily  sodium zirconium cyclosilicate 10 Gram(s) Oral every 8 hours    PRN MEDICATIONS  acetaminophen     Tablet .. 650 milliGRAM(s) Oral every 6 hours PRN  aluminum hydroxide/magnesium hydroxide/simethicone Suspension 30 milliLiter(s) Oral every 4 hours PRN  melatonin 3 milliGRAM(s) Oral at bedtime PRN  ondansetron Injectable 4 milliGRAM(s) IV Push every 8 hours PRN  polyethylene glycol 3350 17 Gram(s) Oral daily PRN  senna 2 Tablet(s) Oral at bedtime PRN      VITAL SIGNS: Last 24 Hours  T(C): 36.6 (10 Dec 2024 15:46), Max: 36.6 (10 Dec 2024 15:46)  T(F): 97.8 (10 Dec 2024 15:46), Max: 97.8 (10 Dec 2024 15:46)  HR: 98 (11 Dec 2024 04:53) (78 - 98)  BP: 90/59 (11 Dec 2024 04:53) (90/59 - 111/70)  BP(mean): 69 (11 Dec 2024 04:53) (69 - 78)  RR: 18 (11 Dec 2024 04:53) (17 - 18)  SpO2: 97% (11 Dec 2024 04:53) (97% - 98%)    LABS:                        14.3   25.00 )-----------( 186      ( 10 Dec 2024 03:20 )             44.3     12-11    138  |  98  |  75[HH]  ----------------------------<  109[H]  3.8   |  27  |  1.9[H]    Ca    8.5      11 Dec 2024 00:23    TPro  5.8[L]  /  Alb  3.3[L]  /  TBili  0.8  /  DBili  x   /  AST  22  /  ALT  14  /  AlkPhos  109  12-11    PT/INR - ( 11 Dec 2024 00:23 )   PT: >40.00 sec;   INR: 4.24 ratio         PTT - ( 11 Dec 2024 00:23 )  PTT:43.9 sec  Urinalysis Basic - ( 11 Dec 2024 00:23 )    Color: x / Appearance: x / SG: x / pH: x  Gluc: 109 mg/dL / Ketone: x  / Bili: x / Urobili: x   Blood: x / Protein: x / Nitrite: x   Leuk Esterase: x / RBC: x / WBC x   Sq Epi: x / Non Sq Epi: x / Bacteria: x        Lactate, Blood: 1.4 mmol/L (12-11-24 @ 00:23)      Urinalysis with Rflx Culture (collected 10 Dec 2024 10:14)              PHYSICAL EXAM:  GENERAL: NAD, well-developed.  HEAD:  Atraumatic, Normocephalic.  EYES: conjunctiva and sclera clear  CHEST/LUNG: GBAE. No wheezing or crackles   HEART: regular rate and rhythm; S1/S2.  ABDOMEN: Soft, Nontender, Nondistended  EXTREMITIES: No edema.   PSYCH: AAOx3.  NEUROLOGY: non-focal; moves all extremities

## 2024-12-11 NOTE — CONSULT NOTE ADULT - ASSESSMENT
ASSESSMENT  89-year-old female with a significant PMH of CHF with an EF of 41% as of October 2023, atrial fibrillation on Coumadin, a prosthetic mitral valve, and an AICD, presents with shortness of breath that began 2 days prior to admission.     She reports associated symptoms of cough, congestion, and diarrhea. On the evening of admission, she experienced severe dyspnea, prompting her visit to the ER. At baseline, she can walk with a walker and talk without breathing difficulties and does not use home oxygen. She denies fevers, chills, recent sick contacts; denies orthopnea, swelling of extremities, or any rashes.    ID consulted to r/o infective endocarditis.    IMPRESSION  # (?) Sepsis on admission (2 or more of the following T<96.8F, T>101F, Pulse>90, Resp Rate>20, WBC>12, wbc<4, Bands>10%), PLUS (+) lactic acidosis, OR metabolic encephalopathy, OR KATHY, OR bacteremia . 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 There is a non-mobile echodense mass measuring 0.9 x 0.6cm attached anteriorly with resultant moderate, posteriorly directed valvular regurgitation. The mean transvalvular gradient is 5mmHg at 90bpm. Findings may be consistent with calcified thrombus or healed endocarditis. Acute endocarditis cannot be excluded.  - CXR 12/10: Increased pulmonary vascular congestion.        RECOMMENDATIONS  - f/u pending bcx  - f/u procal, MRSA, ESR, CRP    This is a pended note. All final recommendations to follow pending discussion with ID Attending 89-year-old female with a significant PMH of CHF with an EF of 41% as of October 2023, atrial fibrillation on Coumadin, a prosthetic mitral valve, and an AICD, presents with shortness of breath that began 2 days prior to admission.     She reports associated symptoms of cough, congestion, and diarrhea. On the evening of admission, she experienced severe dyspnea, prompting her visit to the ER. At baseline, she can walk with a walker and talk without breathing difficulties and does not use home oxygen. She denies fevers, chills, recent sick contacts; denies orthopnea, swelling of extremities, or any rashes.    ID consulted to r/o infective endocarditis.    IMPRESSION  # (?) Sepsis on admission (2 or more of the following T<96.8F, T>101F, Pulse>90, Resp Rate>20, WBC>12, wbc<4, Bands>10%), PLUS (+) lactic acidosis, OR metabolic encephalopathy, OR KATHY, OR bacteremia . 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 There is a non-mobile echodense mass measuring 0.9 x 0.6cm attached anteriorly with resultant moderate, posteriorly directed valvular regurgitation. The mean transvalvular gradient is 5mmHg at 90bpm. Findings may be consistent with calcified thrombus or healed endocarditis. Acute endocarditis cannot be excluded.  - CXR 12/10: Increased pulmonary vascular congestion.        RECOMMENDATIONS  - Recommend JAIME  - Start ceftriaxone 2g IV q 24 and Vancomycin 1 g IV x 1 dose  - f/u pending bcx, procal 89-year-old female with a significant PMH of CHF with an EF of 41% as of October 2023, atrial fibrillation on Coumadin, a prosthetic mitral valve, and an AICD, presents with shortness of breath that began 2 days prior to admission.     She reports associated symptoms of cough, congestion, and diarrhea. On the evening of admission, she experienced severe dyspnea, prompting her visit to the ER. At baseline, she can walk with a walker and talk without breathing difficulties and does not use home oxygen. She denies fevers, chills, recent sick contacts; denies orthopnea, swelling of extremities, or any rashes.    ID consulted to r/o infective endocarditis.    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 There is a non-mobile echodense mass measuring 0.9 x 0.6cm attached anteriorly with resultant moderate, posteriorly directed valvular regurgitation. The mean transvalvular gradient is 5mmHg at 90bpm. Findings may be consistent with calcified thrombus or healed endocarditis. Acute endocarditis cannot be excluded.  - CXR 12/10: Increased pulmonary vascular congestion.        RECOMMENDATIONS  - Recommend JAIME  - Start ceftriaxone 2g IV q 24 and Vancomycin 1 g IV x 1 dose  - f/u pending bcx, procal 89-year-old female with a significant PMH of CHF with an EF of 41% as of October 2023, atrial fibrillation on Coumadin, a prosthetic mitral valve, and an AICD, presents with shortness of breath that began 2 days prior to admission.     She reports associated symptoms of cough, congestion, and diarrhea. On the evening of admission, she experienced severe dyspnea, prompting her visit to the ER. At baseline, she can walk with a walker and talk without breathing difficulties and does not use home oxygen. She denies fevers, chills, recent sick contacts; denies orthopnea, swelling of extremities, or any rashes.    ID consulted to r/o infective endocarditis.    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 There is a non-mobile echodense mass measuring 0.9 x 0.6cm attached anteriorly with resultant moderate, posteriorly directed valvular regurgitation. The mean transvalvular gradient is 5mmHg at 90bpm. Findings may be consistent with calcified thrombus or healed endocarditis. Acute endocarditis cannot be excluded.  - CXR 12/10: Increased pulmonary vascular congestion.        RECOMMENDATIONS  - Recommend JAIME  - Start ceftriaxone 2g IV q 24 and Vancomycin 1 g IV x 1 dose  - f/u pending bcx, procal 89-year-old female with a significant PMH of CHF with an EF of 41% as of October 2023, atrial fibrillation on Coumadin, a prosthetic mitral valve, and an AICD, presents with shortness of breath that began 2 days prior to admission.     She reports associated symptoms of cough, congestion, and diarrhea. On the evening of admission, she experienced severe dyspnea, prompting her visit to the ER. At baseline, she can walk with a walker and talk without breathing difficulties and does not use home oxygen. She denies fevers, chills, recent sick contacts; denies orthopnea, swelling of extremities, or any rashes.    ID consulted to r/o infective endocarditis.    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 There is a non-mobile echodense mass measuring 0.9 x 0.6cm attached anteriorly with resultant moderate, posteriorly directed valvular regurgitation. The mean transvalvular gradient is 5mmHg at 90bpm. Findings may be consistent with calcified thrombus or healed endocarditis. Acute endocarditis cannot be excluded.  - CXR 12/10: Increased pulmonary vascular congestion.        RECOMMENDATIONS  - Recommend JAIME  - Start ceftriaxone 2g IV q 24 and Vancomycin 1 g IV x 1 dose, subsequent dose adjustments based on AUC/ONELIA per clinical pharmacist.  - f/u pending bcx, procal

## 2024-12-11 NOTE — PROGRESS NOTE ADULT - ASSESSMENT
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 Vital signs were significant for hypoxia and tachypnea which improved with Bipap. 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%), s/p AICD  #NSTEMI type II  #Lactic acidosis RESOLVED  - Afebrile on admission   - CXR: Increased pulm congestion  - Bnp 9.6k; on home torsemide 20  - TROP: 75>>110  - TTE 12/10: LVEF 60-65%, severe pulm htn   - c/w IV lasix 40 mg BID  - c/w bipap as needed    #Possible Mitral Valve Vegetation, rule out infective endocarditis  - sepsis not present on admission; no janeway lesions, splinter hemorrhages, osler nodes  - h/o prosthetic mitral valve  - TTE 12/10 There is a non-mobile echodense mass measuring 0.9 x 0.6cm attached anteriorly with resultant moderate, posteriorly directed valvular regurgitation. The mean transvalvular gradient is 5mmHg at 90bpm. Findings may be consistent with calcified thrombus or healed endocarditis. 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 Vital signs were significant for hypoxia and tachypnea which improved with Bipap. 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%), s/p AICD  #NSTEMI type II  #Lactic acidosis RESOLVED  - Afebrile on admission   - EKG no ischemic changes x2   - CXR: Increased pulm congestion  - Bnp 9.6k; on home torsemide 20  - TROP: 75>>110  - TTE 12/10: LVEF 60-65%, severe pulm htn   - c/w IV lasix 40 mg BID  - c/w bipap as needed    #Possible Mitral Valve Vegetation, rule out infective endocarditis  - sepsis not present on admission; no janeway lesions, splinter hemorrhages, osler nodes  - h/o prosthetic mitral valve  - TTE 12/10 There is a non-mobile echodense mass measuring 0.9 x 0.6cm attached anteriorly with resultant moderate, posteriorly directed valvular regurgitation. The mean transvalvular gradient is 5mmHg at 90bpm. Findings may be consistent with calcified thrombus or healed endocarditis. 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 Vital signs were significant for hypoxia and tachypnea which improved with Bipap. 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 Macdonald    #Possible Mitral Valve Vegetation, rule out infective endocarditis  - sepsis not present on admission; no janeway lesions, splinter hemorrhages, osler nodes  - h/o prosthetic mitral valve  - TTE 12/10 There is a non-mobile echodense mass measuring 0.9 x 0.6cm attached anteriorly with resultant moderate, posteriorly directed valvular regurgitation. The mean transvalvular gradient is 5mmHg at 90bpm. Findings may be consistent with calcified thrombus or healed endocarditis. 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 Vital signs were significant for hypoxia and tachypnea which improved with Bipap. 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 Macdonald    #Possible Mitral Valve Vegetation, rule out infective endocarditis  - sepsis not present on admission; no janeway lesions, splinter hemorrhages, osler nodes  - h/o prosthetic mitral valve  - TTE 12/10 There is a non-mobile echodense mass measuring 0.9 x 0.6cm attached anteriorly with resultant moderate, posteriorly directed valvular regurgitation. The mean transvalvular gradient is 5mmHg at 90bpm. Findings may be consistent with calcified thrombus or healed endocarditis. 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 Vital signs were significant for hypoxia and tachypnea which improved with Bipap. 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 Macdonald    #Possible Mitral Valve Vegetation, rule out infective endocarditis  - sepsis not present on admission; no janeway lesions, splinter hemorrhages, osler nodes  - h/o prosthetic mitral valve  - TTE 12/10 There is a non-mobile echodense mass measuring 0.9 x 0.6cm attached anteriorly with resultant moderate, posteriorly directed valvular regurgitation. The mean transvalvular gradient is 5mmHg at 90bpm. Findings may be consistent with calcified thrombus or healed endocarditis. 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 Vital signs were significant for hypoxia and tachypnea which improved with Bipap. 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 Macdonald  - F/U cardio for JAIME    #Possible Mitral Valve Vegetation, rule out infective endocarditis  - sepsis not present on admission; no janeway lesions, splinter hemorrhages, osler nodes  - h/o prosthetic mitral valve  - TTE 12/10 There is a non-mobile echodense mass measuring 0.9 x 0.6cm attached anteriorly with resultant moderate, posteriorly directed valvular regurgitation. The mean transvalvular gradient is 5mmHg at 90bpm. Findings may be consistent with calcified thrombus or healed endocarditis. Acute endocarditis cannot be excluded  - F/U bcx   - ID vanc 1g then get a trough, rocephin 2g q24, get JAIME  - f/u ESR, CRP, Procal    #KATHY  #Hyper K RESOLVED   - BL cr 1.1  - Cr fluctuating   - Holding losartan, will restart when Cr improves    #Chronic A-fib/A-flutter  #S/P MVR on Coumadin  #HTN  - c/w toprol 300  - daily INR and coumadin 3mg orders  - supratherauptic today, monitor off today     #DVT PPX: Warfarin  #GI PPX: Famotidine  #Diet: fluid restrict  #Code Status: full code  #Activity Order: increase as tolerated   #Dispo/Needs: inpt tele, PT

## 2024-12-11 NOTE — PATIENT PROFILE ADULT - FALL HARM RISK - HARM RISK INTERVENTIONS

## 2024-12-11 NOTE — PHYSICAL THERAPY INITIAL EVALUATION ADULT - ADDITIONAL COMMENTS
pt lives with family private pay aide 7hrs independent pt lives with her sister in an apartment with 0 steps to enter and an elevator inside. she has a private pay aide x 7hrs. she was independent with a rolling walker prior to admission.

## 2024-12-11 NOTE — PHYSICAL THERAPY INITIAL EVALUATION ADULT - GAIT DEVIATIONS NOTED, PT EVAL
crouch/decreased kamilah/increased time in double stance/decreased velocity of limb motion/decreased step length/decreased stride length

## 2024-12-11 NOTE — CONSULT NOTE ADULT - SUBJECTIVE AND OBJECTIVE BOX
ANETTEJORDAN  89y, Female  Allergy: sulfamethoxazole (Rash)    CHIEF COMPLAINT: Cough/SOB (11 Dec 2024 05:41)    HPI:  Patient is an 89-year-old female with a significant past medical history of CHF with an EF of 41% as of October 2023, atrial fibrillation on Coumadin, a prosthetic mitral valve, and an AICD, presents with shortness of breath that began 2 days prior to admission.     She reports associated symptoms of cough, congestion, and diarrhea. On the evening of admission, she experienced severe dyspnea, prompting her visit to the ER. At baseline, she can walk with a walker and talk without breathing difficulties and does not use home oxygen. She denies fevers, chills, recent sick contacts; denies orthopnea, swelling of extremities, or any rashes.    In the ER, she was found to be hypoxic on room air, requiring 6L nasal cannula (NC) oxygen. She was tachypneic and initially unable to speak in full sentences due to dyspnea but improved with BiPAP support, allowing her to speak fully.  She has bibasilar crackles, and no lower extremity edema.    T(F): 96.7 (12-10-24 @ 07:56), Max: 97.4 (12-10-24 @ 02:15)  HR: 78 (12-10-24 @ 07:56) (76 - 83)  BP: 108/74 (12-10-24 @ 07:56) (108/74 - 117/72)  RR: 17 (12-10-24 @ 07:56) (17 - 22)  SpO2: 98% (12-10-24 @ 07:56) (88% - 99%)    Labs s/f  wbc 25, trop 75, BNP 9.6k, BUN 82 Cr 2.6 (1.1 baseline), Na 137, K 5.8  venous lactate 2.4 vbg pH: 7.30pCO2: 57 mmHgHCO3, Venous: 28   ekg showing sinus rhythm, no st elevation  CXR showed pulmonary vascular congestion w/ b/l costophrenic blunting, and lab results indicated new acute kidney injury (KATHY).     On preliminary read of echo, possible Mitral valve vegetation was noted.    The patient received IV Lasix and was weaned off BiPAP, becoming stable enough for admission to the floor for acute CHF exacerbation r/o endocarditis.    Patient's Nephew is Ahsan Bucio who is an MD; he was called and updated on patient's clinical status and medical plan and requests to be updated. [phone] (10 Dec 2024 10:32)      Infectious Diseases History:  Old Micro Data/Cultures:     FAMILY HISTORY:    PAST MEDICAL & SURGICAL HISTORY:  Afib      HTN (hypertension)      Heart failure      H/O mitral valve replacement      H/O prior ablation treatment  afib          SOCIAL HISTORY  Social History:  Lives with Nurse Aide and Older sister.  Uses walker at baseline. (10 Dec 2024 10:32)      Recent Travel:  Other Exposures:     ROS  General: Denies rigors, nightsweats  HEENT: Denies headache, rhinorrhea, sore throat, eye pain  CV: Denies CP, palpitations  PULM: Denies wheezing, hemoptysis  GI: Denies hematemesis, hematochezia, melena  : Denies discharge, hematuria  MSK: Denies arthralgias, myalgias  SKIN: Denies rash, lesions  NEURO: Denies paresthesias, weakness  PSYCH: Denies depression, anxiety    VITALS:  T(F): 97.6, Max: 97.8 (12-10-24 @ 15:46)  HR: 85  BP: 107/72  RR: 18Vital Signs Last 24 Hrs  T(C): 36.4 (11 Dec 2024 07:28), Max: 36.6 (10 Dec 2024 15:46)  T(F): 97.6 (11 Dec 2024 07:28), Max: 97.8 (10 Dec 2024 15:46)  HR: 85 (11 Dec 2024 07:55) (85 - 98)  BP: 107/72 (11 Dec 2024 07:28) (90/59 - 111/70)  BP(mean): 69 (11 Dec 2024 04:53) (69 - 78)  RR: 18 (11 Dec 2024 07:28) (18 - 18)  SpO2: 97% (11 Dec 2024 07:55) (97% - 98%)    Parameters below as of 11 Dec 2024 07:28  Patient On (Oxygen Delivery Method): room air        PHYSICAL EXAM:  Gen: NAD  HEENT: Normocephalic, atraumatic  Neck: supple, no lymphadenopathy  CV: Regular rate & regular rhythm  Lungs: CTAB  Abdomen: Soft, BS present  Ext: Warm, well perfused  Neuro: non focal, awake  Skin: no rash, no lesions  Lines: no phlebitis    TESTS & MEASUREMENTS:                        14.3   25.00 )-----------( 186      ( 10 Dec 2024 03:20 )             44.3     12-11    138  |  98  |  75[HH]  ----------------------------<  109[H]  3.8   |  27  |  1.9[H]    Ca    8.5      11 Dec 2024 00:23    TPro  5.8[L]  /  Alb  3.3[L]  /  TBili  0.8  /  DBili  x   /  AST  22  /  ALT  14  /  AlkPhos  109  12-11      LIVER FUNCTIONS - ( 11 Dec 2024 00:23 )  Alb: 3.3 g/dL / Pro: 5.8 g/dL / ALK PHOS: 109 U/L / ALT: 14 U/L / AST: 22 U/L / GGT: x           Urinalysis Basic - ( 11 Dec 2024 00:23 )    Color: x / Appearance: x / SG: x / pH: x  Gluc: 109 mg/dL / Ketone: x  / Bili: x / Urobili: x   Blood: x / Protein: x / Nitrite: x   Leuk Esterase: x / RBC: x / WBC x   Sq Epi: x / Non Sq Epi: x / Bacteria: x        Urinalysis with Rflx Culture (collected 12-10-24 @ 10:14)        Lactate, Blood: 1.4 mmol/L (12-11-24 @ 00:23)  Blood Gas Venous - Lactate: 2.4 mmol/L (12-10-24 @ 04:07)      INFECTIOUS DISEASES TESTING      RADIOLOGY & ADDITIONAL TESTS:    Xray Chest 1 View- PORTABLE-Urgent:   ACC: [id number] EXAM:  XR CHEST PORTABLE URGENT 1V   ORDERED BY: DC MCCARTHY     PROCEDURE DATE:  12/10/2024          INTERPRETATION:  CLINICAL HISTORY / REASON FOR EXAM: Shortness of breath.    COMPARISON: Chest radiograph from December 28, 2023.    TECHNIQUE/POSITIONING: Satisfactory. Single image, AP chest radiograph.    FINDINGS:    SUPPORT DEVICES: ICD implant overlies the left hemithorax.    CARDIAC/MEDIASTINUM/HILUM: Post sternotomy.    LUNG PARENCHYMA/PLEURA: Increased pulmonary vascular congestion. Stable   left basilar opacity/effusion. No pneumothorax.    SKELETON/SOFT TISSUES: Unremarkable.      IMPRESSION:    Increased pulmonary vascular congestion.    --- End of Report ---      TTE Echo Complete w/o Contrast w/ Doppler (12.10.24 @ 14:13)  Summary:   1. Normal global left ventricular systolic function with ejection   fraction by visual estimation of 60-65%. The left ventricular diastolic   function could not be assessed in this study. No regional wall motion   abnormalities.   2. Grossly normal right ventricular size and function. A pacer wire is   visualized in the right ventricle.   3. Severely enlarged left atrium.   4. Mildly enlarged right atrium.   5. S/p mitral valve replacement. There is a non-mobile echodense mass   measuring 0.9 x 0.6cm attached anteriorly with resultant moderate,   posteriorly directed valvular regurgitation. The mean transvalvular   gradient is 5mmHg at 90bpm. Findings may be consistent with calcified   thrombus or healed endocarditis. Acute endocarditis cannot be excluded.   6. At least moderate paradoxical low-flow low-gradient aortic valve   stenosis (Vmax 2.5m/s, mean PG 13mmHg, DI 0.39, PRISCILLA 0.94cm2, SVi   20ml/m2). On short axis views the valve appears moderately stenotic.   7. Moderate tricuspid regurgitation.   8. Severe pulmonary hypertension (PASP = 72mmHg).   9. There is no evidence of pericardial effusion.          CARDIOLOGY TESTING  12 Lead ECG:   Ventricular Rate 90 BPM    Atrial Rate 90 BPM    P-R Interval 258 ms    QRS Duration 104 ms    Q-T Interval 370 ms    QTC Calculation(Bazett) 452 ms    P Axis 73 degrees    R Axis 98 degrees    T Axis 57 degrees    Diagnosis Line Sinus rhythm with 1st degree A-V block  Rightward axis  Borderline ECG    Confirmed by Orlando Dupree ([id number]) on 12/10/2024 11:00:47 PM (12-10-24 @ 09:48)    12 Lead ECG:   Ventricular Rate 79 BPM    Atrial Rate 79 BPM    P-R Interval 246 ms    QRS Duration 100 ms    Q-T Interval 404 ms    QTC Calculation(Bazett) 463 ms    P Axis 77 degrees    R Axis 97 degrees    T Axis 93 degrees    Diagnosis Line Sinus rhythm with 1st degree A-V block  Rightward axis  Nonspecific ST abnormality  Abnormal ECG    Confirmed by Orlando Dupree ([id number]) on 12/10/2024 10:39:41 PM (12-10-24 @ 03:51)      All available historical records have been reviewed    MEDICATIONS  atorvastatin 20  cefTRIAXone   IVPB 1000  famotidine    Tablet 20  furosemide   Injectable 40  metoprolol succinate   sodium zirconium cyclosilicate 10      ANTIBIOTICS:  cefTRIAXone   IVPB 1000 milliGRAM(s) IV Intermittent every 24 hours      All available historical data has been reviewed ANETTEJORDAN  89y, Female  Allergy: sulfamethoxazole (Rash)    CHIEF COMPLAINT: Cough/SOB (11 Dec 2024 05:41)    HPI:  Patient is an 89-year-old female with a significant past medical history of CHF with an EF of 41% as of October 2023, atrial fibrillation on Coumadin, a prosthetic mitral valve, and an AICD, presents with shortness of breath that began 2 days prior to admission.     She reports associated symptoms of cough, congestion, and diarrhea. On the evening of admission, she experienced severe dyspnea, prompting her visit to the ER. At baseline, she can walk with a walker and talk without breathing difficulties and does not use home oxygen. She denies fevers, chills, recent sick contacts; denies orthopnea, swelling of extremities, or any rashes.    In the ER, she was found to be hypoxic on room air, requiring 6L nasal cannula (NC) oxygen. She was tachypneic and initially unable to speak in full sentences due to dyspnea but improved with BiPAP support, allowing her to speak fully.  She has bibasilar crackles, and no lower extremity edema.    T(F): 96.7 (12-10-24 @ 07:56), Max: 97.4 (12-10-24 @ 02:15)  HR: 78 (12-10-24 @ 07:56) (76 - 83)  BP: 108/74 (12-10-24 @ 07:56) (108/74 - 117/72)  RR: 17 (12-10-24 @ 07:56) (17 - 22)  SpO2: 98% (12-10-24 @ 07:56) (88% - 99%)    Labs s/f  wbc 25, trop 75, BNP 9.6k, BUN 82 Cr 2.6 (1.1 baseline), Na 137, K 5.8  venous lactate 2.4 vbg pH: 7.30pCO2: 57 mmHgHCO3, Venous: 28   ekg showing sinus rhythm, no st elevation  CXR showed pulmonary vascular congestion w/ b/l costophrenic blunting, and lab results indicated new acute kidney injury (KATHY).     On preliminary read of echo, possible Mitral valve vegetation was noted.    The patient received IV Lasix and was weaned off BiPAP, becoming stable enough for admission to the floor for acute CHF exacerbation r/o endocarditis.    Patient's Nephew is Ahsan Bucio who is an MD; he was called and updated on patient's clinical status and medical plan and requests to be updated. [phone] (10 Dec 2024 10:32)      Infectious Diseases History:  Old Micro Data/Cultures:     FAMILY HISTORY:    PAST MEDICAL & SURGICAL HISTORY:  Afib      HTN (hypertension)      Heart failure      H/O mitral valve replacement      H/O prior ablation treatment  afib          SOCIAL HISTORY  Social History:  Lives with Nurse Aide and Older sister.  Uses walker at baseline. (10 Dec 2024 10:32)      Recent Travel:  Other Exposures:     ROS  General: Denies rigors, nightsweats  HEENT: Denies headache, rhinorrhea, sore throat, eye pain  CV: Denies CP, palpitations  PULM: Denies wheezing, hemoptysis  GI: Denies hematemesis, hematochezia, melena  : Denies discharge, hematuria  MSK: Denies arthralgias, myalgias  SKIN: Denies rash, lesions  NEURO: Denies paresthesias, weakness  PSYCH: Denies depression, anxiety    VITALS:  T(F): 97.6, Max: 97.8 (12-10-24 @ 15:46)  HR: 85  BP: 107/72  RR: 18Vital Signs Last 24 Hrs  T(C): 36.4 (11 Dec 2024 07:28), Max: 36.6 (10 Dec 2024 15:46)  T(F): 97.6 (11 Dec 2024 07:28), Max: 97.8 (10 Dec 2024 15:46)  HR: 85 (11 Dec 2024 07:55) (85 - 98)  BP: 107/72 (11 Dec 2024 07:28) (90/59 - 111/70)  BP(mean): 69 (11 Dec 2024 04:53) (69 - 78)  RR: 18 (11 Dec 2024 07:28) (18 - 18)  SpO2: 97% (11 Dec 2024 07:55) (97% - 98%)    Parameters below as of 11 Dec 2024 07:28  Patient On (Oxygen Delivery Method): room air        PHYSICAL EXAM:  Gen: NAD  HEENT: Normocephalic, atraumatic  Neck: supple, no lymphadenopathy  CV: Regular rate & regular rhythm, holosytolic murmur  Lungs: decreased bs b/l, bilateral bibasilar crackles  Abdomen: Soft, BS present  Ext: Warm, well perfused  Neuro: non focal, awake  Skin: no rash, no lesions  Lines: no phlebitis    TESTS & MEASUREMENTS:                        14.3   25.00 )-----------( 186      ( 10 Dec 2024 03:20 )             44.3     12-11    138  |  98  |  75[HH]  ----------------------------<  109[H]  3.8   |  27  |  1.9[H]    Ca    8.5      11 Dec 2024 00:23    TPro  5.8[L]  /  Alb  3.3[L]  /  TBili  0.8  /  DBili  x   /  AST  22  /  ALT  14  /  AlkPhos  109  12-11      LIVER FUNCTIONS - ( 11 Dec 2024 00:23 )  Alb: 3.3 g/dL / Pro: 5.8 g/dL / ALK PHOS: 109 U/L / ALT: 14 U/L / AST: 22 U/L / GGT: x           Urinalysis Basic - ( 11 Dec 2024 00:23 )    Color: x / Appearance: x / SG: x / pH: x  Gluc: 109 mg/dL / Ketone: x  / Bili: x / Urobili: x   Blood: x / Protein: x / Nitrite: x   Leuk Esterase: x / RBC: x / WBC x   Sq Epi: x / Non Sq Epi: x / Bacteria: x        Urinalysis with Rflx Culture (collected 12-10-24 @ 10:14)        Lactate, Blood: 1.4 mmol/L (12-11-24 @ 00:23)  Blood Gas Venous - Lactate: 2.4 mmol/L (12-10-24 @ 04:07)      INFECTIOUS DISEASES TESTING      RADIOLOGY & ADDITIONAL TESTS:    Xray Chest 1 View- PORTABLE-Urgent:   ACC: [id number] EXAM:  XR CHEST PORTABLE URGENT 1V   ORDERED BY: DC MCCARTHY     PROCEDURE DATE:  12/10/2024          INTERPRETATION:  CLINICAL HISTORY / REASON FOR EXAM: Shortness of breath.    COMPARISON: Chest radiograph from December 28, 2023.    TECHNIQUE/POSITIONING: Satisfactory. Single image, AP chest radiograph.    FINDINGS:    SUPPORT DEVICES: ICD implant overlies the left hemithorax.    CARDIAC/MEDIASTINUM/HILUM: Post sternotomy.    LUNG PARENCHYMA/PLEURA: Increased pulmonary vascular congestion. Stable   left basilar opacity/effusion. No pneumothorax.    SKELETON/SOFT TISSUES: Unremarkable.      IMPRESSION:    Increased pulmonary vascular congestion.    --- End of Report ---      TTE Echo Complete w/o Contrast w/ Doppler (12.10.24 @ 14:13)  Summary:   1. Normal global left ventricular systolic function with ejection   fraction by visual estimation of 60-65%. The left ventricular diastolic   function could not be assessed in this study. No regional wall motion   abnormalities.   2. Grossly normal right ventricular size and function. A pacer wire is   visualized in the right ventricle.   3. Severely enlarged left atrium.   4. Mildly enlarged right atrium.   5. S/p mitral valve replacement. There is a non-mobile echodense mass   measuring 0.9 x 0.6cm attached anteriorly with resultant moderate,   posteriorly directed valvular regurgitation. The mean transvalvular   gradient is 5mmHg at 90bpm. Findings may be consistent with calcified   thrombus or healed endocarditis. Acute endocarditis cannot be excluded.   6. At least moderate paradoxical low-flow low-gradient aortic valve   stenosis (Vmax 2.5m/s, mean PG 13mmHg, DI 0.39, PRISCILLA 0.94cm2, SVi   20ml/m2). On short axis views the valve appears moderately stenotic.   7. Moderate tricuspid regurgitation.   8. Severe pulmonary hypertension (PASP = 72mmHg).   9. There is no evidence of pericardial effusion.          CARDIOLOGY TESTING  12 Lead ECG:   Ventricular Rate 90 BPM    Atrial Rate 90 BPM    P-R Interval 258 ms    QRS Duration 104 ms    Q-T Interval 370 ms    QTC Calculation(Bazett) 452 ms    P Axis 73 degrees    R Axis 98 degrees    T Axis 57 degrees    Diagnosis Line Sinus rhythm with 1st degree A-V block  Rightward axis  Borderline ECG    Confirmed by Orlando Dupree ([id number]) on 12/10/2024 11:00:47 PM (12-10-24 @ 09:48)    12 Lead ECG:   Ventricular Rate 79 BPM    Atrial Rate 79 BPM    P-R Interval 246 ms    QRS Duration 100 ms    Q-T Interval 404 ms    QTC Calculation(Bazett) 463 ms    P Axis 77 degrees    R Axis 97 degrees    T Axis 93 degrees    Diagnosis Line Sinus rhythm with 1st degree A-V block  Rightward axis  Nonspecific ST abnormality  Abnormal ECG    Confirmed by Orlando Dupree ([id number]) on 12/10/2024 10:39:41 PM (12-10-24 @ 03:51)      All available historical records have been reviewed    MEDICATIONS  atorvastatin 20  cefTRIAXone   IVPB 1000  famotidine    Tablet 20  furosemide   Injectable 40  metoprolol succinate   sodium zirconium cyclosilicate 10      ANTIBIOTICS:  cefTRIAXone   IVPB 1000 milliGRAM(s) IV Intermittent every 24 hours      All available historical data has been reviewed ANETTEJORDAN  89y, Female  Allergy: sulfamethoxazole (Rash)    CHIEF COMPLAINT: Cough/SOB (11 Dec 2024 05:41)    HPI:  Patient is an 89-year-old female with a significant past medical history of CHF with an EF of 41% as of October 2023, atrial fibrillation on Coumadin, a prosthetic mitral valve, and an AICD, presents with shortness of breath that began 2 days prior to admission.     She reports associated symptoms of cough, congestion, and diarrhea. On the evening of admission, she experienced severe dyspnea, prompting her visit to the ER. At baseline, she can walk with a walker and talk without breathing difficulties and does not use home oxygen. She denies fevers, chills, recent sick contacts; denies orthopnea, swelling of extremities, or any rashes.    In the ER, she was found to be hypoxic on room air, requiring 6L nasal cannula (NC) oxygen. She was tachypneic and initially unable to speak in full sentences due to dyspnea but improved with BiPAP support, allowing her to speak fully.  She has bibasilar crackles, and no lower extremity edema.    T(F): 96.7 (12-10-24 @ 07:56), Max: 97.4 (12-10-24 @ 02:15)  HR: 78 (12-10-24 @ 07:56) (76 - 83)  BP: 108/74 (12-10-24 @ 07:56) (108/74 - 117/72)  RR: 17 (12-10-24 @ 07:56) (17 - 22)  SpO2: 98% (12-10-24 @ 07:56) (88% - 99%)    Labs s/f  wbc 25, trop 75, BNP 9.6k, BUN 82 Cr 2.6 (1.1 baseline), Na 137, K 5.8  venous lactate 2.4 vbg pH: 7.30pCO2: 57 mmHgHCO3, Venous: 28   ekg showing sinus rhythm, no st elevation  CXR showed pulmonary vascular congestion w/ b/l costophrenic blunting, and lab results indicated new acute kidney injury (KATHY).     On preliminary read of echo, possible Mitral valve vegetation was noted.    The patient received IV Lasix and was weaned off BiPAP, becoming stable enough for admission to the floor for acute CHF exacerbation r/o endocarditis.    Patient's Nephew is Ahsan Bucio who is an MD; he was called and updated on patient's clinical status and medical plan and requests to be updated. [phone] (10 Dec 2024 10:32)      Infectious Diseases History:  Old Micro Data/Cultures:     FAMILY HISTORY:    PAST MEDICAL & SURGICAL HISTORY:  Afib      HTN (hypertension)      Heart failure      H/O mitral valve replacement      H/O prior ablation treatment  afib          SOCIAL HISTORY  Social History:  Lives with Nurse Aide and Older sister.  Uses walker at baseline. (10 Dec 2024 10:32)      Recent Travel:  Other Exposures:     ROS  General: Denies rigors, nightsweats  HEENT: Denies headache, rhinorrhea, sore throat, eye pain  CV: Denies CP, palpitations  PULM: Denies wheezing, hemoptysis  GI: Denies hematemesis, hematochezia, melena  : Denies discharge, hematuria  MSK: Denies arthralgias, myalgias  SKIN: Denies rash, lesions  NEURO: Denies paresthesias, weakness  PSYCH: Denies depression, anxiety    VITALS:  T(F): 97.6, Max: 97.8 (12-10-24 @ 15:46)  HR: 85  BP: 107/72  RR: 18Vital Signs Last 24 Hrs  T(C): 36.4 (11 Dec 2024 07:28), Max: 36.6 (10 Dec 2024 15:46)  T(F): 97.6 (11 Dec 2024 07:28), Max: 97.8 (10 Dec 2024 15:46)  HR: 85 (11 Dec 2024 07:55) (85 - 98)  BP: 107/72 (11 Dec 2024 07:28) (90/59 - 111/70)  BP(mean): 69 (11 Dec 2024 04:53) (69 - 78)  RR: 18 (11 Dec 2024 07:28) (18 - 18)  SpO2: 97% (11 Dec 2024 07:55) (97% - 98%)    Parameters below as of 11 Dec 2024 07:28  Patient On (Oxygen Delivery Method): room air        PHYSICAL EXAM:  Gen: NAD  HEENT: Normocephalic, atraumatic  Neck: supple, no lymphadenopathy  CV: Regular rate & regular rhythm, holosystolic murmur  Lungs: decreased bs b/l, + bibasilar crackles  Abdomen: Soft, BS present  Ext: Warm, well perfused  Neuro: non focal, awake  Skin: no rash, no lesions  Lines: no phlebitis    TESTS & MEASUREMENTS:                        14.3   25.00 )-----------( 186      ( 10 Dec 2024 03:20 )             44.3     12-11    138  |  98  |  75[HH]  ----------------------------<  109[H]  3.8   |  27  |  1.9[H]    Ca    8.5      11 Dec 2024 00:23    TPro  5.8[L]  /  Alb  3.3[L]  /  TBili  0.8  /  DBili  x   /  AST  22  /  ALT  14  /  AlkPhos  109  12-11      LIVER FUNCTIONS - ( 11 Dec 2024 00:23 )  Alb: 3.3 g/dL / Pro: 5.8 g/dL / ALK PHOS: 109 U/L / ALT: 14 U/L / AST: 22 U/L / GGT: x           Urinalysis Basic - ( 11 Dec 2024 00:23 )    Color: x / Appearance: x / SG: x / pH: x  Gluc: 109 mg/dL / Ketone: x  / Bili: x / Urobili: x   Blood: x / Protein: x / Nitrite: x   Leuk Esterase: x / RBC: x / WBC x   Sq Epi: x / Non Sq Epi: x / Bacteria: x        Urinalysis with Rflx Culture (collected 12-10-24 @ 10:14)        Lactate, Blood: 1.4 mmol/L (12-11-24 @ 00:23)  Blood Gas Venous - Lactate: 2.4 mmol/L (12-10-24 @ 04:07)      INFECTIOUS DISEASES TESTING      RADIOLOGY & ADDITIONAL TESTS:    Xray Chest 1 View- PORTABLE-Urgent:   ACC: [id number] EXAM:  XR CHEST PORTABLE URGENT 1V   ORDERED BY: DC MCCATRHY     PROCEDURE DATE:  12/10/2024          INTERPRETATION:  CLINICAL HISTORY / REASON FOR EXAM: Shortness of breath.    COMPARISON: Chest radiograph from December 28, 2023.    TECHNIQUE/POSITIONING: Satisfactory. Single image, AP chest radiograph.    FINDINGS:    SUPPORT DEVICES: ICD implant overlies the left hemithorax.    CARDIAC/MEDIASTINUM/HILUM: Post sternotomy.    LUNG PARENCHYMA/PLEURA: Increased pulmonary vascular congestion. Stable   left basilar opacity/effusion. No pneumothorax.    SKELETON/SOFT TISSUES: Unremarkable.      IMPRESSION:    Increased pulmonary vascular congestion.    --- End of Report ---      TTE Echo Complete w/o Contrast w/ Doppler (12.10.24 @ 14:13)  Summary:   1. Normal global left ventricular systolic function with ejection   fraction by visual estimation of 60-65%. The left ventricular diastolic   function could not be assessed in this study. No regional wall motion   abnormalities.   2. Grossly normal right ventricular size and function. A pacer wire is   visualized in the right ventricle.   3. Severely enlarged left atrium.   4. Mildly enlarged right atrium.   5. S/p mitral valve replacement. There is a non-mobile echodense mass   measuring 0.9 x 0.6cm attached anteriorly with resultant moderate,   posteriorly directed valvular regurgitation. The mean transvalvular   gradient is 5mmHg at 90bpm. Findings may be consistent with calcified   thrombus or healed endocarditis. Acute endocarditis cannot be excluded.   6. At least moderate paradoxical low-flow low-gradient aortic valve   stenosis (Vmax 2.5m/s, mean PG 13mmHg, DI 0.39, PRISCILLA 0.94cm2, SVi   20ml/m2). On short axis views the valve appears moderately stenotic.   7. Moderate tricuspid regurgitation.   8. Severe pulmonary hypertension (PASP = 72mmHg).   9. There is no evidence of pericardial effusion.          CARDIOLOGY TESTING  12 Lead ECG:   Ventricular Rate 90 BPM    Atrial Rate 90 BPM    P-R Interval 258 ms    QRS Duration 104 ms    Q-T Interval 370 ms    QTC Calculation(Bazett) 452 ms    P Axis 73 degrees    R Axis 98 degrees    T Axis 57 degrees    Diagnosis Line Sinus rhythm with 1st degree A-V block  Rightward axis  Borderline ECG    Confirmed by Orlando Dupree ([id number]) on 12/10/2024 11:00:47 PM (12-10-24 @ 09:48)    12 Lead ECG:   Ventricular Rate 79 BPM    Atrial Rate 79 BPM    P-R Interval 246 ms    QRS Duration 100 ms    Q-T Interval 404 ms    QTC Calculation(Bazett) 463 ms    P Axis 77 degrees    R Axis 97 degrees    T Axis 93 degrees    Diagnosis Line Sinus rhythm with 1st degree A-V block  Rightward axis  Nonspecific ST abnormality  Abnormal ECG    Confirmed by Orlando Dupree ([id number]) on 12/10/2024 10:39:41 PM (12-10-24 @ 03:51)      All available historical records have been reviewed    MEDICATIONS  atorvastatin 20  cefTRIAXone   IVPB 1000  famotidine    Tablet 20  furosemide   Injectable 40  metoprolol succinate   sodium zirconium cyclosilicate 10      ANTIBIOTICS:  cefTRIAXone   IVPB 1000 milliGRAM(s) IV Intermittent every 24 hours      All available historical data has been reviewed ANETTEJORDAN  89y, Female  Allergy: sulfamethoxazole (Rash)    CHIEF COMPLAINT: Cough/SOB (11 Dec 2024 05:41)    HPI:  Patient is an 89-year-old female with a significant past medical history of CHF with an EF of 41% as of October 2023, atrial fibrillation on Coumadin, a prosthetic mitral valve, and an AICD, presents with shortness of breath that began 2 days prior to admission.     She reports associated symptoms of cough, congestion, and diarrhea. On the evening of admission, she experienced severe dyspnea, prompting her visit to the ER. At baseline, she can walk with a walker and talk without breathing difficulties and does not use home oxygen. She denies fevers, chills, recent sick contacts; denies orthopnea, swelling of extremities, or any rashes.    In the ER, she was found to be hypoxic on room air, requiring 6L nasal cannula (NC) oxygen. She was tachypneic and initially unable to speak in full sentences due to dyspnea but improved with BiPAP support, allowing her to speak fully.  She has bibasilar crackles, and no lower extremity edema.    T(F): 96.7 (12-10-24 @ 07:56), Max: 97.4 (12-10-24 @ 02:15)  HR: 78 (12-10-24 @ 07:56) (76 - 83)  BP: 108/74 (12-10-24 @ 07:56) (108/74 - 117/72)  RR: 17 (12-10-24 @ 07:56) (17 - 22)  SpO2: 98% (12-10-24 @ 07:56) (88% - 99%)    Labs s/f  wbc 25, trop 75, BNP 9.6k, BUN 82 Cr 2.6 (1.1 baseline), Na 137, K 5.8  venous lactate 2.4 vbg pH: 7.30pCO2: 57 mmHgHCO3, Venous: 28   ekg showing sinus rhythm, no st elevation  CXR showed pulmonary vascular congestion w/ b/l costophrenic blunting, and lab results indicated new acute kidney injury (KATHY).     On preliminary read of echo, possible Mitral valve vegetation was noted.    The patient received IV Lasix and was weaned off BiPAP, becoming stable enough for admission to the floor for acute CHF exacerbation r/o endocarditis.    Patient's Nephew is Ahsan Bucio who is an MD; he was called and updated on patient's clinical status and medical plan and requests to be updated. [phone] (10 Dec 2024 10:32)      Infectious Diseases History:  Old Micro Data/Cultures:     FAMILY HISTORY:    PAST MEDICAL & SURGICAL HISTORY:  Afib      HTN (hypertension)      Heart failure      H/O mitral valve replacement      H/O prior ablation treatment  afib          SOCIAL HISTORY  Social History:  Lives with Nurse Aide and Older sister.  Uses walker at baseline. (10 Dec 2024 10:32)      Recent Travel:  Other Exposures:     ROS  General: Denies rigors, nightsweats  HEENT: Denies headache, rhinorrhea, sore throat, eye pain  CV: Denies CP, palpitations  PULM: Denies wheezing, hemoptysis  GI: Denies hematemesis, hematochezia, melena  : Denies discharge, hematuria  MSK: Denies arthralgias, myalgias  SKIN: Denies rash, lesions  NEURO: Denies paresthesias, weakness  PSYCH: Denies depression, anxiety    VITALS:  T(F): 97.6, Max: 97.8 (12-10-24 @ 15:46)  HR: 85  BP: 107/72  RR: 18Vital Signs Last 24 Hrs  T(C): 36.4 (11 Dec 2024 07:28), Max: 36.6 (10 Dec 2024 15:46)  T(F): 97.6 (11 Dec 2024 07:28), Max: 97.8 (10 Dec 2024 15:46)  HR: 85 (11 Dec 2024 07:55) (85 - 98)  BP: 107/72 (11 Dec 2024 07:28) (90/59 - 111/70)  BP(mean): 69 (11 Dec 2024 04:53) (69 - 78)  RR: 18 (11 Dec 2024 07:28) (18 - 18)  SpO2: 97% (11 Dec 2024 07:55) (97% - 98%)    Parameters below as of 11 Dec 2024 07:28  Patient On (Oxygen Delivery Method): room air        PHYSICAL EXAM:  Gen: NAD  HEENT: Normocephalic, atraumatic  Neck: supple, no lymphadenopathy  CV: Regular rate & regular rhythm, holosystolic murmur  Lungs: decreased bs b/l, + bibasilar crackles  Abdomen: Soft, BS present  Ext: Warm, well perfused  Neuro: non focal, awake  Skin: no rash, no lesions  Lines: no phlebitis    TESTS & MEASUREMENTS:                        14.3   25.00 )-----------( 186      ( 10 Dec 2024 03:20 )             44.3     12-11    138  |  98  |  75[HH]  ----------------------------<  109[H]  3.8   |  27  |  1.9[H]    Ca    8.5      11 Dec 2024 00:23    TPro  5.8[L]  /  Alb  3.3[L]  /  TBili  0.8  /  DBili  x   /  AST  22  /  ALT  14  /  AlkPhos  109  12-11      LIVER FUNCTIONS - ( 11 Dec 2024 00:23 )  Alb: 3.3 g/dL / Pro: 5.8 g/dL / ALK PHOS: 109 U/L / ALT: 14 U/L / AST: 22 U/L / GGT: x           Urinalysis Basic - ( 11 Dec 2024 00:23 )    Color: x / Appearance: x / SG: x / pH: x  Gluc: 109 mg/dL / Ketone: x  / Bili: x / Urobili: x   Blood: x / Protein: x / Nitrite: x   Leuk Esterase: x / RBC: x / WBC x   Sq Epi: x / Non Sq Epi: x / Bacteria: x        Urinalysis with Rflx Culture (collected 12-10-24 @ 10:14)        Lactate, Blood: 1.4 mmol/L (12-11-24 @ 00:23)  Blood Gas Venous - Lactate: 2.4 mmol/L (12-10-24 @ 04:07)      INFECTIOUS DISEASES TESTING      RADIOLOGY & ADDITIONAL TESTS:    Xray Chest 1 View- PORTABLE-Urgent:   ACC: [id number] EXAM:  XR CHEST PORTABLE URGENT 1V   ORDERED BY: DC MCCARTHY     PROCEDURE DATE:  12/10/2024          INTERPRETATION:  CLINICAL HISTORY / REASON FOR EXAM: Shortness of breath.    COMPARISON: Chest radiograph from December 28, 2023.    TECHNIQUE/POSITIONING: Satisfactory. Single image, AP chest radiograph.    FINDINGS:    SUPPORT DEVICES: ICD implant overlies the left hemithorax.    CARDIAC/MEDIASTINUM/HILUM: Post sternotomy.    LUNG PARENCHYMA/PLEURA: Increased pulmonary vascular congestion. Stable   left basilar opacity/effusion. No pneumothorax.    SKELETON/SOFT TISSUES: Unremarkable.      IMPRESSION:    Increased pulmonary vascular congestion.    --- End of Report ---      TTE Echo Complete w/o Contrast w/ Doppler (12.10.24 @ 14:13)  Summary:   1. Normal global left ventricular systolic function with ejection   fraction by visual estimation of 60-65%. The left ventricular diastolic   function could not be assessed in this study. No regional wall motion   abnormalities.   2. Grossly normal right ventricular size and function. A pacer wire is   visualized in the right ventricle.   3. Severely enlarged left atrium.   4. Mildly enlarged right atrium.   5. S/p mitral valve replacement. There is a non-mobile echodense mass   measuring 0.9 x 0.6cm attached anteriorly with resultant moderate,   posteriorly directed valvular regurgitation. The mean transvalvular   gradient is 5mmHg at 90bpm. Findings may be consistent with calcified   thrombus or healed endocarditis. Acute endocarditis cannot be excluded.   6. At least moderate paradoxical low-flow low-gradient aortic valve   stenosis (Vmax 2.5m/s, mean PG 13mmHg, DI 0.39, PRISCILLA 0.94cm2, SVi   20ml/m2). On short axis views the valve appears moderately stenotic.   7. Moderate tricuspid regurgitation.   8. Severe pulmonary hypertension (PASP = 72mmHg).   9. There is no evidence of pericardial effusion.          CARDIOLOGY TESTING  12 Lead ECG:   Ventricular Rate 90 BPM    Atrial Rate 90 BPM    P-R Interval 258 ms    QRS Duration 104 ms    Q-T Interval 370 ms    QTC Calculation(Bazett) 452 ms    P Axis 73 degrees    R Axis 98 degrees    T Axis 57 degrees    Diagnosis Line Sinus rhythm with 1st degree A-V block  Rightward axis  Borderline ECG    Confirmed by Orlando Dupree ([id number]) on 12/10/2024 11:00:47 PM (12-10-24 @ 09:48)    12 Lead ECG:   Ventricular Rate 79 BPM    Atrial Rate 79 BPM    P-R Interval 246 ms    QRS Duration 100 ms    Q-T Interval 404 ms    QTC Calculation(Bazett) 463 ms    P Axis 77 degrees    R Axis 97 degrees    T Axis 93 degrees    Diagnosis Line Sinus rhythm with 1st degree A-V block  Rightward axis  Nonspecific ST abnormality  Abnormal ECG    Confirmed by Orlando Dupree ([id number]) on 12/10/2024 10:39:41 PM (12-10-24 @ 03:51)      All available historical records have been reviewed    MEDICATIONS  atorvastatin 20  cefTRIAXone   IVPB 1000  famotidine    Tablet 20  furosemide   Injectable 40  metoprolol succinate   sodium zirconium cyclosilicate 10      ANTIBIOTICS:  cefTRIAXone   IVPB 1000 milliGRAM(s) IV Intermittent every 24 hours      All available historical data has been reviewed

## 2024-12-12 LAB
ALBUMIN SERPL ELPH-MCNC: 3.5 G/DL — SIGNIFICANT CHANGE UP (ref 3.5–5.2)
ALP SERPL-CCNC: 115 U/L — SIGNIFICANT CHANGE UP (ref 30–115)
ALT FLD-CCNC: 38 U/L — SIGNIFICANT CHANGE UP (ref 0–41)
ANION GAP SERPL CALC-SCNC: 15 MMOL/L — HIGH (ref 7–14)
APTT BLD: 34 SEC — SIGNIFICANT CHANGE UP (ref 27–39.2)
AST SERPL-CCNC: 41 U/L — SIGNIFICANT CHANGE UP (ref 0–41)
BASOPHILS # BLD AUTO: 0.04 K/UL — SIGNIFICANT CHANGE UP (ref 0–0.2)
BASOPHILS NFR BLD AUTO: 0.3 % — SIGNIFICANT CHANGE UP (ref 0–1)
BILIRUB SERPL-MCNC: 0.7 MG/DL — SIGNIFICANT CHANGE UP (ref 0.2–1.2)
BUN SERPL-MCNC: 76 MG/DL — CRITICAL HIGH (ref 10–20)
CALCIUM SERPL-MCNC: 9 MG/DL — SIGNIFICANT CHANGE UP (ref 8.4–10.5)
CHLORIDE SERPL-SCNC: 99 MMOL/L — SIGNIFICANT CHANGE UP (ref 98–110)
CO2 SERPL-SCNC: 24 MMOL/L — SIGNIFICANT CHANGE UP (ref 17–32)
CREAT SERPL-MCNC: 1.8 MG/DL — HIGH (ref 0.7–1.5)
CRP SERPL-MCNC: 117.9 MG/L — HIGH
EGFR: 27 ML/MIN/1.73M2 — LOW
EOSINOPHIL # BLD AUTO: 0.28 K/UL — SIGNIFICANT CHANGE UP (ref 0–0.7)
EOSINOPHIL NFR BLD AUTO: 1.8 % — SIGNIFICANT CHANGE UP (ref 0–8)
ERYTHROCYTE [SEDIMENTATION RATE] IN BLOOD: 33 MM/HR — HIGH (ref 0–20)
GLUCOSE SERPL-MCNC: 117 MG/DL — HIGH (ref 70–99)
HCT VFR BLD CALC: 36.4 % — LOW (ref 37–47)
HGB BLD-MCNC: 11.8 G/DL — LOW (ref 12–16)
IMM GRANULOCYTES NFR BLD AUTO: 0.4 % — HIGH (ref 0.1–0.3)
INR BLD: 2.28 RATIO — HIGH (ref 0.65–1.3)
LYMPHOCYTES # BLD AUTO: 13.2 % — LOW (ref 20.5–51.1)
LYMPHOCYTES # BLD AUTO: 2.06 K/UL — SIGNIFICANT CHANGE UP (ref 1.2–3.4)
MAGNESIUM SERPL-MCNC: 1.9 MG/DL — SIGNIFICANT CHANGE UP (ref 1.8–2.4)
MCHC RBC-ENTMCNC: 28.2 PG — SIGNIFICANT CHANGE UP (ref 27–31)
MCHC RBC-ENTMCNC: 32.4 G/DL — SIGNIFICANT CHANGE UP (ref 32–37)
MCV RBC AUTO: 87.1 FL — SIGNIFICANT CHANGE UP (ref 81–99)
MONOCYTES # BLD AUTO: 1.44 K/UL — HIGH (ref 0.1–0.6)
MONOCYTES NFR BLD AUTO: 9.2 % — SIGNIFICANT CHANGE UP (ref 1.7–9.3)
NEUTROPHILS # BLD AUTO: 11.73 K/UL — HIGH (ref 1.4–6.5)
NEUTROPHILS NFR BLD AUTO: 75.1 % — SIGNIFICANT CHANGE UP (ref 42.2–75.2)
NRBC # BLD: 0 /100 WBCS — SIGNIFICANT CHANGE UP (ref 0–0)
PLATELET # BLD AUTO: 219 K/UL — SIGNIFICANT CHANGE UP (ref 130–400)
PMV BLD: 11.8 FL — HIGH (ref 7.4–10.4)
POTASSIUM SERPL-MCNC: 4.4 MMOL/L — SIGNIFICANT CHANGE UP (ref 3.5–5)
POTASSIUM SERPL-SCNC: 4.4 MMOL/L — SIGNIFICANT CHANGE UP (ref 3.5–5)
PROT SERPL-MCNC: 6.3 G/DL — SIGNIFICANT CHANGE UP (ref 6–8)
PROTHROM AB SERPL-ACNC: 27.4 SEC — HIGH (ref 9.95–12.87)
RBC # BLD: 4.18 M/UL — LOW (ref 4.2–5.4)
RBC # FLD: 14.7 % — HIGH (ref 11.5–14.5)
SODIUM SERPL-SCNC: 138 MMOL/L — SIGNIFICANT CHANGE UP (ref 135–146)
VANCOMYCIN TROUGH SERPL-MCNC: 5.3 UG/ML — SIGNIFICANT CHANGE UP (ref 5–10)
WBC # BLD: 15.61 K/UL — HIGH (ref 4.8–10.8)
WBC # FLD AUTO: 15.61 K/UL — HIGH (ref 4.8–10.8)

## 2024-12-12 PROCEDURE — 99233 SBSQ HOSP IP/OBS HIGH 50: CPT

## 2024-12-12 RX ORDER — WARFARIN SODIUM 4 MG/1
3 TABLET ORAL ONCE
Refills: 0 | Status: COMPLETED | OUTPATIENT
Start: 2024-12-12 | End: 2024-12-12

## 2024-12-12 RX ORDER — VANCOMYCIN HCL 900 MCG/MG
1000 POWDER (GRAM) MISCELLANEOUS
Refills: 0 | Status: DISCONTINUED | OUTPATIENT
Start: 2024-12-12 | End: 2024-12-12

## 2024-12-12 RX ADMIN — Medication 100 MILLIGRAM(S): at 15:43

## 2024-12-12 RX ADMIN — FUROSEMIDE 40 MILLIGRAM(S): 40 TABLET ORAL at 13:42

## 2024-12-12 RX ADMIN — FAMOTIDINE 20 MILLIGRAM(S): 20 TABLET, FILM COATED ORAL at 11:16

## 2024-12-12 RX ADMIN — METOPROLOL TARTRATE 300 MILLIGRAM(S): 100 TABLET, FILM COATED ORAL at 06:54

## 2024-12-12 RX ADMIN — FUROSEMIDE 40 MILLIGRAM(S): 40 TABLET ORAL at 05:36

## 2024-12-12 RX ADMIN — Medication 20 MILLIGRAM(S): at 22:51

## 2024-12-12 RX ADMIN — Medication 250 MILLIGRAM(S): at 07:55

## 2024-12-12 RX ADMIN — WARFARIN SODIUM 3 MILLIGRAM(S): 4 TABLET ORAL at 13:42

## 2024-12-12 NOTE — PROGRESS NOTE ADULT - SUBJECTIVE AND OBJECTIVE BOX
JORDAN CHEEMA  89y, Female    All available historical data reviewed    OVERNIGHT EVENTS:    none  feels well and has no new complaints  No fevers   No complaints  RA  no pressors  no quevedo  ROS:  General: Denies rigors, nightsweats  HEENT: Denies headache, rhinorrhea, sore throat, eye pain  CV: Denies CP, palpitations  PULM: Denies wheezing, hemoptysis  GI: Denies hematemesis, hematochezia, melena  : Denies discharge, hematuria  MSK: Denies arthralgias, myalgias  SKIN: Denies rash, lesions  NEURO: Denies paresthesias, weakness  PSYCH: Denies depression, anxiety    VITALS:  T(F): 97.2, Max: 97.8 (12-11-24 @ 15:35)  HR: 80  BP: 104/65  RR: 18Vital Signs Last 24 Hrs  T(C): 36.2 (12 Dec 2024 08:05), Max: 36.6 (11 Dec 2024 15:35)  T(F): 97.2 (12 Dec 2024 08:05), Max: 97.8 (11 Dec 2024 15:35)  HR: 80 (12 Dec 2024 08:05) (80 - 89)  BP: 104/65 (12 Dec 2024 08:05) (95/47 - 108/57)  BP(mean): 78 (12 Dec 2024 01:33) (73 - 78)  RR: 18 (12 Dec 2024 08:05) (10 - 18)  SpO2: 96% (12 Dec 2024 08:05) (95% - 96%)    Parameters below as of 12 Dec 2024 01:33  Patient On (Oxygen Delivery Method): room air        TESTS & MEASUREMENTS:                        11.8   15.61 )-----------( 219      ( 12 Dec 2024 07:05 )             36.4     12-11    138  |  98  |  76[HH]  ----------------------------<  100[H]  4.7   |  29  |  1.9[H]    Ca    9.1      11 Dec 2024 07:57  Mg     1.8     12-11    TPro  6.1  /  Alb  3.4[L]  /  TBili  1.1  /  DBili  x   /  AST  25  /  ALT  15  /  AlkPhos  115  12-11    LIVER FUNCTIONS - ( 11 Dec 2024 07:57 )  Alb: 3.4 g/dL / Pro: 6.1 g/dL / ALK PHOS: 115 U/L / ALT: 15 U/L / AST: 25 U/L / GGT: x             Culture - Blood (collected 12-10-24 @ 15:55)  Source: .Blood BLOOD  Preliminary Report (12-11-24 @ 23:08):    No growth at 24 hours    Culture - Blood (collected 12-10-24 @ 15:55)  Source: .Blood BLOOD  Preliminary Report (12-11-24 @ 23:08):    No growth at 24 hours    Urinalysis with Rflx Culture (collected 12-10-24 @ 10:14)      Urinalysis Basic - ( 11 Dec 2024 07:57 )    Color: x / Appearance: x / SG: x / pH: x  Gluc: 100 mg/dL / Ketone: x  / Bili: x / Urobili: x   Blood: x / Protein: x / Nitrite: x   Leuk Esterase: x / RBC: x / WBC x   Sq Epi: x / Non Sq Epi: x / Bacteria: x          Social History:  Tobacco Use: No  Alcohol Use: No  Drug Use: No    RADIOLOGY & ADDITIONAL TESTS:  Personal review of radiological diagnostics performed  Echo and EKG results noted when applicable.     MEDICATIONS:  acetaminophen     Tablet .. 650 milliGRAM(s) Oral every 6 hours PRN  aluminum hydroxide/magnesium hydroxide/simethicone Suspension 30 milliLiter(s) Oral every 4 hours PRN  atorvastatin 20 milliGRAM(s) Oral at bedtime  cefTRIAXone   IVPB 2000 milliGRAM(s) IV Intermittent every 24 hours  famotidine    Tablet 20 milliGRAM(s) Oral daily  furosemide   Injectable 40 milliGRAM(s) IV Push two times a day  melatonin 3 milliGRAM(s) Oral at bedtime PRN  metoprolol succinate  milliGRAM(s) Oral daily  ondansetron Injectable 4 milliGRAM(s) IV Push every 8 hours PRN  polyethylene glycol 3350 17 Gram(s) Oral daily PRN  senna 2 Tablet(s) Oral at bedtime PRN  sodium zirconium cyclosilicate 10 Gram(s) Oral every 8 hours      ANTIBIOTICS:  cefTRIAXone   IVPB 2000 milliGRAM(s) IV Intermittent every 24 hours

## 2024-12-12 NOTE — PROGRESS NOTE ADULT - ATTENDING COMMENTS
Pt seen and examined at bedside. No cp or sob.     Vital Signs (24 Hrs):  T(C): 36.2 (12-12-24 @ 08:05), Max: 36.6 (12-11-24 @ 15:35)  HR: 80 (12-12-24 @ 08:05) (80 - 89)  BP: 104/65 (12-12-24 @ 08:05) (95/47 - 108/57)  RR: 18 (12-12-24 @ 08:05) (10 - 18)  SpO2: 96% (12-12-24 @ 08:05) (95% - 96%)    PHYSICAL EXAM:  GENERAL: NAD, well-developed  HEAD:  Atraumatic, Normocephalic  EYES: EOMI, PERRLA, conjunctiva and sclera clear  NECK: Supple, No JVD  CHEST/LUNG: Clear to auscultation bilaterally; No wheeze  HEART: Regular rate and rhythm; No, rubs, or gallops, +murmurs   ABDOMEN: Soft, Nontender, Nondistended; Bowel sounds present  EXTREMITIES:  2+ Peripheral Pulses, No clubbing, cyanosis, or edema  PSYCH: AAOx3  NEUROLOGY: non-focal  SKIN: No rashes or lesions  Labs reviewed  Imaging reviewed independently and reviewed read      Plan    #Progress Note Handoff  Pending (specify):    Family discussion: house staff updated pt family  Disposition:   Decision to admit the pt is based on acuity as above Pt seen and examined at bedside. No cp or sob.     Vital Signs (24 Hrs):  T(C): 36.2 (12-12-24 @ 08:05), Max: 36.6 (12-11-24 @ 15:35)  HR: 80 (12-12-24 @ 08:05) (80 - 89)  BP: 104/65 (12-12-24 @ 08:05) (95/47 - 108/57)  RR: 18 (12-12-24 @ 08:05) (10 - 18)  SpO2: 96% (12-12-24 @ 08:05) (95% - 96%)    PHYSICAL EXAM:  GENERAL: NAD, well-developed  HEAD:  Atraumatic, Normocephalic  EYES: EOMI, PERRLA, conjunctiva and sclera clear  NECK: Supple, No JVD  CHEST/LUNG: Clear to auscultation bilaterally; No wheeze  HEART: Regular rate and rhythm; No, rubs, or gallops, +murmurs   ABDOMEN: Soft, Nontender, Nondistended; Bowel sounds present  EXTREMITIES:  2+ Peripheral Pulses, No clubbing, cyanosis, or edema  PSYCH: AAOx3  NEUROLOGY: non-focal  SKIN: No rashes or lesions  Labs reviewed  Imaging reviewed independently and reviewed read    Plan as above. DW resident. Agree to plan. Made edits    #Progress Note Handoff  Pending (specify):  follow up JAIME on 12/13, npo in evening, continue diuresis   Family discussion: house staff updated pt family  Disposition: cardiac telemonitoring   Decision to admit the pt is based on acuity as above

## 2024-12-12 NOTE — PROGRESS NOTE ADULT - SUBJECTIVE AND OBJECTIVE BOX
JORDAN BUCIO 89y Female  MRN#: [id number]   Hospital Day: 2d    HPI:  Patient is an 89-year-old female with a significant past medical history of CHF with an EF of 41% as of October 2023, atrial fibrillation on Coumadin, a prosthetic mitral valve, and an AICD, presents with shortness of breath that began 2 days prior to admission.     She reports associated symptoms of cough, congestion, and diarrhea. On the evening of admission, she experienced severe dyspnea, prompting her visit to the ER. At baseline, she can walk with a walker and talk without breathing difficulties and does not use home oxygen. She denies fevers, chills, recent sick contacts; denies orthopnea, swelling of extremities, or any rashes.    In the ER, she was found to be hypoxic on room air, requiring 6L nasal cannula (NC) oxygen. She was tachypneic and initially unable to speak in full sentences due to dyspnea but improved with BiPAP support, allowing her to speak fully.  She has bibasilar crackles, and no lower extremity edema.    T(F): 96.7 (12-10-24 @ 07:56), Max: 97.4 (12-10-24 @ 02:15)  HR: 78 (12-10-24 @ 07:56) (76 - 83)  BP: 108/74 (12-10-24 @ 07:56) (108/74 - 117/72)  RR: 17 (12-10-24 @ 07:56) (17 - 22)  SpO2: 98% (12-10-24 @ 07:56) (88% - 99%)    Labs s/f  wbc 25, trop 75, BNP 9.6k, BUN 82 Cr 2.6 (1.1 baseline), Na 137, K 5.8  venous lactate 2.4 vbg pH: 7.30pCO2: 57 mmHgHCO3, Venous: 28   ekg showing sinus rhythm, no st elevation  CXR showed pulmonary vascular congestion w/ b/l costophrenic blunting, and lab results indicated new acute kidney injury (KATHY).     On preliminary read of echo, possible Mitral valve vegetation was noted.    The patient received IV Lasix and was weaned off BiPAP, becoming stable enough for admission to the floor for acute CHF exacerbation r/o endocarditis.    Patient's Nephew is Ahsan Bucio who is an MD; he was called and updated on patient's clinical status and medical plan and requests to be updated. [phone] (10 Dec 2024 10:32)      SUBJECTIVE      OBJECTIVE  PAST MEDICAL & SURGICAL HISTORY  Afib    HTN (hypertension)    Heart failure    H/O mitral valve replacement    H/O prior ablation treatment  afib      ALLERGIES:  sulfamethoxazole (Rash)    MEDICATIONS:  STANDING MEDICATIONS  atorvastatin 20 milliGRAM(s) Oral at bedtime  cefTRIAXone   IVPB 2000 milliGRAM(s) IV Intermittent every 24 hours  famotidine    Tablet 20 milliGRAM(s) Oral daily  furosemide   Injectable 40 milliGRAM(s) IV Push two times a day  metoprolol succinate  milliGRAM(s) Oral daily  sodium zirconium cyclosilicate 10 Gram(s) Oral every 8 hours  vancomycin  IVPB 1000 milliGRAM(s) IV Intermittent once    PRN MEDICATIONS  acetaminophen     Tablet .. 650 milliGRAM(s) Oral every 6 hours PRN  aluminum hydroxide/magnesium hydroxide/simethicone Suspension 30 milliLiter(s) Oral every 4 hours PRN  melatonin 3 milliGRAM(s) Oral at bedtime PRN  ondansetron Injectable 4 milliGRAM(s) IV Push every 8 hours PRN  polyethylene glycol 3350 17 Gram(s) Oral daily PRN  senna 2 Tablet(s) Oral at bedtime PRN      VITAL SIGNS: Last 24 Hours  T(C): 36.6 (12 Dec 2024 01:33), Max: 36.6 (11 Dec 2024 15:35)  T(F): 97.8 (12 Dec 2024 01:33), Max: 97.8 (11 Dec 2024 15:35)  HR: 84 (12 Dec 2024 05:42) (80 - 89)  BP: 108/57 (12 Dec 2024 05:42) (95/47 - 108/57)  BP(mean): 78 (12 Dec 2024 01:33) (73 - 78)  RR: 10 (12 Dec 2024 01:33) (10 - 18)  SpO2: 95% (12 Dec 2024 01:33) (95% - 97%)    LABS:                        11.0   17.62 )-----------( 186      ( 11 Dec 2024 12:10 )             33.4     12-11    138  |  98  |  76[HH]  ----------------------------<  100[H]  4.7   |  29  |  1.9[H]    Ca    9.1      11 Dec 2024 07:57  Mg     1.8     12-11    TPro  6.1  /  Alb  3.4[L]  /  TBili  1.1  /  DBili  x   /  AST  25  /  ALT  15  /  AlkPhos  115  12-11    PT/INR - ( 11 Dec 2024 07:57 )   PT: >40.00 sec;   INR: 3.79 ratio         PTT - ( 11 Dec 2024 07:57 )  PTT:43.6 sec  Urinalysis Basic - ( 11 Dec 2024 07:57 )    Color: x / Appearance: x / SG: x / pH: x  Gluc: 100 mg/dL / Ketone: x  / Bili: x / Urobili: x   Blood: x / Protein: x / Nitrite: x   Leuk Esterase: x / RBC: x / WBC x   Sq Epi: x / Non Sq Epi: x / Bacteria: x            Culture - Blood (collected 10 Dec 2024 15:55)  Source: .Blood BLOOD  Preliminary Report (11 Dec 2024 23:08):    No growth at 24 hours    Culture - Blood (collected 10 Dec 2024 15:55)  Source: .Blood BLOOD  Preliminary Report (11 Dec 2024 23:08):    No growth at 24 hours    Urinalysis with Rflx Culture (collected 10 Dec 2024 10:14)              PHYSICAL EXAM:  GENERAL: NAD, well-developed.  HEAD:  Atraumatic, Normocephalic.  EYES: conjunctiva and sclera clear  CHEST/LUNG: GBAE. No wheezing or crackles   HEART: regular rate and rhythm; S1/S2.  ABDOMEN: Soft, Nontender, Nondistended  EXTREMITIES: No edema.   PSYCH: AAOx3.  NEUROLOGY: non-focal; moves all extremities     JORDAN BUCIO 89y Female  MRN#: [id number]   Hospital Day: 2d    HPI:  Patient is an 89-year-old female with a significant past medical history of CHF with an EF of 41% as of October 2023, atrial fibrillation on Coumadin, a prosthetic mitral valve, and an AICD, presents with shortness of breath that began 2 days prior to admission.     She reports associated symptoms of cough, congestion, and diarrhea. On the evening of admission, she experienced severe dyspnea, prompting her visit to the ER. At baseline, she can walk with a walker and talk without breathing difficulties and does not use home oxygen. She denies fevers, chills, recent sick contacts; denies orthopnea, swelling of extremities, or any rashes.    In the ER, she was found to be hypoxic on room air, requiring 6L nasal cannula (NC) oxygen. She was tachypneic and initially unable to speak in full sentences due to dyspnea but improved with BiPAP support, allowing her to speak fully.  She has bibasilar crackles, and no lower extremity edema.    T(F): 96.7 (12-10-24 @ 07:56), Max: 97.4 (12-10-24 @ 02:15)  HR: 78 (12-10-24 @ 07:56) (76 - 83)  BP: 108/74 (12-10-24 @ 07:56) (108/74 - 117/72)  RR: 17 (12-10-24 @ 07:56) (17 - 22)  SpO2: 98% (12-10-24 @ 07:56) (88% - 99%)    Labs s/f  wbc 25, trop 75, BNP 9.6k, BUN 82 Cr 2.6 (1.1 baseline), Na 137, K 5.8  venous lactate 2.4 vbg pH: 7.30pCO2: 57 mmHgHCO3, Venous: 28   ekg showing sinus rhythm, no st elevation  CXR showed pulmonary vascular congestion w/ b/l costophrenic blunting, and lab results indicated new acute kidney injury (KATHY).     On preliminary read of echo, possible Mitral valve vegetation was noted.    The patient received IV Lasix and was weaned off BiPAP, becoming stable enough for admission to the floor for acute CHF exacerbation r/o endocarditis.    Patient's Nephew is Ahsan Bucio who is an MD; he was called and updated on patient's clinical status and medical plan and requests to be updated. [phone] (10 Dec 2024 10:32)      SUBJECTIVE  Pt was seen and evaluated at bedside, no acute events overnight, now breathing on RA    OBJECTIVE  PAST MEDICAL & SURGICAL HISTORY  Afib    HTN (hypertension)    Heart failure    H/O mitral valve replacement    H/O prior ablation treatment  afib      ALLERGIES:  sulfamethoxazole (Rash)    MEDICATIONS:  STANDING MEDICATIONS  atorvastatin 20 milliGRAM(s) Oral at bedtime  cefTRIAXone   IVPB 2000 milliGRAM(s) IV Intermittent every 24 hours  famotidine    Tablet 20 milliGRAM(s) Oral daily  furosemide   Injectable 40 milliGRAM(s) IV Push two times a day  metoprolol succinate  milliGRAM(s) Oral daily  sodium zirconium cyclosilicate 10 Gram(s) Oral every 8 hours  vancomycin  IVPB 1000 milliGRAM(s) IV Intermittent once    PRN MEDICATIONS  acetaminophen     Tablet .. 650 milliGRAM(s) Oral every 6 hours PRN  aluminum hydroxide/magnesium hydroxide/simethicone Suspension 30 milliLiter(s) Oral every 4 hours PRN  melatonin 3 milliGRAM(s) Oral at bedtime PRN  ondansetron Injectable 4 milliGRAM(s) IV Push every 8 hours PRN  polyethylene glycol 3350 17 Gram(s) Oral daily PRN  senna 2 Tablet(s) Oral at bedtime PRN      VITAL SIGNS: Last 24 Hours  T(C): 36.6 (12 Dec 2024 01:33), Max: 36.6 (11 Dec 2024 15:35)  T(F): 97.8 (12 Dec 2024 01:33), Max: 97.8 (11 Dec 2024 15:35)  HR: 84 (12 Dec 2024 05:42) (80 - 89)  BP: 108/57 (12 Dec 2024 05:42) (95/47 - 108/57)  BP(mean): 78 (12 Dec 2024 01:33) (73 - 78)  RR: 10 (12 Dec 2024 01:33) (10 - 18)  SpO2: 95% (12 Dec 2024 01:33) (95% - 97%)    LABS:                        11.0   17.62 )-----------( 186      ( 11 Dec 2024 12:10 )             33.4     12-11    138  |  98  |  76[HH]  ----------------------------<  100[H]  4.7   |  29  |  1.9[H]    Ca    9.1      11 Dec 2024 07:57  Mg     1.8     12-11    TPro  6.1  /  Alb  3.4[L]  /  TBili  1.1  /  DBili  x   /  AST  25  /  ALT  15  /  AlkPhos  115  12-11    PT/INR - ( 11 Dec 2024 07:57 )   PT: >40.00 sec;   INR: 3.79 ratio         PTT - ( 11 Dec 2024 07:57 )  PTT:43.6 sec  Urinalysis Basic - ( 11 Dec 2024 07:57 )    Color: x / Appearance: x / SG: x / pH: x  Gluc: 100 mg/dL / Ketone: x  / Bili: x / Urobili: x   Blood: x / Protein: x / Nitrite: x   Leuk Esterase: x / RBC: x / WBC x   Sq Epi: x / Non Sq Epi: x / Bacteria: x            Culture - Blood (collected 10 Dec 2024 15:55)  Source: .Blood BLOOD  Preliminary Report (11 Dec 2024 23:08):    No growth at 24 hours    Culture - Blood (collected 10 Dec 2024 15:55)  Source: .Blood BLOOD  Preliminary Report (11 Dec 2024 23:08):    No growth at 24 hours    Urinalysis with Rflx Culture (collected 10 Dec 2024 10:14)              PHYSICAL EXAM:  GENERAL: NAD, well-developed.  HEAD:  Atraumatic, Normocephalic.  EYES: conjunctiva and sclera clear  CHEST/LUNG: GBAE. No wheezing or crackles   HEART: regular rate and rhythm; S1/S2.  ABDOMEN: Soft, Nontender, Nondistended  EXTREMITIES: No edema.   PSYCH: AAOx3.  NEUROLOGY: non-focal; moves all extremities

## 2024-12-12 NOTE — PROGRESS NOTE ADULT - ASSESSMENT
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 Vital signs were significant for hypoxia and tachypnea which improved with Bipap. 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 Macdonald  - F/U cardio for JAIME    #Possible Mitral Valve Vegetation, rule out infective endocarditis  - sepsis not present on admission; no janeway lesions, splinter hemorrhages, osler nodes  - h/o prosthetic mitral valve  - TTE 12/10 There is a non-mobile echodense mass measuring 0.9 x 0.6cm attached anteriorly with resultant moderate, posteriorly directed valvular regurgitation. The mean transvalvular gradient is 5mmHg at 90bpm. Findings may be consistent with calcified thrombus or healed endocarditis. 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 Vital signs were significant for hypoxia and tachypnea which improved with Bipap. 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 Macdonald  - F/U  JAIME    #Possible Mitral Valve Vegetation, rule out infective endocarditis  - sepsis not present on admission; no janeway lesions, splinter hemorrhages, osler nodes  - h/o prosthetic mitral valve  - TTE 12/10 There is a non-mobile echodense mass measuring 0.9 x 0.6cm attached anteriorly with resultant moderate, posteriorly directed valvular regurgitation. The mean transvalvular gradient is 5mmHg at 90bpm. Findings may be consistent with calcified thrombus or healed endocarditis. 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 Vital signs were significant for hypoxia and tachypnea which improved with Bipap. 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 Macdonald  - F/U  JAIME    #Possible Mitral Valve Vegetation, rule out infective endocarditis  - sepsis not present on admission; no Janeway lesions, splinter hemorrhages, osler nodes  - h/o prosthetic mitral valve  - TTE 12/10 There is a non-mobile echodense mass measuring 0.9 x 0.6cm attached anteriorly with resultant moderate, posteriorly directed valvular regurgitation. The mean transvalvular gradient is 5mmHg at 90bpm. Findings may be consistent with calcified thrombus or healed endocarditis. 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 Vital signs were significant for hypoxia and tachypnea which improved with Bipap. 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 Macdonald  - F/U  JAIME 12/13, npo after mn before    #Possible Mitral Valve Vegetation, rule out infective endocarditis  - sepsis not present on admission; no Janeway lesions, splinter hemorrhages, osler nodes  - h/o prosthetic mitral valve  - TTE 12/10 There is a non-mobile echodense mass measuring 0.9 x 0.6cm attached anteriorly with resultant moderate, posteriorly directed valvular regurgitation. The mean transvalvular gradient is 5mmHg at 90bpm. Findings may be consistent with calcified thrombus or healed endocarditis. Acute endocarditis cannot be excluded  - F/U bcx   - ID vanc 1g q48H, rocephin 2g q24, get JAIME  - f/u ESR, CRP, Procal    #KATHY  #Hyper K RESOLVED   - BL cr 1.1  - Cr fluctuating   - Holding losartan, will restart when Cr improves    #Chronic A-fib/A-flutter  #S/P MVR on Coumadin  #HTN  - c/w toprol 300  - daily INR and coumadin 3mg orders  - supratherauptic today, monitor off today     #DVT PPX: Warfarin  #GI PPX: Famotidine  #Diet: fluid restrict, npo @mn  #Code Status: full code  #Activity Order: increase as tolerated   #Dispo/Needs: inpt tele, PT

## 2024-12-12 NOTE — CHART NOTE - NSCHARTNOTEFT_GEN_A_CORE
Met with patient to provide nutrition education for CHF management through diet modification. Discussed importance of avoiding excessive sodium intake and of monitoring fluid intake. Provided printed handout which included information regarding the above mentioned and related topics for patient's future reference. Patient verbalized understanding. Will f/u as needed.    RD to be available.    Sanju Wolff RD via Teams

## 2024-12-12 NOTE — PHARMACOTHERAPY INTERVENTION NOTE - COMMENTS
Recommended to hold vancomycin order for now since the CrCl is < 30 mL/min. Patient will need to be dosed by level. Current AUC/ONELIA is 460 mg/L*h, as per [employer] calculations.    Whit Sauceda, PharmD, [facility]  Clinical Pharmacy Specialist, Infectious Diseases  Tele-Antimicrobial Stewardship Program (Tele-ASP)  Tele-ASP Phone: [phone]

## 2024-12-12 NOTE — PHARMACOTHERAPY INTERVENTION NOTE - COMMENTS
As per policy, ordered a vancomycin level for 12/13 AM in order to assist with vancomycin pharmacokinetic monitoring.    Whit Sauceda, PharmD, BCIDP  Clinical Pharmacy Specialist, Infectious Diseases  Tele-Antimicrobial Stewardship Program (Tele-ASP)  Tele-ASP Phone: [phone]

## 2024-12-12 NOTE — PROGRESS NOTE ADULT - ASSESSMENT
89-year-old female with a significant PMH of CHF with an EF of 41% as of October 2023, atrial fibrillation on Coumadin, a prosthetic mitral valve, and an AICD, presents with shortness of breath that began 2 days prior to admission.     She reports associated symptoms of cough, congestion, and diarrhea. On the evening of admission, she experienced severe dyspnea, prompting her visit to the ER. At baseline, she can walk with a walker and talk without breathing difficulties and does not use home oxygen. She denies fevers, chills, recent sick contacts; denies orthopnea, swelling of extremities, or any rashes.    ID consulted to r/o infective endocarditis.    IMPRESSION/RECOMMENDATIONS  Immunosuppression/Immunosenescence ( above age 60 yrs there is a exponential decline in immunity which could result in poor clinical outcomes.  Acute illness ( MV endocarditis )which poses a threat to life or bodily function without treatment   Acute Hypoxic respiratory failure on chronic CHF leading to initial Bipap  Mitral Valve Vegetation / possible infective endocarditis  12/10 BCx NG  12/10 Nares ORSA NG  12/10 COVID 19/ Influenza/ RSV NG.   12/10 CT chest : no focal opacity. Congestion . ( Independent interpretation of test : no PNA  12/11 CXR increased markings left . ( Independent interpretation of test : CHF  SOB secondary to cardiac issues ( MV ) and not secondary to a bacterial PNA  MV prosthetic valve non mobile mass with valvular failure leading to cute CHF  R/o infectious endocarditis  12/11 Cardiology evaluation noted. No contraindication for JAIME  12/10 TTE :  S/p mitral valve replacement. There is a non-mobile echodense mass measuring 0.9 x 0.6cm attached anteriorly with resultant moderate, posteriorly directed valvular regurgitation. The mean transvalvular gradient is 5mmHg at 90bpm. Findings may be consistent with calcified thrombus or healed endocarditis. Acute endocarditis cannot be excluded.    CBC :  ( WBC 25>15.6  ), ( Hg 11.8 ), CMP ( Cr 1.9  ) reviewed .     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  -FU with Cardiology    Discussion of management/test results/antibiotic regimen  with primary medical team.

## 2024-12-13 ENCOUNTER — RESULT REVIEW (OUTPATIENT)
Age: 89
End: 2024-12-13

## 2024-12-13 LAB
ALBUMIN SERPL ELPH-MCNC: 3.4 G/DL — LOW (ref 3.5–5.2)
ALP SERPL-CCNC: 106 U/L — SIGNIFICANT CHANGE UP (ref 30–115)
ALT FLD-CCNC: 73 U/L — HIGH (ref 0–41)
ANION GAP SERPL CALC-SCNC: 12 MMOL/L — SIGNIFICANT CHANGE UP (ref 7–14)
AST SERPL-CCNC: 57 U/L — HIGH (ref 0–41)
BASOPHILS # BLD AUTO: 0.06 K/UL — SIGNIFICANT CHANGE UP (ref 0–0.2)
BASOPHILS NFR BLD AUTO: 0.5 % — SIGNIFICANT CHANGE UP (ref 0–1)
BILIRUB SERPL-MCNC: 0.6 MG/DL — SIGNIFICANT CHANGE UP (ref 0.2–1.2)
BUN SERPL-MCNC: 78 MG/DL — CRITICAL HIGH (ref 10–20)
CALCIUM SERPL-MCNC: 8.9 MG/DL — SIGNIFICANT CHANGE UP (ref 8.4–10.4)
CHLORIDE SERPL-SCNC: 101 MMOL/L — SIGNIFICANT CHANGE UP (ref 98–110)
CO2 SERPL-SCNC: 29 MMOL/L — SIGNIFICANT CHANGE UP (ref 17–32)
CREAT SERPL-MCNC: 1.8 MG/DL — HIGH (ref 0.7–1.5)
DIGOXIN SERPL-MCNC: 0.7 NG/ML — LOW (ref 0.8–2)
EGFR: 27 ML/MIN/1.73M2 — LOW
EOSINOPHIL # BLD AUTO: 0.34 K/UL — SIGNIFICANT CHANGE UP (ref 0–0.7)
EOSINOPHIL NFR BLD AUTO: 2.6 % — SIGNIFICANT CHANGE UP (ref 0–8)
GLUCOSE SERPL-MCNC: 120 MG/DL — HIGH (ref 70–99)
HCT VFR BLD CALC: 37.3 % — SIGNIFICANT CHANGE UP (ref 37–47)
HGB BLD-MCNC: 12.1 G/DL — SIGNIFICANT CHANGE UP (ref 12–16)
IMM GRANULOCYTES NFR BLD AUTO: 0.3 % — SIGNIFICANT CHANGE UP (ref 0.1–0.3)
LYMPHOCYTES # BLD AUTO: 1.85 K/UL — SIGNIFICANT CHANGE UP (ref 1.2–3.4)
LYMPHOCYTES # BLD AUTO: 14.3 % — LOW (ref 20.5–51.1)
MAGNESIUM SERPL-MCNC: 1.9 MG/DL — SIGNIFICANT CHANGE UP (ref 1.8–2.4)
MCHC RBC-ENTMCNC: 28.3 PG — SIGNIFICANT CHANGE UP (ref 27–31)
MCHC RBC-ENTMCNC: 32.4 G/DL — SIGNIFICANT CHANGE UP (ref 32–37)
MCV RBC AUTO: 87.4 FL — SIGNIFICANT CHANGE UP (ref 81–99)
MONOCYTES # BLD AUTO: 1.17 K/UL — HIGH (ref 0.1–0.6)
MONOCYTES NFR BLD AUTO: 9.1 % — SIGNIFICANT CHANGE UP (ref 1.7–9.3)
NEUTROPHILS # BLD AUTO: 9.44 K/UL — HIGH (ref 1.4–6.5)
NEUTROPHILS NFR BLD AUTO: 73.2 % — SIGNIFICANT CHANGE UP (ref 42.2–75.2)
NRBC # BLD: 0 /100 WBCS — SIGNIFICANT CHANGE UP (ref 0–0)
PLATELET # BLD AUTO: 230 K/UL — SIGNIFICANT CHANGE UP (ref 130–400)
PMV BLD: 11.9 FL — HIGH (ref 7.4–10.4)
POTASSIUM SERPL-MCNC: 4.3 MMOL/L — SIGNIFICANT CHANGE UP (ref 3.5–5)
POTASSIUM SERPL-SCNC: 4.3 MMOL/L — SIGNIFICANT CHANGE UP (ref 3.5–5)
PROCALCITONIN SERPL-MCNC: 1.02 NG/ML — HIGH (ref 0.02–0.1)
PROT SERPL-MCNC: 6.3 G/DL — SIGNIFICANT CHANGE UP (ref 6–8)
RBC # BLD: 4.27 M/UL — SIGNIFICANT CHANGE UP (ref 4.2–5.4)
RBC # FLD: 14.8 % — HIGH (ref 11.5–14.5)
SODIUM SERPL-SCNC: 142 MMOL/L — SIGNIFICANT CHANGE UP (ref 135–146)
VANCOMYCIN FLD-MCNC: 11.8 UG/ML — HIGH (ref 5–10)
WBC # BLD: 12.9 K/UL — HIGH (ref 4.8–10.8)
WBC # FLD AUTO: 12.9 K/UL — HIGH (ref 4.8–10.8)

## 2024-12-13 PROCEDURE — 99233 SBSQ HOSP IP/OBS HIGH 50: CPT

## 2024-12-13 PROCEDURE — 93320 DOPPLER ECHO COMPLETE: CPT | Mod: 26

## 2024-12-13 PROCEDURE — 93325 DOPPLER ECHO COLOR FLOW MAPG: CPT | Mod: 26

## 2024-12-13 PROCEDURE — 93312 ECHO TRANSESOPHAGEAL: CPT | Mod: 26,XU

## 2024-12-13 PROCEDURE — 99497 ADVNCD CARE PLAN 30 MIN: CPT | Mod: 25

## 2024-12-13 RX ORDER — DIGOXIN 250 MCG
1 TABLET ORAL
Refills: 0 | DISCHARGE

## 2024-12-13 RX ORDER — WARFARIN SODIUM 4 MG/1
3 TABLET ORAL ONCE
Refills: 0 | Status: DISCONTINUED | OUTPATIENT
Start: 2024-12-13 | End: 2024-12-13

## 2024-12-13 RX ORDER — VANCOMYCIN HCL 900 MCG/MG
1000 POWDER (GRAM) MISCELLANEOUS EVERY 24 HOURS
Refills: 0 | Status: DISCONTINUED | OUTPATIENT
Start: 2024-12-13 | End: 2024-12-13

## 2024-12-13 RX ORDER — WARFARIN SODIUM 4 MG/1
2 TABLET ORAL ONCE
Refills: 0 | Status: COMPLETED | OUTPATIENT
Start: 2024-12-13 | End: 2024-12-13

## 2024-12-13 RX ADMIN — FAMOTIDINE 20 MILLIGRAM(S): 20 TABLET, FILM COATED ORAL at 12:42

## 2024-12-13 RX ADMIN — METOPROLOL TARTRATE 300 MILLIGRAM(S): 100 TABLET, FILM COATED ORAL at 05:08

## 2024-12-13 RX ADMIN — Medication 20 MILLIGRAM(S): at 21:37

## 2024-12-13 RX ADMIN — FUROSEMIDE 40 MILLIGRAM(S): 40 TABLET ORAL at 05:08

## 2024-12-13 RX ADMIN — Medication 100 MILLIGRAM(S): at 15:15

## 2024-12-13 RX ADMIN — FUROSEMIDE 40 MILLIGRAM(S): 40 TABLET ORAL at 13:37

## 2024-12-13 NOTE — PROGRESS NOTE ADULT - CONVERSATION DETAILS
Discussed GOC, and prognosis with the pt and her nephew. Full code for now
Goals of Care Conversation done with pt. Discussed FULL CODE VS DNR/DNI. CPR and intubation discussed with Pt and agreed to Both. Pt wishes to be FULL code.  All questions answered.

## 2024-12-13 NOTE — CHART NOTE - NSCHARTNOTEFT_GEN_A_CORE
Pre Procedure Note:  Patient Name: JORDAN CHEEMA   Age:89y  MRN: [id number]  Location: [address]  Procedure Service:  Cardiology   Procedure Name: Transesophageal Echo    Pre Procedure Evaluation: echo dense lesion on MV    HPI: 89yyear old Female presents for Transesophageal echocardiogram.  Indication:  r/o IE of MV     RELEVANT PMH/PSH:  Heart failure    Long term (current) use of anticoagulants    Unspecified atrial fibrillation    DNI    No pertinent family history in first degree relatives    Handoff    MEWS Score    Afib    HTN (hypertension)    Heart failure    KATHY (acute kidney injury)    Acute exacerbation of CHF (congestive heart failure)    H/O mitral valve replacement    H/O prior ablation treatment    SOB    90+    Acute renal failure    [email or web address]        Any respiratory problems: Asthma, COPD, MARIA DE JESUS, recent respiratory illness? NO    Any history of Atlantoaxial disease and severe generalized cervical arthritis? NO    Oral/Dental history: Any dentures, removable, loose, broken tooth/teeth, mouth sores? NO    Significant dysphagia and odynophagia? NO    Any GI history of: Esophageal surgeries, strictures, diverticula, masses, varices, diaphragmatic hernia, stomach ulcers, stomach surgeries, bariatric surgery, GI bleed? NO    SOCIAL HISTORY:   [ ] Non-smoker [ ] Smoker [ ] Alcohol    ALLERGIES: sulfamethoxazole (Rash)      MEDICATION: REVIEWED  acetaminophen     Tablet .. 650 milliGRAM(s) Oral every 6 hours PRN  aluminum hydroxide/magnesium hydroxide/simethicone Suspension 30 milliLiter(s) Oral every 4 hours PRN  atorvastatin 20 milliGRAM(s) Oral at bedtime  cefTRIAXone   IVPB 2000 milliGRAM(s) IV Intermittent every 24 hours  famotidine    Tablet 20 milliGRAM(s) Oral daily  furosemide   Injectable 40 milliGRAM(s) IV Push two times a day  melatonin 3 milliGRAM(s) Oral at bedtime PRN  metoprolol succinate  milliGRAM(s) Oral daily  ondansetron Injectable 4 milliGRAM(s) IV Push every 8 hours PRN  polyethylene glycol 3350 17 Gram(s) Oral daily PRN  senna 2 Tablet(s) Oral at bedtime PRN  sodium zirconium cyclosilicate 10 Gram(s) Oral every 8 hours      REVIEW OF SYSTEMS:  Negative except as mentioend in H&P    PHYSICAL EXAM:  Vital Signs:  T(C): 36.2 (12-13-24 @ 08:50), Max: 36.5 (12-13-24 @ 04:45)  HR: 64 (12-13-24 @ 08:50) (64 - 77)  BP: 127/56 (12-13-24 @ 08:50) (108/71 - 127/56)  RR: 14 (12-13-24 @ 08:50) (14 - 18)  SpO2: 97% (12-13-24 @ 08:50) (97% - 98%)    Appearance: Normal	  HEENT:   Normal oral mucosa, PERRL, EOMI	  Cardiovascular: Normal S1 S2, No JVD, No murmurs, No edema  Respiratory: Lungs clear to auscultation	  Gastrointestinal:  Soft, Non-tender, + BS	  Neurologic/PSY: Non-focal, A&O x 3  Extremities: No clubbing, cyanosis or edema    LABS: reviewed    CARDIAC TESTING/STUDIES:    [ ] ECG  [ ] Echocardiogram:  [ ] Catheterization:  [ ] Stress Test:  	  [ ] PPM/AICD:	    Plan: 	  JAIME Procedure Candidate	X YES          Day of Procedure Attestation:  I have personally seen, examined, and participated in the care of this patient. I have reviewed all pertinent information, including history, physical exam, sedation plan, and agree except as noted.    Attestation Statements:  I have personally seen and examined the patient.  I fully participated in the care of this patient.  I have made amendments to the documentation where necessary, and agree with the history, physical exam, and plan as documented by the Fellow. Pre Procedure Note:  Patient Name: JORDAN CHEEMA   Age:89y  MRN: [id number]  Location: [address]  Procedure Service:  Cardiology   Procedure Name: Transesophageal Echo    Pre Procedure Evaluation: echo dense lesion on MV    HPI: 89yyear old Female presents for Transesophageal echocardiogram.  Indication:  r/o IE of MV     RELEVANT PMH/PSH:  Heart failure    Long term (current) use of anticoagulants    Unspecified atrial fibrillation    DNI    No pertinent family history in first degree relatives    Handoff    MEWS Score    Afib    HTN (hypertension)    Heart failure    KATYH (acute kidney injury)    Acute exacerbation of CHF (congestive heart failure)    H/O mitral valve replacement    H/O prior ablation treatment    SOB    90+    Acute renal failure    [email or web address]        Any respiratory problems: Asthma, COPD, MARIA DE JESUS, recent respiratory illness? NO    Any history of Atlantoaxial disease and severe generalized cervical arthritis? NO    Oral/Dental history: Any dentures, removable, loose, broken tooth/teeth, mouth sores? NO    Significant dysphagia and odynophagia? NO    Any GI history of: Esophageal surgeries, strictures, diverticula, masses, varices, diaphragmatic hernia, stomach ulcers, stomach surgeries, bariatric surgery, GI bleed? NO    SOCIAL HISTORY:   [ ] Non-smoker [ ] Smoker [ ] Alcohol    ALLERGIES: sulfamethoxazole (Rash)      MEDICATION: REVIEWED  acetaminophen     Tablet .. 650 milliGRAM(s) Oral every 6 hours PRN  aluminum hydroxide/magnesium hydroxide/simethicone Suspension 30 milliLiter(s) Oral every 4 hours PRN  atorvastatin 20 milliGRAM(s) Oral at bedtime  cefTRIAXone   IVPB 2000 milliGRAM(s) IV Intermittent every 24 hours  famotidine    Tablet 20 milliGRAM(s) Oral daily  furosemide   Injectable 40 milliGRAM(s) IV Push two times a day  melatonin 3 milliGRAM(s) Oral at bedtime PRN  metoprolol succinate  milliGRAM(s) Oral daily  ondansetron Injectable 4 milliGRAM(s) IV Push every 8 hours PRN  polyethylene glycol 3350 17 Gram(s) Oral daily PRN  senna 2 Tablet(s) Oral at bedtime PRN  sodium zirconium cyclosilicate 10 Gram(s) Oral every 8 hours      REVIEW OF SYSTEMS:  Negative except as mentioned in H&P    PHYSICAL EXAM:  Vital Signs:  T(C): 36.2 (12-13-24 @ 08:50), Max: 36.5 (12-13-24 @ 04:45)  HR: 64 (12-13-24 @ 08:50) (64 - 77)  BP: 127/56 (12-13-24 @ 08:50) (108/71 - 127/56)  RR: 14 (12-13-24 @ 08:50) (14 - 18)  SpO2: 97% (12-13-24 @ 08:50) (97% - 98%)    Appearance: Normal	  HEENT:   Normal oral mucosa, PERRL, EOMI	  Cardiovascular: Normal S1 S2, No JVD, No murmurs, No edema  Respiratory: Lungs clear to auscultation	  Gastrointestinal:  Soft, Non-tender, + BS	  Neurologic/PSY: Non-focal, A&O x 3  Extremities: No clubbing, cyanosis or edema    LABS: reviewed    CARDIAC TESTING/STUDIES:    [ ] ECG  [ ] Echocardiogram:  [ ] Catheterization:  [ ] Stress Test:  	  [ ] PPM/AICD:	    Plan: 	  JAIME Procedure Candidate	X YES          Day of Procedure Attestation:  I have personally seen, examined, and participated in the care of this patient. I have reviewed all pertinent information, including history, physical exam, sedation plan, and agree except as noted.    Attestation Statements:  I have personally seen and examined the patient.  I fully participated in the care of this patient.  I have made amendments to the documentation where necessary, and agree with the history, physical exam, and plan as documented by the Fellow.

## 2024-12-13 NOTE — PHARMACOTHERAPY INTERVENTION NOTE - COMMENTS
Patient started on empiric antibiotics for possible infective endocarditis; vancomycin 750 mg x 1 given yesterday with vancomycin level today @0537 = 11.8. Patient scheduled for JAIME; follow up plan with cardio and ID regarding antibiotics. Blood culture negative x 48 hr. If continuing with vancomycin Per [employer] Bayesian vancomycin dosing software, a regimen of vancomycin 1000 mg IV q24h predicts a therapeutic steady-state AUC/ONELIA of 537.07 mg/L*hr (goal 400-600). KATHY is improving SCr today = 1.8. Recommend vancomycin 1000 mg IV q24h starting today and repeat vancomycin level prior to third dose of regimen on 12/15 with AM labs.

## 2024-12-13 NOTE — PROGRESS NOTE ADULT - SUBJECTIVE AND OBJECTIVE BOX
SUBJECTIVE/OVERNIGHT EVENTS  Today is hospital day 3d. This morning patient was seen and examined at bedside, resting comfortably in bed. No acute or major events overnight. Patient denied any CP, SOB, nausea, and vomiting.      Heart failure    Long term (current) use of anticoagulants    Unspecified atrial fibrillation    DNI    No pertinent family history in first degree relatives    Handoff    MEWS Score    Afib    HTN (hypertension)    Heart failure    KATHY (acute kidney injury)    Acute exacerbation of CHF (congestive heart failure)    H/O mitral valve replacement    H/O prior ablation treatment    SOB    90+    Acute renal failure    SysAdmin_VisitLink        MEDICATIONS  STANDING MEDICATIONS  atorvastatin 20 milliGRAM(s) Oral at bedtime  cefTRIAXone   IVPB 2000 milliGRAM(s) IV Intermittent every 24 hours  famotidine    Tablet 20 milliGRAM(s) Oral daily  furosemide   Injectable 40 milliGRAM(s) IV Push two times a day  metoprolol succinate  milliGRAM(s) Oral daily  sodium zirconium cyclosilicate 10 Gram(s) Oral every 8 hours  vancomycin  IVPB 1000 milliGRAM(s) IV Intermittent every 24 hours    PRN MEDICATIONS  acetaminophen     Tablet .. 650 milliGRAM(s) Oral every 6 hours PRN  aluminum hydroxide/magnesium hydroxide/simethicone Suspension 30 milliLiter(s) Oral every 4 hours PRN  melatonin 3 milliGRAM(s) Oral at bedtime PRN  ondansetron Injectable 4 milliGRAM(s) IV Push every 8 hours PRN  polyethylene glycol 3350 17 Gram(s) Oral daily PRN  senna 2 Tablet(s) Oral at bedtime PRN    VITALS  T(F): 97.2 (12-13-24 @ 08:50), Max: 97.7 (12-13-24 @ 04:45)  HR: 64 (12-13-24 @ 08:50) (64 - 77)  BP: 127/56 (12-13-24 @ 08:50) (108/71 - 127/56)  RR: 14 (12-13-24 @ 08:50) (14 - 18)  SpO2: 97% (12-13-24 @ 08:50) (97% - 98%)    PHYSICAL EXAM  GENERAL  (x  ) NAD    (  ) obtunded     (  ) lethargic     (  ) somnolent    HEAD  ( x ) Atraumatic     (  ) hematoma     (  ) laceration (specify location:       )     NECK  ( x ) Supple     (  ) neck stiffness     (  ) nuchal rigidity     (  )  no JVD     (  ) JVD present ( -- cm)    HEART  Rate -->  ( x ) normal rate    (  ) bradycardic    (  ) tachycardic  Rhythm -->  (  ) regular    (  ) regularly irregular    (  ) irregularly irregular  Murmurs -->  (  ) normal s1/s2    (  ) systolic murmur    (  ) diastolic murmur    (  ) continuous murmur     (  ) S3 present    (  ) S4 present    LUNGS  (  )Unlabored respirations     (  ) tachypnea  (  ) B/L air entry     (  ) decreased breath sounds in:  (location     )    (  ) no adventitious sound     ( x) crackles     (  ) wheezing      (  ) rhonchi      (specify location:       )  (  ) chest wall tenderness (specify location:       )    ABDOMEN  ( x ) Soft     (  ) tense   |   ( x ) nondistended     (  ) distended   |   (  ) +BS     (  ) hypoactive bowel sounds     (  ) hyperactive bowel sounds  ( x ) nontender     (  ) RUQ tenderness     (  ) RLQ tenderness     (  ) LLQ tenderness     (  ) epigastric tenderness     (  ) diffuse tenderness  (  ) Splenomegaly      (  ) Hepatomegaly      (  ) Jaundice     (  ) ecchymosis     EXTREMITIES  (  ) Normal     (  ) Rash     (  ) ecchymosis     (  ) varicose veins      ( x ) trace pitting edema     (  ) non-pitting edema   (  ) ulceration     (  ) gangrene:     (location:     )    NERVOUS SYSTEM  ( x ) A&Ox3     (  ) confused     (  ) lethargic  CN II-XII:     (  ) Intact     (  ) focal deficits  (Specify:     )   Upper extremities:     (  ) strength X/5     (  ) focal deficit (specify:    )  Lower extremities:     (  ) strength  X/5    (  ) focal deficit (specify:    )        LABS             12.1   12.90 )-----------( 230      ( 12-13-24 @ 05:37 )             37.3     142  |  101  |  78  -------------------------<  120   12-13-24 @ 05:37  4.3  |  29  |  1.8    Ca      8.9     12-13-24 @ 05:37  Mg     1.9     12-13-24 @ 05:37    TPro  6.3  /  Alb  3.4  /  TBili  0.6  /  DBili  x   /  AST  57  /  ALT  73  /  AlkPhos  106  /  GGT  x     12-13-24 @ 05:37    PT/INR - ( 12-12-24 @ 11:41 )   PT: 27.40 sec[H];   INR: 2.28 ratio[H]  PTT - ( 12-12-24 @ 11:41 )  PTT:34.0 sec    Troponin T, High Sensitivity Result: 108 ng/L (12-11-24 @ 07:57)  Troponin T, High Sensitivity Result: 110 ng/L (12-11-24 @ 00:23)    Urinalysis Basic - ( 13 Dec 2024 05:37 )    Color: x / Appearance: x / SG: x / pH: x  Gluc: 120 mg/dL / Ketone: x  / Bili: x / Urobili: x   Blood: x / Protein: x / Nitrite: x   Leuk Esterase: x / RBC: x / WBC x   Sq Epi: x / Non Sq Epi: x / Bacteria: x          Culture - Blood (collected 11 Dec 2024 07:57)  Source: .Blood BLOOD  Preliminary Report (12 Dec 2024 18:01):    No growth at 24 hours    Culture - Blood (collected 10 Dec 2024 15:55)  Source: .Blood BLOOD  Preliminary Report (12 Dec 2024 23:01):    No growth at 48 Hours    Culture - Blood (collected 10 Dec 2024 15:55)  Source: .Blood BLOOD  Preliminary Report (12 Dec 2024 23:01):    No growth at 48 Hours      IMAGING

## 2024-12-13 NOTE — PROGRESS NOTE ADULT - MUSCULOSKELETAL
no calf tenderness/no chest wall tenderness/decreased strength
no calf tenderness/no chest wall tenderness/decreased strength

## 2024-12-13 NOTE — PHARMACOTHERAPY INTERVENTION NOTE - COMMENTS
89yFemale    Indication: Afib (CHADsVASc = 4)  INR Goal: 2-3  Home Dose: 3 mg po daily  Bridge Therapy: not indicated    Current Medications:  acetaminophen     Tablet .. 650 milliGRAM(s) Oral every 6 hours PRN  aluminum hydroxide/magnesium hydroxide/simethicone Suspension 30 milliLiter(s) Oral every 4 hours PRN  atorvastatin 20 milliGRAM(s) Oral at bedtime  cefTRIAXone   IVPB 2000 milliGRAM(s) IV Intermittent every 24 hours  famotidine    Tablet 20 milliGRAM(s) Oral daily  furosemide   Injectable 40 milliGRAM(s) IV Push two times a day  melatonin 3 milliGRAM(s) Oral at bedtime PRN  metoprolol succinate  milliGRAM(s) Oral daily  ondansetron Injectable 4 milliGRAM(s) IV Push every 8 hours PRN  polyethylene glycol 3350 17 Gram(s) Oral daily PRN  senna 2 Tablet(s) Oral at bedtime PRN  sodium zirconium cyclosilicate 10 Gram(s) Oral every 8 hours  vancomycin  IVPB 1000 milliGRAM(s) IV Intermittent every 24 hours      hemoglobin 12.1 g/dL (12-13-24 @ 05:37)    hematocrit 37.3 % (12-13-24 @ 05:37)    PLT: 230 K/uL (12-13-24 @ 05:37)    GFR:27 mL/min/1.73m2 (12-13-24 @ 05:37)        Drug Interactions: cefTRIAXone    INR trend  4.24 ratio (12-11-24 @ 00:23)  3.79 ratio (12-11-24 @ 07:57)  2.28 ratio (12-12-24 @ 11:41)      Warfarin administration history:  warfarin: 3 milliGRAM(s) (12-12-24 @ 13:42)    1. INR today is:  [ x ] at goal                                                 2. Recommend Warfarin       3 mg   PO x 1     3. Obtain INR tomorrow AM    Patient with history of afib on warfarin 3 mg po daily prior to admission. INR was elevated on admission = 4.24, warfarin dos was held on 12/11. Restarted home dose of 3 mg on 12/12, INR today at goal = 2.28. Recommend to continue with home dose of warfarin 3 mg today, continue to trend daily INR.   89yFemale    Indication: Afib (CHADsVASc = 4)  INR Goal: 2-3  Home Dose: 2 mg po daily  Bridge Therapy: not indicated    Current Medications:  acetaminophen     Tablet .. 650 milliGRAM(s) Oral every 6 hours PRN  aluminum hydroxide/magnesium hydroxide/simethicone Suspension 30 milliLiter(s) Oral every 4 hours PRN  atorvastatin 20 milliGRAM(s) Oral at bedtime  cefTRIAXone   IVPB 2000 milliGRAM(s) IV Intermittent every 24 hours  famotidine    Tablet 20 milliGRAM(s) Oral daily  furosemide   Injectable 40 milliGRAM(s) IV Push two times a day  melatonin 3 milliGRAM(s) Oral at bedtime PRN  metoprolol succinate  milliGRAM(s) Oral daily  ondansetron Injectable 4 milliGRAM(s) IV Push every 8 hours PRN  polyethylene glycol 3350 17 Gram(s) Oral daily PRN  senna 2 Tablet(s) Oral at bedtime PRN  sodium zirconium cyclosilicate 10 Gram(s) Oral every 8 hours  vancomycin  IVPB 1000 milliGRAM(s) IV Intermittent every 24 hours      hemoglobin 12.1 g/dL (12-13-24 @ 05:37)    hematocrit 37.3 % (12-13-24 @ 05:37)    PLT: 230 K/uL (12-13-24 @ 05:37)    GFR:27 mL/min/1.73m2 (12-13-24 @ 05:37)        Drug Interactions: cefTRIAXone    INR trend  4.24 ratio (12-11-24 @ 00:23)  3.79 ratio (12-11-24 @ 07:57)  2.28 ratio (12-12-24 @ 11:41)      Warfarin administration history:  warfarin: 3 milliGRAM(s) (12-12-24 @ 13:42)    1. INR today is:  [ x ] at goal                                                 2. Recommend Warfarin       3 mg   PO x 1     3. Obtain INR tomorrow AM    Patient with history of afib on warfarin. Spoke to patient and family at bedside. Patient states she gets her INR checked every 2 weeks and confirmed her most recent dose of warfarin is 2 mg po daily prior to admission. INR was elevated on admission = 4.24, warfarin dose was held on 12/11. Patient was given warfarin 3 mg on 12/12 as original med rec had her home dose listed as warfarin 3 mg po daily. INR today at goal = 2.28. Recommend to continue with home dose of warfarin 2 mg today, continue to trend daily INR. If INR trends up may need to reduce to 1 mg dose, patient states she fluctuates between 1 mg and 2 mg depenedent on what her INR check is.

## 2024-12-13 NOTE — PROGRESS NOTE ADULT - SUBJECTIVE AND OBJECTIVE BOX
JORDAN CHEEMA  89y, Female    All available historical data reviewed    OVERNIGHT EVENTS:    none  ROS:  no fevers  'on RA  General: Denies rigors, nightsweats  HEENT: Denies headache, rhinorrhea, sore throat, eye pain  CV: Denies CP, palpitations  PULM: Denies wheezing, hemoptysis  GI: Denies hematemesis, hematochezia, melena  : Denies discharge, hematuria  MSK: Denies arthralgias, myalgias  SKIN: Denies rash, lesions  NEURO: Denies paresthesias, weakness  PSYCH: Denies depression, anxiety    VITALS:  T(F): 97.2, Max: 97.7 (12-13-24 @ 04:45)  HR: 64  BP: 127/56  RR: 14Vital Signs Last 24 Hrs  T(C): 36.2 (13 Dec 2024 08:50), Max: 36.5 (13 Dec 2024 04:45)  T(F): 97.2 (13 Dec 2024 08:50), Max: 97.7 (13 Dec 2024 04:45)  HR: 64 (13 Dec 2024 08:50) (64 - 77)  BP: 127/56 (13 Dec 2024 08:50) (108/71 - 127/56)  BP(mean): 87 (13 Dec 2024 04:45) (87 - 87)  RR: 14 (13 Dec 2024 08:50) (14 - 18)  SpO2: 97% (13 Dec 2024 08:50) (97% - 98%)    Parameters below as of 12 Dec 2024 16:16  Patient On (Oxygen Delivery Method): room air        TESTS & MEASUREMENTS:                        12.1   12.90 )-----------( 230      ( 13 Dec 2024 05:37 )             37.3     12-13    142  |  101  |  78[HH]  ----------------------------<  120[H]  4.3   |  29  |  1.8[H]    Ca    8.9      13 Dec 2024 05:37  Mg     1.9     12-13    TPro  6.3  /  Alb  3.4[L]  /  TBili  0.6  /  DBili  x   /  AST  57[H]  /  ALT  73[H]  /  AlkPhos  106  12-13    LIVER FUNCTIONS - ( 13 Dec 2024 05:37 )  Alb: 3.4 g/dL / Pro: 6.3 g/dL / ALK PHOS: 106 U/L / ALT: 73 U/L / AST: 57 U/L / GGT: x             Culture - Blood (collected 12-11-24 @ 07:57)  Source: .Blood BLOOD  Preliminary Report (12-12-24 @ 18:01):    No growth at 24 hours    Culture - Blood (collected 12-10-24 @ 15:55)  Source: .Blood BLOOD  Preliminary Report (12-12-24 @ 23:01):    No growth at 48 Hours    Culture - Blood (collected 12-10-24 @ 15:55)  Source: .Blood BLOOD  Preliminary Report (12-12-24 @ 23:01):    No growth at 48 Hours    Urinalysis with Rflx Culture (collected 12-10-24 @ 10:14)    Culture - Urine (collected 12-10-24 @ 10:14)  Source: Clean Catch None  Preliminary Report (12-12-24 @ 11:53):    10,000 - 49,000 CFU/mL Gram Negative Rods      Urinalysis Basic - ( 13 Dec 2024 05:37 )    Color: x / Appearance: x / SG: x / pH: x  Gluc: 120 mg/dL / Ketone: x  / Bili: x / Urobili: x   Blood: x / Protein: x / Nitrite: x   Leuk Esterase: x / RBC: x / WBC x   Sq Epi: x / Non Sq Epi: x / Bacteria: x          Social History:  Tobacco Use: No  Alcohol Use: No  Drug Use: No    RADIOLOGY & ADDITIONAL TESTS:  Personal review of radiological diagnostics performed  Echo and EKG results noted when applicable.     MEDICATIONS:  acetaminophen     Tablet .. 650 milliGRAM(s) Oral every 6 hours PRN  aluminum hydroxide/magnesium hydroxide/simethicone Suspension 30 milliLiter(s) Oral every 4 hours PRN  atorvastatin 20 milliGRAM(s) Oral at bedtime  cefTRIAXone   IVPB 2000 milliGRAM(s) IV Intermittent every 24 hours  famotidine    Tablet 20 milliGRAM(s) Oral daily  furosemide   Injectable 40 milliGRAM(s) IV Push two times a day  melatonin 3 milliGRAM(s) Oral at bedtime PRN  metoprolol succinate  milliGRAM(s) Oral daily  ondansetron Injectable 4 milliGRAM(s) IV Push every 8 hours PRN  polyethylene glycol 3350 17 Gram(s) Oral daily PRN  senna 2 Tablet(s) Oral at bedtime PRN  sodium zirconium cyclosilicate 10 Gram(s) Oral every 8 hours  vancomycin  IVPB 1000 milliGRAM(s) IV Intermittent every 24 hours      ANTIBIOTICS:  cefTRIAXone   IVPB 2000 milliGRAM(s) IV Intermittent every 24 hours  vancomycin  IVPB 1000 milliGRAM(s) IV Intermittent every 24 hours

## 2024-12-13 NOTE — PHARMACOTHERAPY INTERVENTION NOTE - COMMENTS
Completed admission med rec with patient and family at bedside, doses confirmed with patients pharmacy [facility]. Of note patient was taking digoxin 125 mcg po daily and spironolactone 25 mg po daily prior to admission. K elevated on admission = 5.9 appropriate to continue holding spironolactone at this time. Follow up digoxin dose prior to restarting digoxin. Inaccurate formulation of metoprolol on original med rec- changed from metoprolol succinate 300 mg po daily to metoprolol tartrate 100 mg po TID. Also changed dose of warfarin from 3 mg po daily to 2 mg po daily. Patient notes that she has INR checked about every 2 weeks and she varies between 1 mg po daily and 2 mg po daily however most recently she was taking 2 mg po daily.    Home Medications:  atorvastatin 20 m tablet orally once a day  famotidine 20 m tablet orally once a day  digoxin 125 mcg (0.125 mg) oral tablet: 1 tab(s) orally once a day (13 Dec 2024 13:00)  losartan 50 m tablet orally once a day  metoprolol tartrate 100 mg oral tablet: 1 tab(s) orally 3 times a day (13 Dec 2024 12:57)  spironolactone 25 mg oral tablet: 1 tab(s) orally once a day (13 Dec 2024 13:00)  torsemide 20 mg oral tablet: 1 tab(s) orally once a day (10 Dec 2024 14:05)  warfarin: 2 milligram(s) orally once a day varies between 1 mg and 2 mg daily, follows up with INR weekly, Most recent dose 2 mg po daily (13 Dec 2024 12:58)

## 2024-12-13 NOTE — PROGRESS NOTE ADULT - ASSESSMENT
89-year-old female with a significant PMH of CHF with an EF of 41% as of October 2023, atrial fibrillation on Coumadin, a prosthetic mitral valve, and an AICD, presents with shortness of breath that began 2 days prior to admission.     She reports associated symptoms of cough, congestion, and diarrhea. On the evening of admission, she experienced severe dyspnea, prompting her visit to the ER. At baseline, she can walk with a walker and talk without breathing difficulties and does not use home oxygen. She denies fevers, chills, recent sick contacts; denies orthopnea, swelling of extremities, or any rashes.    ID consulted to r/o infective endocarditis.    IMPRESSION/RECOMMENDATIONS  Immunosuppression/Immunosenescence ( above age 60 yrs there is a exponential decline in immunity which could result in poor clinical outcomes.  Acute Hypoxic respiratory failure on chronic CHF leading to initial Bipap: resolved  Possible aspiration leading to chemical pneumonitis  no ongoing bacterial PNA  No Mitral Valve Vegetation seen on JAIME  12/10 BCx NG  12/10 Nares ORSA NG  12/10 COVID 19/ Influenza/ RSV NG.   12/10 CT chest : no focal opacity. Congestion . ( Independent interpretation of test : no PNA  12/11 CXR increased markings left . ( Independent interpretation of test : CHF    12/10 TTE :  S/p mitral valve replacement. There is a non-mobile echodense mass measuring 0.9 x 0.6cm attached anteriorly with resultant moderate, posteriorly directed valvular regurgitation. The mean transvalvular gradient is 5mmHg at 90bpm. Findings may be consistent with calcified thrombus or healed endocarditis. Acute endocarditis cannot be excluded.    CBC :  ( WBC 25>15.6 >12.9 ), ( Hg 12.1), CMP ( Cr 1.8  ) reviewed .     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

## 2024-12-13 NOTE — CHART NOTE - NSCHARTNOTEFT_GEN_A_CORE
POST OPERATIVE PROCEDURAL DOCUMENTATION  PRE-OP DIAGNOSIS: Echo dense lesion on TTE    POST-OP DIAGNOSIS: presence of well formed healed possible thrombus on the ant leaflet of the MV    PROCEDURE: Transesophageal Echocardiogram    Primary Physician: Dr. Powell  Cardiology Fellow: Dr Ramone Carrington    ANESTHESIA TYPE  [  ] General Anesthesia  [ x ] Conscious Sedation  [  ] Local/Regional    CONDITION  [  ] Critical  [  ] Serious  [  ] Fair  [ x ] Good    SPECIMENS REMOVED (IF APPLICABLE): N/A    IMPLANTS (IF APPLICABLE): None    ESTIMATED BLOOD LOSS: None    COMPLICATIONS: None    After risks and benefits of procedures were explained, informed consent was obtained and placed in chart.   The patient received topical anesthetic to the oropharynx with viscous lidocaine and benzocaine spray.  Refer to Anesthesia note for sedation details.  The JAIME probe was passed into the esophagus without difficulty.  Transesophageal and transgastric images were obtained.  The JAIME probe was removed without difficulty and examined.  There was no evidence for bleeding.  The patient tolerated the procedure well without any immediate JAIME-related complications.      Preliminary Findings:  LA: Moderately enlarged  CHAPIN: Prominent, Left atrial appendage was clear of clot and smoke.  LV: LVEF was estimated at 55-65%  MV: mod to severe MR with Choandae effect, posteriorly direct jet, No MS  AV: trace AI, mod AS with sclerotic cusps and decreased opening   RA: Mildly enlarged  RV: Normal size and function  TV: not assessed   PV: Not assessed   IAS: not assessed   Aorta: There was mild, non-mobile atheroma seen in the thoracic aorta      DIAGNOSIS/IMPRESSION: presence of well healed formed structure below the ant leaflet of MV. differential diagnosis include old thrombus vs hx of endocarditis     Full report to follow    PLAN OF CARE:  [x] Return to inpatient bed when stable and fully awake.  [x] Continue coumadin   [x] No eating or drinking for 1 hour  [x] No driving for 24 hours    Results of procedure/ plan of care discussed with patient/  in detail.

## 2024-12-13 NOTE — PHARMACOTHERAPY INTERVENTION NOTE - COMMENTS
Spoke with patient and family at bedside regarding home medications. Also confirmed medications with patients pharmacy: [facility] pharmacy. Added digoxin 125 mcg po daily and spironolactone 25 mg po daily to admission med rec.

## 2024-12-13 NOTE — CHART NOTE - NSCHARTNOTESELECT_GEN_ALL_CORE
family discussion/Event Note
pre JAIME/Event Note
CHF Education Consult/Nutrition Services
Electrophysiology PA
post JAIME/Event Note

## 2024-12-13 NOTE — PROGRESS NOTE ADULT - NS ATTEST RISK PROBLEM GEN_ALL_CORE FT
-I independently reviewed the completed labs ( CBC, CMP, cultures  along with sensitivities ) and imaging (CT/CXR as available with independent interpretation )  -My assessment required an independent historian  -I discussed my diagnostic/therapeutic recommendations with the primary team housestaff/Attending  -I assisted in the decision regarding continued need for hospitalization / or escalation of care as needed.  -Patient is on drug therapy that requires intense monitoring or toxicity and adjustment of the Antibiotics based on creatinine clearence
I have personally seen and examined this patient. I have reviewed all pertinent clinical information and reviewed all relevant imaging ( and noted the impression from the Radiologist ) and diagnostic studies personally. I counseled the patient about the diagnostic testing and treatment plan. I discussed my recommendations with the primary team.

## 2024-12-13 NOTE — PROGRESS NOTE ADULT - ASSESSMENT
Patient is an 89-year-old female with a significant past medical history of CHF with an EF of 41% as of October 2023, atrial fibrillation on Coumadin, a prosthetic mitral valve, and an AICD, presents with shortness of breath that began 2 days prior to admission. She reports associated symptoms of cough, congestion, and diarrhea.     Acute Hypoxic respiratory failure  Acute HFpEF  Suspected Endocarditis  S/P MVR on Coumadin  Chronic A-fib   HTN  KATHY                PLAN:    ·	Tele reviewed by me  ·	EKG on admission: NSR 79/min. RAD. Non specific ST changes (Interpreted by me)  ·	Blood cxs from 12/10 and 12/11 are negative.   ·	CT chest noted.  Patient is an 89-year-old female with a significant past medical history of CHF with an EF of 41% as of October 2023, atrial fibrillation on Coumadin, a prosthetic mitral valve, and an AICD, presents with shortness of breath that began 2 days prior to admission. She reports associated symptoms of cough, congestion, and diarrhea.     Acute Hypoxic respiratory failure  Acute HFpEF  S/P MVR on Coumadin  Chronic A-fib   HTN  KATHY                PLAN:    ·	Tele reviewed by me. No events  ·	EKG on admission: NSR 79/min. RAD. Non specific ST changes (Interpreted by me)  ·	Blood cxs from 12/10 and 12/11 are negative.   ·	CT chest noted. Bilateral lung groundglass and nodular opacities involving all lobes likely of infectious or inflammatory etiology. Bilateral small pleural effusion.  ·	ECHO reviewed. EF is 60-65%. Mod to severe MR, mod TR, mod AS  ·	S/P JAIME. Mod to severe MR, mod AS. No evidence of acute endocarditis  ·	O/E pt is fluid overload. Cont Lasix 40 mg iv q 12h today. Can switch her to home dose of Torsemide 20 mg po daily form tomorrow and cont Spironolactone 25 mg po daily on discharge  ·	Check i's and o's and daily wt  ·	Low salt diet and water restriction to 1.5 L/D  ·	Called pt's cardiologist and discussed care with him  ·	Leukocytosis likely reactive. D/W the ID. Will d/c all Abx.   ·	PT eval    Progress Note Handoff    Pending (specify):  Consults__PT_______, Tests________, Test Results_______, Other_Anticipate d/c in AM________  Family discussion: Diagnosis, prognosis and plan of care d/w her nephew Dr. Bucio  Disposition: Home___/SNF___/Other________/Unknown at this time________    Tao Salmeron MD  Spectra: [phone]                   Patient is an 89-year-old female with a significant past medical history of CHF with an EF of 41% as of October 2023, atrial fibrillation on Coumadin, a prosthetic mitral valve, and an AICD, presents with shortness of breath that began 2 days prior to admission. She reports associated symptoms of cough, congestion, and diarrhea.     Acute Hypoxic respiratory failure  Acute HFpEF  S/P MVR on Coumadin  Chronic A-fib   HTN  KATHY                PLAN:    ·	Tele reviewed by me. No events  ·	EKG on admission: NSR 79/min. RAD. Non specific ST changes (Interpreted by me)  ·	Blood cxs from 12/10 and 12/11 are negative.   ·	CT chest noted. Bilateral lung groundglass and nodular opacities involving all lobes likely of infectious or inflammatory etiology. Bilateral small pleural effusion.  ·	ECHO reviewed. EF is 60-65%. Mod to severe MR, mod TR, mod AS  ·	S/P JAIME. Mod to severe MR, mod AS. No evidence of acute endocarditis  ·	O/E pt is fluid overload. Cont Lasix 40 mg iv q 12h today. Can switch her to home dose of Torsemide 20 mg po daily form tomorrow and cont Spironolactone 25 mg po daily on discharge  ·	Check i's and o's and daily wt  ·	Low salt diet and water restriction to 1.5 L/D  ·	Called pt's cardiologist and discussed care with him  ·	Leukocytosis likely reactive. D/W the ID. Will d/c all Abx.   ·	INR is 2.28. Give Coumadin 2 mg po tonight and check INR in AM  ·	PT eval    Progress Note Handoff    Pending (specify):  Consults__PT_______, Tests________, Test Results_______, Other_Anticipate d/c in AM________  Family discussion: Diagnosis, prognosis and plan of care d/w her nephew Dr. Bucio  Disposition: Home___/SNF___/Other________/Unknown at this time________    Tao Salmeron MD  Spectra: [phone]

## 2024-12-13 NOTE — PHARMACOTHERAPY INTERVENTION NOTE - COMMENTS
Changed metoprolol from metoprolol succinate 300 mg po daily to metoprolol tartrate 100 mg po TID as per patient and patient's pharmacy: [facility].

## 2024-12-13 NOTE — PROGRESS NOTE ADULT - ASSESSMENT
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 Vital signs were significant for hypoxia and tachypnea which improved with Bipap. 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 Macdonald  - EP interrogated patients AICD and found many episodes of AF RVR as well as a AF burden 93/1%  - Patient went for JAIME today and preliminary results were remarkable for MV: mod to severe MR with Choandae effect, posteriorly direct jet, No MS AV: trace AI, mod AS with sclerotic cusps and decreased opening     #Possible Mitral Valve Vegetation, rule out infective endocarditis  - sepsis not present on admission; no Janeway lesions, splinter hemorrhages, osler nodes  - h/o prosthetic mitral valve  - TTE 12/10 There is a non-mobile echodense mass measuring 0.9 x 0.6cm attached anteriorly with resultant moderate, posteriorly directed valvular regurgitation. The mean transvalvular gradient is 5mmHg at 90bpm. Findings may be consistent with calcified thrombus or healed endocarditis. Acute endocarditis cannot be excluded  - F/U bcx   - ID cw vanc 1g q48H, rocephin 2g q24  - ESR was elevated at 33  - CRP was elevated 117.9   - Procal was elevated at 1.02  -Blood cx currently NGTD    #UTI  #Pyuria  -UA was positive for LE and few bacteria and elevated WBCs  -Urine cultures preliminary results showing gram negative rods  -Currently on ceftriaxone    #KATHY  #Hyper K RESOLVED   - BL cr 1.1  - Cr today 1.8  - Holding losartan, will restart when Cr improves    #Chronic A-fib/A-flutter  #S/P MVR on Coumadin  #HTN  - c/w toprol 300  - daily INR and coumadin 3mg orders  - INR was 2.28 pharmacy will assist with warfarin dosing    #DVT PPX: Warfarin  #GI PPX: Famotidine  #Diet: fluid restrict, npo @mn  #Code Status: full code  #Activity Order: increase as tolerated   #Dispo/Needs: inpt tele, PT

## 2024-12-13 NOTE — PROGRESS NOTE ADULT - SUBJECTIVE AND OBJECTIVE BOX
ANETTE JODRAN  89y Female    CHIEF COMPLAINT:    Patient is a 89y old  Female who presents with a chief complaint of Cough/SOB (12 Dec 2024 08:27)      INTERVAL HPI/OVERNIGHT EVENTS:    Patient seen and examined.    ROS: All other systems are negative.    Vital Signs:    T(F): 97.7 (12-13-24 @ 06:38), Max: 97.7 (12-13-24 @ 04:45)  HR: 74 (12-13-24 @ 06:38) (74 - 80)  BP: 115/73 (12-13-24 @ 06:38) (104/65 - 115/73)  RR: 18 (12-13-24 @ 06:38) (18 - 18)  SpO2: 98% (12-13-24 @ 06:38) (96% - 98%)  I&O's Summary    12 Dec 2024 07:01  -  13 Dec 2024 07:00  --------------------------------------------------------  IN: 240 mL / OUT: 0 mL / NET: 240 mL      Daily Height in cm: 165.1 (13 Dec 2024 06:38)    Daily   CAPILLARY BLOOD GLUCOSE          PHYSICAL EXAM:    GENERAL:  NAD  SKIN: No rashes or lesions  HENT: Atraumatic. Normocephalic. PERRL. Moist membranes.  NECK: Supple, No JVD. No lymphadenopathy.  PULMONARY: CTA B/L. No wheezing. No rales  CVS: Normal S1, S2. Rate and Rhythm are regular. No murmurs.  ABDOMEN/GI: Soft, Nontender, Nondistended; BS present  EXTREMITIES: Peripheral pulses intact. No edema B/L LE.  NEUROLOGIC:  No motor or sensory deficit.  PSYCH: Alert & oriented x 3    Consultant(s) Notes Reviewed:  [x ] YES  [ ] NO  Care Discussed with Consultants/Other Providers [ x] YES  [ ] NO    EKG reviewed  Telemetry reviewed    LABS:                        12.1 12.90 )-----------( 230      ( 13 Dec 2024 05:37 )             37.3     12-12    138  |  99  |  76[HH]  ----------------------------<  117[H]  4.4   |  24  |  1.8[H]    Ca    9.0      12 Dec 2024 07:05  Mg     1.9     12-12    TPro  6.3  /  Alb  3.5  /  TBili  0.7  /  DBili  x   /  AST  41  /  ALT  38  /  AlkPhos  115  12-12    PT/INR - ( 12 Dec 2024 11:41 )   PT: 27.40 sec;   INR: 2.28 ratio         PTT - ( 12 Dec 2024 11:41 )  PTT:34.0 sec        Culture - Blood (collected 11 Dec 2024 07:57)  Source: .Blood BLOOD  Preliminary Report (12 Dec 2024 18:01):    No growth at 24 hours    Culture - Blood (collected 10 Dec 2024 15:55)  Source: .Blood BLOOD  Preliminary Report (12 Dec 2024 23:01):    No growth at 48 Hours    Culture - Blood (collected 10 Dec 2024 15:55)  Source: .Blood BLOOD  Preliminary Report (12 Dec 2024 23:01):    No growth at 48 Hours    Urinalysis with Rflx Culture (collected 10 Dec 2024 10:14)    Culture - Urine (collected 10 Dec 2024 10:14)  Source: Clean Catch None  Preliminary Report (12 Dec 2024 11:53):    10,000 - 49,000 CFU/mL Gram Negative Rods        RADIOLOGY & ADDITIONAL TESTS:    < from: CT Chest No Cont (12.10.24 @ 17:43) >    IMPRESSION:  Bilateral lung groundglass and nodular opacities involving all lobes   likely of infectious or inflammatory etiology. Bilateral small pleural   effusion.    < end of copied text >  < from: TTE Echo Complete w/o Contrast w/ Doppler (12.10.24 @ 14:13) >  Summary:   1. Normal global left ventricular systolic function with ejection   fraction by visual estimation of 60-65%. The left ventricular diastolic   function could not be assessed in this study. No regional wall motion   abnormalities.   2. Grossly normal right ventricular size and function. A pacer wire is   visualized in the right ventricle.   3. Severely enlarged left atrium.   4. Mildly enlarged right atrium.   5. S/p mitral valve replacement. There is a non-mobile echodense mass   measuring 0.9 x 0.6cm attached anteriorly with resultant moderate,   posteriorly directed valvular regurgitation. The mean transvalvular   gradient is 5mmHg at 90bpm. Findings may be consistent with calcified   thrombus or healed endocarditis. Acute endocarditis cannot be excluded.   6. At least moderate paradoxical low-flow low-gradient aortic valve   stenosis (Vmax 2.5m/s, mean PG 13mmHg, DI 0.39, PRISCILLA 0.94cm2, SVi   20ml/m2). On short axis views the valve appears moderately stenotic.   7. Moderate tricuspid regurgitation.   8. Severe pulmonary hypertension (PASP = 72mmHg).   9. There is no evidence of pericardial effusion.  10. Critical findings were relayed to the primary team at the time of   dictation (Dr. Montoya).    < end of copied text >    Imaging or report Personally Reviewed:  [x ] YES  [ ] NO    Medications:  Standing  atorvastatin 20 milliGRAM(s) Oral at bedtime  cefTRIAXone   IVPB 2000 milliGRAM(s) IV Intermittent every 24 hours  famotidine    Tablet 20 milliGRAM(s) Oral daily  furosemide   Injectable 40 milliGRAM(s) IV Push two times a day  metoprolol succinate  milliGRAM(s) Oral daily  sodium zirconium cyclosilicate 10 Gram(s) Oral every 8 hours    PRN Meds  acetaminophen     Tablet .. 650 milliGRAM(s) Oral every 6 hours PRN  aluminum hydroxide/magnesium hydroxide/simethicone Suspension 30 milliLiter(s) Oral every 4 hours PRN  melatonin 3 milliGRAM(s) Oral at bedtime PRN  ondansetron Injectable 4 milliGRAM(s) IV Push every 8 hours PRN  polyethylene glycol 3350 17 Gram(s) Oral daily PRN  senna 2 Tablet(s) Oral at bedtime PRN      Case discussed with resident    Care discussed with pt/family           ANETTE, JORDAN  89y Female    CHIEF COMPLAINT:    Patient is a 89y old  Female who presents with a chief complaint of Cough/SOB (12 Dec 2024 08:27)      INTERVAL HPI/OVERNIGHT EVENTS:    Patient seen and examined. No sob. No cough. No fever. Feels good.     ROS: All other systems are negative.    Vital Signs:    T(F): 97.7 (12-13-24 @ 06:38), Max: 97.7 (12-13-24 @ 04:45)  HR: 74 (12-13-24 @ 06:38) (74 - 80)  BP: 115/73 (12-13-24 @ 06:38) (104/65 - 115/73)  RR: 18 (12-13-24 @ 06:38) (18 - 18)  SpO2: 98% (12-13-24 @ 06:38) (96% - 98%)  I&O's Summary    12 Dec 2024 07:01  -  13 Dec 2024 07:00  --------------------------------------------------------  IN: 240 mL / OUT: 0 mL / NET: 240 mL      Daily Height in cm: 165.1 (13 Dec 2024 06:38)    Daily   CAPILLARY BLOOD GLUCOSE          PHYSICAL EXAM:    GENERAL:  NAD  SKIN: No rashes or lesions  HENT: Atraumatic. Normocephalic. PERRL. Moist membranes.  NECK: Supple, No JVD. No lymphadenopathy.  PULMONARY: +ve rales in the L base. No wheezing.   CVS: Normal S1, S2. Rate and Rhythm are regular. No murmurs.  ABDOMEN/GI: Soft, Nontender, Nondistended; BS present  EXTREMITIES: Peripheral pulses intact. No edema B/L LE.  NEUROLOGIC:  No motor or sensory deficit.  PSYCH: Alert & oriented x 3    Consultant(s) Notes Reviewed:  [x ] YES  [ ] NO  Care Discussed with Consultants/Other Providers [ x] YES  [ ] NO    EKG reviewed  Telemetry reviewed    LABS:                        12.1   12.90 )-----------( 230      ( 13 Dec 2024 05:37 )             37.3     12-12    138  |  99  |  76[HH]  ----------------------------<  117[H]  4.4   |  24  |  1.8[H]    Ca    9.0      12 Dec 2024 07:05  Mg     1.9     12-12    TPro  6.3  /  Alb  3.5  /  TBili  0.7  /  DBili  x   /  AST  41  /  ALT  38  /  AlkPhos  115  12-12    PT/INR - ( 12 Dec 2024 11:41 )   PT: 27.40 sec;   INR: 2.28 ratio         PTT - ( 12 Dec 2024 11:41 )  PTT:34.0 sec        Culture - Blood (collected 11 Dec 2024 07:57)  Source: .Blood BLOOD  Preliminary Report (12 Dec 2024 18:01):    No growth at 24 hours    Culture - Blood (collected 10 Dec 2024 15:55)  Source: .Blood BLOOD  Preliminary Report (12 Dec 2024 23:01):    No growth at 48 Hours    Culture - Blood (collected 10 Dec 2024 15:55)  Source: .Blood BLOOD  Preliminary Report (12 Dec 2024 23:01):    No growth at 48 Hours    Urinalysis with Rflx Culture (collected 10 Dec 2024 10:14)    Culture - Urine (collected 10 Dec 2024 10:14)  Source: Clean Catch None  Preliminary Report (12 Dec 2024 11:53):    10,000 - 49,000 CFU/mL Gram Negative Rods        RADIOLOGY & ADDITIONAL TESTS:    < from: CT Chest No Cont (12.10.24 @ 17:43) >    IMPRESSION:  Bilateral lung groundglass and nodular opacities involving all lobes   likely of infectious or inflammatory etiology. Bilateral small pleural   effusion.    < end of copied text >  < from: TTE Echo Complete w/o Contrast w/ Doppler (12.10.24 @ 14:13) >  Summary:   1. Normal global left ventricular systolic function with ejection   fraction by visual estimation of 60-65%. The left ventricular diastolic   function could not be assessed in this study. No regional wall motion   abnormalities.   2. Grossly normal right ventricular size and function. A pacer wire is   visualized in the right ventricle.   3. Severely enlarged left atrium.   4. Mildly enlarged right atrium.   5. S/p mitral valve replacement. There is a non-mobile echodense mass   measuring 0.9 x 0.6cm attached anteriorly with resultant moderate,   posteriorly directed valvular regurgitation. The mean transvalvular   gradient is 5mmHg at 90bpm. Findings may be consistent with calcified   thrombus or healed endocarditis. Acute endocarditis cannot be excluded.   6. At least moderate paradoxical low-flow low-gradient aortic valve   stenosis (Vmax 2.5m/s, mean PG 13mmHg, DI 0.39, PRISCILLA 0.94cm2, SVi   20ml/m2). On short axis views the valve appears moderately stenotic.   7. Moderate tricuspid regurgitation.   8. Severe pulmonary hypertension (PASP = 72mmHg).   9. There is no evidence of pericardial effusion.  10. Critical findings were relayed to the primary team at the time of   dictation (Dr. Montoya).    < end of copied text >    Imaging or report Personally Reviewed:  [x ] YES  [ ] NO    Medications:  Standing  atorvastatin 20 milliGRAM(s) Oral at bedtime  cefTRIAXone   IVPB 2000 milliGRAM(s) IV Intermittent every 24 hours  famotidine    Tablet 20 milliGRAM(s) Oral daily  furosemide   Injectable 40 milliGRAM(s) IV Push two times a day  metoprolol succinate  milliGRAM(s) Oral daily  sodium zirconium cyclosilicate 10 Gram(s) Oral every 8 hours    PRN Meds  acetaminophen     Tablet .. 650 milliGRAM(s) Oral every 6 hours PRN  aluminum hydroxide/magnesium hydroxide/simethicone Suspension 30 milliLiter(s) Oral every 4 hours PRN  melatonin 3 milliGRAM(s) Oral at bedtime PRN  ondansetron Injectable 4 milliGRAM(s) IV Push every 8 hours PRN  polyethylene glycol 3350 17 Gram(s) Oral daily PRN  senna 2 Tablet(s) Oral at bedtime PRN      Case discussed with resident    Care discussed with pt/family

## 2024-12-13 NOTE — PHARMACOTHERAPY INTERVENTION NOTE - COMMENTS
Patient with history of a fib was taking digoxin 125 mcg po daily prior to admission- confirmed with patient and patient's pharmacy. Not on original med rec, added to med rec after discussion with patient. She has not received any digoxin since admission. Based on current renal function and using IBW CrCl - 20, digoxin dose recommended for this CrCl would be 62.5 mcg po q48h. SCr today = 1.8 trending down. Recommended digoxin level with 1600 labs; goal for A fib  0.8 to 2 ng/mL. Will need to evaluate if digoxin is needed or appropriate for patient on discharge.

## 2024-12-13 NOTE — PROGRESS NOTE ADULT - CARDIOVASCULAR
Chart notes reviewed.  Please offer appointment tomorrow with Dr. Reddy at [facility] in held call spot. Otherwise Tuesday in [place] with Dr. Reddy in held call spot.    
Problem:  RIGHT TIBIA FRACTURE    Is Patient currently treated by: Splint    Referred by:  JAJA CHILEL ER:  [facility] IN Date:  08/15/2018    Date of Injury:  08/14/2018 Type: JUMPED OFF BED    Work Related: No    Previously seen for this problem before? No  · Where:  NONE  · Previous Records:  NONE    Previous X-Rays:  [facility]    Has patient been made aware to bring films?  No    Contact Phone Number:  [phone]  JÚNIOR/MOTHER          
no new murmurs
no new murmurs

## 2024-12-14 ENCOUNTER — TRANSCRIPTION ENCOUNTER (OUTPATIENT)
Age: 89
End: 2024-12-14

## 2024-12-14 LAB
ALBUMIN SERPL ELPH-MCNC: 3.4 G/DL — LOW (ref 3.5–5.2)
ALP SERPL-CCNC: 105 U/L — SIGNIFICANT CHANGE UP (ref 30–115)
ALT FLD-CCNC: 92 U/L — HIGH (ref 0–41)
ANION GAP SERPL CALC-SCNC: 13 MMOL/L — SIGNIFICANT CHANGE UP (ref 7–14)
APTT BLD: 33 SEC — SIGNIFICANT CHANGE UP (ref 27–39.2)
AST SERPL-CCNC: 58 U/L — HIGH (ref 0–41)
BASOPHILS # BLD AUTO: 0.06 K/UL — SIGNIFICANT CHANGE UP (ref 0–0.2)
BASOPHILS NFR BLD AUTO: 0.4 % — SIGNIFICANT CHANGE UP (ref 0–1)
BILIRUB SERPL-MCNC: 0.6 MG/DL — SIGNIFICANT CHANGE UP (ref 0.2–1.2)
BUN SERPL-MCNC: 73 MG/DL — CRITICAL HIGH (ref 10–20)
CALCIUM SERPL-MCNC: 8.6 MG/DL — SIGNIFICANT CHANGE UP (ref 8.4–10.4)
CHLORIDE SERPL-SCNC: 100 MMOL/L — SIGNIFICANT CHANGE UP (ref 98–110)
CO2 SERPL-SCNC: 28 MMOL/L — SIGNIFICANT CHANGE UP (ref 17–32)
CREAT SERPL-MCNC: 1.8 MG/DL — HIGH (ref 0.7–1.5)
EGFR: 27 ML/MIN/1.73M2 — LOW
EOSINOPHIL # BLD AUTO: 0.34 K/UL — SIGNIFICANT CHANGE UP (ref 0–0.7)
EOSINOPHIL NFR BLD AUTO: 2.5 % — SIGNIFICANT CHANGE UP (ref 0–8)
GLUCOSE SERPL-MCNC: 122 MG/DL — HIGH (ref 70–99)
HCT VFR BLD CALC: 38 % — SIGNIFICANT CHANGE UP (ref 37–47)
HGB BLD-MCNC: 12.1 G/DL — SIGNIFICANT CHANGE UP (ref 12–16)
IMM GRANULOCYTES NFR BLD AUTO: 0.5 % — HIGH (ref 0.1–0.3)
INR BLD: 2.82 RATIO — HIGH (ref 0.65–1.3)
LYMPHOCYTES # BLD AUTO: 1.57 K/UL — SIGNIFICANT CHANGE UP (ref 1.2–3.4)
LYMPHOCYTES # BLD AUTO: 11.7 % — LOW (ref 20.5–51.1)
MAGNESIUM SERPL-MCNC: 1.8 MG/DL — SIGNIFICANT CHANGE UP (ref 1.8–2.4)
MCHC RBC-ENTMCNC: 28.3 PG — SIGNIFICANT CHANGE UP (ref 27–31)
MCHC RBC-ENTMCNC: 31.8 G/DL — LOW (ref 32–37)
MCV RBC AUTO: 88.8 FL — SIGNIFICANT CHANGE UP (ref 81–99)
MONOCYTES # BLD AUTO: 1.12 K/UL — HIGH (ref 0.1–0.6)
MONOCYTES NFR BLD AUTO: 8.4 % — SIGNIFICANT CHANGE UP (ref 1.7–9.3)
NEUTROPHILS # BLD AUTO: 10.24 K/UL — HIGH (ref 1.4–6.5)
NEUTROPHILS NFR BLD AUTO: 76.5 % — HIGH (ref 42.2–75.2)
NRBC # BLD: 0 /100 WBCS — SIGNIFICANT CHANGE UP (ref 0–0)
PLATELET # BLD AUTO: 255 K/UL — SIGNIFICANT CHANGE UP (ref 130–400)
PMV BLD: 11.4 FL — HIGH (ref 7.4–10.4)
POTASSIUM SERPL-MCNC: 3.9 MMOL/L — SIGNIFICANT CHANGE UP (ref 3.5–5)
POTASSIUM SERPL-SCNC: 3.9 MMOL/L — SIGNIFICANT CHANGE UP (ref 3.5–5)
PROT SERPL-MCNC: 6.3 G/DL — SIGNIFICANT CHANGE UP (ref 6–8)
PROTHROM AB SERPL-ACNC: 34 SEC — HIGH (ref 9.95–12.87)
RBC # BLD: 4.28 M/UL — SIGNIFICANT CHANGE UP (ref 4.2–5.4)
RBC # FLD: 14.9 % — HIGH (ref 11.5–14.5)
SODIUM SERPL-SCNC: 141 MMOL/L — SIGNIFICANT CHANGE UP (ref 135–146)
WBC # BLD: 13.4 K/UL — HIGH (ref 4.8–10.8)
WBC # FLD AUTO: 13.4 K/UL — HIGH (ref 4.8–10.8)

## 2024-12-14 PROCEDURE — 99238 HOSP IP/OBS DSCHRG MGMT 30/<: CPT

## 2024-12-14 PROCEDURE — 99232 SBSQ HOSP IP/OBS MODERATE 35: CPT

## 2024-12-14 RX ORDER — TORSEMIDE 10 MG
20 TABLET ORAL DAILY
Refills: 0 | Status: DISCONTINUED | OUTPATIENT
Start: 2024-12-14 | End: 2024-12-16

## 2024-12-14 RX ORDER — WARFARIN SODIUM 4 MG/1
2 TABLET ORAL ONCE
Refills: 0 | Status: COMPLETED | OUTPATIENT
Start: 2024-12-14 | End: 2024-12-14

## 2024-12-14 RX ADMIN — FUROSEMIDE 40 MILLIGRAM(S): 40 TABLET ORAL at 06:22

## 2024-12-14 RX ADMIN — WARFARIN SODIUM 2 MILLIGRAM(S): 4 TABLET ORAL at 14:57

## 2024-12-14 RX ADMIN — Medication 20 MILLIGRAM(S): at 21:04

## 2024-12-14 RX ADMIN — ACETAMINOPHEN 500MG 650 MILLIGRAM(S): 500 TABLET, COATED ORAL at 21:04

## 2024-12-14 RX ADMIN — ACETAMINOPHEN, DIPHENHYDRAMINE HCL, PHENYLEPHRINE HCL 3 MILLIGRAM(S): 325; 25; 5 TABLET ORAL at 21:04

## 2024-12-14 RX ADMIN — FAMOTIDINE 20 MILLIGRAM(S): 20 TABLET, FILM COATED ORAL at 13:33

## 2024-12-14 RX ADMIN — METOPROLOL TARTRATE 300 MILLIGRAM(S): 100 TABLET, FILM COATED ORAL at 06:22

## 2024-12-14 NOTE — PROGRESS NOTE ADULT - ASSESSMENT
89-year-old female with a significant past medical history of CHF with an EF of 41% as of October 2023, atrial fibrillation on Coumadin, a prosthetic mitral valve, and an AICD, presents with shortness of breath that began 2 days prior to admission. She reports associated symptoms of cough, congestion, and diarrhea.     Acute Hypoxic respiratory failure  Acute HFpEF  S/P MVR on Coumadin  Chronic A-fib   HTN  KATHY                PLAN:    Tele reviewed by me. No events  EKG on admission: NSR 79/min. RAD. Non specific ST changes (Interpreted by me)  Blood cxs from 12/10 and 12/11 are negative.   CT chest noted. Bilateral lung groundglass and nodular opacities involving all lobes likely of infectious or inflammatory etiology. Bilateral small pleural effusion.  ECHO reviewed. EF is 60-65%. Mod to severe MR, mod TR, mod AS  S/P JAIME. Mod to severe MR, mod AS. No evidence of acute endocarditis   Can switch her to home dose of Torsemide 20 mg po daily and cont Spironolactone 25 mg po daily on discharge  Check i's and o's and daily wt  Low salt diet and water restriction to 1.5 L/D  team discussed care w cardiology   Leukocytosis likely reactive. D/W the ID. Will d/c all Abx.   INR is 2.28. Give Coumadin 2 mg po tonight and check INR in AM  PT eval    Progress Note Handoff    Pending (specify):  dc today   Disposition: Home   with patient

## 2024-12-14 NOTE — DISCHARGE NOTE NURSING/CASE MANAGEMENT/SOCIAL WORK - NSDCPEFALRISK_GEN_ALL_CORE
For information on Fall & Injury Prevention, visit: [email or web address] OR  [email or web address] OR  https://www.cdc.gov/steadi/patient.html

## 2024-12-14 NOTE — DISCHARGE NOTE PROVIDER - NSDCFUSCHEDAPPT_GEN_ALL_CORE_FT
Kirsten Quezada  [facility]  Scheduled Appointment: 12/17/2024    Kirsten Quezada  [facility]  [facility] [address]  Scheduled Appointment: 12/17/2024    [facility]  CARDIOLOGY [address]  Scheduled Appointment: 12/23/2024

## 2024-12-14 NOTE — PROGRESS NOTE ADULT - SUBJECTIVE AND OBJECTIVE BOX
JORDAN CHEEMA 89y Female  MRN#: [id number]     SUBJECTIVE  Patient is a 89y old Female who presents with a chief complaint of Cough/SOB (14 Dec 2024 09:38)  Currently admitted to medicine with the primary diagnosis of Acute exacerbation of CHF (congestive heart failure)  no overnight events     OBJECTIVE  PAST MEDICAL & SURGICAL HISTORY  Afib    HTN (hypertension)    Heart failure    H/O mitral valve replacement    H/O prior ablation treatment  afib      ALLERGIES:  sulfamethoxazole (Rash)    MEDICATIONS:  STANDING MEDICATIONS  atorvastatin 20 milliGRAM(s) Oral at bedtime  famotidine    Tablet 20 milliGRAM(s) Oral daily  metoprolol succinate  milliGRAM(s) Oral daily  sodium zirconium cyclosilicate 10 Gram(s) Oral every 8 hours  torsemide 20 milliGRAM(s) Oral daily    PRN MEDICATIONS  acetaminophen     Tablet .. 650 milliGRAM(s) Oral every 6 hours PRN  aluminum hydroxide/magnesium hydroxide/simethicone Suspension 30 milliLiter(s) Oral every 4 hours PRN  melatonin 3 milliGRAM(s) Oral at bedtime PRN  ondansetron Injectable 4 milliGRAM(s) IV Push every 8 hours PRN  polyethylene glycol 3350 17 Gram(s) Oral daily PRN  senna 2 Tablet(s) Oral at bedtime PRN      VITAL SIGNS: Last 24 Hours  T(C): 36.5 (14 Dec 2024 05:07), Max: 36.6 (13 Dec 2024 18:58)  T(F): 97.7 (14 Dec 2024 05:07), Max: 97.8 (13 Dec 2024 18:58)  HR: 83 (14 Dec 2024 05:07) (78 - 83)  BP: 115/74 (14 Dec 2024 05:07) (101/66 - 115/74)  BP(mean): --  RR: 18 (14 Dec 2024 05:07) (18 - 18)  SpO2: --    LABS:                        12.1   13.40 )-----------( 255      ( 14 Dec 2024 07:13 )             38.0     12-14    141  |  100  |  73[HH]  ----------------------------<  122[H]  3.9   |  28  |  1.8[H]    Ca    8.6      14 Dec 2024 07:13  Mg     1.8     12-14    TPro  6.3  /  Alb  3.4[L]  /  TBili  0.6  /  DBili  x   /  AST  58[H]  /  ALT  92[H]  /  AlkPhos  105  12-14    PT/INR - ( 14 Dec 2024 07:13 )   PT: 34.00 sec;   INR: 2.82 ratio         PTT - ( 14 Dec 2024 07:13 )  PTT:33.0 sec  Urinalysis Basic - ( 14 Dec 2024 07:13 )    Color: x / Appearance: x / SG: x / pH: x  Gluc: 122 mg/dL / Ketone: x  / Bili: x / Urobili: x   Blood: x / Protein: x / Nitrite: x   Leuk Esterase: x / RBC: x / WBC x   Sq Epi: x / Non Sq Epi: x / Bacteria: x                RADIOLOGY:      PHYSICAL EXAM:    GENERAL: AAOx3  HEENT:  No JVD  PULMONARY: no respiratory distress  CARDIOVASCULAR: Regular rate and rhythm  GASTROINTESTINAL: Soft, Nontender, Nondistended  MUSCULOSKELETAL: No clubbing, cyanosis, or edema  SKIN: No rashes or lesions

## 2024-12-14 NOTE — DISCHARGE NOTE PROVIDER - NSDCCPCAREPLAN_GEN_ALL_CORE_FT
PRINCIPAL DISCHARGE DIAGNOSIS  Diagnosis: Acute exacerbation of CHF (congestive heart failure)  Assessment and Plan of Treatment: You came to the hospital because you were short of breath.  Tests showed that fluid was building up in your lungs, your heart wasn't pumping as well as it should.  We also found a possible infection in your artificial heart valve. We gave you medications to help your heart pump better, get rid of extra fluid, and fight off the infections.  We also did some tests, including ultrasounds of your heart, to get a better understanding of what was going on. The tests were negative. You're improving, so we're sending you home.  It's important to keep taking your medications and follow up with your doctor.       PRINCIPAL DISCHARGE DIAGNOSIS  Diagnosis: Acute exacerbation of CHF (congestive heart failure)  Assessment and Plan of Treatment: You came to the hospital because you were short of breath.  Tests showed that fluid was building up in your lungs, your heart wasn't pumping as well as it should.  We also found a possible infection in your artificial heart valve. We gave you medications to help your heart pump better, get rid of extra fluid, and fight off the infections.  We also did some tests, including ultrasounds of your heart, to get a better understanding of what was going on. The tests were negative. You're improving, so we're sending you home.  It's important to keep taking your medications and follow up with your doctor.  CT scan showed nodular opacities. Please repeat CT scan in 6 weeks after doischarge to rule out lung mass or nodule.        PRINCIPAL DISCHARGE DIAGNOSIS  Diagnosis: Acute exacerbation of CHF (congestive heart failure)  Assessment and Plan of Treatment: You came to the hospital because you were short of breath.  Tests showed that fluid was building up in your lungs, your heart wasn't pumping as well as it should.  You were treated with IV diuretics to help flush out the extra fluid out of your body. We also found a possible infection in your artificial heart valve. We gave you medications to help your heart pump better, get rid of extra fluid, and fight off the infections.  We also did some tests, including ultrasounds of your heart, to get a better understanding of what was going on. The tests were negative. You're improving, so we're sending you home.  It's important to keep taking your medications and follow up with your doctor.  As you have a mechanical heart valve and take warfarin, please follow up outpatient with Warfarin clinic for PT/INR checks and to adjust warfarin dosing based off the PT/INR.   CT scan showed nodular opacities. Please repeat CT scan in 6 weeks after doischarge to rule out lung mass or nodule.

## 2024-12-14 NOTE — DISCHARGE NOTE NURSING/CASE MANAGEMENT/SOCIAL WORK - PATIENT PORTAL LINK FT
You can access the FollowMyHealth Patient Portal offered by [facility] by registering at the following website: [email or web address]. By joining [facility]’s FollowMyHealth portal, you will also be able to view your health information using other applications (apps) compatible with our system.

## 2024-12-14 NOTE — DISCHARGE NOTE PROVIDER - HOSPITAL COURSE
History of Present Illness:   Patient is an 89-year-old female with a significant past medical history of CHF with an EF of 41% as of October 2023, atrial fibrillation on Coumadin, a prosthetic mitral valve, and an AICD, presents with shortness of breath that began 2 days prior to admission.     She reports associated symptoms of cough, congestion, and diarrhea. On the evening of admission, she experienced severe dyspnea, prompting her visit to the ER. At baseline, she can walk with a walker and talk without breathing difficulties and does not use home oxygen. She denies fevers, chills, recent sick contacts; denies orthopnea, swelling of extremities, or any rashes.    In the ER, she was found to be hypoxic on room air, requiring 6L nasal cannula (NC) oxygen. She was tachypneic and initially unable to speak in full sentences due to dyspnea but improved with BiPAP support, allowing her to speak fully.  She has bibasilar crackles, and no lower extremity edema.    T(F): 96.7 (12-10-24 @ 07:56), Max: 97.4 (12-10-24 @ 02:15)  HR: 78 (12-10-24 @ 07:56) (76 - 83)  BP: 108/74 (12-10-24 @ 07:56) (108/74 - 117/72)  RR: 17 (12-10-24 @ 07:56) (17 - 22)  SpO2: 98% (12-10-24 @ 07:56) (88% - 99%)    Labs s/f  wbc 25, trop 75, BNP 9.6k, BUN 82 Cr 2.6 (1.1 baseline), Na 137, K 5.8  venous lactate 2.4 vbg pH: 7.30pCO2: 57 mmHgHCO3, Venous: 28   ekg showing sinus rhythm, no st elevation  CXR showed pulmonary vascular congestion w/ b/l costophrenic blunting, and lab results indicated new acute kidney injury (KATHY).     On preliminary read of echo, possible Mitral valve vegetation was noted.    The patient received IV Lasix and was weaned off BiPAP, becoming stable enough for admission to the floor for acute CHF exacerbation r/o endocarditis.    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 Vital signs were significant for hypoxia and tachypnea which improved with Bipap. 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 Initial CXR revealed increased pulmonary congestion. She was then given lasix. There was suspicion for a mass on her prosthetic valve suspicious for infective endocarditis due to TTE that revealed mobile echodense mass measuring 0.9 x 0.6cm attached anteriorly with resultant moderate, posteriorly directed valvular regurgitation. The mean transvalvular gradient is 5mmHg at 90bpm. Findings may be consistent with calcified thrombus or healed endocarditis. Acute endocarditis cannot be excluded. ESR and CRP were elevated and blood cultures were negative. She was then taken for JAIME that revealed remarkable for MV: mod to severe MR with Choandae effect, posteriorly direct jet, No MS AV: trace AI, mod AS with sclerotic cusps and decreased opening and negative for IE. She was initially on abxs for possible IE then was discontinued. Will send her home with torsemide and spironolactone.  - Afebrile on admission   - EKG no ischemic changes x2   - CXR: Increased pulm congestion  - Bnp 9.6k; on home torsemide 20  - TROP: 75>>110>>108  - TTE 12/10: LVEF 60-65%, severe pulm htn   - EP interrogated patients AICD and found many episodes of AF RVR as well as a AF burden 93/1%  - Patient went for JAIME today and preliminary results were remarkable for MV: mod to severe MR with Choandae effect, posteriorly direct jet, No MS AV: trace AI, mod AS with sclerotic cusps and decreased opening     #Possible Mitral Valve Vegetation, rule out infective endocarditis  - sepsis not present on admission; no Janeway lesions, splinter hemorrhages, osler nodes  - h/o prosthetic mitral valve  - TTE 12/10 There is a non-mobile echodense mass measuring 0.9 x 0.6cm attached anteriorly with resultant moderate, posteriorly directed valvular regurgitation. The mean transvalvular gradient is 5mmHg at 90bpm. Findings may be consistent with calcified thrombus or healed endocarditis. Acute endocarditis cannot be excluded  - ESR was elevated at 33  - CRP was elevated 117.9   - Procal was elevated at 1.02  -Blood cx currently NGTD    #UTI  #Pyuria  -UA was positive for LE and few bacteria and elevated WBCs  -Urine cultures preliminary results showing gram negative rods    #KATHY  #Hyper K RESOLVED   - BL cr 1.1  - Cr today 1.8  - Holding losartan, will restart when Cr improves    #Chronic A-fib/A-flutter  #S/P MVR on Coumadin  #HTN  - c/w toprol 300  - daily INR and coumadin 3mg orders  - INR was 2.28 pharmacy will assist with warfarin dosing       History of Present Illness:   Patient is an 89-year-old female with a significant past medical history of CHF with an EF of 41% as of October 2023, atrial fibrillation on Coumadin, a prosthetic mitral valve, and an AICD, presents with shortness of breath that began 2 days prior to admission.     She reports associated symptoms of cough, congestion, and diarrhea. On the evening of admission, she experienced severe dyspnea, prompting her visit to the ER. At baseline, she can walk with a walker and talk without breathing difficulties and does not use home oxygen. She denies fevers, chills, recent sick contacts; denies orthopnea, swelling of extremities, or any rashes.    In the ER, she was found to be hypoxic on room air, requiring 6L nasal cannula (NC) oxygen. She was tachypneic and initially unable to speak in full sentences due to dyspnea but improved with BiPAP support, allowing her to speak fully.  She has bibasilar crackles, and no lower extremity edema.    T(F): 96.7 (12-10-24 @ 07:56), Max: 97.4 (12-10-24 @ 02:15)  HR: 78 (12-10-24 @ 07:56) (76 - 83)  BP: 108/74 (12-10-24 @ 07:56) (108/74 - 117/72)  RR: 17 (12-10-24 @ 07:56) (17 - 22)  SpO2: 98% (12-10-24 @ 07:56) (88% - 99%)    Labs s/f  wbc 25, trop 75, BNP 9.6k, BUN 82 Cr 2.6 (1.1 baseline), Na 137, K 5.8  venous lactate 2.4 vbg pH: 7.30pCO2: 57 mmHgHCO3, Venous: 28   ekg showing sinus rhythm, no st elevation  CXR showed pulmonary vascular congestion w/ b/l costophrenic blunting, and lab results indicated new acute kidney injury (KATHY).     On preliminary read of echo, possible Mitral valve vegetation was noted.    The patient received IV Lasix and was weaned off BiPAP, becoming stable enough for admission to the floor for acute CHF exacerbation r/o endocarditis.    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 Vital signs were significant for hypoxia and tachypnea which improved with Bipap. 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 Initial CXR revealed increased pulmonary congestion. She was then given lasix. There was suspicion for a mass on her prosthetic valve suspicious for infective endocarditis due to TTE that revealed mobile echodense mass measuring 0.9 x 0.6cm attached anteriorly with resultant moderate, posteriorly directed valvular regurgitation. The mean transvalvular gradient is 5mmHg at 90bpm. Findings may be consistent with calcified thrombus or healed endocarditis. Acute endocarditis cannot be excluded. ESR and CRP were elevated and blood cultures were negative. She was then taken for JAIME that revealed remarkable for MV: mod to severe MR with Choandae effect, posteriorly direct jet, No MS AV: trace AI, mod AS with sclerotic cusps and decreased opening and negative for IE. She was initially on abxs for possible IE then was discontinued. Will send her home with torsemide and spironolactone.  - Afebrile on admission   - EKG no ischemic changes x2   - CXR: Increased pulm congestion  - Bnp 9.6k; on home torsemide 20  - TROP: 75>>110>>108  - TTE 12/10: LVEF 60-65%, severe pulm htn   - c/w IV lasix 40 mg BID  - c/w bipap as needed  - F/U Dr. Macdonald  - EP interrogated patients AICD and found many episodes of AF RVR as well as a AF burden 93/1%  - Cont home dose of Torsemide 20 mg po daiy and cont Spironolactone 25 mg po daily on discharge    #Possible Mitral Valve Vegetation, rule out infective endocarditis  - sepsis not present on admission; no Janeway lesions, splinter hemorrhages, osler nodes  - h/o prosthetic mitral valve  - TTE 12/10 There is a non-mobile echodense mass measuring 0.9 x 0.6cm attached anteriorly with resultant moderate, posteriorly directed valvular regurgitation. The mean transvalvular gradient is 5mmHg at 90bpm. Findings may be consistent with calcified thrombus or healed endocarditis. Acute endocarditis cannot be excluded  - F/U bcx   - ID cw vanc 1g q48H, rocephin 2g q24  - ESR was elevated at 33  - CRP was elevated 117.9   - Procal was elevated at 1.02  -Blood cx currently NGTD  - Patient went for JAIME 12/13. Mod to severe MR, mod AS. No evidence of acute endocarditis  - Leukocytosis likely reactive. D/W the ID. Will d/c all Abx.     #KATHY  #Hyper K RESOLVED   - BL cr 1.1  - Cr today 1.8  - Holding losartan, will restart when Cr improves    #Chronic A-fib/A-flutter  #S/P MVR on Coumadin  #HTN  - c/w toprol 300  - daily INR and coumadin per pharmacy  - INR is 2.63. Cont home dose of Coumadin    #UTI  #Pyuria  -UA was positive for LE and few bacteria and elevated WBCs  -Urine cultures preliminary results showing gram negative rods  -was on ceftriaxone, now monitor off abx

## 2024-12-14 NOTE — DISCHARGE NOTE NURSING/CASE MANAGEMENT/SOCIAL WORK - FINANCIAL ASSISTANCE
[facility] provides services at a reduced cost to those who are determined to be eligible through [facility]’s financial assistance program. Information regarding [facility]’s financial assistance program can be found by going to [email or web address] or by calling [phone].

## 2024-12-14 NOTE — DISCHARGE NOTE PROVIDER - NSDCMRMEDTOKEN_GEN_ALL_CORE_FT
atorvastatin 20 mg oral tablet: 1 tab(s) orally once a day  digoxin 125 mcg (0.125 mg) oral tablet: 1 tab(s) orally once a day  famotidine 20 mg oral tablet: 1 tab(s) orally once a day  losartan 50 mg oral tablet: 1 tab(s) orally once a day  Metoprolol Tartrate 100 mg oral tablet: 1 tab(s) orally 3 times a day  senna leaf extract oral tablet: 2 tab(s) orally once a day (at bedtime) as needed for  constipation  spironolactone 25 mg oral tablet: 1 tab(s) orally once a day  torsemide 20 mg oral tablet: 1 tab(s) orally once a day  warfarin: 2 milligram(s) orally once a day varies between 1 mg and 2 mg daily, follows up with INR weekly, Most recent dose 2 mg po daily

## 2024-12-14 NOTE — DISCHARGE NOTE PROVIDER - CARE PROVIDER_API CALL
Kirsten Quezada NP in Family Health  [address] [place] [id number]  Phone: [phone]  Fax: [phone]  Follow Up Time: 2 weeks

## 2024-12-15 LAB
ALBUMIN SERPL ELPH-MCNC: 3.3 G/DL — LOW (ref 3.5–5.2)
ALP SERPL-CCNC: 102 U/L — SIGNIFICANT CHANGE UP (ref 30–115)
ALT FLD-CCNC: 59 U/L — HIGH (ref 0–41)
ANION GAP SERPL CALC-SCNC: 12 MMOL/L — SIGNIFICANT CHANGE UP (ref 7–14)
APTT BLD: 33.8 SEC — SIGNIFICANT CHANGE UP (ref 27–39.2)
AST SERPL-CCNC: 31 U/L — SIGNIFICANT CHANGE UP (ref 0–41)
BASOPHILS # BLD AUTO: 0.08 K/UL — SIGNIFICANT CHANGE UP (ref 0–0.2)
BASOPHILS NFR BLD AUTO: 0.5 % — SIGNIFICANT CHANGE UP (ref 0–1)
BILIRUB SERPL-MCNC: 0.9 MG/DL — SIGNIFICANT CHANGE UP (ref 0.2–1.2)
BUN SERPL-MCNC: 69 MG/DL — CRITICAL HIGH (ref 10–20)
CALCIUM SERPL-MCNC: 9 MG/DL — SIGNIFICANT CHANGE UP (ref 8.4–10.5)
CHLORIDE SERPL-SCNC: 99 MMOL/L — SIGNIFICANT CHANGE UP (ref 98–110)
CO2 SERPL-SCNC: 28 MMOL/L — SIGNIFICANT CHANGE UP (ref 17–32)
CREAT SERPL-MCNC: 1.5 MG/DL — SIGNIFICANT CHANGE UP (ref 0.7–1.5)
CULTURE RESULTS: SIGNIFICANT CHANGE UP
CULTURE RESULTS: SIGNIFICANT CHANGE UP
EGFR: 34 ML/MIN/1.73M2 — LOW
EOSINOPHIL # BLD AUTO: 0.33 K/UL — SIGNIFICANT CHANGE UP (ref 0–0.7)
EOSINOPHIL NFR BLD AUTO: 2.2 % — SIGNIFICANT CHANGE UP (ref 0–8)
GLUCOSE SERPL-MCNC: 109 MG/DL — HIGH (ref 70–99)
HCT VFR BLD CALC: 36.3 % — LOW (ref 37–47)
HGB BLD-MCNC: 11.7 G/DL — LOW (ref 12–16)
IMM GRANULOCYTES NFR BLD AUTO: 0.5 % — HIGH (ref 0.1–0.3)
INR BLD: 2.82 RATIO — HIGH (ref 0.65–1.3)
LYMPHOCYTES # BLD AUTO: 1.93 K/UL — SIGNIFICANT CHANGE UP (ref 1.2–3.4)
LYMPHOCYTES # BLD AUTO: 13 % — LOW (ref 20.5–51.1)
MAGNESIUM SERPL-MCNC: 1.9 MG/DL — SIGNIFICANT CHANGE UP (ref 1.8–2.4)
MCHC RBC-ENTMCNC: 28.7 PG — SIGNIFICANT CHANGE UP (ref 27–31)
MCHC RBC-ENTMCNC: 32.2 G/DL — SIGNIFICANT CHANGE UP (ref 32–37)
MCV RBC AUTO: 89.2 FL — SIGNIFICANT CHANGE UP (ref 81–99)
MONOCYTES # BLD AUTO: 1.18 K/UL — HIGH (ref 0.1–0.6)
MONOCYTES NFR BLD AUTO: 8 % — SIGNIFICANT CHANGE UP (ref 1.7–9.3)
NEUTROPHILS # BLD AUTO: 11.24 K/UL — HIGH (ref 1.4–6.5)
NEUTROPHILS NFR BLD AUTO: 75.8 % — HIGH (ref 42.2–75.2)
NRBC # BLD: 0 /100 WBCS — SIGNIFICANT CHANGE UP (ref 0–0)
PLATELET # BLD AUTO: 241 K/UL — SIGNIFICANT CHANGE UP (ref 130–400)
PMV BLD: 11.7 FL — HIGH (ref 7.4–10.4)
POTASSIUM SERPL-MCNC: 4 MMOL/L — SIGNIFICANT CHANGE UP (ref 3.5–5)
POTASSIUM SERPL-SCNC: 4 MMOL/L — SIGNIFICANT CHANGE UP (ref 3.5–5)
PROT SERPL-MCNC: 6 G/DL — SIGNIFICANT CHANGE UP (ref 6–8)
PROTHROM AB SERPL-ACNC: 34 SEC — HIGH (ref 9.95–12.87)
RBC # BLD: 4.07 M/UL — LOW (ref 4.2–5.4)
RBC # FLD: 14.9 % — HIGH (ref 11.5–14.5)
SODIUM SERPL-SCNC: 139 MMOL/L — SIGNIFICANT CHANGE UP (ref 135–146)
SPECIMEN SOURCE: SIGNIFICANT CHANGE UP
SPECIMEN SOURCE: SIGNIFICANT CHANGE UP
VANCOMYCIN FLD-MCNC: 6.1 UG/ML — SIGNIFICANT CHANGE UP (ref 5–10)
WBC # BLD: 14.84 K/UL — HIGH (ref 4.8–10.8)
WBC # FLD AUTO: 14.84 K/UL — HIGH (ref 4.8–10.8)

## 2024-12-15 PROCEDURE — 99232 SBSQ HOSP IP/OBS MODERATE 35: CPT

## 2024-12-15 RX ORDER — WARFARIN SODIUM 4 MG/1
2 TABLET ORAL AT BEDTIME
Refills: 0 | Status: DISCONTINUED | OUTPATIENT
Start: 2024-12-15 | End: 2024-12-15

## 2024-12-15 RX ORDER — SPIRONOLACTONE 25 MG
25 TABLET ORAL DAILY
Refills: 0 | Status: DISCONTINUED | OUTPATIENT
Start: 2024-12-15 | End: 2024-12-16

## 2024-12-15 RX ADMIN — FAMOTIDINE 20 MILLIGRAM(S): 20 TABLET, FILM COATED ORAL at 11:45

## 2024-12-15 RX ADMIN — Medication 25 MILLIGRAM(S): at 10:40

## 2024-12-15 RX ADMIN — ACETAMINOPHEN, DIPHENHYDRAMINE HCL, PHENYLEPHRINE HCL 3 MILLIGRAM(S): 325; 25; 5 TABLET ORAL at 21:30

## 2024-12-15 RX ADMIN — ACETAMINOPHEN 500MG 650 MILLIGRAM(S): 500 TABLET, COATED ORAL at 21:30

## 2024-12-15 RX ADMIN — ACETAMINOPHEN 500MG 650 MILLIGRAM(S): 500 TABLET, COATED ORAL at 22:00

## 2024-12-15 RX ADMIN — Medication 20 MILLIGRAM(S): at 06:02

## 2024-12-15 RX ADMIN — METOPROLOL TARTRATE 300 MILLIGRAM(S): 100 TABLET, FILM COATED ORAL at 06:01

## 2024-12-15 RX ADMIN — Medication 20 MILLIGRAM(S): at 21:30

## 2024-12-15 NOTE — PROGRESS NOTE ADULT - ASSESSMENT
89-year-old female with a significant past medical history of CHF with an EF of 41% as of October 2023, atrial fibrillation on Coumadin, a prosthetic mitral valve, and an AICD, presents with shortness of breath that began 2 days prior to admission. She reports associated symptoms of cough, congestion, and diarrhea.     Acute Hypoxic respiratory failure  Acute HFpEF  S/P MVR on Coumadin  Chronic A-fib   HTN  KATHY                PLAN:    Tele reviewed   EKG on admission: NSR 79/min. RAD. Non specific ST changes   Blood cxs from 12/10 and 12/11 are negative.   CT chest noted. Bilateral lung groundglass and nodular opacities involving all lobes likely of infectious or inflammatory etiology. Bilateral small pleural effusion.  ECHO reviewed. EF is 60-65%. Mod to severe MR, mod TR, mod AS  S/P JAIME. Mod to severe MR, mod AS. No evidence of acute endocarditis  Can switch her to home dose of Torsemide 20 mg po daily   cont Spironolactone 25 mg po daily today, monitor BMP  Check i's and o's and daily wt  Low salt diet and water restriction to 1.5 L/D  Team discussed care w cardiology   Leukocytosis likely reactive. D/W the ID. Will d/c all Abx.   INR is 2.28. Give Coumadin 2 mg po tonight and check INR in AM  PT eval    Progress Note Handoff    Pending (specify): PT eval, dc plan, Prepare for dc in am   Disposition: Home vs rehab

## 2024-12-15 NOTE — PROGRESS NOTE ADULT - SUBJECTIVE AND OBJECTIVE BOX
JORDAN CHEEMA 89y Female  MRN#: [id number]       SUBJECTIVE  Patient is a 89y old Female who presents with a chief complaint of Cough/SOB (14 Dec 2024 10:58)  Currently admitted to medicine with the primary diagnosis of Acute exacerbation of CHF (congestive heart failure)  no overnight  events, no BM last two day s    OBJECTIVE  PAST MEDICAL & SURGICAL HISTORY  Afib    HTN (hypertension)    Heart failure    H/O mitral valve replacement    H/O prior ablation treatment  afib      ALLERGIES:  sulfamethoxazole (Rash)    MEDICATIONS:  STANDING MEDICATIONS  atorvastatin 20 milliGRAM(s) Oral at bedtime  famotidine    Tablet 20 milliGRAM(s) Oral daily  metoprolol succinate  milliGRAM(s) Oral daily  sodium zirconium cyclosilicate 10 Gram(s) Oral every 8 hours  torsemide 20 milliGRAM(s) Oral daily    PRN MEDICATIONS  acetaminophen     Tablet .. 650 milliGRAM(s) Oral every 6 hours PRN  aluminum hydroxide/magnesium hydroxide/simethicone Suspension 30 milliLiter(s) Oral every 4 hours PRN  melatonin 3 milliGRAM(s) Oral at bedtime PRN  ondansetron Injectable 4 milliGRAM(s) IV Push every 8 hours PRN  polyethylene glycol 3350 17 Gram(s) Oral daily PRN  senna 2 Tablet(s) Oral at bedtime PRN      VITAL SIGNS: Last 24 Hours  T(C): 36.4 (15 Dec 2024 05:06), Max: 36.6 (14 Dec 2024 11:57)  T(F): 97.5 (15 Dec 2024 05:06), Max: 97.8 (14 Dec 2024 11:57)  HR: 73 (15 Dec 2024 05:06) (72 - 80)  BP: 113/71 (15 Dec 2024 05:06) (109/67 - 122/74)  BP(mean): 90 (14 Dec 2024 21:00) (90 - 90)  RR: 18 (15 Dec 2024 05:06) (18 - 18)  SpO2: 96% (15 Dec 2024 08:16) (95% - 96%)    LABS:                        11.7   14.84 )-----------( 241      ( 15 Dec 2024 06:51 )             36.3     12-15    139  |  99  |  69[HH]  ----------------------------<  109[H]  4.0   |  28  |  1.5    Ca    9.0      15 Dec 2024 06:51  Mg     1.9     12-15    TPro  6.0  /  Alb  3.3[L]  /  TBili  0.9  /  DBili  x   /  AST  31  /  ALT  59[H]  /  AlkPhos  102  12-15    PT/INR - ( 14 Dec 2024 07:13 )   PT: 34.00 sec;   INR: 2.82 ratio         PTT - ( 14 Dec 2024 07:13 )  PTT:33.0 sec  Urinalysis Basic - ( 15 Dec 2024 06:51 )    Color: x / Appearance: x / SG: x / pH: x  Gluc: 109 mg/dL / Ketone: x  / Bili: x / Urobili: x   Blood: x / Protein: x / Nitrite: x   Leuk Esterase: x / RBC: x / WBC x   Sq Epi: x / Non Sq Epi: x / Bacteria: x                RADIOLOGY:      PHYSICAL EXAM:    GENERAL: AAOx3  HEENT:  No JVD  PULMONARY: no respiratory distress  CARDIOVASCULAR: Regular rate and rhythm  GASTROINTESTINAL: Soft, Nontender, Nondistended  MUSCULOSKELETAL: No clubbing, cyanosis, or edema  SKIN: No rashes or lesions

## 2024-12-16 VITALS
SYSTOLIC BLOOD PRESSURE: 102 MMHG | RESPIRATION RATE: 18 BRPM | DIASTOLIC BLOOD PRESSURE: 66 MMHG | TEMPERATURE: 98 F | HEART RATE: 57 BPM

## 2024-12-16 LAB
ANION GAP SERPL CALC-SCNC: 15 MMOL/L — HIGH (ref 7–14)
APTT BLD: 33.5 SEC — SIGNIFICANT CHANGE UP (ref 27–39.2)
BUN SERPL-MCNC: 78 MG/DL — CRITICAL HIGH (ref 10–20)
CALCIUM SERPL-MCNC: 9 MG/DL — SIGNIFICANT CHANGE UP (ref 8.4–10.4)
CHLORIDE SERPL-SCNC: 99 MMOL/L — SIGNIFICANT CHANGE UP (ref 98–110)
CO2 SERPL-SCNC: 26 MMOL/L — SIGNIFICANT CHANGE UP (ref 17–32)
CREAT SERPL-MCNC: 1.8 MG/DL — HIGH (ref 0.7–1.5)
CULTURE RESULTS: SIGNIFICANT CHANGE UP
EGFR: 27 ML/MIN/1.73M2 — LOW
GLUCOSE SERPL-MCNC: 107 MG/DL — HIGH (ref 70–99)
INR BLD: 2.63 RATIO — HIGH (ref 0.65–1.3)
POTASSIUM SERPL-MCNC: 4.7 MMOL/L — SIGNIFICANT CHANGE UP (ref 3.5–5)
POTASSIUM SERPL-SCNC: 4.7 MMOL/L — SIGNIFICANT CHANGE UP (ref 3.5–5)
PROTHROM AB SERPL-ACNC: 31.6 SEC — HIGH (ref 9.95–12.87)
SODIUM SERPL-SCNC: 140 MMOL/L — SIGNIFICANT CHANGE UP (ref 135–146)
SPECIMEN SOURCE: SIGNIFICANT CHANGE UP

## 2024-12-16 PROCEDURE — 99232 SBSQ HOSP IP/OBS MODERATE 35: CPT

## 2024-12-16 RX ADMIN — Medication 20 MILLIGRAM(S): at 05:17

## 2024-12-16 RX ADMIN — Medication 25 MILLIGRAM(S): at 05:17

## 2024-12-16 RX ADMIN — METOPROLOL TARTRATE 300 MILLIGRAM(S): 100 TABLET, FILM COATED ORAL at 05:17

## 2024-12-16 RX ADMIN — FAMOTIDINE 20 MILLIGRAM(S): 20 TABLET, FILM COATED ORAL at 11:05

## 2024-12-16 NOTE — PROGRESS NOTE ADULT - SUBJECTIVE AND OBJECTIVE BOX
SUBJECTIVE/OVERNIGHT EVENTS  Today is hospital day 6d. This morning patient was seen and examined at bedside, resting comfortably in bed. No acute or major events overnight.    MEDICATIONS  STANDING MEDICATIONS  atorvastatin 20 milliGRAM(s) Oral at bedtime  famotidine    Tablet 20 milliGRAM(s) Oral daily  metoprolol succinate  milliGRAM(s) Oral daily  spironolactone 25 milliGRAM(s) Oral daily  torsemide 20 milliGRAM(s) Oral daily    PRN MEDICATIONS  acetaminophen     Tablet .. 650 milliGRAM(s) Oral every 6 hours PRN  aluminum hydroxide/magnesium hydroxide/simethicone Suspension 30 milliLiter(s) Oral every 4 hours PRN  melatonin 3 milliGRAM(s) Oral at bedtime PRN  ondansetron Injectable 4 milliGRAM(s) IV Push every 8 hours PRN  polyethylene glycol 3350 17 Gram(s) Oral daily PRN  senna 2 Tablet(s) Oral at bedtime PRN    VITALS  T(F): 97.4 (12-16-24 @ 05:13), Max: 97.6 (12-15-24 @ 14:11)  HR: 70 (12-16-24 @ 05:13) (70 - 100)  BP: 100/64 (12-16-24 @ 05:13) (100/64 - 108/70)  RR: 18 (12-16-24 @ 05:13) (18 - 18)  SpO2: 96% (12-16-24 @ 05:13) (96% - 96%)    PHYSICAL EXAM  GENERAL: NAD, lying in bed comfortably  HEAD:  Atraumatic, normocephalic  EYES: EOMI, PERRLA, conjunctiva and sclera clear  ENT: Moist mucous membranes  NECK: Supple, no JVD  HEART: Regular rate and rhythm, no murmurs, rubs, or gallops  LUNGS: Unlabored respirations.  Clear to auscultation bilaterally, no crackles, wheezing, or rhonchi  ABDOMEN: Soft, nontender, nondistended, +BS  EXTREMITIES: 2+ peripheral pulses bilaterally. No clubbing, cyanosis, or edema  NERVOUS SYSTEM:  A&Ox3, no focal deficits   SKIN: No rashes or lesions    LABS             11.7   14.84 )-----------( 241      ( 12-15-24 @ 06:51 )             36.3     140  |  99  |  78  -------------------------<  107   12-16-24 @ 06:33  4.7  |  26  |  1.8    Ca      9.0     12-16-24 @ 06:33  Mg     1.9     12-15-24 @ 06:51    TPro  6.0  /  Alb  3.3  /  TBili  0.9  /  DBili  x   /  AST  31  /  ALT  59  /  AlkPhos  102  /  GGT  x     12-15-24 @ 06:51    PT/INR - ( 12-16-24 @ 06:33 )   PT: 31.60 sec[H];   INR: 2.63 ratio[H]  PTT - ( 12-16-24 @ 06:33 )  PTT:33.5 sec      Urinalysis Basic - ( 16 Dec 2024 06:33 )    Color: x / Appearance: x / SG: x / pH: x  Gluc: 107 mg/dL / Ketone: x  / Bili: x / Urobili: x   Blood: x / Protein: x / Nitrite: x   Leuk Esterase: x / RBC: x / WBC x   Sq Epi: x / Non Sq Epi: x / Bacteria: x          IMAGING

## 2024-12-16 NOTE — PHARMACOTHERAPY INTERVENTION NOTE - INTERVENTION TYPE RECOOMEND
Pharmacokinetics Evaluation
Dose Adjustment - Renal Dose
Pharmacokinetics Evaluation
Dose Optimization/Non-renal Dose Adjustment

## 2024-12-16 NOTE — PROGRESS NOTE ADULT - PROVIDER SPECIALTY LIST ADULT
Internal Medicine
Hospitalist
Internal Medicine
Hospitalist
Infectious Disease
Infectious Disease

## 2024-12-16 NOTE — PROGRESS NOTE ADULT - SUBJECTIVE AND OBJECTIVE BOX
ANETTE JORDAN  89y Female    CHIEF COMPLAINT:    Patient is a 89y old  Female who presents with a chief complaint of Cough/SOB (15 Dec 2024 09:54)      INTERVAL HPI/OVERNIGHT EVENTS:    Patient seen and examined. Feels good. No sob.     ROS: All other systems are negative.    Vital Signs:    T(F): 97.4 (24 @ 05:13), Max: 97.6 (12-15-24 @ 14:11)  HR: 70 (24 @ 05:13) (70 - 100)  BP: 100/64 (24 @ 05:13) (100/64 - 108/70)  RR: 18 (24 @ 05:13) (18 - 18)  SpO2: 96% (24 @ 05:13) (96% - 96%)  I&O's Summary    15 Dec 2024 07:01  -  16 Dec 2024 07:00  --------------------------------------------------------  IN: 610 mL / OUT: 1500 mL / NET: -890 mL    16 Dec 2024 07:01  -  16 Dec 2024 10:30  --------------------------------------------------------  IN: 286 mL / OUT: 0 mL / NET: 286 mL      Daily     Daily Weight in k (16 Dec 2024 06:53)  CAPILLARY BLOOD GLUCOSE          PHYSICAL EXAM:    GENERAL:  NAD  SKIN: No rashes or lesions  HENT: Atraumatic. Normocephalic. PERRL. Moist membranes.  NECK: Supple, No JVD. No lymphadenopathy.  PULMONARY: CTA B/L. No wheezing. No rales  CVS: Normal S1, S2. Rate and Rhythm are regular.   ABDOMEN/GI: Soft, Nontender, Nondistended; BS present  EXTREMITIES: Peripheral pulses intact. No edema B/L LE.  NEUROLOGIC:  No motor or sensory deficit.  PSYCH: Alert & oriented x 3    Consultant(s) Notes Reviewed:  [x ] YES  [ ] NO  Care Discussed with Consultants/Other Providers [ x] YES  [ ] NO    EKG reviewed  Telemetry reviewed    LABS:                        11.7   14.84 )-----------( 241      ( 15 Dec 2024 06:51 )             36.3     12-15    139  |  99  |  69[HH]  ----------------------------<  109[H]  4.0   |  28  |  1.5    Ca    9.0      15 Dec 2024 06:51  Mg     1.9     12-15    TPro  6.0  /  Alb  3.3[L]  /  TBili  0.9  /  DBili  x   /  AST  31  /  ALT  59[H]  /  AlkPhos  102  12-15    PT/INR - ( 16 Dec 2024 06:33 )   PT: 31.60 sec;   INR: 2.63 ratio         PTT - ( 16 Dec 2024 06:33 )  PTT:33.5 sec          RADIOLOGY & ADDITIONAL TESTS:      Imaging or report Personally Reviewed:  [ ] YES  [ ] NO    Medications:  Standing  atorvastatin 20 milliGRAM(s) Oral at bedtime  famotidine    Tablet 20 milliGRAM(s) Oral daily  metoprolol succinate  milliGRAM(s) Oral daily  spironolactone 25 milliGRAM(s) Oral daily  torsemide 20 milliGRAM(s) Oral daily    PRN Meds  acetaminophen     Tablet .. 650 milliGRAM(s) Oral every 6 hours PRN  aluminum hydroxide/magnesium hydroxide/simethicone Suspension 30 milliLiter(s) Oral every 4 hours PRN  melatonin 3 milliGRAM(s) Oral at bedtime PRN  ondansetron Injectable 4 milliGRAM(s) IV Push every 8 hours PRN  polyethylene glycol 3350 17 Gram(s) Oral daily PRN  senna 2 Tablet(s) Oral at bedtime PRN      Case discussed with resident    Care discussed with pt/family

## 2024-12-16 NOTE — PROGRESS NOTE ADULT - ASSESSMENT
Patient is an 89-year-old female with a significant past medical history of CHF with an EF of 41% as of October 2023, atrial fibrillation on Coumadin, a prosthetic mitral valve, and an AICD, presents with shortness of breath that began 2 days prior to admission. She reports associated symptoms of cough, congestion, and diarrhea.     Acute Hypoxic respiratory failure  Acute HFpEF  S/P MVR on Coumadin  Chronic A-fib   HTN  KATHY                PLAN:    ·	Tele reviewed by me. No events  ·	EKG on admission: NSR 79/min. RAD. Non specific ST changes (Interpreted by me)  ·	Blood cxs from 12/10 and 12/11 are negative.   ·	CT chest noted. Bilateral lung groundglass and nodular opacities involving all lobes likely of infectious or inflammatory etiology. Bilateral small pleural effusion.  ·	ECHO reviewed. EF is 60-65%. Mod to severe MR, mod TR, mod AS  ·	S/P JAIME. Mod to severe MR, mod AS. No evidence of acute endocarditis  ·	O/E pt is euvolemic. Cont home dose of Torsemide 20 mg po daiy and cont Spironolactone 25 mg po daily on discharge  ·	Low salt diet and water restriction to 1.5 L/D  ·	Called pt's cardiologist and discussed care with him  ·	Leukocytosis likely reactive. D/W the ID. Will d/c all Abx.   ·	INR is 2.63. Cont home dose of Coumadin  ·	PT eval noted. Home vs SNF    * Med rec reviewed. Plan of care d/w the pt and her nephew. Time spent 40 minutes.     Progress Note Handoff    Pending (specify):  Consults__PT_______, Tests________, Test Results_______, Other_Social services for d/c planning______  Family discussion: Diagnosis, prognosis and plan of care d/w her nephew Dr. Bucio  Disposition: Home___/SNF___/Other________/Unknown at this time________    Tao Salmeron MD  Spectra: [phone]

## 2024-12-16 NOTE — PROGRESS NOTE ADULT - ASSESSMENT
88 y/o F pmhx HFmrEFwith an EF of 41% as of October 2023, atrial fibrillation on Coumadin, a prosthetic mitral valve, and an AICD, presents with shortness of breath that began 2 days prior to admission. Vital signs were significant for hypoxia and tachypnea which improved with Bipap. 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 Macdonald  - EP interrogated patients AICD and found many episodes of AF RVR as well as a AF burden 93/1%  - Cont home dose of Torsemide 20 mg po daiy and cont Spironolactone 25 mg po daily on discharge    #Possible Mitral Valve Vegetation, rule out infective endocarditis  - sepsis not present on admission; no Janeway lesions, splinter hemorrhages, osler nodes  - h/o prosthetic mitral valve  - TTE 12/10 There is a non-mobile echodense mass measuring 0.9 x 0.6cm attached anteriorly with resultant moderate, posteriorly directed valvular regurgitation. The mean transvalvular gradient is 5mmHg at 90bpm. Findings may be consistent with calcified thrombus or healed endocarditis. Acute endocarditis cannot be excluded  - F/U bcx   - ID cw vanc 1g q48H, rocephin 2g q24  - ESR was elevated at 33  - CRP was elevated 117.9   - Procal was elevated at 1.02  -Blood cx currently NGTD  - Patient went for JAIME 12/13. Mod to severe MR, mod AS. No evidence of acute endocarditis  - Leukocytosis likely reactive. D/W the ID. Will d/c all Abx.     #KATHY  #Hyper K RESOLVED   - BL cr 1.1  - Cr today 1.8  - Holding losartan, will restart when Cr improves    #Chronic A-fib/A-flutter  #S/P MVR on Coumadin  #HTN  - c/w toprol 300  - daily INR and coumadin per pharmacy  - INR is 2.63. Cont home dose of Coumadin    #UTI  #Pyuria  -UA was positive for LE and few bacteria and elevated WBCs  -Urine cultures preliminary results showing gram negative rods  -was on ceftriaxone, now monitor off abx    #DVT PPX: Warfarin  #GI PPX: Famotidine  #Diet: fluid restrict, DASH  #Code Status: full code  #Activity Order: increase as tolerated   #Dispo/Needs: inpt tele

## 2024-12-16 NOTE — PHARMACOTHERAPY INTERVENTION NOTE - COMMENTS
89yFemale    Indication:afib  INR Goal:2-3  Home Dose: 2 mg daily  Bridge Therapy:    Current Medications:  acetaminophen     Tablet .. 650 milliGRAM(s) Oral every 6 hours PRN  aluminum hydroxide/magnesium hydroxide/simethicone Suspension 30 milliLiter(s) Oral every 4 hours PRN  atorvastatin 20 milliGRAM(s) Oral at bedtime  famotidine    Tablet 20 milliGRAM(s) Oral daily  melatonin 3 milliGRAM(s) Oral at bedtime PRN  metoprolol succinate  milliGRAM(s) Oral daily  ondansetron Injectable 4 milliGRAM(s) IV Push every 8 hours PRN  polyethylene glycol 3350 17 Gram(s) Oral daily PRN  senna 2 Tablet(s) Oral at bedtime PRN  spironolactone 25 milliGRAM(s) Oral daily  torsemide 20 milliGRAM(s) Oral daily      hemoglobin 11.7 g/dL (12-15-24 @ 06:51)    hematocrit 36.3 % (12-15-24 @ 06:51)    PLT: 241 K/uL (12-15-24 @ 06:51)    GFR:27 mL/min/1.73m2 (12-16-24 @ 06:33)      Drug Interactions: cefTRIAXone   IVPB   100 mL/Hr (12-10-24 @ 15:33)    cefTRIAXone   IVPB   100 mL/Hr (12-13-24 @ 15:15)   100 mL/Hr (12-12-24 @ 15:43)   100 mL/Hr (12-11-24 @ 15:29)    vancomycin  IVPB   250 mL/Hr (12-12-24 @ 07:55)    vancomycin  IVPB   166.67 mL/Hr (12-11-24 @ 11:39)        INR trend  4.24 ratio (12-11-24 @ 00:23)  3.79 ratio (12-11-24 @ 07:57)  2.28 ratio (12-12-24 @ 11:41)  2.82 ratio (12-14-24 @ 07:13)  2.82 ratio (12-15-24 @ 18:01)  2.63 ratio (12-16-24 @ 06:33)      Warfarin administration history:  warfarin: 3 milliGRAM(s) (12-12-24 @ 13:42)  warfarin: 2 milliGRAM(s) (12-14-24 @ 14:57)        1. INR today is:               [   ] below goal ----- This is likely due to                                            [  x ] at goal                                            [   ] above goal ------ This is likely due to        2. Recommend Warfarin   2 mg        PO x 1   3. Obtain INR tomorrow AM

## 2024-12-17 ENCOUNTER — NON-APPOINTMENT (OUTPATIENT)
Age: 89
End: 2024-12-17

## 2024-12-17 NOTE — PROCEDURAL SAFETY CHECKLIST WITH OR WITHOUT SEDATION - NSBLDPRODCTAVAIL_GEN_ALL_CORE
done

## 2024-12-18 ENCOUNTER — OUTPATIENT (OUTPATIENT)
Dept: OUTPATIENT SERVICES | Facility: HOSPITAL | Age: 89
LOS: 1 days | End: 2024-12-18
Payer: MEDICARE

## 2024-12-18 ENCOUNTER — APPOINTMENT (OUTPATIENT)
Dept: MEDICATION MANAGEMENT | Facility: CLINIC | Age: 89
End: 2024-12-18

## 2024-12-18 DIAGNOSIS — Z79.01 LONG TERM (CURRENT) USE OF ANTICOAGULANTS: ICD-10-CM

## 2024-12-18 DIAGNOSIS — Z95.2 PRESENCE OF PROSTHETIC HEART VALVE: Chronic | ICD-10-CM

## 2024-12-18 DIAGNOSIS — I48.91 UNSPECIFIED ATRIAL FIBRILLATION: ICD-10-CM

## 2024-12-18 DIAGNOSIS — Z98.890 OTHER SPECIFIED POSTPROCEDURAL STATES: Chronic | ICD-10-CM

## 2024-12-18 LAB
INR PPP: 2.48
PT BLD: 29.3

## 2024-12-18 PROCEDURE — G0463: CPT

## 2024-12-19 DIAGNOSIS — Z79.01 LONG TERM (CURRENT) USE OF ANTICOAGULANTS: ICD-10-CM

## 2024-12-19 DIAGNOSIS — I48.91 UNSPECIFIED ATRIAL FIBRILLATION: ICD-10-CM

## 2024-12-23 ENCOUNTER — APPOINTMENT (OUTPATIENT)
Dept: CARDIOLOGY | Facility: CLINIC | Age: 89
End: 2024-12-23
Payer: MEDICARE

## 2024-12-23 ENCOUNTER — NON-APPOINTMENT (OUTPATIENT)
Age: 89
End: 2024-12-23

## 2024-12-23 PROCEDURE — 93296 REM INTERROG EVL PM/IDS: CPT

## 2024-12-23 PROCEDURE — 93295 DEV INTERROG REMOTE 1/2/MLT: CPT

## 2024-12-26 DIAGNOSIS — D84.9 IMMUNODEFICIENCY, UNSPECIFIED: ICD-10-CM

## 2024-12-26 DIAGNOSIS — E87.5 HYPERKALEMIA: ICD-10-CM

## 2024-12-26 DIAGNOSIS — I08.3 COMBINED RHEUMATIC DISORDERS OF MITRAL, AORTIC AND TRICUSPID VALVES: ICD-10-CM

## 2024-12-26 DIAGNOSIS — I21.A1 MYOCARDIAL INFARCTION TYPE 2: ICD-10-CM

## 2024-12-26 DIAGNOSIS — I48.20 CHRONIC ATRIAL FIBRILLATION, UNSPECIFIED: ICD-10-CM

## 2024-12-26 DIAGNOSIS — N39.0 URINARY TRACT INFECTION, SITE NOT SPECIFIED: ICD-10-CM

## 2024-12-26 DIAGNOSIS — I50.23 ACUTE ON CHRONIC SYSTOLIC (CONGESTIVE) HEART FAILURE: ICD-10-CM

## 2024-12-26 DIAGNOSIS — I48.92 UNSPECIFIED ATRIAL FLUTTER: ICD-10-CM

## 2024-12-26 DIAGNOSIS — I11.0 HYPERTENSIVE HEART DISEASE WITH HEART FAILURE: ICD-10-CM

## 2024-12-26 DIAGNOSIS — J96.01 ACUTE RESPIRATORY FAILURE WITH HYPOXIA: ICD-10-CM

## 2024-12-26 DIAGNOSIS — D72.829 ELEVATED WHITE BLOOD CELL COUNT, UNSPECIFIED: ICD-10-CM

## 2024-12-26 DIAGNOSIS — N17.9 ACUTE KIDNEY FAILURE, UNSPECIFIED: ICD-10-CM

## 2024-12-26 DIAGNOSIS — Z95.810 PRESENCE OF AUTOMATIC (IMPLANTABLE) CARDIAC DEFIBRILLATOR: ICD-10-CM

## 2024-12-26 DIAGNOSIS — Z95.2 PRESENCE OF PROSTHETIC HEART VALVE: ICD-10-CM

## 2024-12-26 DIAGNOSIS — I34.0 NONRHEUMATIC MITRAL (VALVE) INSUFFICIENCY: ICD-10-CM

## 2024-12-26 DIAGNOSIS — Z79.01 LONG TERM (CURRENT) USE OF ANTICOAGULANTS: ICD-10-CM

## 2024-12-26 DIAGNOSIS — I27.20 PULMONARY HYPERTENSION, UNSPECIFIED: ICD-10-CM

## 2024-12-26 DIAGNOSIS — E87.20 ACIDOSIS, UNSPECIFIED: ICD-10-CM

## 2024-12-27 ENCOUNTER — OUTPATIENT (OUTPATIENT)
Dept: OUTPATIENT SERVICES | Facility: HOSPITAL | Age: 89
LOS: 1 days | End: 2024-12-27
Payer: MEDICARE

## 2024-12-27 DIAGNOSIS — Z95.2 PRESENCE OF PROSTHETIC HEART VALVE: Chronic | ICD-10-CM

## 2024-12-27 DIAGNOSIS — R06.02 SHORTNESS OF BREATH: ICD-10-CM

## 2024-12-27 DIAGNOSIS — Z98.890 OTHER SPECIFIED POSTPROCEDURAL STATES: Chronic | ICD-10-CM

## 2024-12-27 PROCEDURE — 71046 X-RAY EXAM CHEST 2 VIEWS: CPT

## 2024-12-27 PROCEDURE — 71046 X-RAY EXAM CHEST 2 VIEWS: CPT | Mod: 26

## 2024-12-28 DIAGNOSIS — R06.02 SHORTNESS OF BREATH: ICD-10-CM

## 2025-01-01 ENCOUNTER — INPATIENT (INPATIENT)
Facility: HOSPITAL | Age: 89
LOS: 6 days | DRG: 684 | End: 2025-02-21
Attending: INTERNAL MEDICINE | Admitting: STUDENT IN AN ORGANIZED HEALTH CARE EDUCATION/TRAINING PROGRAM
Payer: MEDICARE

## 2025-01-01 ENCOUNTER — APPOINTMENT (OUTPATIENT)
Dept: GERIATRICS | Facility: HOME HEALTH | Age: 89
End: 2025-01-01

## 2025-01-01 ENCOUNTER — APPOINTMENT (OUTPATIENT)
Dept: HEART FAILURE | Facility: CLINIC | Age: 89
End: 2025-01-01
Payer: MEDICARE

## 2025-01-01 ENCOUNTER — OUTPATIENT (OUTPATIENT)
Dept: OUTPATIENT SERVICES | Facility: HOSPITAL | Age: 89
LOS: 1 days | End: 2025-01-01
Payer: MEDICARE

## 2025-01-01 ENCOUNTER — APPOINTMENT (OUTPATIENT)
Dept: MEDICATION MANAGEMENT | Facility: CLINIC | Age: 89
End: 2025-01-01

## 2025-01-01 ENCOUNTER — APPOINTMENT (OUTPATIENT)
Dept: ELECTROPHYSIOLOGY | Facility: CLINIC | Age: 89
End: 2025-01-01
Payer: MEDICARE

## 2025-01-01 ENCOUNTER — NON-APPOINTMENT (OUTPATIENT)
Age: 89
End: 2025-01-01

## 2025-01-01 ENCOUNTER — APPOINTMENT (OUTPATIENT)
Dept: PODIATRY | Facility: HOME HEALTH | Age: 89
End: 2025-01-01

## 2025-01-01 VITALS
HEIGHT: 65 IN | DIASTOLIC BLOOD PRESSURE: 71 MMHG | SYSTOLIC BLOOD PRESSURE: 106 MMHG | RESPIRATION RATE: 18 BRPM | OXYGEN SATURATION: 96 % | TEMPERATURE: 98 F | HEART RATE: 59 BPM

## 2025-01-01 VITALS — HEIGHT: 64.02 IN | WEIGHT: 144.84 LBS

## 2025-01-01 VITALS
HEART RATE: 67 BPM | DIASTOLIC BLOOD PRESSURE: 60 MMHG | BODY MASS INDEX: 22.5 KG/M2 | SYSTOLIC BLOOD PRESSURE: 102 MMHG | HEIGHT: 66 IN | WEIGHT: 140 LBS

## 2025-01-01 DIAGNOSIS — Z79.01 LONG TERM (CURRENT) USE OF ANTICOAGULANTS: ICD-10-CM

## 2025-01-01 DIAGNOSIS — G47.00 INSOMNIA, UNSPECIFIED: ICD-10-CM

## 2025-01-01 DIAGNOSIS — A41.9 SEPSIS, UNSPECIFIED ORGANISM: ICD-10-CM

## 2025-01-01 DIAGNOSIS — Z45.02 ENCOUNTER FOR ADJUSTMENT AND MANAGEMENT OF AUTOMATIC IMPLANTABLE CARDIAC DEFIBRILLATOR: ICD-10-CM

## 2025-01-01 DIAGNOSIS — R79.1 ABNORMAL COAGULATION PROFILE: ICD-10-CM

## 2025-01-01 DIAGNOSIS — I48.20 CHRONIC ATRIAL FIBRILLATION, UNSPECIFIED: ICD-10-CM

## 2025-01-01 DIAGNOSIS — I48.92 UNSPECIFIED ATRIAL FLUTTER: ICD-10-CM

## 2025-01-01 DIAGNOSIS — I10 ESSENTIAL (PRIMARY) HYPERTENSION: ICD-10-CM

## 2025-01-01 DIAGNOSIS — Z98.890 OTHER SPECIFIED POSTPROCEDURAL STATES: Chronic | ICD-10-CM

## 2025-01-01 DIAGNOSIS — Z51.5 ENCOUNTER FOR PALLIATIVE CARE: ICD-10-CM

## 2025-01-01 DIAGNOSIS — Z95.2 PRESENCE OF PROSTHETIC HEART VALVE: ICD-10-CM

## 2025-01-01 DIAGNOSIS — N18.32 CHRONIC KIDNEY DISEASE, STAGE 3B: ICD-10-CM

## 2025-01-01 DIAGNOSIS — I82.411 ACUTE EMBOLISM AND THROMBOSIS OF RIGHT FEMORAL VEIN: ICD-10-CM

## 2025-01-01 DIAGNOSIS — I50.9 HEART FAILURE, UNSPECIFIED: ICD-10-CM

## 2025-01-01 DIAGNOSIS — I48.91 UNSPECIFIED ATRIAL FIBRILLATION: ICD-10-CM

## 2025-01-01 DIAGNOSIS — I50.30 UNSPECIFIED DIASTOLIC (CONGESTIVE) HEART FAILURE: ICD-10-CM

## 2025-01-01 DIAGNOSIS — N17.9 ACUTE KIDNEY FAILURE, UNSPECIFIED: ICD-10-CM

## 2025-01-01 DIAGNOSIS — I50.32 CHRONIC DIASTOLIC (CONGESTIVE) HEART FAILURE: ICD-10-CM

## 2025-01-01 DIAGNOSIS — Z79.899 OTHER LONG TERM (CURRENT) DRUG THERAPY: ICD-10-CM

## 2025-01-01 DIAGNOSIS — Z95.2 PRESENCE OF PROSTHETIC HEART VALVE: Chronic | ICD-10-CM

## 2025-01-01 DIAGNOSIS — R41.82 ALTERED MENTAL STATUS, UNSPECIFIED: ICD-10-CM

## 2025-01-01 DIAGNOSIS — J96.01 ACUTE RESPIRATORY FAILURE WITH HYPOXIA: ICD-10-CM

## 2025-01-01 LAB
ALBUMIN SERPL ELPH-MCNC: 2.8 G/DL — LOW (ref 3.5–5.2)
ALBUMIN SERPL ELPH-MCNC: 3.2 G/DL — LOW (ref 3.5–5.2)
ALBUMIN SERPL ELPH-MCNC: 3.4 G/DL — LOW (ref 3.5–5.2)
ALBUMIN SERPL ELPH-MCNC: 3.4 G/DL — LOW (ref 3.5–5.2)
ALBUMIN SERPL ELPH-MCNC: 3.5 G/DL — SIGNIFICANT CHANGE UP (ref 3.5–5.2)
ALBUMIN SERPL ELPH-MCNC: 3.6 G/DL — SIGNIFICANT CHANGE UP (ref 3.5–5.2)
ALP SERPL-CCNC: 102 U/L — SIGNIFICANT CHANGE UP (ref 30–115)
ALP SERPL-CCNC: 110 U/L — SIGNIFICANT CHANGE UP (ref 30–115)
ALP SERPL-CCNC: 133 U/L — HIGH (ref 30–115)
ALP SERPL-CCNC: 135 U/L — HIGH (ref 30–115)
ALP SERPL-CCNC: 72 U/L — SIGNIFICANT CHANGE UP (ref 30–115)
ALP SERPL-CCNC: 91 U/L — SIGNIFICANT CHANGE UP (ref 30–115)
ALT FLD-CCNC: 115 U/L — HIGH (ref 0–41)
ALT FLD-CCNC: 1591 U/L — HIGH (ref 0–41)
ALT FLD-CCNC: 2373 U/L — HIGH (ref 0–41)
ALT FLD-CCNC: 27 U/L — SIGNIFICANT CHANGE UP (ref 0–41)
ALT FLD-CCNC: 39 U/L — SIGNIFICANT CHANGE UP (ref 0–41)
ALT FLD-CCNC: 647 U/L — HIGH (ref 0–41)
ANION GAP SERPL CALC-SCNC: 14 MMOL/L — SIGNIFICANT CHANGE UP (ref 7–14)
ANION GAP SERPL CALC-SCNC: 16 MMOL/L — HIGH (ref 7–14)
ANION GAP SERPL CALC-SCNC: 18 MMOL/L — HIGH (ref 7–14)
ANION GAP SERPL CALC-SCNC: 19 MMOL/L — HIGH (ref 7–14)
ANION GAP SERPL CALC-SCNC: 20 MMOL/L — HIGH (ref 7–14)
ANION GAP SERPL CALC-SCNC: 21 MMOL/L — HIGH (ref 7–14)
ANION GAP SERPL CALC-SCNC: 24 MMOL/L — HIGH (ref 7–14)
ANION GAP SERPL CALC-SCNC: 27 MMOL/L — HIGH (ref 7–14)
ANION GAP SERPL CALC-SCNC: 27 MMOL/L — HIGH (ref 7–14)
APPEARANCE UR: CLEAR — SIGNIFICANT CHANGE UP
APTT BLD: 46.5 SEC — HIGH (ref 27–39.2)
APTT BLD: 51.3 SEC — HIGH (ref 27–39.2)
APTT BLD: 51.7 SEC — HIGH (ref 27–39.2)
AST SERPL-CCNC: 1871 U/L — HIGH (ref 0–41)
AST SERPL-CCNC: 2854 U/L — HIGH (ref 0–41)
AST SERPL-CCNC: 33 U/L — SIGNIFICANT CHANGE UP (ref 0–41)
AST SERPL-CCNC: 34 U/L — SIGNIFICANT CHANGE UP (ref 0–41)
AST SERPL-CCNC: 682 U/L — HIGH (ref 0–41)
AST SERPL-CCNC: 76 U/L — HIGH (ref 0–41)
BASE EXCESS BLDA CALC-SCNC: -12.2 MMOL/L — LOW (ref -2–3)
BASOPHILS # BLD AUTO: 0 K/UL — SIGNIFICANT CHANGE UP (ref 0–0.2)
BASOPHILS # BLD AUTO: 0.01 K/UL — SIGNIFICANT CHANGE UP (ref 0–0.2)
BASOPHILS # BLD AUTO: 0.01 K/UL — SIGNIFICANT CHANGE UP (ref 0–0.2)
BASOPHILS # BLD AUTO: 0.02 K/UL — SIGNIFICANT CHANGE UP (ref 0–0.2)
BASOPHILS # BLD AUTO: 0.03 K/UL — SIGNIFICANT CHANGE UP (ref 0–0.2)
BASOPHILS # BLD AUTO: 0.04 K/UL — SIGNIFICANT CHANGE UP (ref 0–0.2)
BASOPHILS # BLD AUTO: 0.05 K/UL — SIGNIFICANT CHANGE UP (ref 0–0.2)
BASOPHILS NFR BLD AUTO: 0 % — SIGNIFICANT CHANGE UP (ref 0–1)
BASOPHILS NFR BLD AUTO: 0.1 % — SIGNIFICANT CHANGE UP (ref 0–1)
BASOPHILS NFR BLD AUTO: 0.2 % — SIGNIFICANT CHANGE UP (ref 0–1)
BASOPHILS NFR BLD AUTO: 0.2 % — SIGNIFICANT CHANGE UP (ref 0–1)
BASOPHILS NFR BLD AUTO: 0.4 % — SIGNIFICANT CHANGE UP (ref 0–1)
BASOPHILS NFR BLD AUTO: 0.5 % — SIGNIFICANT CHANGE UP (ref 0–1)
BILIRUB DIRECT SERPL-MCNC: 0.4 MG/DL — HIGH (ref 0–0.3)
BILIRUB DIRECT SERPL-MCNC: 1.7 MG/DL — HIGH (ref 0–0.3)
BILIRUB INDIRECT FLD-MCNC: 1 MG/DL — SIGNIFICANT CHANGE UP (ref 0.2–1.2)
BILIRUB INDIRECT FLD-MCNC: 1.4 MG/DL — HIGH (ref 0.2–1.2)
BILIRUB SERPL-MCNC: 0.6 MG/DL — SIGNIFICANT CHANGE UP (ref 0.2–1.2)
BILIRUB SERPL-MCNC: 0.7 MG/DL — SIGNIFICANT CHANGE UP (ref 0.2–1.2)
BILIRUB SERPL-MCNC: 1.1 MG/DL — SIGNIFICANT CHANGE UP (ref 0.2–1.2)
BILIRUB SERPL-MCNC: 1.8 MG/DL — HIGH (ref 0.2–1.2)
BILIRUB SERPL-MCNC: 2.7 MG/DL — HIGH (ref 0.2–1.2)
BILIRUB SERPL-MCNC: 2.7 MG/DL — HIGH (ref 0.2–1.2)
BILIRUB UR-MCNC: NEGATIVE — SIGNIFICANT CHANGE UP
BUN SERPL-MCNC: 108 MG/DL — CRITICAL HIGH (ref 10–20)
BUN SERPL-MCNC: 109 MG/DL — CRITICAL HIGH (ref 10–20)
BUN SERPL-MCNC: 111 MG/DL — CRITICAL HIGH (ref 10–20)
BUN SERPL-MCNC: 111 MG/DL — CRITICAL HIGH (ref 10–20)
BUN SERPL-MCNC: 80 MG/DL — CRITICAL HIGH (ref 10–20)
BUN SERPL-MCNC: 82 MG/DL — CRITICAL HIGH (ref 10–20)
BUN SERPL-MCNC: 93 MG/DL — CRITICAL HIGH (ref 10–20)
BUN SERPL-MCNC: 95 MG/DL — CRITICAL HIGH (ref 10–20)
BUN SERPL-MCNC: 98 MG/DL — CRITICAL HIGH (ref 10–20)
CALCIUM SERPL-MCNC: 8.2 MG/DL — LOW (ref 8.4–10.5)
CALCIUM SERPL-MCNC: 8.5 MG/DL — SIGNIFICANT CHANGE UP (ref 8.4–10.5)
CALCIUM SERPL-MCNC: 8.6 MG/DL — SIGNIFICANT CHANGE UP (ref 8.4–10.5)
CALCIUM SERPL-MCNC: 8.7 MG/DL — SIGNIFICANT CHANGE UP (ref 8.4–10.5)
CALCIUM SERPL-MCNC: 8.9 MG/DL — SIGNIFICANT CHANGE UP (ref 8.4–10.5)
CALCIUM SERPL-MCNC: 8.9 MG/DL — SIGNIFICANT CHANGE UP (ref 8.4–10.5)
CALCIUM SERPL-MCNC: 9 MG/DL — SIGNIFICANT CHANGE UP (ref 8.4–10.5)
CALCIUM SERPL-MCNC: 9 MG/DL — SIGNIFICANT CHANGE UP (ref 8.4–10.5)
CALCIUM SERPL-MCNC: 9.1 MG/DL — SIGNIFICANT CHANGE UP (ref 8.4–10.5)
CHLORIDE SERPL-SCNC: 100 MMOL/L — SIGNIFICANT CHANGE UP (ref 98–110)
CHLORIDE SERPL-SCNC: 100 MMOL/L — SIGNIFICANT CHANGE UP (ref 98–110)
CHLORIDE SERPL-SCNC: 101 MMOL/L — SIGNIFICANT CHANGE UP (ref 98–110)
CHLORIDE SERPL-SCNC: 98 MMOL/L — SIGNIFICANT CHANGE UP (ref 98–110)
CHLORIDE SERPL-SCNC: 99 MMOL/L — SIGNIFICANT CHANGE UP (ref 98–110)
CHLORIDE SERPL-SCNC: 99 MMOL/L — SIGNIFICANT CHANGE UP (ref 98–110)
CK SERPL-CCNC: 45 U/L — SIGNIFICANT CHANGE UP (ref 0–225)
CO2 SERPL-SCNC: 11 MMOL/L — LOW (ref 17–32)
CO2 SERPL-SCNC: 12 MMOL/L — LOW (ref 17–32)
CO2 SERPL-SCNC: 15 MMOL/L — LOW (ref 17–32)
CO2 SERPL-SCNC: 17 MMOL/L — SIGNIFICANT CHANGE UP (ref 17–32)
CO2 SERPL-SCNC: 21 MMOL/L — SIGNIFICANT CHANGE UP (ref 17–32)
CO2 SERPL-SCNC: 21 MMOL/L — SIGNIFICANT CHANGE UP (ref 17–32)
CO2 SERPL-SCNC: 22 MMOL/L — SIGNIFICANT CHANGE UP (ref 17–32)
CO2 SERPL-SCNC: 24 MMOL/L — SIGNIFICANT CHANGE UP (ref 17–32)
CO2 SERPL-SCNC: 25 MMOL/L — SIGNIFICANT CHANGE UP (ref 17–32)
COLOR SPEC: YELLOW — SIGNIFICANT CHANGE UP
CORTIS AM PEAK SERPL-MCNC: 66.1 UG/DL — HIGH (ref 6–18.4)
CREAT SERPL-MCNC: 2.7 MG/DL — HIGH (ref 0.7–1.5)
CREAT SERPL-MCNC: 2.7 MG/DL — HIGH (ref 0.7–1.5)
CREAT SERPL-MCNC: 3.2 MG/DL — HIGH (ref 0.7–1.5)
CREAT SERPL-MCNC: 3.3 MG/DL — HIGH (ref 0.7–1.5)
CREAT SERPL-MCNC: 3.3 MG/DL — HIGH (ref 0.7–1.5)
CREAT SERPL-MCNC: 3.6 MG/DL — HIGH (ref 0.7–1.5)
CREAT SERPL-MCNC: 3.9 MG/DL — HIGH (ref 0.7–1.5)
CREAT SERPL-MCNC: 4 MG/DL — HIGH (ref 0.7–1.5)
CREAT SERPL-MCNC: 4.2 MG/DL — CRITICAL HIGH (ref 0.7–1.5)
D DIMER BLD IA.RAPID-MCNC: 649 NG/ML DDU — HIGH
DIFF PNL FLD: NEGATIVE — SIGNIFICANT CHANGE UP
EGFR: 10 ML/MIN/1.73M2 — LOW
EGFR: 10 ML/MIN/1.73M2 — LOW
EGFR: 11 ML/MIN/1.73M2 — LOW
EGFR: 12 ML/MIN/1.73M2 — LOW
EGFR: 13 ML/MIN/1.73M2 — LOW
EGFR: 13 ML/MIN/1.73M2 — LOW
EGFR: 14 ML/MIN/1.73M2 — LOW
EGFR: 17 ML/MIN/1.73M2 — LOW
EGFR: 17 ML/MIN/1.73M2 — LOW
EOSINOPHIL # BLD AUTO: 0 K/UL — SIGNIFICANT CHANGE UP (ref 0–0.7)
EOSINOPHIL # BLD AUTO: 0.01 K/UL — SIGNIFICANT CHANGE UP (ref 0–0.7)
EOSINOPHIL # BLD AUTO: 0.03 K/UL — SIGNIFICANT CHANGE UP (ref 0–0.7)
EOSINOPHIL # BLD AUTO: 0.06 K/UL — SIGNIFICANT CHANGE UP (ref 0–0.7)
EOSINOPHIL # BLD AUTO: 0.09 K/UL — SIGNIFICANT CHANGE UP (ref 0–0.7)
EOSINOPHIL NFR BLD AUTO: 0 % — SIGNIFICANT CHANGE UP (ref 0–8)
EOSINOPHIL NFR BLD AUTO: 0.1 % — SIGNIFICANT CHANGE UP (ref 0–8)
EOSINOPHIL NFR BLD AUTO: 0.2 % — SIGNIFICANT CHANGE UP (ref 0–8)
EOSINOPHIL NFR BLD AUTO: 0.6 % — SIGNIFICANT CHANGE UP (ref 0–8)
EOSINOPHIL NFR BLD AUTO: 0.9 % — SIGNIFICANT CHANGE UP (ref 0–8)
FIBRINOGEN PPP-MCNC: 204 MG/DL — SIGNIFICANT CHANGE UP (ref 200–435)
FLUAV AG NPH QL: SIGNIFICANT CHANGE UP
FLUBV AG NPH QL: SIGNIFICANT CHANGE UP
GAS PNL BLDA: SIGNIFICANT CHANGE UP
GAS PNL BLDV: SIGNIFICANT CHANGE UP
GLUCOSE BLDC GLUCOMTR-MCNC: 147 MG/DL — HIGH (ref 70–99)
GLUCOSE BLDC GLUCOMTR-MCNC: 216 MG/DL — HIGH (ref 70–99)
GLUCOSE BLDC GLUCOMTR-MCNC: 225 MG/DL — HIGH (ref 70–99)
GLUCOSE BLDC GLUCOMTR-MCNC: 23 MG/DL — CRITICAL LOW (ref 70–99)
GLUCOSE BLDC GLUCOMTR-MCNC: 24 MG/DL — CRITICAL LOW (ref 70–99)
GLUCOSE BLDC GLUCOMTR-MCNC: 27 MG/DL — CRITICAL LOW (ref 70–99)
GLUCOSE BLDC GLUCOMTR-MCNC: 277 MG/DL — HIGH (ref 70–99)
GLUCOSE BLDC GLUCOMTR-MCNC: 55 MG/DL — LOW (ref 70–99)
GLUCOSE BLDC GLUCOMTR-MCNC: 98 MG/DL — SIGNIFICANT CHANGE UP (ref 70–99)
GLUCOSE SERPL-MCNC: 107 MG/DL — HIGH (ref 70–99)
GLUCOSE SERPL-MCNC: 146 MG/DL — HIGH (ref 70–99)
GLUCOSE SERPL-MCNC: 301 MG/DL — HIGH (ref 70–99)
GLUCOSE SERPL-MCNC: 67 MG/DL — LOW (ref 70–99)
GLUCOSE SERPL-MCNC: 70 MG/DL — SIGNIFICANT CHANGE UP (ref 70–99)
GLUCOSE SERPL-MCNC: 71 MG/DL — SIGNIFICANT CHANGE UP (ref 70–99)
GLUCOSE SERPL-MCNC: 75 MG/DL — SIGNIFICANT CHANGE UP (ref 70–99)
GLUCOSE SERPL-MCNC: 95 MG/DL — SIGNIFICANT CHANGE UP (ref 70–99)
GLUCOSE SERPL-MCNC: 96 MG/DL — SIGNIFICANT CHANGE UP (ref 70–99)
GLUCOSE UR QL: NEGATIVE MG/DL — SIGNIFICANT CHANGE UP
HCO3 BLDA-SCNC: 12 MMOL/L — LOW (ref 21–28)
HCT VFR BLD CALC: 41.5 % — SIGNIFICANT CHANGE UP (ref 37–47)
HCT VFR BLD CALC: 42.8 % — SIGNIFICANT CHANGE UP (ref 37–47)
HCT VFR BLD CALC: 42.9 % — SIGNIFICANT CHANGE UP (ref 37–47)
HCT VFR BLD CALC: 43.1 % — SIGNIFICANT CHANGE UP (ref 37–47)
HCT VFR BLD CALC: 43.3 % — SIGNIFICANT CHANGE UP (ref 37–47)
HCT VFR BLD CALC: 43.9 % — SIGNIFICANT CHANGE UP (ref 37–47)
HCT VFR BLD CALC: 44.3 % — SIGNIFICANT CHANGE UP (ref 37–47)
HCT VFR BLD CALC: 44.3 % — SIGNIFICANT CHANGE UP (ref 37–47)
HCT VFR BLD CALC: 44.6 % — SIGNIFICANT CHANGE UP (ref 37–47)
HGB BLD-MCNC: 12.2 G/DL — SIGNIFICANT CHANGE UP (ref 12–16)
HGB BLD-MCNC: 12.6 G/DL — SIGNIFICANT CHANGE UP (ref 12–16)
HGB BLD-MCNC: 12.6 G/DL — SIGNIFICANT CHANGE UP (ref 12–16)
HGB BLD-MCNC: 13 G/DL — SIGNIFICANT CHANGE UP (ref 12–16)
HGB BLD-MCNC: 13.1 G/DL — SIGNIFICANT CHANGE UP (ref 12–16)
HGB BLD-MCNC: 13.1 G/DL — SIGNIFICANT CHANGE UP (ref 12–16)
HGB BLD-MCNC: 13.2 G/DL — SIGNIFICANT CHANGE UP (ref 12–16)
HGB BLD-MCNC: 13.3 G/DL — SIGNIFICANT CHANGE UP (ref 12–16)
HGB BLD-MCNC: 13.4 G/DL — SIGNIFICANT CHANGE UP (ref 12–16)
HOROWITZ INDEX BLDA+IHG-RTO: 100 — SIGNIFICANT CHANGE UP
IMM GRANULOCYTES NFR BLD AUTO: 0.4 % — HIGH (ref 0.1–0.3)
IMM GRANULOCYTES NFR BLD AUTO: 0.6 % — HIGH (ref 0.1–0.3)
IMM GRANULOCYTES NFR BLD AUTO: 0.7 % — HIGH (ref 0.1–0.3)
IMM GRANULOCYTES NFR BLD AUTO: 0.7 % — HIGH (ref 0.1–0.3)
IMM GRANULOCYTES NFR BLD AUTO: 0.8 % — HIGH (ref 0.1–0.3)
INR BLD: 10.74 RATIO — CRITICAL HIGH (ref 0.65–1.3)
INR BLD: 2.41 RATIO — HIGH (ref 0.65–1.3)
INR BLD: 2.95 RATIO — HIGH (ref 0.65–1.3)
INR BLD: 5.07 RATIO — CRITICAL HIGH (ref 0.65–1.3)
INR BLD: 5.14 RATIO — CRITICAL HIGH (ref 0.65–1.3)
INR BLD: 6.25 RATIO — CRITICAL HIGH (ref 0.65–1.3)
INR BLD: 6.7 RATIO — CRITICAL HIGH (ref 0.65–1.3)
INR BLD: 9.17 RATIO — CRITICAL HIGH (ref 0.65–1.3)
INR BLD: 9.56 RATIO — CRITICAL HIGH (ref 0.65–1.3)
INR PPP: 2.13
KETONES UR-MCNC: NEGATIVE MG/DL — SIGNIFICANT CHANGE UP
LEUKOCYTE ESTERASE UR-ACNC: NEGATIVE — SIGNIFICANT CHANGE UP
LYMPHOCYTES # BLD AUTO: 0.19 K/UL — LOW (ref 1.2–3.4)
LYMPHOCYTES # BLD AUTO: 0.68 K/UL — LOW (ref 1.2–3.4)
LYMPHOCYTES # BLD AUTO: 0.71 K/UL — LOW (ref 1.2–3.4)
LYMPHOCYTES # BLD AUTO: 0.9 % — LOW (ref 20.5–51.1)
LYMPHOCYTES # BLD AUTO: 0.99 K/UL — LOW (ref 1.2–3.4)
LYMPHOCYTES # BLD AUTO: 1.12 K/UL — LOW (ref 1.2–3.4)
LYMPHOCYTES # BLD AUTO: 1.18 K/UL — LOW (ref 1.2–3.4)
LYMPHOCYTES # BLD AUTO: 1.35 K/UL — SIGNIFICANT CHANGE UP (ref 1.2–3.4)
LYMPHOCYTES # BLD AUTO: 1.56 K/UL — SIGNIFICANT CHANGE UP (ref 1.2–3.4)
LYMPHOCYTES # BLD AUTO: 1.63 K/UL — SIGNIFICANT CHANGE UP (ref 1.2–3.4)
LYMPHOCYTES # BLD AUTO: 10 % — LOW (ref 20.5–51.1)
LYMPHOCYTES # BLD AUTO: 16.2 % — LOW (ref 20.5–51.1)
LYMPHOCYTES # BLD AUTO: 17 % — LOW (ref 20.5–51.1)
LYMPHOCYTES # BLD AUTO: 3.6 % — LOW (ref 20.5–51.1)
LYMPHOCYTES # BLD AUTO: 4.9 % — LOW (ref 20.5–51.1)
LYMPHOCYTES # BLD AUTO: 6.6 % — LOW (ref 20.5–51.1)
LYMPHOCYTES # BLD AUTO: 8.5 % — LOW (ref 20.5–51.1)
LYMPHOCYTES # BLD AUTO: 8.6 % — LOW (ref 20.5–51.1)
MAGNESIUM SERPL-MCNC: 2.4 MG/DL — SIGNIFICANT CHANGE UP (ref 1.8–2.4)
MAGNESIUM SERPL-MCNC: 2.6 MG/DL — HIGH (ref 1.8–2.4)
MAGNESIUM SERPL-MCNC: 2.7 MG/DL — HIGH (ref 1.8–2.4)
MAGNESIUM SERPL-MCNC: 2.8 MG/DL — HIGH (ref 1.8–2.4)
MCHC RBC-ENTMCNC: 28.7 G/DL — LOW (ref 32–37)
MCHC RBC-ENTMCNC: 28.7 PG — SIGNIFICANT CHANGE UP (ref 27–31)
MCHC RBC-ENTMCNC: 28.7 PG — SIGNIFICANT CHANGE UP (ref 27–31)
MCHC RBC-ENTMCNC: 28.8 PG — SIGNIFICANT CHANGE UP (ref 27–31)
MCHC RBC-ENTMCNC: 28.9 PG — SIGNIFICANT CHANGE UP (ref 27–31)
MCHC RBC-ENTMCNC: 28.9 PG — SIGNIFICANT CHANGE UP (ref 27–31)
MCHC RBC-ENTMCNC: 29.1 PG — SIGNIFICANT CHANGE UP (ref 27–31)
MCHC RBC-ENTMCNC: 29.1 PG — SIGNIFICANT CHANGE UP (ref 27–31)
MCHC RBC-ENTMCNC: 29.3 G/DL — LOW (ref 32–37)
MCHC RBC-ENTMCNC: 29.3 PG — SIGNIFICANT CHANGE UP (ref 27–31)
MCHC RBC-ENTMCNC: 29.4 G/DL — LOW (ref 32–37)
MCHC RBC-ENTMCNC: 29.7 PG — SIGNIFICANT CHANGE UP (ref 27–31)
MCHC RBC-ENTMCNC: 30.2 G/DL — LOW (ref 32–37)
MCHC RBC-ENTMCNC: 30.6 G/DL — LOW (ref 32–37)
MCHC RBC-ENTMCNC: 30.6 G/DL — LOW (ref 32–37)
MCHC RBC-ENTMCNC: 30.7 G/DL — LOW (ref 32–37)
MCV RBC AUTO: 101.4 FL — HIGH (ref 81–99)
MCV RBC AUTO: 93.7 FL — SIGNIFICANT CHANGE UP (ref 81–99)
MCV RBC AUTO: 94.7 FL — SIGNIFICANT CHANGE UP (ref 81–99)
MCV RBC AUTO: 96.9 FL — SIGNIFICANT CHANGE UP (ref 81–99)
MCV RBC AUTO: 97.1 FL — SIGNIFICANT CHANGE UP (ref 81–99)
MCV RBC AUTO: 97.6 FL — SIGNIFICANT CHANGE UP (ref 81–99)
MCV RBC AUTO: 98.2 FL — SIGNIFICANT CHANGE UP (ref 81–99)
MCV RBC AUTO: 98.3 FL — SIGNIFICANT CHANGE UP (ref 81–99)
MCV RBC AUTO: 98.4 FL — SIGNIFICANT CHANGE UP (ref 81–99)
MONOCYTES # BLD AUTO: 0.78 K/UL — HIGH (ref 0.1–0.6)
MONOCYTES # BLD AUTO: 0.78 K/UL — HIGH (ref 0.1–0.6)
MONOCYTES # BLD AUTO: 0.94 K/UL — HIGH (ref 0.1–0.6)
MONOCYTES # BLD AUTO: 0.97 K/UL — HIGH (ref 0.1–0.6)
MONOCYTES # BLD AUTO: 0.99 K/UL — HIGH (ref 0.1–0.6)
MONOCYTES # BLD AUTO: 1.14 K/UL — HIGH (ref 0.1–0.6)
MONOCYTES # BLD AUTO: 1.3 K/UL — HIGH (ref 0.1–0.6)
MONOCYTES # BLD AUTO: 1.34 K/UL — HIGH (ref 0.1–0.6)
MONOCYTES # BLD AUTO: 1.43 K/UL — HIGH (ref 0.1–0.6)
MONOCYTES NFR BLD AUTO: 11.9 % — HIGH (ref 1.7–9.3)
MONOCYTES NFR BLD AUTO: 3.7 % — SIGNIFICANT CHANGE UP (ref 1.7–9.3)
MONOCYTES NFR BLD AUTO: 6.6 % — SIGNIFICANT CHANGE UP (ref 1.7–9.3)
MONOCYTES NFR BLD AUTO: 6.8 % — SIGNIFICANT CHANGE UP (ref 1.7–9.3)
MONOCYTES NFR BLD AUTO: 7.2 % — SIGNIFICANT CHANGE UP (ref 1.7–9.3)
MONOCYTES NFR BLD AUTO: 7.4 % — SIGNIFICANT CHANGE UP (ref 1.7–9.3)
MONOCYTES NFR BLD AUTO: 8.1 % — SIGNIFICANT CHANGE UP (ref 1.7–9.3)
MONOCYTES NFR BLD AUTO: 9.4 % — HIGH (ref 1.7–9.3)
MONOCYTES NFR BLD AUTO: 9.9 % — HIGH (ref 1.7–9.3)
MRSA PCR RESULT.: NEGATIVE — SIGNIFICANT CHANGE UP
NEUTROPHILS # BLD AUTO: 10.76 K/UL — HIGH (ref 1.4–6.5)
NEUTROPHILS # BLD AUTO: 10.93 K/UL — HIGH (ref 1.4–6.5)
NEUTROPHILS # BLD AUTO: 11.18 K/UL — HIGH (ref 1.4–6.5)
NEUTROPHILS # BLD AUTO: 12.13 K/UL — HIGH (ref 1.4–6.5)
NEUTROPHILS # BLD AUTO: 12.9 K/UL — HIGH (ref 1.4–6.5)
NEUTROPHILS # BLD AUTO: 17.63 K/UL — HIGH (ref 1.4–6.5)
NEUTROPHILS # BLD AUTO: 19.94 K/UL — HIGH (ref 1.4–6.5)
NEUTROPHILS # BLD AUTO: 6.62 K/UL — HIGH (ref 1.4–6.5)
NEUTROPHILS # BLD AUTO: 7.15 K/UL — HIGH (ref 1.4–6.5)
NEUTROPHILS NFR BLD AUTO: 69.3 % — SIGNIFICANT CHANGE UP (ref 42.2–75.2)
NEUTROPHILS NFR BLD AUTO: 74.3 % — SIGNIFICANT CHANGE UP (ref 42.2–75.2)
NEUTROPHILS NFR BLD AUTO: 79.6 % — HIGH (ref 42.2–75.2)
NEUTROPHILS NFR BLD AUTO: 81.1 % — HIGH (ref 42.2–75.2)
NEUTROPHILS NFR BLD AUTO: 83.3 % — HIGH (ref 42.2–75.2)
NEUTROPHILS NFR BLD AUTO: 86 % — HIGH (ref 42.2–75.2)
NEUTROPHILS NFR BLD AUTO: 86.1 % — HIGH (ref 42.2–75.2)
NEUTROPHILS NFR BLD AUTO: 87.5 % — HIGH (ref 42.2–75.2)
NEUTROPHILS NFR BLD AUTO: 88.3 % — HIGH (ref 42.2–75.2)
NITRITE UR-MCNC: NEGATIVE — SIGNIFICANT CHANGE UP
NRBC BLD AUTO-RTO: 0 /100 WBCS — SIGNIFICANT CHANGE UP (ref 0–0)
NT-PROBNP SERPL-SCNC: HIGH PG/ML (ref 0–300)
NT-PROBNP SERPL-SCNC: HIGH PG/ML (ref 0–300)
OSMOLALITY SERPL: 313 MOS/KG — HIGH (ref 280–301)
PCO2 BLDA: 22 MMHG — LOW (ref 32–45)
PH BLDA: 7.33 — LOW (ref 7.35–7.45)
PH UR: 6.5 — SIGNIFICANT CHANGE UP (ref 5–8)
PHOSPHATE SERPL-MCNC: 7.9 MG/DL — HIGH (ref 2.1–4.9)
PLATELET # BLD AUTO: 102 K/UL — LOW (ref 130–400)
PLATELET # BLD AUTO: 171 K/UL — SIGNIFICANT CHANGE UP (ref 130–400)
PLATELET # BLD AUTO: 178 K/UL — SIGNIFICANT CHANGE UP (ref 130–400)
PLATELET # BLD AUTO: 208 K/UL — SIGNIFICANT CHANGE UP (ref 130–400)
PLATELET # BLD AUTO: 208 K/UL — SIGNIFICANT CHANGE UP (ref 130–400)
PLATELET # BLD AUTO: 220 K/UL — SIGNIFICANT CHANGE UP (ref 130–400)
PLATELET # BLD AUTO: 234 K/UL — SIGNIFICANT CHANGE UP (ref 130–400)
PLATELET # BLD AUTO: 236 K/UL — SIGNIFICANT CHANGE UP (ref 130–400)
PLATELET # BLD AUTO: 237 K/UL — SIGNIFICANT CHANGE UP (ref 130–400)
PMV BLD: 11 FL — HIGH (ref 7.4–10.4)
PMV BLD: 11.2 FL — HIGH (ref 7.4–10.4)
PMV BLD: 11.5 FL — HIGH (ref 7.4–10.4)
PMV BLD: 12 FL — HIGH (ref 7.4–10.4)
PMV BLD: 12.1 FL — HIGH (ref 7.4–10.4)
PMV BLD: 12.3 FL — HIGH (ref 7.4–10.4)
PMV BLD: 12.6 FL — HIGH (ref 7.4–10.4)
PO2 BLDA: 277 MMHG — HIGH (ref 83–108)
POTASSIUM SERPL-MCNC: 4.7 MMOL/L — SIGNIFICANT CHANGE UP (ref 3.5–5)
POTASSIUM SERPL-MCNC: 4.8 MMOL/L — SIGNIFICANT CHANGE UP (ref 3.5–5)
POTASSIUM SERPL-MCNC: 4.9 MMOL/L — SIGNIFICANT CHANGE UP (ref 3.5–5)
POTASSIUM SERPL-MCNC: 5.3 MMOL/L — HIGH (ref 3.5–5)
POTASSIUM SERPL-MCNC: 5.4 MMOL/L — HIGH (ref 3.5–5)
POTASSIUM SERPL-MCNC: 5.8 MMOL/L — HIGH (ref 3.5–5)
POTASSIUM SERPL-MCNC: 6.4 MMOL/L — CRITICAL HIGH (ref 3.5–5)
POTASSIUM SERPL-SCNC: 4.7 MMOL/L — SIGNIFICANT CHANGE UP (ref 3.5–5)
POTASSIUM SERPL-SCNC: 4.8 MMOL/L — SIGNIFICANT CHANGE UP (ref 3.5–5)
POTASSIUM SERPL-SCNC: 4.9 MMOL/L — SIGNIFICANT CHANGE UP (ref 3.5–5)
POTASSIUM SERPL-SCNC: 5.3 MMOL/L — HIGH (ref 3.5–5)
POTASSIUM SERPL-SCNC: 5.4 MMOL/L — HIGH (ref 3.5–5)
POTASSIUM SERPL-SCNC: 5.8 MMOL/L — HIGH (ref 3.5–5)
POTASSIUM SERPL-SCNC: 6.4 MMOL/L — CRITICAL HIGH (ref 3.5–5)
PROCALCITONIN SERPL-MCNC: 0.35 NG/ML — HIGH (ref 0.02–0.1)
PROCALCITONIN SERPL-MCNC: 0.54 NG/ML — HIGH (ref 0.02–0.1)
PROT SERPL-MCNC: 4.8 G/DL — LOW (ref 6–8)
PROT SERPL-MCNC: 5.1 G/DL — LOW (ref 6–8)
PROT SERPL-MCNC: 5.4 G/DL — LOW (ref 6–8)
PROT SERPL-MCNC: 5.5 G/DL — LOW (ref 6–8)
PROT SERPL-MCNC: 5.5 G/DL — LOW (ref 6–8)
PROT SERPL-MCNC: 5.9 G/DL — LOW (ref 6–8)
PROT UR-MCNC: 100 MG/DL
PROTHROM AB SERPL-ACNC: 28.9 SEC — HIGH (ref 9.95–12.87)
PROTHROM AB SERPL-ACNC: 35.6 SEC — HIGH (ref 9.95–12.87)
PROTHROM AB SERPL-ACNC: >40 SEC — HIGH (ref 9.95–12.87)
PT BLD: 25
RBC # BLD: 4.22 M/UL — SIGNIFICANT CHANGE UP (ref 4.2–5.4)
RBC # BLD: 4.33 M/UL — SIGNIFICANT CHANGE UP (ref 4.2–5.4)
RBC # BLD: 4.36 M/UL — SIGNIFICANT CHANGE UP (ref 4.2–5.4)
RBC # BLD: 4.41 M/UL — SIGNIFICANT CHANGE UP (ref 4.2–5.4)
RBC # BLD: 4.51 M/UL — SIGNIFICANT CHANGE UP (ref 4.2–5.4)
RBC # BLD: 4.57 M/UL — SIGNIFICANT CHANGE UP (ref 4.2–5.4)
RBC # BLD: 4.6 M/UL — SIGNIFICANT CHANGE UP (ref 4.2–5.4)
RBC # FLD: 20 % — HIGH (ref 11.5–14.5)
RBC # FLD: 20.1 % — HIGH (ref 11.5–14.5)
RBC # FLD: 20.4 % — HIGH (ref 11.5–14.5)
RBC # FLD: 20.5 % — HIGH (ref 11.5–14.5)
RBC # FLD: 20.8 % — HIGH (ref 11.5–14.5)
RBC # FLD: 20.8 % — HIGH (ref 11.5–14.5)
RBC # FLD: 20.9 % — HIGH (ref 11.5–14.5)
RBC # FLD: 21.1 % — HIGH (ref 11.5–14.5)
RBC # FLD: 21.2 % — HIGH (ref 11.5–14.5)
RSV RNA NPH QL NAA+NON-PROBE: SIGNIFICANT CHANGE UP
SAO2 % BLDA: 100 % — HIGH (ref 94–98)
SARS-COV-2 RNA SPEC QL NAA+PROBE: SIGNIFICANT CHANGE UP
SODIUM SERPL-SCNC: 136 MMOL/L — SIGNIFICANT CHANGE UP (ref 135–146)
SODIUM SERPL-SCNC: 137 MMOL/L — SIGNIFICANT CHANGE UP (ref 135–146)
SODIUM SERPL-SCNC: 138 MMOL/L — SIGNIFICANT CHANGE UP (ref 135–146)
SODIUM SERPL-SCNC: 138 MMOL/L — SIGNIFICANT CHANGE UP (ref 135–146)
SODIUM SERPL-SCNC: 139 MMOL/L — SIGNIFICANT CHANGE UP (ref 135–146)
SODIUM SERPL-SCNC: 141 MMOL/L — SIGNIFICANT CHANGE UP (ref 135–146)
SODIUM SERPL-SCNC: 142 MMOL/L — SIGNIFICANT CHANGE UP (ref 135–146)
SP GR SPEC: 1.02 — SIGNIFICANT CHANGE UP (ref 1–1.03)
TROPONIN T, HIGH SENSITIVITY RESULT: 36 NG/L — HIGH (ref 6–13)
TROPONIN T, HIGH SENSITIVITY RESULT: 41 NG/L — HIGH (ref 6–13)
TROPONIN T, HIGH SENSITIVITY RESULT: 69 NG/L — CRITICAL HIGH (ref 6–13)
UROBILINOGEN FLD QL: 0.2 MG/DL — SIGNIFICANT CHANGE UP (ref 0.2–1)
WBC # BLD: 13.12 K/UL — HIGH (ref 4.8–10.8)
WBC # BLD: 13.52 K/UL — HIGH (ref 4.8–10.8)
WBC # BLD: 13.78 K/UL — HIGH (ref 4.8–10.8)
WBC # BLD: 13.86 K/UL — HIGH (ref 4.8–10.8)
WBC # BLD: 15.01 K/UL — HIGH (ref 4.8–10.8)
WBC # BLD: 19.94 K/UL — HIGH (ref 4.8–10.8)
WBC # BLD: 21.1 K/UL — HIGH (ref 4.8–10.8)
WBC # BLD: 9.57 K/UL — SIGNIFICANT CHANGE UP (ref 4.8–10.8)
WBC # BLD: 9.63 K/UL — SIGNIFICANT CHANGE UP (ref 4.8–10.8)
WBC # FLD AUTO: 13.12 K/UL — HIGH (ref 4.8–10.8)
WBC # FLD AUTO: 13.52 K/UL — HIGH (ref 4.8–10.8)
WBC # FLD AUTO: 13.78 K/UL — HIGH (ref 4.8–10.8)
WBC # FLD AUTO: 13.86 K/UL — HIGH (ref 4.8–10.8)
WBC # FLD AUTO: 15.01 K/UL — HIGH (ref 4.8–10.8)
WBC # FLD AUTO: 19.94 K/UL — HIGH (ref 4.8–10.8)
WBC # FLD AUTO: 21.1 K/UL — HIGH (ref 4.8–10.8)
WBC # FLD AUTO: 9.57 K/UL — SIGNIFICANT CHANGE UP (ref 4.8–10.8)
WBC # FLD AUTO: 9.63 K/UL — SIGNIFICANT CHANGE UP (ref 4.8–10.8)

## 2025-01-01 PROCEDURE — 82803 BLOOD GASES ANY COMBINATION: CPT

## 2025-01-01 PROCEDURE — 82533 TOTAL CORTISOL: CPT

## 2025-01-01 PROCEDURE — 76705 ECHO EXAM OF ABDOMEN: CPT

## 2025-01-01 PROCEDURE — 94760 N-INVAS EAR/PLS OXIMETRY 1: CPT

## 2025-01-01 PROCEDURE — 93925 LOWER EXTREMITY STUDY: CPT

## 2025-01-01 PROCEDURE — 87641 MR-STAPH DNA AMP PROBE: CPT

## 2025-01-01 PROCEDURE — 99291 CRITICAL CARE FIRST HOUR: CPT

## 2025-01-01 PROCEDURE — 76705 ECHO EXAM OF ABDOMEN: CPT | Mod: 26

## 2025-01-01 PROCEDURE — 93925 LOWER EXTREMITY STUDY: CPT | Mod: 26

## 2025-01-01 PROCEDURE — 87640 STAPH A DNA AMP PROBE: CPT

## 2025-01-01 PROCEDURE — 71045 X-RAY EXAM CHEST 1 VIEW: CPT | Mod: 26

## 2025-01-01 PROCEDURE — 85730 THROMBOPLASTIN TIME PARTIAL: CPT

## 2025-01-01 PROCEDURE — 80053 COMPREHEN METABOLIC PANEL: CPT

## 2025-01-01 PROCEDURE — 99233 SBSQ HOSP IP/OBS HIGH 50: CPT

## 2025-01-01 PROCEDURE — 83930 ASSAY OF BLOOD OSMOLALITY: CPT

## 2025-01-01 PROCEDURE — 85379 FIBRIN DEGRADATION QUANT: CPT

## 2025-01-01 PROCEDURE — 99215 OFFICE O/P EST HI 40 MIN: CPT

## 2025-01-01 PROCEDURE — 84100 ASSAY OF PHOSPHORUS: CPT

## 2025-01-01 PROCEDURE — 82330 ASSAY OF CALCIUM: CPT

## 2025-01-01 PROCEDURE — 82962 GLUCOSE BLOOD TEST: CPT

## 2025-01-01 PROCEDURE — 87040 BLOOD CULTURE FOR BACTERIA: CPT

## 2025-01-01 PROCEDURE — 80048 BASIC METABOLIC PNL TOTAL CA: CPT

## 2025-01-01 PROCEDURE — 71045 X-RAY EXAM CHEST 1 VIEW: CPT

## 2025-01-01 PROCEDURE — 76770 US EXAM ABDO BACK WALL COMP: CPT

## 2025-01-01 PROCEDURE — 98012 SYNCH AUDIO-ONLY EST SF 10: CPT

## 2025-01-01 PROCEDURE — 83605 ASSAY OF LACTIC ACID: CPT

## 2025-01-01 PROCEDURE — 99214 OFFICE O/P EST MOD 30 MIN: CPT

## 2025-01-01 PROCEDURE — 83880 ASSAY OF NATRIURETIC PEPTIDE: CPT

## 2025-01-01 PROCEDURE — 71045 X-RAY EXAM CHEST 1 VIEW: CPT | Mod: 26,77

## 2025-01-01 PROCEDURE — 85018 HEMOGLOBIN: CPT

## 2025-01-01 PROCEDURE — 83735 ASSAY OF MAGNESIUM: CPT

## 2025-01-01 PROCEDURE — 94660 CPAP INITIATION&MGMT: CPT

## 2025-01-01 PROCEDURE — 76770 US EXAM ABDO BACK WALL COMP: CPT | Mod: 26

## 2025-01-01 PROCEDURE — 97163 PT EVAL HIGH COMPLEX 45 MIN: CPT | Mod: GP

## 2025-01-01 PROCEDURE — 80074 ACUTE HEPATITIS PANEL: CPT

## 2025-01-01 PROCEDURE — 85610 PROTHROMBIN TIME: CPT

## 2025-01-01 PROCEDURE — 36415 COLL VENOUS BLD VENIPUNCTURE: CPT

## 2025-01-01 PROCEDURE — 99223 1ST HOSP IP/OBS HIGH 75: CPT

## 2025-01-01 PROCEDURE — 85025 COMPLETE CBC W/AUTO DIFF WBC: CPT

## 2025-01-01 PROCEDURE — 99232 SBSQ HOSP IP/OBS MODERATE 35: CPT

## 2025-01-01 PROCEDURE — 99285 EMERGENCY DEPT VISIT HI MDM: CPT | Mod: FS

## 2025-01-01 PROCEDURE — 70450 CT HEAD/BRAIN W/O DYE: CPT | Mod: 26

## 2025-01-01 PROCEDURE — 99223 1ST HOSP IP/OBS HIGH 75: CPT | Mod: FS

## 2025-01-01 PROCEDURE — 80076 HEPATIC FUNCTION PANEL: CPT

## 2025-01-01 PROCEDURE — 84145 PROCALCITONIN (PCT): CPT

## 2025-01-01 PROCEDURE — 84484 ASSAY OF TROPONIN QUANT: CPT

## 2025-01-01 PROCEDURE — 93283 PRGRMG EVAL IMPLANTABLE DFB: CPT

## 2025-01-01 PROCEDURE — 99222 1ST HOSP IP/OBS MODERATE 55: CPT

## 2025-01-01 PROCEDURE — 93010 ELECTROCARDIOGRAM REPORT: CPT

## 2025-01-01 PROCEDURE — 93000 ELECTROCARDIOGRAM COMPLETE: CPT

## 2025-01-01 PROCEDURE — 85014 HEMATOCRIT: CPT

## 2025-01-01 PROCEDURE — 84295 ASSAY OF SERUM SODIUM: CPT

## 2025-01-01 PROCEDURE — 93005 ELECTROCARDIOGRAM TRACING: CPT

## 2025-01-01 PROCEDURE — 70450 CT HEAD/BRAIN W/O DYE: CPT | Mod: MC

## 2025-01-01 PROCEDURE — 84132 ASSAY OF SERUM POTASSIUM: CPT

## 2025-01-01 PROCEDURE — 93306 TTE W/DOPPLER COMPLETE: CPT | Mod: 26

## 2025-01-01 PROCEDURE — 93306 TTE W/DOPPLER COMPLETE: CPT

## 2025-01-01 PROCEDURE — 85384 FIBRINOGEN ACTIVITY: CPT

## 2025-01-01 PROCEDURE — 99202 OFFICE O/P NEW SF 15 MIN: CPT | Mod: 93

## 2025-01-01 RX ORDER — SODIUM ZIRCONIUM CYCLOSILICATE 5 G/5G
5 POWDER, FOR SUSPENSION ORAL
Refills: 0 | Status: COMPLETED | OUTPATIENT
Start: 2025-01-01 | End: 2025-01-01

## 2025-01-01 RX ORDER — CEFEPIME 2 G/20ML
1000 INJECTION, POWDER, FOR SOLUTION INTRAVENOUS ONCE
Refills: 0 | Status: COMPLETED | OUTPATIENT
Start: 2025-01-01 | End: 2025-01-01

## 2025-01-01 RX ORDER — MIRTAZAPINE 7.5 MG/1
7.5 TABLET, FILM COATED ORAL
Qty: 30 | Refills: 0 | Status: ACTIVE | COMMUNITY
Start: 2025-01-01 | End: 1900-01-01

## 2025-01-01 RX ORDER — METOPROLOL SUCCINATE 50 MG/1
200 TABLET, EXTENDED RELEASE ORAL DAILY
Refills: 0 | Status: DISCONTINUED | OUTPATIENT
Start: 2025-01-01 | End: 2025-01-01

## 2025-01-01 RX ORDER — GLYCOPYRROLATE 0.2 MG/ML
0.2 INJECTION INTRAMUSCULAR; INTRAVENOUS EVERY 6 HOURS
Refills: 0 | Status: DISCONTINUED | OUTPATIENT
Start: 2025-01-01 | End: 2025-01-01

## 2025-01-01 RX ORDER — NOREPINEPHRINE BITARTRATE 8 MG
0.05 SOLUTION INTRAVENOUS
Qty: 16 | Refills: 0 | Status: DISCONTINUED | OUTPATIENT
Start: 2025-01-01 | End: 2025-01-01

## 2025-01-01 RX ORDER — DEXTROSE 50 % IN WATER 50 %
50 SYRINGE (ML) INTRAVENOUS ONCE
Refills: 0 | Status: COMPLETED | OUTPATIENT
Start: 2025-01-01 | End: 2025-01-01

## 2025-01-01 RX ORDER — ATORVASTATIN CALCIUM 80 MG/1
20 TABLET, FILM COATED ORAL AT BEDTIME
Refills: 0 | Status: DISCONTINUED | OUTPATIENT
Start: 2025-01-01 | End: 2025-01-01

## 2025-01-01 RX ORDER — SODIUM CHLORIDE 9 G/1000ML
1000 INJECTION, SOLUTION INTRAVENOUS
Refills: 0 | Status: DISCONTINUED | OUTPATIENT
Start: 2025-01-01 | End: 2025-01-01

## 2025-01-01 RX ORDER — FUROSEMIDE 10 MG/ML
60 INJECTION INTRAMUSCULAR; INTRAVENOUS ONCE
Refills: 0 | Status: COMPLETED | OUTPATIENT
Start: 2025-01-01 | End: 2025-01-01

## 2025-01-01 RX ORDER — HYDROMORPHONE/SOD CHLOR,ISO/PF 2 MG/10 ML
0.5 SYRINGE (ML) INJECTION
Refills: 0 | Status: DISCONTINUED | OUTPATIENT
Start: 2025-01-01 | End: 2025-01-01

## 2025-01-01 RX ORDER — MIDODRINE HYDROCHLORIDE 5 MG/1
5 TABLET ORAL ONCE
Refills: 0 | Status: COMPLETED | OUTPATIENT
Start: 2025-01-01 | End: 2025-01-01

## 2025-01-01 RX ORDER — HYDROMORPHONE/SOD CHLOR,ISO/PF 2 MG/10 ML
0.5 SYRINGE (ML) INJECTION ONCE
Refills: 0 | Status: DISCONTINUED | OUTPATIENT
Start: 2025-01-01 | End: 2025-01-01

## 2025-01-01 RX ORDER — SODIUM CHLORIDE 9 G/1000ML
500 INJECTION, SOLUTION INTRAVENOUS ONCE
Refills: 0 | Status: DISCONTINUED | OUTPATIENT
Start: 2025-01-01 | End: 2025-01-01

## 2025-01-01 RX ORDER — SODIUM CHLORIDE 9 G/1000ML
500 INJECTION, SOLUTION INTRAVENOUS ONCE
Refills: 0 | Status: COMPLETED | OUTPATIENT
Start: 2025-01-01 | End: 2025-01-01

## 2025-01-01 RX ORDER — DAPAGLIFLOZIN 5 MG/1
10 TABLET, FILM COATED ORAL DAILY
Refills: 0 | Status: DISCONTINUED | OUTPATIENT
Start: 2025-01-01 | End: 2025-01-01

## 2025-01-01 RX ORDER — VANCOMYCIN HCL IN 5 % DEXTROSE 1.5G/250ML
750 PLASTIC BAG, INJECTION (ML) INTRAVENOUS ONCE
Refills: 0 | Status: COMPLETED | OUTPATIENT
Start: 2025-01-01 | End: 2025-01-01

## 2025-01-01 RX ORDER — SODIUM ZIRCONIUM CYCLOSILICATE 5 G/5G
10 POWDER, FOR SUSPENSION ORAL ONCE
Refills: 0 | Status: COMPLETED | OUTPATIENT
Start: 2025-01-01 | End: 2025-01-01

## 2025-01-01 RX ORDER — BUMETANIDE 1 MG/1
1 TABLET ORAL ONCE
Refills: 0 | Status: COMPLETED | OUTPATIENT
Start: 2025-01-01 | End: 2025-01-01

## 2025-01-01 RX ORDER — HYDROCORTISONE 20 MG
50 TABLET ORAL EVERY 6 HOURS
Refills: 0 | Status: DISCONTINUED | OUTPATIENT
Start: 2025-01-01 | End: 2025-01-01

## 2025-01-01 RX ORDER — CEFEPIME 2 G/20ML
INJECTION, POWDER, FOR SOLUTION INTRAVENOUS
Refills: 0 | Status: DISCONTINUED | OUTPATIENT
Start: 2025-01-01 | End: 2025-01-01

## 2025-01-01 RX ORDER — FERROUS SULFATE 137(45) MG
325 TABLET, EXTENDED RELEASE ORAL DAILY
Refills: 0 | Status: DISCONTINUED | OUTPATIENT
Start: 2025-01-01 | End: 2025-01-01

## 2025-01-01 RX ORDER — SODIUM ZIRCONIUM CYCLOSILICATE 5 G/5G
10 POWDER, FOR SUSPENSION ORAL ONCE
Refills: 0 | Status: DISCONTINUED | OUTPATIENT
Start: 2025-01-01 | End: 2025-01-01

## 2025-01-01 RX ORDER — NOREPINEPHRINE BITARTRATE 8 MG
0.05 SOLUTION INTRAVENOUS
Qty: 8 | Refills: 0 | Status: DISCONTINUED | OUTPATIENT
Start: 2025-01-01 | End: 2025-01-01

## 2025-01-01 RX ORDER — SODIUM BICARBONATE 1 MEQ/ML
0.14 SYRINGE (ML) INTRAVENOUS
Qty: 150 | Refills: 0 | Status: DISCONTINUED | OUTPATIENT
Start: 2025-01-01 | End: 2025-01-01

## 2025-01-01 RX ORDER — LORAZEPAM 4 MG/ML
0.5 VIAL (ML) INJECTION
Refills: 0 | Status: DISCONTINUED | OUTPATIENT
Start: 2025-01-01 | End: 2025-01-01

## 2025-01-01 RX ORDER — CALCIUM GLUCONATE 20 MG/ML
2 INJECTION, SOLUTION INTRAVENOUS ONCE
Refills: 0 | Status: COMPLETED | OUTPATIENT
Start: 2025-01-01 | End: 2025-01-01

## 2025-01-01 RX ORDER — FUROSEMIDE 10 MG/ML
40 INJECTION INTRAMUSCULAR; INTRAVENOUS
Refills: 0 | Status: DISCONTINUED | OUTPATIENT
Start: 2025-01-01 | End: 2025-01-01

## 2025-01-01 RX ORDER — LORAZEPAM 4 MG/ML
0.5 VIAL (ML) INJECTION ONCE
Refills: 0 | Status: DISCONTINUED | OUTPATIENT
Start: 2025-01-01 | End: 2025-01-01

## 2025-01-01 RX ORDER — CEFEPIME 2 G/20ML
1000 INJECTION, POWDER, FOR SOLUTION INTRAVENOUS EVERY 24 HOURS
Refills: 0 | Status: DISCONTINUED | OUTPATIENT
Start: 2025-01-01 | End: 2025-01-01

## 2025-01-01 RX ORDER — ACETAMINOPHEN 500 MG/5ML
650 LIQUID (ML) ORAL EVERY 6 HOURS
Refills: 0 | Status: DISCONTINUED | OUTPATIENT
Start: 2025-01-01 | End: 2025-01-01

## 2025-01-01 RX ORDER — MELATONIN 5 MG
5 TABLET ORAL AT BEDTIME
Refills: 0 | Status: DISCONTINUED | OUTPATIENT
Start: 2025-01-01 | End: 2025-01-01

## 2025-01-01 RX ORDER — SPIRONOLACTONE 25 MG/1
25 TABLET ORAL DAILY
Qty: 90 | Refills: 0 | Status: ACTIVE | COMMUNITY
Start: 2025-01-01

## 2025-01-01 RX ADMIN — CEFEPIME 1000 MILLIGRAM(S): 2 INJECTION, POWDER, FOR SOLUTION INTRAVENOUS at 10:58

## 2025-01-01 RX ADMIN — Medication 500 MILLILITER(S): at 19:00

## 2025-01-01 RX ADMIN — SODIUM CHLORIDE 1000 MILLILITER(S): 9 INJECTION, SOLUTION INTRAVENOUS at 19:50

## 2025-01-01 RX ADMIN — Medication 325 MILLIGRAM(S): at 11:38

## 2025-01-01 RX ADMIN — Medication 101 MILLIGRAM(S): at 23:26

## 2025-01-01 RX ADMIN — Medication 20 MILLIGRAM(S): at 11:35

## 2025-01-01 RX ADMIN — Medication 20 MILLIGRAM(S): at 13:28

## 2025-01-01 RX ADMIN — Medication 5 MILLIGRAM(S): at 21:48

## 2025-01-01 RX ADMIN — FUROSEMIDE 60 MILLIGRAM(S): 10 INJECTION INTRAMUSCULAR; INTRAVENOUS at 14:29

## 2025-01-01 RX ADMIN — Medication 50 MILLILITER(S): at 09:28

## 2025-01-01 RX ADMIN — Medication 50 MILLILITER(S): at 06:22

## 2025-01-01 RX ADMIN — Medication 0.5 MILLIGRAM(S): at 13:25

## 2025-01-01 RX ADMIN — Medication 10 UNIT(S): at 09:29

## 2025-01-01 RX ADMIN — Medication 5 UNIT(S): at 08:06

## 2025-01-01 RX ADMIN — Medication 1 APPLICATION(S): at 15:56

## 2025-01-01 RX ADMIN — Medication 325 MILLIGRAM(S): at 11:35

## 2025-01-01 RX ADMIN — Medication 20 MILLIGRAM(S): at 11:49

## 2025-01-01 RX ADMIN — METOPROLOL SUCCINATE 200 MILLIGRAM(S): 50 TABLET, EXTENDED RELEASE ORAL at 05:23

## 2025-01-01 RX ADMIN — SODIUM CHLORIDE 50 MILLILITER(S): 9 INJECTION, SOLUTION INTRAVENOUS at 15:58

## 2025-01-01 RX ADMIN — Medication 50 MILLIGRAM(S): at 10:13

## 2025-01-01 RX ADMIN — Medication 650 MILLIGRAM(S): at 06:13

## 2025-01-01 RX ADMIN — SODIUM ZIRCONIUM CYCLOSILICATE 5 GRAM(S): 5 POWDER, FOR SUSPENSION ORAL at 06:13

## 2025-01-01 RX ADMIN — NOREPINEPHRINE BITARTRATE 3.08 MICROGRAM(S)/KG/MIN: 8 SOLUTION at 08:03

## 2025-01-01 RX ADMIN — FUROSEMIDE 40 MILLIGRAM(S): 10 INJECTION INTRAMUSCULAR; INTRAVENOUS at 14:49

## 2025-01-01 RX ADMIN — Medication 2 MILLIGRAM(S): at 00:49

## 2025-01-01 RX ADMIN — SODIUM ZIRCONIUM CYCLOSILICATE 5 GRAM(S): 5 POWDER, FOR SUSPENSION ORAL at 17:21

## 2025-01-01 RX ADMIN — SODIUM ZIRCONIUM CYCLOSILICATE 10 GRAM(S): 5 POWDER, FOR SUSPENSION ORAL at 15:45

## 2025-01-01 RX ADMIN — Medication 1 APPLICATION(S): at 11:45

## 2025-01-01 RX ADMIN — BUMETANIDE 1 MILLIGRAM(S): 1 TABLET ORAL at 19:07

## 2025-01-01 RX ADMIN — Medication 5 UNIT(S): at 08:02

## 2025-01-01 RX ADMIN — Medication 50 MILLIGRAM(S): at 00:36

## 2025-01-01 RX ADMIN — Medication 50 MILLILITER(S): at 06:58

## 2025-01-01 RX ADMIN — Medication 5 MILLIGRAM(S): at 21:37

## 2025-01-01 RX ADMIN — Medication 325 MILLIGRAM(S): at 11:20

## 2025-01-01 RX ADMIN — Medication 50 MILLIGRAM(S): at 18:03

## 2025-01-01 RX ADMIN — Medication 1 APPLICATION(S): at 12:56

## 2025-01-01 RX ADMIN — MIDODRINE HYDROCHLORIDE 5 MILLIGRAM(S): 5 TABLET ORAL at 18:24

## 2025-01-01 RX ADMIN — Medication 325 MILLIGRAM(S): at 11:49

## 2025-01-01 RX ADMIN — CALCIUM GLUCONATE 200 GRAM(S): 20 INJECTION, SOLUTION INTRAVENOUS at 09:31

## 2025-01-01 RX ADMIN — Medication 60 MEQ/KG/HR: at 10:09

## 2025-01-01 RX ADMIN — FUROSEMIDE 40 MILLIGRAM(S): 10 INJECTION INTRAMUSCULAR; INTRAVENOUS at 05:21

## 2025-01-01 RX ADMIN — Medication 50 MILLIGRAM(S): at 05:06

## 2025-01-01 RX ADMIN — ATORVASTATIN CALCIUM 20 MILLIGRAM(S): 80 TABLET, FILM COATED ORAL at 22:01

## 2025-01-01 RX ADMIN — Medication 250 MILLIGRAM(S): at 10:57

## 2025-01-01 RX ADMIN — Medication 50 MILLIGRAM(S): at 12:56

## 2025-01-01 RX ADMIN — METOPROLOL SUCCINATE 200 MILLIGRAM(S): 50 TABLET, EXTENDED RELEASE ORAL at 05:20

## 2025-01-01 RX ADMIN — SODIUM ZIRCONIUM CYCLOSILICATE 10 GRAM(S): 5 POWDER, FOR SUSPENSION ORAL at 20:40

## 2025-01-01 RX ADMIN — ATORVASTATIN CALCIUM 20 MILLIGRAM(S): 80 TABLET, FILM COATED ORAL at 21:48

## 2025-01-01 RX ADMIN — Medication 20 MILLIGRAM(S): at 11:20

## 2025-01-01 RX ADMIN — Medication 325 MILLIGRAM(S): at 13:28

## 2025-01-01 RX ADMIN — Medication 5 MILLIGRAM(S): at 00:49

## 2025-01-01 RX ADMIN — Medication 5 MILLIGRAM(S): at 22:01

## 2025-01-01 RX ADMIN — Medication 50 MILLILITER(S): at 07:03

## 2025-01-01 RX ADMIN — Medication 20 MILLIGRAM(S): at 11:42

## 2025-01-01 RX ADMIN — METOPROLOL SUCCINATE 200 MILLIGRAM(S): 50 TABLET, EXTENDED RELEASE ORAL at 05:45

## 2025-01-01 RX ADMIN — Medication 101 MILLIGRAM(S): at 15:47

## 2025-01-01 RX ADMIN — CEFEPIME 100 MILLIGRAM(S): 2 INJECTION, POWDER, FOR SOLUTION INTRAVENOUS at 08:06

## 2025-01-01 RX ADMIN — Medication 50 MILLILITER(S): at 06:53

## 2025-01-02 ENCOUNTER — OUTPATIENT (OUTPATIENT)
Dept: OUTPATIENT SERVICES | Facility: HOSPITAL | Age: 89
LOS: 1 days | End: 2025-01-02
Payer: MEDICARE

## 2025-01-02 ENCOUNTER — APPOINTMENT (OUTPATIENT)
Dept: MEDICATION MANAGEMENT | Facility: CLINIC | Age: 89
End: 2025-01-02

## 2025-01-02 DIAGNOSIS — Z79.01 LONG TERM (CURRENT) USE OF ANTICOAGULANTS: ICD-10-CM

## 2025-01-02 DIAGNOSIS — Z95.2 PRESENCE OF PROSTHETIC HEART VALVE: Chronic | ICD-10-CM

## 2025-01-02 DIAGNOSIS — I48.91 UNSPECIFIED ATRIAL FIBRILLATION: ICD-10-CM

## 2025-01-02 LAB
INR PPP: 4.19
PT BLD: 49.2

## 2025-01-02 PROCEDURE — 98012 SYNCH AUDIO-ONLY EST SF 10: CPT

## 2025-01-03 ENCOUNTER — INPATIENT (INPATIENT)
Facility: HOSPITAL | Age: 89
LOS: 4 days | Discharge: HOME HEALTH | DRG: 293 | End: 2025-01-08
Attending: STUDENT IN AN ORGANIZED HEALTH CARE EDUCATION/TRAINING PROGRAM | Admitting: STUDENT IN AN ORGANIZED HEALTH CARE EDUCATION/TRAINING PROGRAM
Payer: MEDICARE

## 2025-01-03 VITALS
DIASTOLIC BLOOD PRESSURE: 56 MMHG | SYSTOLIC BLOOD PRESSURE: 114 MMHG | WEIGHT: 134.04 LBS | RESPIRATION RATE: 28 BRPM | HEART RATE: 55 BPM | HEIGHT: 65 IN | OXYGEN SATURATION: 98 %

## 2025-01-03 DIAGNOSIS — Z79.01 LONG TERM (CURRENT) USE OF ANTICOAGULANTS: ICD-10-CM

## 2025-01-03 DIAGNOSIS — I48.91 UNSPECIFIED ATRIAL FIBRILLATION: ICD-10-CM

## 2025-01-03 DIAGNOSIS — Z95.2 PRESENCE OF PROSTHETIC HEART VALVE: Chronic | ICD-10-CM

## 2025-01-03 DIAGNOSIS — I50.9 HEART FAILURE, UNSPECIFIED: ICD-10-CM

## 2025-01-03 DIAGNOSIS — Z98.890 OTHER SPECIFIED POSTPROCEDURAL STATES: Chronic | ICD-10-CM

## 2025-01-03 LAB
ALBUMIN SERPL ELPH-MCNC: 3.8 G/DL — SIGNIFICANT CHANGE UP (ref 3.5–5.2)
ALP SERPL-CCNC: 119 U/L — HIGH (ref 30–115)
ALT FLD-CCNC: 139 U/L — HIGH (ref 0–41)
ANION GAP SERPL CALC-SCNC: 14 MMOL/L — SIGNIFICANT CHANGE UP (ref 7–14)
APTT BLD: 35.4 SEC — SIGNIFICANT CHANGE UP (ref 27–39.2)
AST SERPL-CCNC: 107 U/L — HIGH (ref 0–41)
BASOPHILS # BLD AUTO: 0.03 K/UL — SIGNIFICANT CHANGE UP (ref 0–0.2)
BASOPHILS NFR BLD AUTO: 0.2 % — SIGNIFICANT CHANGE UP (ref 0–1)
BILIRUB SERPL-MCNC: 2.1 MG/DL — HIGH (ref 0.2–1.2)
BUN SERPL-MCNC: 92 MG/DL — CRITICAL HIGH (ref 10–20)
CALCIUM SERPL-MCNC: 9.2 MG/DL — SIGNIFICANT CHANGE UP (ref 8.4–10.4)
CHLORIDE SERPL-SCNC: 96 MMOL/L — LOW (ref 98–110)
CO2 SERPL-SCNC: 27 MMOL/L — SIGNIFICANT CHANGE UP (ref 17–32)
CREAT SERPL-MCNC: 3.1 MG/DL — HIGH (ref 0.7–1.5)
EGFR: 14 ML/MIN/1.73M2 — LOW
EOSINOPHIL # BLD AUTO: 0.01 K/UL — SIGNIFICANT CHANGE UP (ref 0–0.7)
EOSINOPHIL NFR BLD AUTO: 0.1 % — SIGNIFICANT CHANGE UP (ref 0–8)
GAS PNL BLDV: SIGNIFICANT CHANGE UP
GLUCOSE SERPL-MCNC: 92 MG/DL — SIGNIFICANT CHANGE UP (ref 70–99)
HCT VFR BLD CALC: 32.7 % — LOW (ref 37–47)
HGB BLD-MCNC: 9.9 G/DL — LOW (ref 12–16)
IMM GRANULOCYTES NFR BLD AUTO: 0.5 % — HIGH (ref 0.1–0.3)
INR BLD: 4.63 RATIO — HIGH (ref 0.65–1.3)
LIDOCAIN IGE QN: 43 U/L — SIGNIFICANT CHANGE UP (ref 7–60)
LYMPHOCYTES # BLD AUTO: 1.7 K/UL — SIGNIFICANT CHANGE UP (ref 1.2–3.4)
LYMPHOCYTES # BLD AUTO: 11.5 % — LOW (ref 20.5–51.1)
MAGNESIUM SERPL-MCNC: 2.3 MG/DL — SIGNIFICANT CHANGE UP (ref 1.8–2.4)
MCHC RBC-ENTMCNC: 28.2 PG — SIGNIFICANT CHANGE UP (ref 27–31)
MCHC RBC-ENTMCNC: 30.3 G/DL — LOW (ref 32–37)
MCV RBC AUTO: 93.2 FL — SIGNIFICANT CHANGE UP (ref 81–99)
MONOCYTES # BLD AUTO: 1.43 K/UL — HIGH (ref 0.1–0.6)
MONOCYTES NFR BLD AUTO: 9.7 % — HIGH (ref 1.7–9.3)
NEUTROPHILS # BLD AUTO: 11.56 K/UL — HIGH (ref 1.4–6.5)
NEUTROPHILS NFR BLD AUTO: 78 % — HIGH (ref 42.2–75.2)
NRBC # BLD: 0 /100 WBCS — SIGNIFICANT CHANGE UP (ref 0–0)
NT-PROBNP SERPL-SCNC: HIGH PG/ML (ref 0–300)
PLATELET # BLD AUTO: 265 K/UL — SIGNIFICANT CHANGE UP (ref 130–400)
PMV BLD: 11.9 FL — HIGH (ref 7.4–10.4)
POTASSIUM SERPL-MCNC: 5.3 MMOL/L — HIGH (ref 3.5–5)
POTASSIUM SERPL-SCNC: 5.3 MMOL/L — HIGH (ref 3.5–5)
PROT SERPL-MCNC: 6.3 G/DL — SIGNIFICANT CHANGE UP (ref 6–8)
PROTHROM AB SERPL-ACNC: >40 SEC — HIGH (ref 9.95–12.87)
RBC # BLD: 3.51 M/UL — LOW (ref 4.2–5.4)
RBC # FLD: 17.9 % — HIGH (ref 11.5–14.5)
SODIUM SERPL-SCNC: 137 MMOL/L — SIGNIFICANT CHANGE UP (ref 135–146)
TROPONIN T, HIGH SENSITIVITY RESULT: 84 NG/L — CRITICAL HIGH (ref 6–13)
WBC # BLD: 14.81 K/UL — HIGH (ref 4.8–10.8)
WBC # FLD AUTO: 14.81 K/UL — HIGH (ref 4.8–10.8)

## 2025-01-03 PROCEDURE — 76775 US EXAM ABDO BACK WALL LIM: CPT

## 2025-01-03 PROCEDURE — 83550 IRON BINDING TEST: CPT

## 2025-01-03 PROCEDURE — 82746 ASSAY OF FOLIC ACID SERUM: CPT

## 2025-01-03 PROCEDURE — 82570 ASSAY OF URINE CREATININE: CPT

## 2025-01-03 PROCEDURE — 36415 COLL VENOUS BLD VENIPUNCTURE: CPT

## 2025-01-03 PROCEDURE — 83735 ASSAY OF MAGNESIUM: CPT

## 2025-01-03 PROCEDURE — 82607 VITAMIN B-12: CPT

## 2025-01-03 PROCEDURE — 71045 X-RAY EXAM CHEST 1 VIEW: CPT

## 2025-01-03 PROCEDURE — 83935 ASSAY OF URINE OSMOLALITY: CPT

## 2025-01-03 PROCEDURE — 82728 ASSAY OF FERRITIN: CPT

## 2025-01-03 PROCEDURE — 0225U NFCT DS DNA&RNA 21 SARSCOV2: CPT

## 2025-01-03 PROCEDURE — 84156 ASSAY OF PROTEIN URINE: CPT

## 2025-01-03 PROCEDURE — 85730 THROMBOPLASTIN TIME PARTIAL: CPT

## 2025-01-03 PROCEDURE — 83540 ASSAY OF IRON: CPT

## 2025-01-03 PROCEDURE — 84100 ASSAY OF PHOSPHORUS: CPT

## 2025-01-03 PROCEDURE — 84133 ASSAY OF URINE POTASSIUM: CPT

## 2025-01-03 PROCEDURE — 85027 COMPLETE CBC AUTOMATED: CPT

## 2025-01-03 PROCEDURE — 71045 X-RAY EXAM CHEST 1 VIEW: CPT | Mod: 26

## 2025-01-03 PROCEDURE — 97162 PT EVAL MOD COMPLEX 30 MIN: CPT | Mod: GP

## 2025-01-03 PROCEDURE — 99223 1ST HOSP IP/OBS HIGH 75: CPT

## 2025-01-03 PROCEDURE — 81001 URINALYSIS AUTO W/SCOPE: CPT

## 2025-01-03 PROCEDURE — 90471 IMMUNIZATION ADMIN: CPT

## 2025-01-03 PROCEDURE — 90662 IIV NO PRSV INCREASED AG IM: CPT

## 2025-01-03 PROCEDURE — 85610 PROTHROMBIN TIME: CPT

## 2025-01-03 PROCEDURE — 80048 BASIC METABOLIC PNL TOTAL CA: CPT

## 2025-01-03 PROCEDURE — 84484 ASSAY OF TROPONIN QUANT: CPT

## 2025-01-03 PROCEDURE — 84300 ASSAY OF URINE SODIUM: CPT

## 2025-01-03 PROCEDURE — 84540 ASSAY OF URINE/UREA-N: CPT

## 2025-01-03 PROCEDURE — 80053 COMPREHEN METABOLIC PANEL: CPT

## 2025-01-03 PROCEDURE — 99291 CRITICAL CARE FIRST HOUR: CPT

## 2025-01-03 PROCEDURE — 97110 THERAPEUTIC EXERCISES: CPT | Mod: GP

## 2025-01-03 PROCEDURE — 76705 ECHO EXAM OF ABDOMEN: CPT

## 2025-01-03 PROCEDURE — 85025 COMPLETE CBC W/AUTO DIFF WBC: CPT

## 2025-01-03 RX ORDER — FUROSEMIDE 20 MG
40 TABLET ORAL ONCE
Refills: 0 | Status: COMPLETED | OUTPATIENT
Start: 2025-01-03 | End: 2025-01-03

## 2025-01-03 RX ORDER — FUROSEMIDE 20 MG
40 TABLET ORAL
Refills: 0 | Status: DISCONTINUED | OUTPATIENT
Start: 2025-01-04 | End: 2025-01-07

## 2025-01-03 RX ORDER — DIGOXIN 250 MCG
1 TABLET ORAL
Refills: 0 | DISCHARGE

## 2025-01-03 RX ORDER — SPIRONOLACTONE 25 MG
1 TABLET ORAL
Refills: 0 | DISCHARGE

## 2025-01-03 RX ORDER — PANTOPRAZOLE 40 MG/1
40 TABLET, DELAYED RELEASE ORAL
Refills: 0 | Status: DISCONTINUED | OUTPATIENT
Start: 2025-01-03 | End: 2025-01-08

## 2025-01-03 RX ORDER — METOPROLOL TARTRATE 50 MG
100 TABLET ORAL
Refills: 0 | Status: DISCONTINUED | OUTPATIENT
Start: 2025-01-03 | End: 2025-01-07

## 2025-01-03 RX ORDER — ATORVASTATIN CALCIUM 40 MG/1
20 TABLET, FILM COATED ORAL AT BEDTIME
Refills: 0 | Status: DISCONTINUED | OUTPATIENT
Start: 2025-01-03 | End: 2025-01-08

## 2025-01-03 RX ADMIN — Medication 40 MILLIGRAM(S): at 17:37

## 2025-01-03 NOTE — H&P ADULT - ATTENDING COMMENTS
Patient seen and examined at bedside independently of the residents. I read the resident's note and agree with the plan with the additions and corrections as noted below. My note supersedes the resident's note.     REVIEW OF SYSTEMS:  negative except in HPI.     PMH: HFpEF (LVEF 55-60%) s/p AICD, Paroxysmal A.fib (coumadin), Prosthetic MV with mod-severe MR and moderate AS    FHx: Reviewed. No fhx of asthma/copd, No fhx of liver and pulmonary disease. No fhx of hematological disorder.     Physical Exam:  GEN: No acute distress. Awake, Alert and oriented x 3.   Head: Atraumatic, Normocephalic.   Eye: PEERLA. No sclera icterus. EOMI.   ENT: Normal oropharynx, no thyromegaly, no mass, no lymphadenopathy.   LUNGS: mild crackles b/l lung fields.   HEART: Normal. S1/S2 present. RRR. No murmur/gallops.   ABD: Soft, non-tender, non-distended. Bowel sounds present.   EXT: 1+ pitting edema b/l LE. No erythema. No tenderness.  Integumentary: No rash, No sore, No petechia.   NEURO: CN III-XII intact. Strength: 5/5 b/l ULE. Sensory intact b/l ULE.     Vital Signs Last 24 Hrs  T(C): 37.4 (2025 13:45), Max: 37.4 (2025 13:45)  T(F): 99.4 (2025 13:45), Max: 99.4 (2025 13:45)  HR: 55 (2025 14:00) (54 - 55)  BP: 132/61 (2025 14:00) (114/56 - 132/61)  BP(mean): --  RR: 28 (2025 14:00) (26 - 28)  SpO2: 100% (2025 14:00) (98% - 100%)    Parameters below as of 2025 14:00  Patient On (Oxygen Delivery Method): nasal cannula  O2 Flow (L/min): 2L    Please see the above notes for Labs and radiology.     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     Acute hypoxic respiratory failure 2/2 pulmonary edema 2/2 acute HFpEF   - CXR shows Bilateral opacities, unchanged.  - c/w IV lasix 40mg BID  - monitor daily weight, strict I & O, Low Na diet with fluid restriction  - monitor on Telemetry for now.     AKTHY on CKD stage 3   - serum Cr 3.1 currently. Baseline Cr 1.8.   - check renal US and urine studies  - c/w IV lasix 40mg BID  - monitor BMP while on IV lasix.     Paroxysmal A.fib  Supra therapeutic INR - hold coumadin tonight.   - check INR daily and dose coumadin accordingly.     DVT ppx: on Coumadin   GI ppx: PPI   Diet: DASH diet  Activity: as tolerated.      Date seen by the attendin2025  I have spent 75 minutes of time on the encounter which excludes teaching and/or separately reported services.

## 2025-01-03 NOTE — ED PROVIDER NOTE - IN ACCORDANCE WITH [place] LAW, WE OFFER EVERY PATIENT A HEPATITIS C TEST. WOULD YOU LIKE TO BE TESTED TODAY?
----- Message from Jimi Rodriguez MD sent at 10/7/2019  3:01 PM CDT -----  Please call pt with results.  Glucose level is almost in diabetic range, but not quite.  Labs otherwise look fine.  Encourage healthy diet, regular physical activity, and wt loss.  Follow up in 6 months as scheduled with fasting labs a few days prior.   Opt out

## 2025-01-03 NOTE — ED ADULT NURSE NOTE - CCCP TRG CHIEF CMPLNT
Alert-The patient is alert, awake and responds to voice. The patient is oriented to time, place, and person. The triage nurse is able to obtain subjective information.
shortness of breath

## 2025-01-03 NOTE — H&P ADULT - ASSESSMENT
#Acute on chronic HFrEF exacerbation  #s/p AICD  - CXR consistent with volume overload  - proBNP elevated from prior 42k from 9k  - increased WOB, new hypoxia (was on 4L NC)  - follows Dr Nielsen (cardiology)  - TTE 12/10  LVSF 60-65%, no WMA, severe LAE, mild GEETHA, s/p mitral valve replacement, moderate paradoxical low-flow low-gradient aortic valve stenosis (Vmax 2.5m/s, mean PG 13mmHg, DI 0.39, PRISCILLA 0.94cm2, SVi 20ml/m2), mod TR, severe pHTN (PASP = 72mmHg).  - JAIME 12/13  LVEF 55-60%, mild mod LAE, mild GEETHA, mod-severe MR, mod AS  - c/w lasix 40mg IV BID  - patient is warm and well perfused, not in cardiogenic shock  - BIPAP overnight if tolerated  - f/u RVP   - strict I&Os  - daily weight, fluid restriction  - monitor and replete electrolytes prn     #KATHY on CKD  - baseline Cr 1.8, today 3.1  - likely cardiorenal  - c/w iv diuresis  - f/u urine studies  - avoid nephrotoxic medications  - monitor BUN/Cr    #Mild transaminitis  - f/u RUQ US  - likely congestive    #Afib on coumadin   #Prosthetic MV   - daily INR for coumadin dosing  - holding coumadin tonight as INR 4  - f/u am INR  - EKG a-paced     #HTN  -     #Misc  -DVT prophylaxis: on coumadin  -GI prophylaxis: pantoprazole  -Diet: DASH  -Code status:  -Activity: IAT  -Dispo: Admit to tele   This is an 89 yo F with PMH significant for HFmrEF 40%, s/p AICD, A-Fib on Coumadin, prosthetic mitral valve who presented to the ED c/o 2 days of generalized weakness, followed by increased WOB. Pt noted to be hypoxic on room air to low 90s on 4L NC at arrival to trauma bay. Spoke to the patient at bedside who states that she was feeling extremely short of breath. She was having difficulty speaking as well. Also spoke to Dr Nielsen who reports that her SOB has been worsening and he increased her lasix to twice a day. The patient is not willing to use BIPAP right now, but agreeable in case her SOB worsens. SHe reports some improvement in her sx since she received the lasix at the hospital.     #Acute on chronic HFrEF exacerbation  #s/p AICD  - CXR consistent with volume overload  - proBNP elevated from prior 42k from 9k  - increased WOB, new hypoxia (was on 4L NC)  - follows Dr Nielsen (cardiology)  - TTE 12/10  LVSF 60-65%, no WMA, severe LAE, mild GEETHA, s/p mitral valve replacement, moderate paradoxical low-flow low-gradient aortic valve stenosis (Vmax 2.5m/s, mean PG 13mmHg, DI 0.39, PRISCILLA 0.94cm2, SVi 20ml/m2), mod TR, severe pHTN (PASP = 72mmHg).  - JAIME 12/13  LVEF 55-60%, mild mod LAE, mild GEETHA, mod-severe MR, mod AS  - c/w lasix 40mg IV BID  - patient is warm and well perfused, not in cardiogenic shock  - BIPAP overnight if tolerated  - f/u RVP   - strict I&Os  - daily weight, fluid restriction  - monitor and replete electrolytes prn   - can consider IV venofer (hgb drop from 11 to 9)    #KATHY on CKD  - baseline Cr 1.8, today 3.1  - likely cardiorenal  - c/w iv diuresis  - f/u urine studies  - avoid nephrotoxic medications  - monitor BUN/Cr    #Mild transaminitis  - f/u RUQ US  - likely congestive    #Afib on coumadin   #Prosthetic MV   - daily INR for coumadin dosing  - holding coumadin tonight as INR 4  - f/u am INR  - EKG a-paced     #Misc  -DVT prophylaxis: on coumadin  -GI prophylaxis: pantoprazole  -Diet: DASH  -Activity: IAT  -Dispo: Admit to tele    Med rec confirmed with the help of dc paperwork (which patient states has not changed) and list of meds patient has with her This is an 88 yo F with PMH significant for HFimpEF 55-60%, s/p AICD, A-Fib on Coumadin, prosthetic mitral valve who presented to the ED c/o 2 days of generalized weakness, followed by increased WOB. Pt noted to be hypoxic on room air to low 90s on 4L NC at arrival to trauma bay. Spoke to the patient at bedside who states that she was feeling extremely short of breath. She was having difficulty speaking as well. Also spoke to Dr Nielsen who reports that her SOB has been worsening and he increased her lasix to twice a day. The patient is not willing to use BIPAP right now, but agreeable in case her SOB worsens. SHe reports some improvement in her sx since she received the lasix at the hospital.     #Acute on chronic HFimpEF exacerbation  #s/p AICD  - CXR consistent with volume overload  - proBNP elevated from prior 42k from 9k  - increased WOB, new hypoxia (was on 4L NC)  - follows Dr Nielsen (cardiology)  - TTE 12/10  LVSF 60-65%, no WMA, severe LAE, mild GEETHA, s/p mitral valve replacement, moderate paradoxical low-flow low-gradient aortic valve stenosis (Vmax 2.5m/s, mean PG 13mmHg, DI 0.39, PRISCILLA 0.94cm2, SVi 20ml/m2), mod TR, severe pHTN (PASP = 72mmHg).  - JAIME 12/13  LVEF 55-60%, mild mod LAE, mild GEETHA, mod-severe MR, mod AS  - c/w lasix 40mg IV BID  - patient is warm and well perfused, not in cardiogenic shock  - BIPAP overnight if tolerated  - f/u RVP   - strict I&Os  - daily weight, fluid restriction  - monitor and replete electrolytes prn   - can consider IV venofer (hgb drop from 11 to 9)    #KATHY on CKD  - baseline Cr 1.8, today 3.1  - likely cardiorenal  - c/w iv diuresis  - f/u urine studies  - avoid nephrotoxic medications  - monitor BUN/Cr    #Mild transaminitis  - f/u RUQ US  - likely congestive    #Afib on coumadin   #Prosthetic MV   - daily INR for coumadin dosing  - holding coumadin tonight as INR 4  - f/u am INR  - EKG a-paced     #Misc  -DVT prophylaxis: on coumadin  -GI prophylaxis: pantoprazole  -Diet: DASH  -Activity: IAT  -Dispo: Admit to tele    Med rec confirmed with the help of dc paperwork (which patient states has not changed) and list of meds patient has with her

## 2025-01-03 NOTE — ED PROVIDER NOTE - ATTENDING CONTRIBUTION TO CARE
I have reviewed triage notes and vital signs available at this time.  I have reviewed lab results, if any, available at this time.  I have reviewed EKGs, if any, available at this time.  I have reviewed radiology results and radiographs/scans, if any, available at this time.      I have personally seen, evaluated and participated in this patient's care and find this patient's history and physical examination are consistent with the chart documentation, unless noted below.  ---  Patient presented with progressive shortness of breath and leg swelling over days. Denies any cough, fevers or chest pain.  ED EKG, independent interpretation by me Dr. Venita Monroe: Atrial paced sinus rhythm, no obvious signs of acute ischemia/infarction.  CXR preliminary independent interpretation by me, Dr. Venita Monroe: signs pulmonary edema, no obvious infiltrates, no pneumothorax.  Labs reviewed by me.  Cause of dyspnea most likely worsening congestive heart failure. Patient already on diuretics - additional dose given. Patient admitted for worsening of congestive heart failure

## 2025-01-03 NOTE — H&P ADULT - HISTORY OF PRESENT ILLNESS
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 Pt noted to be hypoxic on room air to low 90s on 4L NC at arrival to trauma bay.    Vitals on admission:   / 56 mm Hg, HR 55 /min, RR  28 /min, sat 98 % on 2 L/min    Remarkable labs:   WBC 14.81  Hgb 9.9 (~11 earlier in dec)  INR 4.63 (on coumadin)  K 5.3  BUN/Cr 92/3.1 (Cr 1.8 earlier in dec)  Tbili 2.1  Alk phos 119  AST//139  Trop 84 > pending repeat  proBNP 42k (9k 12/10)  VBG lactate 2.5 pH 7.36/ pCO2 52 / pO2 20    CXR IMPRESSION: Low lung volume. Bilateral opacities, unchanged. Left-sided permanent pacemaker.  EKG: Diagnosis Line Atrial-paced rhythm with prolonged AV conduction, Rightward axis, Minimal voltage criteria for LVH, may be normal variant ( [place] product ), Possible Inferior infarct , age undetermined, ST & T wave abnormality, consider anterolateral ischemia, Abnormal ECG    - TTE 12/10  LVSF 60-65%, no WMA, severe LAE, mild GEETHA, s/p mitral valve replacement, moderate paradoxical low-flow low-gradient aortic valve stenosis (Vmax 2.5m/s, mean PG 13mmHg, DI 0.39, PRISCILLA 0.94cm2, SVi 20ml/m2), mod TR, severe pHTN (PASP = 72mmHg).  - JAIME 12/13  LVEF 55-60%, mild mod LAE, mild GEETHA, mod-severe MR, mod AS    She was given lasix 40mg IV x 1 and is being admitted to [facility] for further management.  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 Pt noted to be hypoxic on room air to low 90s on 4L NC at arrival to trauma bay. Spoke to the patient at bedside who states that she was feeling extremely short of breath. She was having difficulty speaking as well. Also spoke to Dr Nielsen who reports that her SOB has been worsening and he increased her lasix to twice a day. The patient is not willing to use BIPAP right now, but agreeable in case her SOB worsens. SHe reports some improvement in her sx since she received the lasix at the hospital.     Vitals on admission:   / 56 mm Hg, HR 55 /min, RR  28 /min, sat 98 % on 2 L/min    Remarkable labs:   WBC 14.81  Hgb 9.9 (~11 earlier in dec)  INR 4.63 (on coumadin)  K 5.3  BUN/Cr 92/3.1 (Cr 1.8 earlier in dec)  Tbili 2.1  Alk phos 119  AST//139  Trop 84 > pending repeat  proBNP 42k (9k 12/10)  VBG lactate 2.5 pH 7.36/ pCO2 52 / pO2 20    CXR IMPRESSION: Low lung volume. Bilateral opacities, unchanged. Left-sided permanent pacemaker.  EKG: Diagnosis Line Atrial-paced rhythm with prolonged AV conduction, Rightward axis, Minimal voltage criteria for LVH, may be normal variant ( [place] product ), Possible Inferior infarct , age undetermined, ST & T wave abnormality, consider anterolateral ischemia, Abnormal ECG    - TTE 12/10  LVSF 60-65%, no WMA, severe LAE, mild GEETHA, s/p mitral valve replacement, moderate paradoxical low-flow low-gradient aortic valve stenosis (Vmax 2.5m/s, mean PG 13mmHg, DI 0.39, PRISCILLA 0.94cm2, SVi 20ml/m2), mod TR, severe pHTN (PASP = 72mmHg).  - JAIME 12/13  LVEF 55-60%, mild mod LAE, mild GEETHA, mod-severe MR, mod AS    She was given lasix 40mg IV x 1 and is being admitted to [facility] for further management.  This is an 89 yo F with PMH significant for HFimpEF 55-60%, s/p AICD, A-Fib on Coumadin, prosthetic mitral valve who presented to the ED c/o 2 days of generalized weakness, followed by increased WOB. Pt noted to be hypoxic on room air to low 90s on 4L NC at arrival to trauma bay. Spoke to the patient at bedside who states that she was feeling extremely short of breath. She was having difficulty speaking as well. Also spoke to Dr Nielsen who reports that her SOB has been worsening and he increased her lasix to twice a day. The patient is not willing to use BIPAP right now, but agreeable in case her SOB worsens. SHe reports some improvement in her sx since she received the lasix at the hospital.     Vitals on admission:   / 56 mm Hg, HR 55 /min, RR  28 /min, sat 98 % on 2 L/min    Remarkable labs:   WBC 14.81  Hgb 9.9 (~11 earlier in dec)  INR 4.63 (on coumadin)  K 5.3  BUN/Cr 92/3.1 (Cr 1.8 earlier in dec)  Tbili 2.1  Alk phos 119  AST//139  Trop 84 > pending repeat  proBNP 42k (9k 12/10)  VBG lactate 2.5 pH 7.36/ pCO2 52 / pO2 20    CXR IMPRESSION: Low lung volume. Bilateral opacities, unchanged. Left-sided permanent pacemaker.  EKG: Diagnosis Line Atrial-paced rhythm with prolonged AV conduction, Rightward axis, Minimal voltage criteria for LVH, may be normal variant ( [place] product ), Possible Inferior infarct , age undetermined, ST & T wave abnormality, consider anterolateral ischemia, Abnormal ECG    - TTE 12/10  LVSF 60-65%, no WMA, severe LAE, mild GEETHA, s/p mitral valve replacement, moderate paradoxical low-flow low-gradient aortic valve stenosis (Vmax 2.5m/s, mean PG 13mmHg, DI 0.39, PRISCILLA 0.94cm2, SVi 20ml/m2), mod TR, severe pHTN (PASP = 72mmHg).  - JAIME 12/13  LVEF 55-60%, mild mod LAE, mild GEETHA, mod-severe MR, mod AS    She was given lasix 40mg IV x 1 and is being admitted to [facility] for further management.

## 2025-01-03 NOTE — ED PROVIDER NOTE - OBJECTIVE STATEMENT
89 yo F with PMH HF w/ EF 40%, A-Fib on Coumadin, prosthetic mitral valve, AICD in place who presents to the ED 89 yo F with PMH HF w/ EF 40%, A-Fib on Coumadin, prosthetic mitral valve, AICD in place who presents to the ED with 2 days of generalized weakness and new increased work of breathing noted by family member today. She denies SOB, chest pain, abdominal pain, nausea, vomiting. Denies dizziness. Pt noted to be hypoxic on room air to low 90s on 4L NC at arrival to trauma bay.

## 2025-01-03 NOTE — ED PROVIDER NOTE - CLINICAL SUMMARY MEDICAL DECISION MAKING FREE TEXT BOX
Patient presented with progressive shortness of breath and leg swelling over days. Denies any cough, fevers or chest pain.  ED EKG, independent interpretation by me Dr. Venita Monroe: Atrial paced sinus rhythm, no obvious signs of acute ischemia/infarction.  CXR preliminary independent interpretation by me, Dr. Venita Monroe: signs pulmonary edema, no obvious infiltrates, no pneumothorax.  Labs reviewed by me.  Cause of dyspnea most likely worsening congestive heart failure. Patient already on diuretics - additional dose given. Patient admitted for worsening of congestive heart failure

## 2025-01-03 NOTE — H&P ADULT - NSHPLABSRESULTS_GEN_ALL_CORE
.  LABS:                         9.9    14.81 )-----------( 265      ( 03 Jan 2025 14:20 )             32.7     01-03    137  |  96[L]  |  92[HH]  ----------------------------<  92  5.3[H]   |  27  |  3.1[H]    Ca    9.2      03 Jan 2025 14:20  Mg     2.3     01-03    TPro  6.3  /  Alb  3.8  /  TBili  2.1[H]  /  DBili  x   /  AST  107[H]  /  ALT  139[H]  /  AlkPhos  119[H]  01-03    PT/INR - ( 03 Jan 2025 14:20 )   PT: >40.00 sec;   INR: 4.63 ratio         PTT - ( 03 Jan 2025 14:20 )  PTT:35.4 sec  Urinalysis Basic - ( 03 Jan 2025 14:20 )    Color: x / Appearance: x / SG: x / pH: x  Gluc: 92 mg/dL / Ketone: x  / Bili: x / Urobili: x   Blood: x / Protein: x / Nitrite: x   Leuk Esterase: x / RBC: x / WBC x   Sq Epi: x / Non Sq Epi: x / Bacteria: x

## 2025-01-03 NOTE — ED PROVIDER NOTE - PROGRESS NOTE DETAILS
[place]: Findings with new KATHY, elevated BNP w/ persistent b/l opacities. Will admit for CHF exacerbation.

## 2025-01-03 NOTE — ED ADULT NURSE NOTE - NSFALLHARMRISKINTERV_ED_ALL_ED
Assistance OOB with selected safe patient handling equipment if applicable/Assistance with ambulation/Communicate risk of Fall with Harm to all staff, patient, and family/Monitor gait and stability/Provide visual cue: red socks, yellow wristband, yellow gown, etc/Reinforce activity limits and safety measures with patient and family/Bed in lowest position, wheels locked, appropriate side rails in place/Call bell, personal items and telephone in reach/Instruct patient to call for assistance before getting out of bed/chair/stretcher/Non-slip footwear applied when patient is off stretcher/[place] to call system/Physically safe environment - no spills, clutter or unnecessary equipment/Purposeful Proactive Rounding/Room/bathroom lighting operational, light cord in reach

## 2025-01-03 NOTE — ED PROVIDER NOTE - PHYSICAL EXAMINATION
CONSTITUTIONAL: Well-appearing; well-nourished; in no apparent distress.   HEAD: Normocephalic; atraumatic.   EYES: PERRL; EOM intact. Conjunctiva normal B/L.   ENT: Dry mucos membranes  CHEST: Normal chest excursion with respiration.   CARDIOVASCULAR: Normal S1, S2; no murmurs, rubs, or gallops.   RESPIRATORY: B/l coarse breath sounds, no crackles, wheezes or rales   GI/: Soft; non-distended; non-tender.  BACK: No evidence of trauma or deformity  EXT: b/l pitting edema 1+  SKIN: Normal for age and race; warm; dry; good turgor.  NEURO: A & O x 3

## 2025-01-04 LAB
ANION GAP SERPL CALC-SCNC: 12 MMOL/L — SIGNIFICANT CHANGE UP (ref 7–14)
APPEARANCE UR: CLEAR — SIGNIFICANT CHANGE UP
BILIRUB UR-MCNC: NEGATIVE — SIGNIFICANT CHANGE UP
BUN SERPL-MCNC: 88 MG/DL — CRITICAL HIGH (ref 10–20)
CALCIUM SERPL-MCNC: 8.6 MG/DL — SIGNIFICANT CHANGE UP (ref 8.4–10.5)
CHLORIDE SERPL-SCNC: 97 MMOL/L — LOW (ref 98–110)
CO2 SERPL-SCNC: 29 MMOL/L — SIGNIFICANT CHANGE UP (ref 17–32)
COLOR SPEC: YELLOW — SIGNIFICANT CHANGE UP
CREAT ?TM UR-MCNC: 70 MG/DL — SIGNIFICANT CHANGE UP
CREAT SERPL-MCNC: 3.2 MG/DL — HIGH (ref 0.7–1.5)
DIFF PNL FLD: NEGATIVE — SIGNIFICANT CHANGE UP
EGFR: 14 ML/MIN/1.73M2 — LOW
GLUCOSE SERPL-MCNC: 121 MG/DL — HIGH (ref 70–99)
GLUCOSE UR QL: NEGATIVE MG/DL — SIGNIFICANT CHANGE UP
HCT VFR BLD CALC: 32.3 % — LOW (ref 37–47)
HGB BLD-MCNC: 9.9 G/DL — LOW (ref 12–16)
INR BLD: 4.41 RATIO — HIGH (ref 0.65–1.3)
IRON SATN MFR SERPL: 10 % — LOW (ref 15–50)
IRON SATN MFR SERPL: 29 UG/DL — LOW (ref 35–150)
KETONES UR-MCNC: NEGATIVE MG/DL — SIGNIFICANT CHANGE UP
LEUKOCYTE ESTERASE UR-ACNC: ABNORMAL
MAGNESIUM SERPL-MCNC: 2.2 MG/DL — SIGNIFICANT CHANGE UP (ref 1.8–2.4)
MCHC RBC-ENTMCNC: 28.7 PG — SIGNIFICANT CHANGE UP (ref 27–31)
MCHC RBC-ENTMCNC: 30.7 G/DL — LOW (ref 32–37)
MCV RBC AUTO: 93.6 FL — SIGNIFICANT CHANGE UP (ref 81–99)
NITRITE UR-MCNC: NEGATIVE — SIGNIFICANT CHANGE UP
NRBC # BLD: 0 /100 WBCS — SIGNIFICANT CHANGE UP (ref 0–0)
OSMOLALITY UR: 425 MOS/KG — SIGNIFICANT CHANGE UP (ref 50–1200)
PH UR: 6 — SIGNIFICANT CHANGE UP (ref 5–8)
PLATELET # BLD AUTO: 257 K/UL — SIGNIFICANT CHANGE UP (ref 130–400)
PMV BLD: 11.7 FL — HIGH (ref 7.4–10.4)
POTASSIUM SERPL-MCNC: 4.6 MMOL/L — SIGNIFICANT CHANGE UP (ref 3.5–5)
POTASSIUM SERPL-SCNC: 4.6 MMOL/L — SIGNIFICANT CHANGE UP (ref 3.5–5)
POTASSIUM UR-SCNC: 55 MMOL/L — SIGNIFICANT CHANGE UP
PROT ?TM UR-MCNC: 9 MG/DLG/24H — SIGNIFICANT CHANGE UP
PROT UR-MCNC: NEGATIVE MG/DL — SIGNIFICANT CHANGE UP
PROT/CREAT UR-RTO: 0.1 RATIO — SIGNIFICANT CHANGE UP (ref 0–0.2)
PROTHROM AB SERPL-ACNC: >40 SEC — HIGH (ref 9.95–12.87)
RBC # BLD: 3.45 M/UL — LOW (ref 4.2–5.4)
RBC # FLD: 17.8 % — HIGH (ref 11.5–14.5)
SODIUM SERPL-SCNC: 138 MMOL/L — SIGNIFICANT CHANGE UP (ref 135–146)
SODIUM UR-SCNC: 49 MMOL/L — SIGNIFICANT CHANGE UP
SP GR SPEC: 1.01 — SIGNIFICANT CHANGE UP (ref 1–1.03)
TIBC SERPL-MCNC: 279 UG/DL — SIGNIFICANT CHANGE UP (ref 220–430)
TROPONIN T, HIGH SENSITIVITY RESULT: 63 NG/L — CRITICAL HIGH (ref 6–13)
UIBC SERPL-MCNC: 250 UG/DL — SIGNIFICANT CHANGE UP (ref 110–370)
UROBILINOGEN FLD QL: 1 MG/DL — SIGNIFICANT CHANGE UP (ref 0.2–1)
UUN UR-MCNC: 593 MG/DL — SIGNIFICANT CHANGE UP
WBC # BLD: 13.27 K/UL — HIGH (ref 4.8–10.8)
WBC # FLD AUTO: 13.27 K/UL — HIGH (ref 4.8–10.8)

## 2025-01-04 PROCEDURE — 71045 X-RAY EXAM CHEST 1 VIEW: CPT | Mod: 26

## 2025-01-04 PROCEDURE — 99232 SBSQ HOSP IP/OBS MODERATE 35: CPT

## 2025-01-04 RX ORDER — METOPROLOL TARTRATE 100 MG/1
1 TABLET, FILM COATED ORAL
Refills: 0 | DISCHARGE

## 2025-01-04 RX ORDER — INFLUENZA A VIRUS A/WISCONSIN/588/2019 (H1N1) RECOMBINANT HEMAGGLUTININ ANTIGEN, INFLUENZA A VIRUS A/DARWIN/6/2021 (H3N2) RECOMBINANT HEMAGGLUTININ ANTIGEN, INFLUENZA B VIRUS B/AUSTRIA/1359417/2021 RECOMBINANT HEMAGGLUTININ ANTIGEN, AND INFLUENZA B VIRUS B/PHUKET/3073/2013 RECOMBINANT HEMAGGLUTININ ANTIGEN 45; 45; 45; 45 UG/.5ML; UG/.5ML; UG/.5ML; UG/.5ML
0.5 INJECTION INTRAMUSCULAR ONCE
Refills: 0 | Status: COMPLETED | OUTPATIENT
Start: 2025-01-04 | End: 2025-01-08

## 2025-01-04 RX ORDER — SPIRONOLACTONE 50 MG/1
1 TABLET ORAL
Refills: 0 | DISCHARGE

## 2025-01-04 RX ADMIN — Medication 100 MILLIGRAM(S): at 06:50

## 2025-01-04 RX ADMIN — Medication 40 MILLIGRAM(S): at 06:50

## 2025-01-04 RX ADMIN — ATORVASTATIN CALCIUM 20 MILLIGRAM(S): 40 TABLET, FILM COATED ORAL at 00:00

## 2025-01-04 RX ADMIN — PANTOPRAZOLE 40 MILLIGRAM(S): 40 TABLET, DELAYED RELEASE ORAL at 06:51

## 2025-01-04 RX ADMIN — Medication 40 MILLIGRAM(S): at 13:25

## 2025-01-04 RX ADMIN — ATORVASTATIN CALCIUM 20 MILLIGRAM(S): 40 TABLET, FILM COATED ORAL at 21:05

## 2025-01-04 NOTE — PATIENT PROFILE ADULT - FUNCTIONAL ASSESSMENT - DAILY ACTIVITY 6.
Patient Seen in: [address]    History   Patient presents with: Ankle Pain    Stated Complaint: right ankle injury    HPI    Patient is a 73-year-old male who presents for evaluation of right ankle pain ×1 day.   States h regular rhythm, normal heart sounds and intact distal pulses. Pulmonary/Chest: Effort normal and breath sounds normal.   Musculoskeletal: He exhibits tenderness.         Right hip: Normal.        Right knee: Normal.        Right ankle: He exhibits decrea 4 = No assist / stand by assistance

## 2025-01-04 NOTE — CONSULT NOTE ADULT - SUBJECTIVE AND OBJECTIVE BOX
Patient is a 89y old  Female who presents with a chief complaint of     HPI:  This is an 89 yo F with PMH significant for HFimpEF 55-60%, s/p AICD, A-Fib on Coumadin, prosthetic mitral valve who presented to the ED c/o 2 days of generalized weakness, followed by increased WOB. Pt noted to be hypoxic on room air to low 90s on 4L NC at arrival to trauma bay. Spoke to the patient at bedside who states that she was feeling extremely short of breath. She was having difficulty speaking as well. Also spoke to Dr Nielsen who reports that her SOB has been worsening and he increased her lasix to twice a day. The patient is not willing to use BIPAP right now, but agreeable in case her SOB worsens. SHe reports some improvement in her sx since she received the lasix at the hospital.     Vitals on admission:   / 56 mm Hg, HR 55 /min, RR  28 /min, sat 98 % on 2 L/min    Remarkable labs:   WBC 14.81  Hgb 9.9 (~11 earlier in dec)  INR 4.63 (on coumadin)  K 5.3  BUN/Cr 92/3.1 (Cr 1.8 earlier in dec)  Tbili 2.1  Alk phos 119  AST//139  Trop 84 > pending repeat  proBNP 42k (9k 12/10)  VBG lactate 2.5 pH 7.36/ pCO2 52 / pO2 20    CXR IMPRESSION: Low lung volume. Bilateral opacities, unchanged. Left-sided permanent pacemaker.  EKG: Diagnosis Line Atrial-paced rhythm with prolonged AV conduction, Rightward axis, Minimal voltage criteria for LVH, may be normal variant ( [place] product ), Possible Inferior infarct , age undetermined, ST & T wave abnormality, consider anterolateral ischemia, Abnormal ECG    - TTE 12/10  LVSF 60-65%, no WMA, severe LAE, mild GEETHA, s/p mitral valve replacement, moderate paradoxical low-flow low-gradient aortic valve stenosis (Vmax 2.5m/s, mean PG 13mmHg, DI 0.39, PRISCILLA 0.94cm2, SVi 20ml/m2), mod TR, severe pHTN (PASP = 72mmHg).  - JAIME 12/13  LVEF 55-60%, mild mod LAE, mild GEETHA, mod-severe MR, mod AS    She was given lasix 40mg IV x 1 and is being admitted to [facility] for further management.  (03 Jan 2025 18:31)      PAST MEDICAL & SURGICAL HISTORY:  Afib      HTN (hypertension)      Heart failure      H/O mitral valve replacement      H/O prior ablation treatment  afib          PREVIOUS DIAGNOSTIC TESTING:      ECHO  FINDINGS:    STRESS  FINDINGS:    CATHETERIZATION  FINDINGS:    MEDICATIONS  (STANDING):  atorvastatin 20 milliGRAM(s) Oral at bedtime  furosemide   Injectable 40 milliGRAM(s) IV Push two times a day  influenza  Vaccine (HIGH DOSE) 0.5 milliLiter(s) IntraMuscular once  metoprolol tartrate 100 milliGRAM(s) Oral two times a day  pantoprazole    Tablet 40 milliGRAM(s) Oral before breakfast    MEDICATIONS  (PRN):      FAMILY HISTORY:      SOCIAL HISTORY:  CIGARETTES:    ALCOHOL:    REVIEW OF SYSTEMS:  CONSTITUTIONAL: No fever, weight loss, or fatigue  NECK: No pain or stiffness  RESPIRATORY: No cough, wheezing, chills or hemoptysis; No shortness of breath  CARDIOVASCULAR: No chest pain, palpitations, dizziness, or leg swelling  GASTROINTESTINAL: No abdominal or epigastric pain. No nausea, vomiting, or hematemesis; No diarrhea or constipation. No melena or hematochezia.  GENITOURINARY: No dysuria, frequency, hematuria, or incontinence  NEUROLOGICAL: No headaches, memory loss, loss of strength, numbness, or tremors  SKIN: No itching, burning, rashes, or lesions   ENDOCRINE: No heat or cold intolerance; No hair loss  MUSCULOSKELETAL: No joint pain or swelling; No muscle, back, or extremity pain  HEME/LYMPH: No easy bruising, or bleeding gums          Vital Signs Last 24 Hrs  T(C): --  T(F): --  HR: 55 (04 Jan 2025 13:20) (55 - 55)  BP: 105/64 (04 Jan 2025 13:20) (105/64 - 118/66)  BP(mean): 78 (04 Jan 2025 13:20) (78 - 78)  RR: 16 (04 Jan 2025 13:20) (16 - 16)  SpO2: 99% (04 Jan 2025 13:20) (98% - 99%)    Parameters below as of 04 Jan 2025 13:20  Patient On (Oxygen Delivery Method): room air            PHYSICAL EXAM:  GENERAL: NAD, well-groomed, well-developed  HEAD:  Atraumatic, Normocephalic  NECK: Supple, No JVD, Normal thyroid  NERVOUS SYSTEM:  Alert & Oriented X3, Good concentration  CHEST/LUNG: Clear to percussion bilaterally; No rales, rhonchi, wheezing, or rubs  HEART: Regular rate and rhythm; No murmurs, rubs, or gallops  ABDOMEN: Soft, Nontender, Nondistended; Bowel sounds present  EXTREMITIES:  2+ Peripheral Pulses, No clubbing, cyanosis, or edema  SKIN: No rashes or lesions    INTERPRETATION OF TELEMETRY:    ECG:    I&O's Detail      LABS:                        9.9    14.81 )-----------( 265      ( 03 Jan 2025 14:20 )             32.7     01-03    137  |  96[L]  |  92[HH]  ----------------------------<  92  5.3[H]   |  27  |  3.1[H]    Ca    9.2      03 Jan 2025 14:20  Mg     2.3     01-03    TPro  6.3  /  Alb  3.8  /  TBili  2.1[H]  /  DBili  x   /  AST  107[H]  /  ALT  139[H]  /  AlkPhos  119[H]  01-03        PT/INR - ( 03 Jan 2025 14:20 )   PT: >40.00 sec;   INR: 4.63 ratio         PTT - ( 03 Jan 2025 14:20 )  PTT:35.4 sec  Urinalysis Basic - ( 03 Jan 2025 14:20 )    Color: x / Appearance: x / SG: x / pH: x  Gluc: 92 mg/dL / Ketone: x  / Bili: x / Urobili: x   Blood: x / Protein: x / Nitrite: x   Leuk Esterase: x / RBC: x / WBC x   Sq Epi: x / Non Sq Epi: x / Bacteria: x      I&O's Summary      RADIOLOGY & ADDITIONAL STUDIES:

## 2025-01-04 NOTE — PROGRESS NOTE ADULT - ASSESSMENT
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     #Acute hypoxic respiratory failure  #Acute on Chronic HFimpEF  #Severe pHTN  CXR 01/04 reviewed, shows Bilateral opacities, unchanged.  Echo 12/13: 55-60% EF; moderate AS; severe pHTN  On 2L  - Tele monitoring  - c/w IV lasix 40mg BID  - monitor daily weight, strict I & O, Low Na diet with fluid restriction  - Holding aldactone due to high K  - Cardio eval    #KATHY on CKD stage 3   serum Cr 3.1 currently. Baseline Cr 1.8.   suspect cardiorenal  - check renal US and urine studies  - c/w IV lasix 40mg BID  - monitor BMP while on IV lasix.     #Transaminitis  suspect congestive hepatopathy    #Paroxysmal A.fib  - check INR daily and dose coumadin accordingly.   - Cont metoprolol    DVT ppx: on Coumadin     #Progress Note Handoff  Pending (specify): Cont IV diuresis  Family discussion: real pt regarding plan of care  Disposition: Tele, from home

## 2025-01-04 NOTE — PATIENT PROFILE ADULT - FALL HARM RISK - HARM RISK INTERVENTIONS

## 2025-01-04 NOTE — PHYSICAL THERAPY INITIAL EVALUATION ADULT - SPECIFY REASON(S)
Patient attempted to see the t for PT eval. Patient declined participating in PT eval, reported that she was very tired.

## 2025-01-05 LAB
ALBUMIN SERPL ELPH-MCNC: 3.6 G/DL — SIGNIFICANT CHANGE UP (ref 3.5–5.2)
ALP SERPL-CCNC: 111 U/L — SIGNIFICANT CHANGE UP (ref 30–115)
ALT FLD-CCNC: 106 U/L — HIGH (ref 0–41)
ANION GAP SERPL CALC-SCNC: 14 MMOL/L — SIGNIFICANT CHANGE UP (ref 7–14)
APTT BLD: 35.1 SEC — SIGNIFICANT CHANGE UP (ref 27–39.2)
AST SERPL-CCNC: 45 U/L — HIGH (ref 0–41)
BILIRUB SERPL-MCNC: 1.7 MG/DL — HIGH (ref 0.2–1.2)
BUN SERPL-MCNC: 81 MG/DL — CRITICAL HIGH (ref 10–20)
CALCIUM SERPL-MCNC: 8.5 MG/DL — SIGNIFICANT CHANGE UP (ref 8.4–10.5)
CHLORIDE SERPL-SCNC: 94 MMOL/L — LOW (ref 98–110)
CO2 SERPL-SCNC: 28 MMOL/L — SIGNIFICANT CHANGE UP (ref 17–32)
CREAT SERPL-MCNC: 2.5 MG/DL — HIGH (ref 0.7–1.5)
EGFR: 18 ML/MIN/1.73M2 — LOW
GLUCOSE SERPL-MCNC: 132 MG/DL — HIGH (ref 70–99)
HCT VFR BLD CALC: 33.6 % — LOW (ref 37–47)
HGB BLD-MCNC: 10.2 G/DL — LOW (ref 12–16)
INR BLD: 3.26 RATIO — HIGH (ref 0.65–1.3)
MCHC RBC-ENTMCNC: 28.7 PG — SIGNIFICANT CHANGE UP (ref 27–31)
MCHC RBC-ENTMCNC: 30.4 G/DL — LOW (ref 32–37)
MCV RBC AUTO: 94.6 FL — SIGNIFICANT CHANGE UP (ref 81–99)
NRBC # BLD: 0 /100 WBCS — SIGNIFICANT CHANGE UP (ref 0–0)
PLATELET # BLD AUTO: 254 K/UL — SIGNIFICANT CHANGE UP (ref 130–400)
PMV BLD: 11.8 FL — HIGH (ref 7.4–10.4)
POTASSIUM SERPL-MCNC: 4.1 MMOL/L — SIGNIFICANT CHANGE UP (ref 3.5–5)
POTASSIUM SERPL-SCNC: 4.1 MMOL/L — SIGNIFICANT CHANGE UP (ref 3.5–5)
PROT SERPL-MCNC: 5.9 G/DL — LOW (ref 6–8)
PROTHROM AB SERPL-ACNC: 39.4 SEC — HIGH (ref 9.95–12.87)
RAPID RVP RESULT: SIGNIFICANT CHANGE UP
RBC # BLD: 3.55 M/UL — LOW (ref 4.2–5.4)
RBC # FLD: 17.9 % — HIGH (ref 11.5–14.5)
SARS-COV-2 RNA SPEC QL NAA+PROBE: SIGNIFICANT CHANGE UP
SODIUM SERPL-SCNC: 136 MMOL/L — SIGNIFICANT CHANGE UP (ref 135–146)
WBC # BLD: 14.58 K/UL — HIGH (ref 4.8–10.8)
WBC # FLD AUTO: 14.58 K/UL — HIGH (ref 4.8–10.8)

## 2025-01-05 PROCEDURE — 76775 US EXAM ABDO BACK WALL LIM: CPT | Mod: 26,XE

## 2025-01-05 PROCEDURE — 76705 ECHO EXAM OF ABDOMEN: CPT | Mod: 26

## 2025-01-05 PROCEDURE — 99232 SBSQ HOSP IP/OBS MODERATE 35: CPT

## 2025-01-05 RX ORDER — TAMSULOSIN HYDROCHLORIDE 0.4 MG/1
0.4 CAPSULE ORAL AT BEDTIME
Refills: 0 | Status: DISCONTINUED | OUTPATIENT
Start: 2025-01-06 | End: 2025-01-08

## 2025-01-05 RX ORDER — TAMSULOSIN HYDROCHLORIDE 0.4 MG/1
0.4 CAPSULE ORAL ONCE
Refills: 0 | Status: COMPLETED | OUTPATIENT
Start: 2025-01-05 | End: 2025-01-05

## 2025-01-05 RX ORDER — SPIRONOLACTONE 50 MG/1
25 TABLET ORAL DAILY
Refills: 0 | Status: DISCONTINUED | OUTPATIENT
Start: 2025-01-06 | End: 2025-01-08

## 2025-01-05 RX ADMIN — ATORVASTATIN CALCIUM 20 MILLIGRAM(S): 40 TABLET, FILM COATED ORAL at 21:12

## 2025-01-05 RX ADMIN — Medication 40 MILLIGRAM(S): at 05:19

## 2025-01-05 RX ADMIN — PANTOPRAZOLE 40 MILLIGRAM(S): 40 TABLET, DELAYED RELEASE ORAL at 05:19

## 2025-01-05 RX ADMIN — Medication 40 MILLIGRAM(S): at 13:19

## 2025-01-05 RX ADMIN — Medication 100 MILLIGRAM(S): at 18:12

## 2025-01-05 RX ADMIN — TAMSULOSIN HYDROCHLORIDE 0.4 MILLIGRAM(S): 0.4 CAPSULE ORAL at 18:11

## 2025-01-05 NOTE — PROGRESS NOTE ADULT - CONVERSATION DETAILS
C d/w pt and nephew. Pt decided she would like to be DNR with trial of intubation. MOLST form has been signed 01/04, and completed today 01/05.

## 2025-01-05 NOTE — PROGRESS NOTE ADULT - ATTENDING COMMENTS
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     #Acute hypoxic respiratory failure  #Acute on Chronic HFimpEF  #Severe pHTN  CXR 01/04 reviewed, shows Bilateral opacities, unchanged.  Echo 12/13: 55-60% EF; moderate AS; severe pHTN    On 2L  - Tele monitoring  - c/w IV lasix 40mg BID  - monitor daily weight, strict I & O, Low Na diet with fluid restriction  - Holding aldactone due to high K    #KATHY on CKD stage 3   serum Cr 3.1 currently. Baseline Cr 1.8.   suspect cardiorenal  - check renal US and urine studies  - c/w IV lasix 40mg BID  - monitor BMP while on IV lasix.     #Transaminitis  suspect congestive hepatopathy    #Paroxysmal A.fib  - check INR daily and dose coumadin accordingly.   - Cont metoprolol    DVT ppx: on Coumadin     #Progress Note Handoff  Pending (specify): Cont IV diuresis  Family discussion: real pt regarding plan of care  Disposition: Tele, from home 89 yo F with hx of HFpEF (LVEF 55-60%) s/p AICD, Paroxysmal A.fib (coumadin), Prosthetic MV with mod-severe MR and moderate AS presents to ED for worseing SOB x 2 days with generalized weakness.     #Acute hypoxic respiratory failure  #Acute on Chronic HFimpEF  #Severe pHTN  CXR 01/04 reviewed, shows Bilateral opacities, unchanged.  Echo 12/13: 55-60% EF; moderate AS; severe pHTN  On 2L  - Tele monitoring  - c/w IV lasix 40mg BID  - monitor daily weight, strict I & O, Low Na diet with fluid restriction  - Holding aldactone due to high K    #KATHY on CKD stage 3   serum Cr 3.1 currently. Baseline Cr 1.8.   suspect cardiorenal  Post-void bladder scan is 310cc. Pt's renal function improving today, pt asymptomatic. Will hold off on quevedo. Nephew is hoping to avoid quevedo if possible  - check renal US and urine studies  - c/w IV lasix 40mg BID  - monitor BMP while on IV lasix.   - Start PO flomax 0.4mg qD  - Repeat bladder scan tomorrow    #Transaminitis  suspect congestive hepatopathy    #Paroxysmal A.fib  - check INR daily and dose coumadin accordingly.   - Cont metoprolol    DVT ppx: on Coumadin     #Progress Note Handoff  Pending (specify): Cont IV diuresis  Family discussion: real pt regarding plan of care  Disposition: Tele, from home 89 yo F with hx of HFpEF (LVEF 55-60%) s/p AICD, Paroxysmal A.fib (coumadin), Prosthetic MV with mod-severe MR and moderate AS presents to ED for worseing SOB x 2 days with generalized weakness.     #Acute hypoxic respiratory failure  #Acute on Chronic HFimpEF  #Severe pHTN  CXR 01/04 reviewed, shows Bilateral opacities, unchanged.  Echo 12/13: 55-60% EF; moderate AS; severe pHTN  On 2L  - Tele monitoring  - c/w IV lasix 40mg BID  - monitor daily weight, strict I & O, Low Na diet with fluid restriction  - resume aldactone 25mg qD  - HF Eval    #KATHY on CKD stage 3   serum Cr 3.1 currently. Baseline Cr 1.8.   suspect cardiorenal  KBUS 01/05: No hydronephrosis. Urinary bladder volume 540cc  Post-void bladder scan is 310cc. Pt's renal function improving today, and pt is asymptomatic. Hold off on quevedo. Nephew is hoping to avoid quevedo if possible  - c/w IV lasix 40mg BID  - monitor BMP while on IV lasix.   - Start PO flomax 0.4mg qD  - Repeat bladder scan tomorrow    #Transaminitis  suspect congestive hepatopathy  - Trend LFTs    #Paroxysmal A.fib  - check INR daily and dose coumadin accordingly.   - Cont metoprolol    DVT ppx: on Coumadin     #Progress Note Handoff  Pending (specify): Cont IV diuresis, repeat bladder scan tomorrow  Family discussion: dw pt regarding plan of care  Disposition: Tele, from home

## 2025-01-05 NOTE — PROGRESS NOTE ADULT - ASSESSMENT
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     #Acute hypoxic respiratory failure  #Acute on Chronic HFimpEF  #Severe pHTN  CXR 01/04 reviewed, shows Bilateral opacities, unchanged.  Echo 12/13: 55-60% EF; moderate AS; severe pHTN  On 2L  - Tele monitoring  - c/w IV lasix 40mg BID  - monitor daily weight, strict I & O, Low Na diet with fluid restriction  - Holding aldactone due to high K  - Cardio eval - continue diuresis, nephro eval    #KATHY on CKD stage 3   serum Cr 3.1 currently. Baseline Cr 1.8.   suspect cardiorenal  - check renal US and urine studies  - c/w IV lasix 40mg BID  - monitor BMP while on IV lasix.     #Transaminitis  suspect congestive hepatopathy    #Paroxysmal A.fib  - check INR daily and dose coumadin accordingly.   - Cont metoprolol    DVT ppx: on Coumadin

## 2025-01-06 LAB
ALBUMIN SERPL ELPH-MCNC: 3.6 G/DL — SIGNIFICANT CHANGE UP (ref 3.5–5.2)
ALP SERPL-CCNC: 108 U/L — SIGNIFICANT CHANGE UP (ref 30–115)
ALT FLD-CCNC: 100 U/L — HIGH (ref 0–41)
ANION GAP SERPL CALC-SCNC: 12 MMOL/L — SIGNIFICANT CHANGE UP (ref 7–14)
AST SERPL-CCNC: 40 U/L — SIGNIFICANT CHANGE UP (ref 0–41)
BILIRUB SERPL-MCNC: 1.7 MG/DL — HIGH (ref 0.2–1.2)
BUN SERPL-MCNC: 76 MG/DL — CRITICAL HIGH (ref 10–20)
CALCIUM SERPL-MCNC: 8.6 MG/DL — SIGNIFICANT CHANGE UP (ref 8.4–10.5)
CHLORIDE SERPL-SCNC: 98 MMOL/L — SIGNIFICANT CHANGE UP (ref 98–110)
CO2 SERPL-SCNC: 31 MMOL/L — SIGNIFICANT CHANGE UP (ref 17–32)
CREAT SERPL-MCNC: 1.9 MG/DL — HIGH (ref 0.7–1.5)
EGFR: 25 ML/MIN/1.73M2 — LOW
FERRITIN SERPL-MCNC: 119 NG/ML — SIGNIFICANT CHANGE UP (ref 13–330)
FOLATE SERPL-MCNC: 11.3 NG/ML — SIGNIFICANT CHANGE UP
GLUCOSE SERPL-MCNC: 110 MG/DL — HIGH (ref 70–99)
HCT VFR BLD CALC: 33.8 % — LOW (ref 37–47)
HGB BLD-MCNC: 10.1 G/DL — LOW (ref 12–16)
INR BLD: 2.28 RATIO — HIGH (ref 0.65–1.3)
MAGNESIUM SERPL-MCNC: 2 MG/DL — SIGNIFICANT CHANGE UP (ref 1.8–2.4)
MCHC RBC-ENTMCNC: 28.4 PG — SIGNIFICANT CHANGE UP (ref 27–31)
MCHC RBC-ENTMCNC: 29.9 G/DL — LOW (ref 32–37)
MCV RBC AUTO: 94.9 FL — SIGNIFICANT CHANGE UP (ref 81–99)
NRBC # BLD: 0 /100 WBCS — SIGNIFICANT CHANGE UP (ref 0–0)
PLATELET # BLD AUTO: 250 K/UL — SIGNIFICANT CHANGE UP (ref 130–400)
PMV BLD: 11 FL — HIGH (ref 7.4–10.4)
POTASSIUM SERPL-MCNC: 3.9 MMOL/L — SIGNIFICANT CHANGE UP (ref 3.5–5)
POTASSIUM SERPL-SCNC: 3.9 MMOL/L — SIGNIFICANT CHANGE UP (ref 3.5–5)
PROT SERPL-MCNC: 6 G/DL — SIGNIFICANT CHANGE UP (ref 6–8)
PROTHROM AB SERPL-ACNC: 27.3 SEC — HIGH (ref 9.95–12.87)
RBC # BLD: 3.56 M/UL — LOW (ref 4.2–5.4)
RBC # FLD: 18.4 % — HIGH (ref 11.5–14.5)
SODIUM SERPL-SCNC: 141 MMOL/L — SIGNIFICANT CHANGE UP (ref 135–146)
VIT B12 SERPL-MCNC: 552 PG/ML — SIGNIFICANT CHANGE UP (ref 232–1245)
WBC # BLD: 13.75 K/UL — HIGH (ref 4.8–10.8)
WBC # FLD AUTO: 13.75 K/UL — HIGH (ref 4.8–10.8)

## 2025-01-06 PROCEDURE — 99232 SBSQ HOSP IP/OBS MODERATE 35: CPT

## 2025-01-06 RX ORDER — CHLORHEXIDINE GLUCONATE 1.2 MG/ML
1 RINSE ORAL DAILY
Refills: 0 | Status: DISCONTINUED | OUTPATIENT
Start: 2025-01-06 | End: 2025-01-08

## 2025-01-06 RX ORDER — WARFARIN SODIUM 10 MG/1
1.5 TABLET ORAL ONCE
Refills: 0 | Status: COMPLETED | OUTPATIENT
Start: 2025-01-06 | End: 2025-01-06

## 2025-01-06 RX ADMIN — SPIRONOLACTONE 25 MILLIGRAM(S): 50 TABLET ORAL at 05:08

## 2025-01-06 RX ADMIN — WARFARIN SODIUM 1.5 MILLIGRAM(S): 10 TABLET ORAL at 21:58

## 2025-01-06 RX ADMIN — TAMSULOSIN HYDROCHLORIDE 0.4 MILLIGRAM(S): 0.4 CAPSULE ORAL at 21:58

## 2025-01-06 RX ADMIN — CHLORHEXIDINE GLUCONATE 1 APPLICATION(S): 1.2 RINSE ORAL at 12:05

## 2025-01-06 RX ADMIN — Medication 40 MILLIGRAM(S): at 05:08

## 2025-01-06 RX ADMIN — Medication 40 MILLIGRAM(S): at 15:21

## 2025-01-06 RX ADMIN — PANTOPRAZOLE 40 MILLIGRAM(S): 40 TABLET, DELAYED RELEASE ORAL at 05:09

## 2025-01-06 RX ADMIN — ATORVASTATIN CALCIUM 20 MILLIGRAM(S): 40 TABLET, FILM COATED ORAL at 21:59

## 2025-01-06 RX ADMIN — Medication 100 MILLIGRAM(S): at 09:55

## 2025-01-06 RX ADMIN — Medication 100 MILLIGRAM(S): at 22:10

## 2025-01-06 NOTE — PROGRESS NOTE ADULT - ATTENDING COMMENTS
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     Vital Signs Last 24 Hrs  T(C): 36.3 (06 Jan 2025 13:00), Max: 36.3 (06 Jan 2025 13:00)  T(F): 97.3 (06 Jan 2025 13:00), Max: 97.3 (06 Jan 2025 13:00)  HR: 54 (06 Jan 2025 13:00) (54 - 80)  BP: 110/55 (06 Jan 2025 13:00) (99/58 - 119/69)  BP(mean): 81 (06 Jan 2025 04:05) (81 - 81)  RR: 18 (06 Jan 2025 13:00) (18 - 18)  SpO2: 98% (06 Jan 2025 13:00) (95% - 98%)    Parameters below as of 06 Jan 2025 13:00  Patient On (Oxygen Delivery Method): room air                          10.1   13.75 )-----------( 250      ( 06 Jan 2025 13:00 )             33.8   01-06    141  |  98  |  76[HH]  ----------------------------<  110[H]  3.9   |  31  |  1.9[H]    Ca    8.6      06 Jan 2025 13:00  Mg     2.0     01-06    TPro  6.0  /  Alb  3.6  /  TBili  1.7[H]  /  DBili  x   /  AST  40  /  ALT  100[H]  /  AlkPhos  108  01-06    #Acute hypoxic respiratory failure  #Acute on Chronic HFimpEF  #Severe pHTN  CXR 01/04 reviewed, shows Bilateral opacities, unchanged.  Echo 12/13: 55-60% EF; moderate AS; severe pHTN  On 2L  - Tele monitoring  - c/w IV lasix 40mg BID  - monitor daily weight, strict I & O, Low Na diet with fluid restriction      #KATHY on CKD stage 3   serum Cr improved to 1.9   KBUS 01/05: No hydronephrosis. Urinary bladder volume 540cc  Post-void bladder scan is 310cc. Pt's renal function improving today, and pt is asymptomatic. Hold off on quevedo. Nephew is hoping to avoid quevedo if possible  - c/w IV lasix 40mg BID  - monitor BMP while on IV lasix.   - Started on PO flomax 0.4mg qD  - bedside bladder scans for need of straight cath - RN aware     #Transaminitis  suspect congestive hepatopathy  - Trend LFTs    #Paroxysmal A.fib  - supratherapeutic INR on admission - now < 3 - resume home dose coumadin   -daily INR and cbc    DVT ppx: on Coumadin     #Progress Note Handoff  Pending (specify): Cont IV diuresis, tele monitoring   Family discussion: discussed with patient - answered all questions - medical intern will update the family   Disposition: Tele, from home - not discharge ready     Attending Physician Dr. Linette Valle # [id number]

## 2025-01-06 NOTE — PROGRESS NOTE ADULT - ASSESSMENT
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     #Acute hypoxic respiratory failure  #Acute on Chronic HFimpEF  #Severe pHTN  CXR 01/04 reviewed, shows Bilateral opacities, unchanged.  Echo 12/13: 55-60% EF; moderate AS; severe pHTN  Was on 2L NC, now on RA  - Tele monitoring  - c/w IV lasix 40mg BID  - monitor daily weight, strict I & O, Low Na diet with fluid restriction  - Restarted aldactone 25mg OD  - Cardio eval - continue diuresis, nephro eval    #KATHY on CKD stage 3   serum Cr 3.1 currently. Baseline Cr 1.8.   suspect cardiorenal  - Renal US showed no hydronephrosis, bladder volume >500cc, so will check bladder scan  - c/w IV lasix 40mg BID  - monitor BMP while on IV lasix.     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     #Acute hypoxic respiratory failure  #Acute on Chronic HFimpEF  #Severe pHTN  CXR 01/04 reviewed, shows Bilateral opacities, unchanged.  Echo 12/13: 55-60% EF; moderate AS; severe pHTN  Was on 2L NC, now on RA  - Tele monitoring  - c/w IV lasix 40mg BID  - monitor daily weight, strict I & O, Low Na diet with fluid restriction  - Restarted aldactone 25mg OD  - Cardio eval - continue diuresis, nephro eval    #KATHY on CKD stage 3   serum Cr 3.1 currently. Baseline Cr 1.8.   suspect cardiorenal  - Renal US showed no hydronephrosis, bladder volume >500cc, so will check bladder scan  - c/w IV lasix 40mg BID  - monitor BMP while on IV lasix.     #Transaminitis  suspect congestive hepatopathy  RUQ US negative    #Paroxysmal A.fib  - check INR daily and dose coumadin accordingly, target INR 2.5-3.5  - Cont metoprolol for rate control    DVT ppx: on Coumadin

## 2025-01-07 LAB
ANION GAP SERPL CALC-SCNC: 12 MMOL/L — SIGNIFICANT CHANGE UP (ref 7–14)
BUN SERPL-MCNC: 71 MG/DL — CRITICAL HIGH (ref 10–20)
CALCIUM SERPL-MCNC: 8.4 MG/DL — SIGNIFICANT CHANGE UP (ref 8.4–10.5)
CHLORIDE SERPL-SCNC: 101 MMOL/L — SIGNIFICANT CHANGE UP (ref 98–110)
CO2 SERPL-SCNC: 30 MMOL/L — SIGNIFICANT CHANGE UP (ref 17–32)
CREAT SERPL-MCNC: 1.6 MG/DL — HIGH (ref 0.7–1.5)
EGFR: 31 ML/MIN/1.73M2 — LOW
GLUCOSE SERPL-MCNC: 157 MG/DL — HIGH (ref 70–99)
HCT VFR BLD CALC: 31.4 % — LOW (ref 37–47)
HGB BLD-MCNC: 9.3 G/DL — LOW (ref 12–16)
INR BLD: 2.04 RATIO — HIGH (ref 0.65–1.3)
MAGNESIUM SERPL-MCNC: 1.8 MG/DL — SIGNIFICANT CHANGE UP (ref 1.8–2.4)
MCHC RBC-ENTMCNC: 28.5 PG — SIGNIFICANT CHANGE UP (ref 27–31)
MCHC RBC-ENTMCNC: 29.6 G/DL — LOW (ref 32–37)
MCV RBC AUTO: 96.3 FL — SIGNIFICANT CHANGE UP (ref 81–99)
NRBC # BLD: 0 /100 WBCS — SIGNIFICANT CHANGE UP (ref 0–0)
PLATELET # BLD AUTO: 235 K/UL — SIGNIFICANT CHANGE UP (ref 130–400)
PMV BLD: 11.1 FL — HIGH (ref 7.4–10.4)
POTASSIUM SERPL-MCNC: 3.9 MMOL/L — SIGNIFICANT CHANGE UP (ref 3.5–5)
POTASSIUM SERPL-SCNC: 3.9 MMOL/L — SIGNIFICANT CHANGE UP (ref 3.5–5)
PROTHROM AB SERPL-ACNC: 24.4 SEC — HIGH (ref 9.95–12.87)
RBC # BLD: 3.26 M/UL — LOW (ref 4.2–5.4)
RBC # FLD: 18.1 % — HIGH (ref 11.5–14.5)
SODIUM SERPL-SCNC: 143 MMOL/L — SIGNIFICANT CHANGE UP (ref 135–146)
WBC # BLD: 13.05 K/UL — HIGH (ref 4.8–10.8)
WBC # FLD AUTO: 13.05 K/UL — HIGH (ref 4.8–10.8)

## 2025-01-07 PROCEDURE — 99232 SBSQ HOSP IP/OBS MODERATE 35: CPT

## 2025-01-07 PROCEDURE — 99223 1ST HOSP IP/OBS HIGH 75: CPT | Mod: FS

## 2025-01-07 PROCEDURE — 71045 X-RAY EXAM CHEST 1 VIEW: CPT | Mod: 26

## 2025-01-07 RX ORDER — DAPAGLIFLOZIN 5 MG/1
10 TABLET, FILM COATED ORAL DAILY
Refills: 0 | Status: DISCONTINUED | OUTPATIENT
Start: 2025-01-08 | End: 2025-01-08

## 2025-01-07 RX ORDER — FUROSEMIDE 20 MG
40 TABLET ORAL DAILY
Refills: 0 | Status: DISCONTINUED | OUTPATIENT
Start: 2025-01-08 | End: 2025-01-08

## 2025-01-07 RX ORDER — WARFARIN SODIUM 10 MG/1
2 TABLET ORAL ONCE
Refills: 0 | Status: COMPLETED | OUTPATIENT
Start: 2025-01-07 | End: 2025-01-07

## 2025-01-07 RX ORDER — METOPROLOL TARTRATE 50 MG
200 TABLET ORAL DAILY
Refills: 0 | Status: DISCONTINUED | OUTPATIENT
Start: 2025-01-07 | End: 2025-01-08

## 2025-01-07 RX ADMIN — ATORVASTATIN CALCIUM 20 MILLIGRAM(S): 40 TABLET, FILM COATED ORAL at 21:31

## 2025-01-07 RX ADMIN — Medication 100 MILLIGRAM(S): at 05:27

## 2025-01-07 RX ADMIN — WARFARIN SODIUM 2 MILLIGRAM(S): 10 TABLET ORAL at 21:30

## 2025-01-07 RX ADMIN — TAMSULOSIN HYDROCHLORIDE 0.4 MILLIGRAM(S): 0.4 CAPSULE ORAL at 21:30

## 2025-01-07 RX ADMIN — CHLORHEXIDINE GLUCONATE 1 APPLICATION(S): 1.2 RINSE ORAL at 11:15

## 2025-01-07 RX ADMIN — Medication 40 MILLIGRAM(S): at 14:17

## 2025-01-07 RX ADMIN — Medication 40 MILLIGRAM(S): at 05:27

## 2025-01-07 RX ADMIN — SPIRONOLACTONE 25 MILLIGRAM(S): 50 TABLET ORAL at 05:27

## 2025-01-07 NOTE — PROGRESS NOTE ADULT - ASSESSMENT
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     #Acute hypoxic respiratory failure  #Acute on Chronic HFimpEF  #Severe pHTN  CXR 01/04 reviewed, shows Bilateral opacities, unchanged.  Echo 12/13: 55-60% EF; moderate AS; severe pHTN  Was on 2L NC, now on RA  - Tele monitoring  - c/w IV lasix 40mg BID for now  - monitor daily weight, strict I & O, Low Na diet with fluid restriction  - Restarted aldactone 25mg OD  - Cardio eval - continue diuresis    #KATHY on CKD stage 3   serum Cr 3.1 initially->1.9 on 1/6  suspect cardiorenal  - Renal US showed no hydronephrosis, bladder volume >500cc, bladder scan negative for retention  - c/w IV lasix 40mg BID  - monitor BMP while on IV lasix.     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     #Acute hypoxic respiratory failure  #Acute on Chronic HFimpEF  #Severe pHTN  CXR 01/04 reviewed, shows Bilateral opacities, unchanged.  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 Looks euvolemic so switch to PO lasix, switch lopressor to toprol 200mg OD    #KATHY on CKD stage 3   serum Cr 3.1 initially->1.9 on 1/6  suspect cardiorenal  - Renal US showed no hydronephrosis, bladder volume >500cc, bladder scan negative for retention  - c/w IV lasix 40mg BID for today  - monitor BMP while on IV lasix.     #Transaminitis  suspect congestive hepatopathy  RUQ US negative    #Paroxysmal A.fib, bioprosthetic MVR  - check INR daily and dose coumadin accordingly, target INR 2-3  - Warfarin 1.5mg on 1/6, today 2mg as per pharmacist  - Cont metoprolol for rate control    DVT ppx: on Coumadin

## 2025-01-07 NOTE — CONSULT NOTE ADULT - NS ATTEND AMEND GEN_ALL_CORE FT
39-year-old female past medical history nonischemic cardiomyopathy, HFpEF, A-fib, mitral valve disease status post repair presenting with ADHF.    Labs reviewed notable for creatinine as high as 3 but improving.  KATHY most likely due to overdiuresis.  Echo reviewed with preserved LV systolic function and multiple valvular disease.  Elevated PA pressures likely class II pulmonary hypertension. No further work up required.    On exam she is warm well-perfused.  She is euvolemic.  Recommend switching to p.o. Lasix 40 mg daily to maintain euvolemia.  Recommend switching to metoprolol succinate 200 mg daily.  No history of UTI and would recommend starting dapagliflozin 10 mg daily.    HF to sign off.    Raimundo Stewart MD  Interventional Cardiology/Advanced Heart Failure Transplant

## 2025-01-07 NOTE — PROGRESS NOTE ADULT - ATTENDING COMMENTS
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     Vital Signs Last 24 Hrs  T(C): 36.2 (07 Jan 2025 12:30), Max: 36.2 (06 Jan 2025 20:30)  T(F): 97.1 (07 Jan 2025 12:30), Max: 97.2 (07 Jan 2025 04:59)  HR: 56 (07 Jan 2025 12:30) (56 - 86)  BP: 104/53 (07 Jan 2025 12:30) (102/59 - 115/67)  BP(mean): 70 (07 Jan 2025 12:30) (70 - 100)  RR: 18 (07 Jan 2025 12:30) (18 - 18)  SpO2: 97% (07 Jan 2025 12:30) (93% - 97%)                          9.3    13.05 )-----------( 235      ( 07 Jan 2025 10:20 )             31.4             01-07    143  |  101  |  71[HH]  ----------------------------<  157[H]  3.9   |  30  |  1.6[H]    Ca    8.4      07 Jan 2025 10:20  Mg     1.8     01-07    TPro  6.0  /  Alb  3.6  /  TBili  1.7[H]  /  DBili  x   /  AST  40  /  ALT  100[H]  /  AlkPhos  108  01-06      #Acute hypoxic respiratory failure  #Acute on Chronic HFimpEF  #Severe pHTN  CXR 01/04 reviewed, shows Bilateral opacities, unchanged.  Echo 12/13: 55-60% EF; moderate AS; severe pHTN  On 2L  - Tele monitoring  - c/w IV lasix 40mg BID  - monitor daily weight, strict I & O, Low Na diet with fluid restriction      #KATHY on CKD stage 3   serum Cr improved to 1.9   KBUS 01/05: No hydronephrosis. Urinary bladder volume 540cc  Post-void bladder scan is 310cc. Pt's renal function improving today, and pt is asymptomatic. Hold off on quevedo. Nephew is hoping to avoid quevedo if possible  - c/w IV lasix 40mg BID  - monitor BMP while on IV lasix.   - Started on PO flomax 0.4mg qD  - bedside bladder scans performed yest - quevedo not required     #Transaminitis  suspect congestive hepatopathy  - Trend LFTs    #Paroxysmal A.fib  - supratherapeutic INR on admission - now < 3 - resume home dose coumadin   -daily INR and cbc    DVT ppx: on Coumadin     #Progress Note Handoff  Pending (specify): Cont IV diuresis, tele monitoring   Family discussion: discussed with patient - answered all questions - medical intern will update the family   Disposition: Tele, from home - not discharge ready     Attending Physician Dr. Linette Valle # [id number]

## 2025-01-07 NOTE — CONSULT NOTE ADULT - SUBJECTIVE AND OBJECTIVE BOX
Date of Admission: 1/3/2025    CHIEF COMPLAINT:    HISTORY OF PRESENT ILLNESS: 89yFemale with PMH below presented to the hospital for     PAST MEDICAL & SURGICAL HISTORY:  Afib  HTN (hypertension)  Heart failure  H/O mitral valve replacement  H/O prior ablation treatment  afib    FAMILY HISTORY:  [ ] no pertinent family history of premature cardiovascular disease in first degree relatives.  Mother:   Father:   Siblings:     SOCIAL HISTORY:    [ ] Non-smoker  [ ] Smoker  [ ] Alcohol    Allergies    sulfamethoxazole (Rash)    Intolerances    REVIEW OF SYSTEMS: A complete 10-point review of systems was obtained and is negative except as stated in the interval history.    PHYSICAL EXAM:  T(C): 36.2 (01-07-25 @ 12:30), Max: 36.2 (01-06-25 @ 20:30)  HR: 56 (01-07-25 @ 12:30) (56 - 86)  BP: 104/53 (01-07-25 @ 12:30) (102/59 - 115/67)  RR: 18 (01-07-25 @ 12:30) (18 - 18)  SpO2: 97% (01-07-25 @ 12:30) (93% - 97%)  Wt(kg): --    I&O's Summary    06 Jan 2025 07:01  -  07 Jan 2025 07:00  --------------------------------------------------------  IN: 718 mL / OUT: 1250 mL / NET: -532 mL      General Appearance: well appearing, normal for age and gender. 	  Neck: normal JVP,  Eyes: Extra Ocular muscles intact.   Cardiovascular: regular rate and rhythm S1 S2, No JVD, No murmurs, No edema  Respiratory: Lungs clear to auscultation	  Psychiatry: Alert and oriented x 3, Mood & affect appropriate  Gastrointestinal:  Soft, Non-tender  Skin/Integumentary: No rashes, No ecchymoses, No cyanosis	  Neurologic: Non-focal  Musculoskeletal/ extremities: Normal range of motion, No clubbing, cyanosis or edema  Vascular: Peripheral pulses palpable 2+ bilaterally    LABS:	 	                          9.3    13.05 )-----------( 235      ( 07 Jan 2025 10:20 )             31.4     01-07    143  |  101  |  71[HH]  ----------------------------<  157[H]  3.9   |  30  |  1.6[H]    Ca    8.4      07 Jan 2025 10:20  Mg     1.8     01-07    TPro  6.0  /  Alb  3.6  /  TBili  1.7[H]  /  DBili  x   /  AST  40  /  ALT  100[H]  /  AlkPhos  108  01-06    PT/INR - ( 07 Jan 2025 10:20 )   PT: 24.40 sec;   INR: 2.04 ratio      CARDIAC MARKERS:    Home Medications:  Aldactone 25 mg oral tablet: 1 tab(s) orally once a day (04 Jan 2025 12:48)  digoxin 125 mcg (0.125 mg) oral tablet: 1 tab(s) orally once a day (04 Jan 2025 12:48)  furosemide 20 mg oral tablet: 2 tab(s) orally once a day (04 Jan 2025 12:48)  Lopressor 100 mg oral tablet: 1 tab(s) orally 2 times a day (04 Jan 2025 12:50)  warfarin: 2 milligram(s) orally once a day varies between 1 mg and 2 mg daily, follows up with INR weekly, Most recent dose 2 mg po daily (13 Dec 2024 12:58)    MEDICATIONS  (STANDING):  atorvastatin 20 milliGRAM(s) Oral at bedtime  chlorhexidine 2% Cloths 1 Application(s) Topical daily  influenza  Vaccine (HIGH DOSE) 0.5 milliLiter(s) IntraMuscular once  metoprolol succinate  milliGRAM(s) Oral daily  pantoprazole    Tablet 40 milliGRAM(s) Oral before breakfast  spironolactone 25 milliGRAM(s) Oral daily  tamsulosin 0.4 milliGRAM(s) Oral at bedtime  warfarin 2 milliGRAM(s) Oral once    MEDICATIONS  (PRN):         Date of Admission: 1/3/2025    CHIEF COMPLAINT: SOB, difficulty breathing     HISTORY OF PRESENT ILLNESS:   89 year old female, follows with Dr. 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 Patient reports she was last seen in Dr. Macdonald's office approx ~ 3 weeks ago for a follow up appt and was instructed to increase her p.o. Lasix 20 mg BID as she was found to be slightly volume overloaded. In the ED, labs were significant for WBC 14k, elevated INR, Hgb 9s (baseline 11s), BUN 92/ Cr 3.1 (Cr 1.8 in early Dec 2024), pro-BNP 42k (previously 19k on 12/10/24). CXR with mild congestion. Patient was found to be very SOB and placed on 4L NC. Due to increase WOB, pt was recommended to place on BiPAP however, patient was refusing. He was initiated on IV Lasix 40 mg BID and admitted to medicine telemetry for further management.     Upon further questioning, patient states she lives with her sister in the same apartment for the last 14 years. She states she is able to ambulate around her home and outside ie: grocery store, doctor appt with a walker. She is compliant with her medications however, for the last couple of months she has been eating more take out food but has now recently stopped (ie: pizza, pasta with red sauce, and panera soups weekly). Denies recent illness, recent sick contacts.     PAST MEDICAL & SURGICAL HISTORY:  Afib  HTN (hypertension)  Heart failure  H/O mitral valve replacement  H/O prior ablation treatment  afib    FAMILY HISTORY:  [x] no pertinent family history of premature cardiovascular disease in first degree relatives.    SOCIAL HISTORY: Former smoker, quit a few years ago.     Allergies    sulfamethoxazole (Rash)    Intolerances    REVIEW OF SYSTEMS: A complete 10-point review of systems was obtained and is negative except as stated in the interval history.    PHYSICAL EXAM:  T(C): 36.2 (01-07-25 @ 12:30), Max: 36.2 (01-06-25 @ 20:30)  HR: 56 (01-07-25 @ 12:30) (56 - 86)  BP: 104/53 (01-07-25 @ 12:30) (102/59 - 115/67)  RR: 18 (01-07-25 @ 12:30) (18 - 18)  SpO2: 97% (01-07-25 @ 12:30) (93% - 97%)  Wt(kg): --    I&O's Summary    06 Jan 2025 07:01  -  07 Jan 2025 07:00  --------------------------------------------------------  IN: 718 mL / OUT: 1250 mL / NET: -532 mL    General Appearance: well appearing, normal for age and gender. 	  Neck: normal JVP,  Eyes: Extra Ocular muscles intact.   Cardiovascular: regular rate and rhythm S1 S2, No JVD, No murmurs.  Respiratory: mild b/l coarse crackles at the bases  Psychiatry: Alert and oriented x 3, Mood & affect appropriate  Gastrointestinal:  Soft, Non-tender  Skin/Integumentary: No rashes, No ecchymoses, No cyanosis	  Neurologic: Non-focal  Musculoskeletal/ extremities: Normal range of motion, No clubbing, cyanosis. +1 b/l trace ankle edema.   Vascular: Peripheral pulses palpable 2+ bilaterally    LABS:	 	                          9.3    13.05 )-----------( 235      ( 07 Jan 2025 10:20 )             31.4     01-07    143  |  101  |  71[HH]  ----------------------------<  157[H]  3.9   |  30  |  1.6[H]    Ca    8.4      07 Jan 2025 10:20  Mg     1.8     01-07    TPro  6.0  /  Alb  3.6  /  TBili  1.7[H]  /  DBili  x   /  AST  40  /  ALT  100[H]  /  AlkPhos  108  01-06    PT/INR - ( 07 Jan 2025 10:20 )   PT: 24.40 sec;   INR: 2.04 ratio      CARDIAC MARKERS:    Home Medications:  Aldactone 25 mg oral tablet: 1 tab(s) orally once a day (04 Jan 2025 12:48)  digoxin 125 mcg (0.125 mg) oral tablet: 1 tab(s) orally once a day (04 Jan 2025 12:48)  furosemide 20 mg oral tablet: 2 tab(s) orally once a day (04 Jan 2025 12:48)  Lopressor 100 mg oral tablet: 1 tab(s) orally 2 times a day (04 Jan 2025 12:50)  warfarin: 2 milligram(s) orally once a day varies between 1 mg and 2 mg daily, follows up with INR weekly, Most recent dose 2 mg po daily (13 Dec 2024 12:58)    MEDICATIONS  (STANDING):  atorvastatin 20 milliGRAM(s) Oral at bedtime  chlorhexidine 2% Cloths 1 Application(s) Topical daily  influenza  Vaccine (HIGH DOSE) 0.5 milliLiter(s) IntraMuscular once  metoprolol succinate  milliGRAM(s) Oral daily  pantoprazole    Tablet 40 milliGRAM(s) Oral before breakfast  spironolactone 25 milliGRAM(s) Oral daily  tamsulosin 0.4 milliGRAM(s) Oral at bedtime  warfarin 2 milliGRAM(s) Oral once    MEDICATIONS  (PRN):     Date of Admission: 1/3/2025    CHIEF COMPLAINT: SOB, difficulty breathing     HISTORY OF PRESENT ILLNESS:   89 year old female, follows with Dr. 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 Patient reports she was last seen in Dr. Macdonald's office approx ~ 3 weeks ago for a follow up appt and was instructed to increase her p.o. Lasix 20 mg to BID as she was found to be slightly volume overloaded. In the ED, labs were significant for WBC 14k, elevated INR, Hgb 9s (baseline 11s), BUN 92/ Cr 3.1 (Cr 1.8 in early Dec 2024), pro-BNP 42k (previously 19k on 12/10/24). CXR with mild congestion. Patient was found to be very SOB and placed on 4L NC. Due to increase WOB, pt was recommended to place on BiPAP however, patient was refusing. He was initiated on IV Lasix 40 mg BID and admitted to medicine telemetry for further management.     Upon further questioning, patient states she lives with her sister in the same apartment for the last 14 years. She states she is able to ambulate around her home and outside ie: grocery store, doctor appt with a walker. She is compliant with her medications however, for the last couple of months she has been eating more take out food but has now recently stopped (ie: pizza, pasta with red sauce, and panera soups weekly). Denies recent illness, recent sick contacts.     PAST MEDICAL & SURGICAL HISTORY:  Afib  HTN (hypertension)  Heart failure  H/O mitral valve replacement  H/O prior ablation treatment  afib    FAMILY HISTORY:  [x] no pertinent family history of premature cardiovascular disease in first degree relatives.    SOCIAL HISTORY: Former smoker, quit a few years ago.     Allergies    sulfamethoxazole (Rash)    Intolerances    REVIEW OF SYSTEMS: A complete 10-point review of systems was obtained and is negative except as stated in the interval history.    PHYSICAL EXAM:  T(C): 36.2 (01-07-25 @ 12:30), Max: 36.2 (01-06-25 @ 20:30)  HR: 56 (01-07-25 @ 12:30) (56 - 86)  BP: 104/53 (01-07-25 @ 12:30) (102/59 - 115/67)  RR: 18 (01-07-25 @ 12:30) (18 - 18)  SpO2: 97% (01-07-25 @ 12:30) (93% - 97%)  Wt(kg): --    I&O's Summary    06 Jan 2025 07:01  -  07 Jan 2025 07:00  --------------------------------------------------------  IN: 718 mL / OUT: 1250 mL / NET: -532 mL    General Appearance: well appearing, normal for age and gender. 	  Neck: normal JVP,  Eyes: Extra Ocular muscles intact.   Cardiovascular: regular rate and rhythm S1 S2, No JVD, No murmurs.  Respiratory: mild b/l coarse crackles at the bases  Psychiatry: Alert and oriented x 3, Mood & affect appropriate  Gastrointestinal:  Soft, Non-tender  Skin/Integumentary: No rashes, No ecchymoses, No cyanosis	  Neurologic: Non-focal  Musculoskeletal/ extremities: Normal range of motion, No clubbing, cyanosis. +1 b/l trace ankle edema.   Vascular: Peripheral pulses palpable 2+ bilaterally    LABS:	 	                          9.3    13.05 )-----------( 235      ( 07 Jan 2025 10:20 )             31.4     01-07    143  |  101  |  71[HH]  ----------------------------<  157[H]  3.9   |  30  |  1.6[H]    Ca    8.4      07 Jan 2025 10:20  Mg     1.8     01-07    TPro  6.0  /  Alb  3.6  /  TBili  1.7[H]  /  DBili  x   /  AST  40  /  ALT  100[H]  /  AlkPhos  108  01-06    PT/INR - ( 07 Jan 2025 10:20 )   PT: 24.40 sec;   INR: 2.04 ratio      CARDIAC MARKERS:    Home Medications:  Aldactone 25 mg oral tablet: 1 tab(s) orally once a day (04 Jan 2025 12:48)  digoxin 125 mcg (0.125 mg) oral tablet: 1 tab(s) orally once a day (04 Jan 2025 12:48)  furosemide 20 mg oral tablet: 2 tab(s) orally once a day (04 Jan 2025 12:48)  Lopressor 100 mg oral tablet: 1 tab(s) orally 2 times a day (04 Jan 2025 12:50)  warfarin: 2 milligram(s) orally once a day varies between 1 mg and 2 mg daily, follows up with INR weekly, Most recent dose 2 mg po daily (13 Dec 2024 12:58)    MEDICATIONS  (STANDING):  atorvastatin 20 milliGRAM(s) Oral at bedtime  chlorhexidine 2% Cloths 1 Application(s) Topical daily  influenza  Vaccine (HIGH DOSE) 0.5 milliLiter(s) IntraMuscular once  metoprolol succinate  milliGRAM(s) Oral daily  pantoprazole    Tablet 40 milliGRAM(s) Oral before breakfast  spironolactone 25 milliGRAM(s) Oral daily  tamsulosin 0.4 milliGRAM(s) Oral at bedtime  warfarin 2 milliGRAM(s) Oral once    MEDICATIONS  (PRN):

## 2025-01-07 NOTE — PROGRESS NOTE ADULT - SUBJECTIVE AND OBJECTIVE BOX
JORDAN CHEEMA  89y, Female  Allergy: sulfamethoxazole (Rash)    Hospital Day: 1d    Patient seen and examined. No acute events overnight    PMH/PSH:  PAST MEDICAL & SURGICAL HISTORY:  Afib      HTN (hypertension)      Heart failure      H/O mitral valve replacement      H/O prior ablation treatment  afib          VITALS:  T(F): 99.4 (01-03-25 @ 13:45), Max: 99.4 (01-03-25 @ 13:45)  HR: 55 (01-04-25 @ 08:30)  BP: 118/66 (01-04-25 @ 08:30) (114/56 - 132/61)  RR: 16 (01-04-25 @ 08:30)  SpO2: 98% (01-04-25 @ 08:30)    TESTS & MEASUREMENTS:  Weight (Kg): 60.8 (01-03-25 @ 13:31)  BMI (kg/m2): 22.3 (01-03)                          9.9    14.81 )-----------( 265      ( 03 Jan 2025 14:20 )             32.7     PT/INR - ( 03 Jan 2025 14:20 )   PT: >40.00 sec;   INR: 4.63 ratio         PTT - ( 03 Jan 2025 14:20 )  PTT:35.4 sec  01-03    137  |  96[L]  |  92[HH]  ----------------------------<  92  5.3[H]   |  27  |  3.1[H]    Ca    9.2      03 Jan 2025 14:20  Mg     2.3     01-03    TPro  6.3  /  Alb  3.8  /  TBili  2.1[H]  /  DBili  x   /  AST  107[H]  /  ALT  139[H]  /  AlkPhos  119[H]  01-03    LIVER FUNCTIONS - ( 03 Jan 2025 14:20 )  Alb: 3.8 g/dL / Pro: 6.3 g/dL / ALK PHOS: 119 U/L / ALT: 139 U/L / AST: 107 U/L / GGT: x                 Urinalysis Basic - ( 03 Jan 2025 14:20 )    Color: x / Appearance: x / SG: x / pH: x  Gluc: 92 mg/dL / Ketone: x  / Bili: x / Urobili: x   Blood: x / Protein: x / Nitrite: x   Leuk Esterase: x / RBC: x / WBC x   Sq Epi: x / Non Sq Epi: x / Bacteria: x        RADIOLOGY & ADDITIONAL TESTS:    RECENT DIAGNOSTIC ORDERS:  Magnesium: 16:00 (01-04-25 @ 12:18)  Basic Metabolic Panel: 16:00 (01-04-25 @ 12:18)  Complete Blood Count: 16:00 (01-04-25 @ 12:18)  US Renal: Routine   Indication: r/o obstruction  Transport: Stret[facility] (01-03-25 @ 21:47)  Magnesium: Repeat From: 03-Jan-2025 19:07 To: 06-Jan-2025 04:30, Every 1 day(s) (01-03-25 @ 19:07)  Comprehensive Metabolic Panel: Repeat From: 03-Jan-2025 19:06 To: 06-Jan-2025 04:30, Every 1 day(s) (01-03-25 @ 19:07)  Complete Blood Count + Automated Diff: Repeat From: 03-Jan-2025 19:06 To: 06-Jan-2025 04:30, Every 1 day(s) (01-03-25 @ 19:07)  Activated Partial Thromboplastin Time: Repeat From: 03-Jan-2025 19:06 To: 06-Jan-2025 04:30, Every 1 day(s) Start Date:03-Jan-2025 (01-03-25 @ 19:07)  Prothrombin Time and INR, Plasma: Repeat From: 03-Jan-2025 19:06 To: 06-Jan-2025 04:30, Every 1 day(s) Start Date:03-Jan-2025 (01-03-25 @ 19:07)  US Abdomen Upper Quadrant Right: Routine   Indication: transaminitis  Transport: Portable (01-03-25 @ 19:07)  Potassium, Random Urine: STAT (01-03-25 @ 19:07)  Urea Nitrogen,  Random Urine: STAT (01-03-25 @ 19:07)  Osmolality, Random Urine: STAT (01-03-25 @ 19:07)  Protein/Creatinine Ratio, Urine: STAT (01-03-25 @ 19:07)  Sodium, Random Urine: STAT (01-03-25 @ 19:07)  Urinalysis: STAT (01-03-25 @ 19:07)  Respiratory Viral Panel with COVID-19 by SANDRINE: Routine  Specimen Source: Nasopharyngeal (01-03-25 @ 19:07)  Diet, DASH/TLC:   Sodium & Cholesterol Restricted  1200mL Fluid Restriction ([id number]) (01-03-25 @ 19:07)  Type + Screen: Routine (01-03-25 @ 13:55)  POCUS ED Chest: Urgent   Indication: sob  Transport: Portable (01-03-25 @ 13:55)      MEDICATIONS:  MEDICATIONS  (STANDING):  atorvastatin 20 milliGRAM(s) Oral at bedtime  furosemide   Injectable 40 milliGRAM(s) IV Push two times a day  influenza  Vaccine (HIGH DOSE) 0.5 milliLiter(s) IntraMuscular once  metoprolol tartrate 100 milliGRAM(s) Oral two times a day  pantoprazole    Tablet 40 milliGRAM(s) Oral before breakfast    MEDICATIONS  (PRN):      HOME MEDICATIONS:  furosemide 20 mg oral tablet (01-03)  Metoprolol Tartrate 100 mg oral tablet (01-03)  warfarin (12-13)      REVIEW OF SYSTEMS:  All other review of systems is negative unless indicated above.     PHYSICAL EXAM:  PHYSICAL EXAM:  GENERAL: NAD  HEAD:  Atraumatic, Normocephalic  NECK: Supple, No JVD  CHEST/LUNG: Clear to auscultation bilaterally; No wheeze  HEART: Regular rate and rhythm; No murmurs, rubs, or gallops  ABDOMEN: Soft, Nontender, Nondistended; Bowel sounds present  EXTREMITIES:  2+ Peripheral Pulses, No clubbing, cyanosis, or edema  SKIN: No rashes or lesions      
SUBJECTIVE/OVERNIGHT EVENTS  Today is hospital day 2d. No acute or major events overnight.    PMH    Heart failure    Long term (current) use of anticoagulants    Unspecified atrial fibrillation    Intubate    No pertinent family history in first degree relatives    MEWS Score    Afib    HTN (hypertension)    Heart failure    Acute exacerbation of chronic heart failure    H/O mitral valve replacement    H/O prior ablation treatment    SOB    17    [email or web address]        MEDICATIONS  STANDING MEDICATIONS  atorvastatin 20 milliGRAM(s) Oral at bedtime  furosemide   Injectable 40 milliGRAM(s) IV Push two times a day  influenza  Vaccine (HIGH DOSE) 0.5 milliLiter(s) IntraMuscular once  metoprolol tartrate 100 milliGRAM(s) Oral two times a day  pantoprazole    Tablet 40 milliGRAM(s) Oral before breakfast    PRN MEDICATIONS    VITALS  T(F): 97.7 (01-05-25 @ 12:31), Max: 97.7 (01-05-25 @ 12:31)  HR: 55 (01-05-25 @ 12:31) (55 - 59)  BP: 104/54 (01-05-25 @ 12:31) (104/54 - 137/77)  RR: 18 (01-05-25 @ 12:31) (16 - 18)  SpO2: 95% (01-05-25 @ 09:47) (93% - 99%)        LABS             9.9    13.27 )-----------( 257      ( 01-04-25 @ 18:50 )             32.3     138  |  97  |  88  -------------------------<  121   01-04-25 @ 18:50  4.6  |  29  |  3.2    Ca      8.6     01-04-25 @ 18:50  Mg     2.2     01-04-25 @ 18:50    TPro  6.3  /  Alb  3.8  /  TBili  2.1  /  DBili  x   /  AST  107  /  ALT  139  /  AlkPhos  119  /  GGT  x     01-03-25 @ 14:20    PT/INR - ( 01-04-25 @ 18:50 )   PT: >40.00 sec[H];   INR: 4.41 ratio[H]  PTT - ( 01-03-25 @ 14:20 )  PTT:35.4 sec    Troponin T, High Sensitivity Result: 63 ng/L (01-04-25 @ 18:50)  Troponin T, High Sensitivity Result: 84 ng/L (01-03-25 @ 14:20)  Pro-Brain Natriuretic Peptide: 02411 pg/mL (01-03-25 @ 14:20)    Urinalysis Basic - ( 04 Jan 2025 18:50 )    Color: x / Appearance: x / SG: x / pH: x  Gluc: 121 mg/dL / Ketone: x  / Bili: x / Urobili: x   Blood: x / Protein: x / Nitrite: x   Leuk Esterase: x / RBC: x / WBC x   Sq Epi: x / Non Sq Epi: x / Bacteria: x    
SUBJECTIVE/OVERNIGHT EVENTS  Today is hospital day 3d. This morning patient was seen and examined at bedside, resting comfortably in bed. No acute or major events overnight.    MEDICATIONS  STANDING MEDICATIONS  atorvastatin 20 milliGRAM(s) Oral at bedtime  chlorhexidine 2% Cloths 1 Application(s) Topical daily  furosemide   Injectable 40 milliGRAM(s) IV Push two times a day  influenza  Vaccine (HIGH DOSE) 0.5 milliLiter(s) IntraMuscular once  metoprolol tartrate 100 milliGRAM(s) Oral two times a day  pantoprazole    Tablet 40 milliGRAM(s) Oral before breakfast  spironolactone 25 milliGRAM(s) Oral daily  tamsulosin 0.4 milliGRAM(s) Oral at bedtime    PRN MEDICATIONS    VITALS  T(F): 97.2 (01-05-25 @ 20:10), Max: 97.7 (01-05-25 @ 12:31)  HR: 76 (01-06-25 @ 04:05) (55 - 80)  BP: 105/69 (01-06-25 @ 04:05) (99/58 - 119/69)  RR: 18 (01-06-25 @ 04:05) (18 - 18)  SpO2: 95% (01-06-25 @ 04:05) (95% - 96%)    PHYSICAL EXAM  CCO*3  GBAE, clear lungs  Regular S1S2, no murmurs  Soft abdomen, non tender  No LLE      LABS             10.2   14.58 )-----------( 254      ( 01-05-25 @ 14:03 )             33.6     136  |  94  |  81  -------------------------<  132   01-05-25 @ 14:03  4.1  |  28  |  2.5    Ca      8.5     01-05-25 @ 14:03  Mg     2.2     01-04-25 @ 18:50    TPro  5.9  /  Alb  3.6  /  TBili  1.7  /  DBili  x   /  AST  45  /  ALT  106  /  AlkPhos  111  /  GGT  x     01-05-25 @ 14:03    PT/INR - ( 01-05-25 @ 14:03 )   PT: 39.40 sec[H];   INR: 3.26 ratio[H]  PTT - ( 01-05-25 @ 14:03 )  PTT:35.1 sec    Troponin T, High Sensitivity Result: 63 ng/L (01-04-25 @ 18:50)  Troponin T, High Sensitivity Result: 84 ng/L (01-03-25 @ 14:20)  Pro-Brain Natriuretic Peptide: 26136 pg/mL (01-03-25 @ 14:20)    Urinalysis Basic - ( 05 Jan 2025 14:03 )    Color: x / Appearance: x / SG: x / pH: x  Gluc: 132 mg/dL / Ketone: x  / Bili: x / Urobili: x   Blood: x / Protein: x / Nitrite: x   Leuk Esterase: x / RBC: x / WBC x   Sq Epi: x / Non Sq Epi: x / Bacteria: x          IMAGING        
SUBJECTIVE/OVERNIGHT EVENTS  Today is hospital day 4d. This morning patient was seen and examined at bedside, resting comfortably in bed. No acute or major events overnight.    MEDICATIONS  STANDING MEDICATIONS  atorvastatin 20 milliGRAM(s) Oral at bedtime  chlorhexidine 2% Cloths 1 Application(s) Topical daily  furosemide   Injectable 40 milliGRAM(s) IV Push two times a day  influenza  Vaccine (HIGH DOSE) 0.5 milliLiter(s) IntraMuscular once  metoprolol tartrate 100 milliGRAM(s) Oral two times a day  pantoprazole    Tablet 40 milliGRAM(s) Oral before breakfast  spironolactone 25 milliGRAM(s) Oral daily  tamsulosin 0.4 milliGRAM(s) Oral at bedtime    PRN MEDICATIONS    VITALS  T(F): 97.2 (01-07-25 @ 04:59), Max: 97.3 (01-06-25 @ 13:00)  HR: 72 (01-07-25 @ 04:59) (54 - 86)  BP: 115/67 (01-07-25 @ 04:59) (102/59 - 115/67)  RR: 18 (01-07-25 @ 04:59) (18 - 18)  SpO2: 93% (01-07-25 @ 04:59) (93% - 98%)    PHYSICAL EXAM  CCO*3, looks more tired today  GBAE, clear lungs  Regular S1S2, no murmurs  Soft abdomen, non tender  No LLE      LABS             10.1   13.75 )-----------( 250      ( 01-06-25 @ 13:00 )             33.8     141  |  98  |  76  -------------------------<  110   01-06-25 @ 13:00  3.9  |  31  |  1.9    Ca      8.6     01-06-25 @ 13:00  Mg     2.0     01-06-25 @ 13:00    TPro  6.0  /  Alb  3.6  /  TBili  1.7  /  DBili  x   /  AST  40  /  ALT  100  /  AlkPhos  108  /  GGT  x     01-06-25 @ 13:00    PT/INR - ( 01-06-25 @ 13:00 )   PT: 27.30 sec[H];   INR: 2.28 ratio[H]  PTT - ( 01-05-25 @ 14:03 )  PTT:35.1 sec    Troponin T, High Sensitivity Result: 63 ng/L (01-04-25 @ 18:50)    Urinalysis Basic - ( 06 Jan 2025 13:00 )    Color: x / Appearance: x / SG: x / pH: x  Gluc: 110 mg/dL / Ketone: x  / Bili: x / Urobili: x   Blood: x / Protein: x / Nitrite: x   Leuk Esterase: x / RBC: x / WBC x   Sq Epi: x / Non Sq Epi: x / Bacteria: x          IMAGING

## 2025-01-07 NOTE — CONSULT NOTE ADULT - ASSESSMENT
89 year old female, follows with Dr. 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 Admitted for acute decompensated heart failure and KATHY suspected likely to be cardiorenal. Heart failure consulted for evaluation of severe pulmonary hypertension noted on TTE,.     Cardiac Studies   TTE 12/10/24 - EF 60-65%, severe LAE, s/p MVR, moderate LFLG AS, moderate TR, PASP 72 mmHg, RAP of 3 mmHg.   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   Patient is euvolemic on exam  POCUS IVC 1.3 cm and > 50% collapsing, warm and well perfused, normal JVP  Discontinue IV Lasix   Switch to p.o. Lasix 40 mg daily starting tomorrow on Wed 1/8  Switch lopressor to Toprol  mg daily starting tomorrow on Wed 1/8  Start Farxiga 10 mg daily, as per pt denies hx of recent UTIs   No further work up regarding pulmonary hypertension at this time   Please follow up with pt's general cardiologist for further f/u  I/Os, monitor renal function, standing daily weights   Replete lytes PRN, Maintain K>4, Mg >2.2   Has an appt with Dr. Raimundo Stewart in HF clinic on 1/15/24.   Discussed with primary team  Will sign off, please reconsult PRN.    89 year old female, follows with Dr. 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 Admitted for acute decompensated heart failure and KATHY suspected likely to be cardiorenal. Heart failure consulted for evaluation of severe pulmonary hypertension noted on TTE,.     Cardiac Studies   TTE 12/10/24 - EF 60-65%, severe LAE, s/p MVR, moderate LFLG AS, moderate TR, PASP 72 mmHg, RAP of 3 mmHg.   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   Patient is euvolemic on exam  POCUS IVC 1.3 cm and > 50% collapsing, warm and well perfused, normal JVP  Discontinue IV Lasix   Switch to p.o. Lasix 40 mg daily starting tomorrow on Wed 1/8.  Switch lopressor to Toprol  mg daily starting tomorrow on Wed 1/8.  Start Farxiga 10 mg daily, as per pt denies hx of recent UTIs   Consider starting IV iron sucrose 200 mg q24 x 5 days for AHMET (iron sat 10%, ferritin 119); at least 1-2 doses if patient is being discharged. Can repeat iron studies in outpatient.   Continue on spironolactone 25 mg daily  No further work up regarding pulmonary hypertension at this time   Please follow up with pt's general cardiologist for further f/u  I/Os, monitor renal function, standing daily weights   Replete lytes PRN, Maintain K>4, Mg >2.2   Has an appt with Dr. Raimundo Stewart in HF clinic on 1/15/24.   Discussed with primary team  Will sign off, please reconsult PRN.

## 2025-01-07 NOTE — PHYSICAL THERAPY INITIAL EVALUATION ADULT - GENERAL OBSERVATIONS, REHAB EVAL
Patient was educated on importance of OOB activities and participating in early ambulation. Patient verbalized good understanding and was agreeable to work with PT tomorrow. PT reviewed B/L LEs therex in supine: Heel slides, SLRs, hips abd/add and ankle pumps. Patient was instructed to perform them through out the day as tolerated. Patient verbalized good understanding. PT will f/u when appropriate.
Patient was seen from 10:25-10:50 for PT IE. Patient was rec'd in semi reclined in bed, +IVL, +tele, +primafit, NAD, agreeable to participate in PT.

## 2025-01-07 NOTE — CONSULT NOTE ADULT - NS ATTEST RISK PROBLEM GEN_ALL_CORE FT
Face-to-face encounter, review of extensive medical records in this and prior charts, laboratory findings, radiographic and imaging/diagnostic results; documentation as noted above and discussion of diagnostic impressions and plan with the patient and team.    Titration of vasoactive heart failure medications. High risk of decompensation

## 2025-01-07 NOTE — PHYSICAL THERAPY INITIAL EVALUATION ADULT - PERTINENT HX OF CURRENT PROBLEM, REHAB EVAL
This is an 88 yo F with PMH significant for HFimpEF 55-60%, s/p AICD, A-Fib on Coumadin, prosthetic mitral valve who presented to the ED c/o 2 days of generalized weakness, followed by increased WOB. Pt noted to be hypoxic on room air to low 90s on 4L NC at arrival to trauma bay. Spoke to the patient at bedside who states that she was feeling extremely short of breath. She was having difficulty speaking as well. Also spoke to Dr Nielsen who reports that her SOB has been worsening and he increased her lasix to twice a day. The patient is not willing to use BIPAP right now, but agreeable in case her SOB worsens. SHe reports some improvement in her sx since she received the lasix at the hospital.

## 2025-01-08 ENCOUNTER — TRANSCRIPTION ENCOUNTER (OUTPATIENT)
Age: 89
End: 2025-01-08

## 2025-01-08 VITALS
RESPIRATION RATE: 18 BRPM | DIASTOLIC BLOOD PRESSURE: 59 MMHG | SYSTOLIC BLOOD PRESSURE: 101 MMHG | HEART RATE: 60 BPM | TEMPERATURE: 97 F

## 2025-01-08 LAB
ALBUMIN SERPL ELPH-MCNC: 3.2 G/DL — LOW (ref 3.5–5.2)
ALP SERPL-CCNC: 82 U/L — SIGNIFICANT CHANGE UP (ref 30–115)
ALT FLD-CCNC: 59 U/L — HIGH (ref 0–41)
ANION GAP SERPL CALC-SCNC: 11 MMOL/L — SIGNIFICANT CHANGE UP (ref 7–14)
AST SERPL-CCNC: 24 U/L — SIGNIFICANT CHANGE UP (ref 0–41)
BASOPHILS # BLD AUTO: 0.04 K/UL — SIGNIFICANT CHANGE UP (ref 0–0.2)
BASOPHILS NFR BLD AUTO: 0.3 % — SIGNIFICANT CHANGE UP (ref 0–1)
BILIRUB SERPL-MCNC: 1.3 MG/DL — HIGH (ref 0.2–1.2)
BUN SERPL-MCNC: 67 MG/DL — CRITICAL HIGH (ref 10–20)
CALCIUM SERPL-MCNC: 8.3 MG/DL — LOW (ref 8.4–10.5)
CHLORIDE SERPL-SCNC: 101 MMOL/L — SIGNIFICANT CHANGE UP (ref 98–110)
CO2 SERPL-SCNC: 29 MMOL/L — SIGNIFICANT CHANGE UP (ref 17–32)
CREAT SERPL-MCNC: 1.6 MG/DL — HIGH (ref 0.7–1.5)
EGFR: 31 ML/MIN/1.73M2 — LOW
EOSINOPHIL # BLD AUTO: 0.25 K/UL — SIGNIFICANT CHANGE UP (ref 0–0.7)
EOSINOPHIL NFR BLD AUTO: 1.9 % — SIGNIFICANT CHANGE UP (ref 0–8)
GLUCOSE SERPL-MCNC: 112 MG/DL — HIGH (ref 70–99)
HCT VFR BLD CALC: 30.5 % — LOW (ref 37–47)
HGB BLD-MCNC: 8.9 G/DL — LOW (ref 12–16)
IMM GRANULOCYTES NFR BLD AUTO: 0.4 % — HIGH (ref 0.1–0.3)
INR BLD: 2.13 RATIO — HIGH (ref 0.65–1.3)
LYMPHOCYTES # BLD AUTO: 1.61 K/UL — SIGNIFICANT CHANGE UP (ref 1.2–3.4)
LYMPHOCYTES # BLD AUTO: 12.1 % — LOW (ref 20.5–51.1)
MAGNESIUM SERPL-MCNC: 1.7 MG/DL — LOW (ref 1.8–2.4)
MCHC RBC-ENTMCNC: 27.6 PG — SIGNIFICANT CHANGE UP (ref 27–31)
MCHC RBC-ENTMCNC: 29.2 G/DL — LOW (ref 32–37)
MCV RBC AUTO: 94.4 FL — SIGNIFICANT CHANGE UP (ref 81–99)
MONOCYTES # BLD AUTO: 1.22 K/UL — HIGH (ref 0.1–0.6)
MONOCYTES NFR BLD AUTO: 9.2 % — SIGNIFICANT CHANGE UP (ref 1.7–9.3)
NEUTROPHILS # BLD AUTO: 10.1 K/UL — HIGH (ref 1.4–6.5)
NEUTROPHILS NFR BLD AUTO: 76.1 % — HIGH (ref 42.2–75.2)
NRBC # BLD: 0 /100 WBCS — SIGNIFICANT CHANGE UP (ref 0–0)
PHOSPHATE SERPL-MCNC: 2.6 MG/DL — SIGNIFICANT CHANGE UP (ref 2.1–4.9)
PLATELET # BLD AUTO: 241 K/UL — SIGNIFICANT CHANGE UP (ref 130–400)
PMV BLD: 11.7 FL — HIGH (ref 7.4–10.4)
POTASSIUM SERPL-MCNC: 4.4 MMOL/L — SIGNIFICANT CHANGE UP (ref 3.5–5)
POTASSIUM SERPL-SCNC: 4.4 MMOL/L — SIGNIFICANT CHANGE UP (ref 3.5–5)
PROT SERPL-MCNC: 5.2 G/DL — LOW (ref 6–8)
PROTHROM AB SERPL-ACNC: 25.5 SEC — HIGH (ref 9.95–12.87)
RBC # BLD: 3.23 M/UL — LOW (ref 4.2–5.4)
RBC # FLD: 17.8 % — HIGH (ref 11.5–14.5)
SODIUM SERPL-SCNC: 141 MMOL/L — SIGNIFICANT CHANGE UP (ref 135–146)
WBC # BLD: 13.27 K/UL — HIGH (ref 4.8–10.8)
WBC # FLD AUTO: 13.27 K/UL — HIGH (ref 4.8–10.8)

## 2025-01-08 PROCEDURE — 99239 HOSP IP/OBS DSCHRG MGMT >30: CPT

## 2025-01-08 RX ORDER — WARFARIN SODIUM 4 MG/1
2 TABLET ORAL
Qty: 0 | Refills: 0 | DISCHARGE

## 2025-01-08 RX ORDER — METOPROLOL TARTRATE 50 MG
1 TABLET ORAL
Refills: 0 | DISCHARGE

## 2025-01-08 RX ORDER — WARFARIN SODIUM 10 MG/1
2 TABLET ORAL ONCE
Refills: 0 | Status: DISCONTINUED | OUTPATIENT
Start: 2025-01-08 | End: 2025-01-08

## 2025-01-08 RX ORDER — FUROSEMIDE 20 MG
1 TABLET ORAL
Qty: 0 | Refills: 0 | DISCHARGE
Start: 2025-01-08

## 2025-01-08 RX ORDER — FERROUS SULFATE 325(65) MG
1 TABLET ORAL
Qty: 30 | Refills: 1
Start: 2025-01-08 | End: 2025-03-08

## 2025-01-08 RX ORDER — DIGOXIN 250 MCG
1 TABLET ORAL
Refills: 0 | DISCHARGE

## 2025-01-08 RX ORDER — IRON SUCROSE 20 MG/ML
200 INJECTION, SOLUTION INTRAVENOUS ONCE
Refills: 0 | Status: COMPLETED | OUTPATIENT
Start: 2025-01-08 | End: 2025-01-08

## 2025-01-08 RX ORDER — METOPROLOL TARTRATE 50 MG
1 TABLET ORAL
Qty: 30 | Refills: 3
Start: 2025-01-08 | End: 2025-05-07

## 2025-01-08 RX ORDER — FUROSEMIDE 20 MG
2 TABLET ORAL
Refills: 0 | DISCHARGE

## 2025-01-08 RX ORDER — DAPAGLIFLOZIN 5 MG/1
1 TABLET, FILM COATED ORAL
Qty: 30 | Refills: 3
Start: 2025-01-08 | End: 2025-05-07

## 2025-01-08 RX ADMIN — PANTOPRAZOLE 40 MILLIGRAM(S): 40 TABLET, DELAYED RELEASE ORAL at 05:23

## 2025-01-08 RX ADMIN — CHLORHEXIDINE GLUCONATE 1 APPLICATION(S): 1.2 RINSE ORAL at 12:20

## 2025-01-08 RX ADMIN — INFLUENZA A VIRUS A/WISCONSIN/588/2019 (H1N1) RECOMBINANT HEMAGGLUTININ ANTIGEN, INFLUENZA A VIRUS A/DARWIN/6/2021 (H3N2) RECOMBINANT HEMAGGLUTININ ANTIGEN, INFLUENZA B VIRUS B/AUSTRIA/1359417/2021 RECOMBINANT HEMAGGLUTININ ANTIGEN, AND INFLUENZA B VIRUS B/PHUKET/3073/2013 RECOMBINANT HEMAGGLUTININ ANTIGEN 0.5 MILLILITER(S): 45; 45; 45; 45 INJECTION INTRAMUSCULAR at 15:24

## 2025-01-08 RX ADMIN — Medication 40 MILLIGRAM(S): at 05:24

## 2025-01-08 RX ADMIN — SPIRONOLACTONE 25 MILLIGRAM(S): 50 TABLET ORAL at 05:23

## 2025-01-08 RX ADMIN — IRON SUCROSE 100 MILLIGRAM(S): 20 INJECTION, SOLUTION INTRAVENOUS at 12:13

## 2025-01-08 RX ADMIN — Medication 200 MILLIGRAM(S): at 05:24

## 2025-01-08 NOTE — DISCHARGE NOTE NURSING/CASE MANAGEMENT/SOCIAL WORK - FINANCIAL ASSISTANCE
[facility] provides services at a reduced cost to those who are determined to be eligible through [facility]’s financial assistance program. Information regarding [facility]’s financial assistance program can be found by going to [email or web address] or by calling [phone].

## 2025-01-08 NOTE — DISCHARGE NOTE PROVIDER - HOSPITAL COURSE
This is an 89 yo F with PMH significant for HFimpEF 55-60%, s/p AICD, A-Fib on Coumadin, prosthetic mitral valve who presented to the ED c/o 2 days of generalized weakness, followed by increased WOB. Pt noted to be hypoxic on room air to low 90s on 4L NC at arrival to trauma bay. Spoke to the patient at bedside who states that she was feeling extremely short of breath. She was having difficulty speaking as well. Also spoke to Dr Nielsen who reports that her SOB has been worsening and he increased her lasix to twice a day. The patient is not willing to use BIPAP right now, but agreeable in case her SOB worsens. SHe reports some improvement in her sx since she received the lasix at the hospital.     Vitals on admission:   / 56 mm Hg, HR 55 /min, RR  28 /min, sat 98 % on 2 L/min    Remarkable labs:   WBC 14.81  Hgb 9.9 (~11 earlier in dec)  INR 4.63 (on coumadin)  K 5.3  BUN/Cr 92/3.1 (Cr 1.8 earlier in dec)  Tbili 2.1  Alk phos 119  AST//139  Trop 84 > pending repeat  proBNP 42k (9k 12/10)  VBG lactate 2.5 pH 7.36/ pCO2 52 / pO2 20    CXR IMPRESSION: Low lung volume. Bilateral opacities, unchanged. Left-sided permanent pacemaker.  EKG: Diagnosis Line Atrial-paced rhythm with prolonged AV conduction, Rightward axis, Minimal voltage criteria for LVH, may be normal variant ( [place] product ), Possible Inferior infarct , age undetermined, ST & T wave abnormality, consider anterolateral ischemia, Abnormal ECG    - TTE 12/10  LVSF 60-65%, no WMA, severe LAE, mild GEETHA, s/p mitral valve replacement, moderate paradoxical low-flow low-gradient aortic valve stenosis (Vmax 2.5m/s, mean PG 13mmHg, DI 0.39, PRISCILLA 0.94cm2, SVi 20ml/m2), mod TR, severe pHTN (PASP = 72mmHg).  - JAIME 12/13  LVEF 55-60%, mild mod LAE, mild GEETHA, mod-severe MR, mod AS    She was given lasix 40mg IV x 1 and is being admitted to tele for further management.     #Acute hypoxic respiratory failure  #Acute on Chronic HFimpEF  #Severe pHTN  CXR 01/04 reviewed, shows Bilateral opacities, unchanged.  Echo 12/13: 55-60% EF; moderate AS; severe pHTN  Was on 2L NC, now on RA  - Tele monitoring negative for arrhythmias  - Was on IV lasix 40mg BID, switched to PO lasix home dose  - Restarted aldactone 25mg OD  - Added farxiga 10mg  - HF consulted for severe pulm HTN: most likely group 2 PH due to her HF, no further workup, f/u OP with Dr. Stewart. Looks euvolemic so switched to PO lasix, switch lopressor to toprol 200mg OD, venofer then iron PO    #KATHY on CKD stage 3   serum Cr 3.1 initially->1.6  suspect cardiorenal  - Renal US showed no hydronephrosis, bladder volume >500cc, bladder scan negative for retention    #Transaminitis, improved  suspect congestive hepatopathy  RUQ US negative    #Paroxysmal A.fib, bioprosthetic MVR  - check INR daily and dose coumadin accordingly, target INR 2-3  - Warfarin 1.5mg on 1/6, then 2 mg on 1/7 and 1/8  - Cont metoprolol for rate control                 This is an 88 yo F with PMH significant for HFimpEF 55-60%, s/p AICD, A-Fib on Coumadin, prosthetic mitral valve who presented to the ED c/o 2 days of generalized weakness, followed by increased WOB. Pt noted to be hypoxic on room air to low 90s on 4L NC at arrival to trauma bay. Spoke to the patient at bedside who states that she was feeling extremely short of breath. She was having difficulty speaking as well. Also spoke to Dr Nielsen who reports that her SOB has been worsening and he increased her lasix to twice a day. The patient is not willing to use BIPAP right now, but agreeable in case her SOB worsens. SHe reports some improvement in her sx since she received the lasix at the hospital.     Vitals on admission:   / 56 mm Hg, HR 55 /min, RR  28 /min, sat 98 % on 2 L/min    Remarkable labs:   WBC 14.81  Hgb 9.9 (~11 earlier in dec)  INR 4.63 (on coumadin)  K 5.3  BUN/Cr 92/3.1 (Cr 1.8 earlier in dec)  Tbili 2.1  Alk phos 119  AST//139  Trop 84 > pending repeat  proBNP 42k (9k 12/10)  VBG lactate 2.5 pH 7.36/ pCO2 52 / pO2 20    CXR IMPRESSION: Low lung volume. Bilateral opacities, unchanged. Left-sided permanent pacemaker.  EKG: Diagnosis Line Atrial-paced rhythm with prolonged AV conduction, Rightward axis, Minimal voltage criteria for LVH, may be normal variant ( [place] product ), Possible Inferior infarct , age undetermined, ST & T wave abnormality, consider anterolateral ischemia, Abnormal ECG    - TTE 12/10  LVSF 60-65%, no WMA, severe LAE, mild GEETHA, s/p mitral valve replacement, moderate paradoxical low-flow low-gradient aortic valve stenosis (Vmax 2.5m/s, mean PG 13mmHg, DI 0.39, PRISCILLA 0.94cm2, SVi 20ml/m2), mod TR, severe pHTN (PASP = 72mmHg).  - JAIME 12/13  LVEF 55-60%, mild mod LAE, mild GEETHA, mod-severe MR, mod AS    She was given lasix 40mg IV x 1 and is being admitted to tele for further management.     #Acute hypoxic respiratory failure  #Acute on Chronic HFimpEF  #Severe pHTN  CXR 01/04 reviewed, shows Bilateral opacities, unchanged.  Echo 12/13: 55-60% EF; moderate AS; severe pHTN  Was on 2L NC, now on RA  - Tele monitoring negative for arrhythmias  - Was on IV lasix 40mg BID, switched to PO lasix home dose  - Restarted aldactone 25mg OD  - Added farxiga 10mg  - HF consulted for severe pulm HTN: most likely group 2 PH due to her HF, no further workup, f/u OP with Dr. Stewart. Looks euvolemic so switched to PO lasix, switch lopressor to toprol 200mg OD, venofer then iron PO    #KATHY on CKD stage 3   serum Cr 3.1 initially->1.6  suspect cardiorenal  - Renal US showed no hydronephrosis, bladder volume >500cc, bladder scan negative for retention    #Transaminitis, improved  suspect congestive hepatopathy  RUQ US negative    #Paroxysmal A.fib, bioprosthetic MVR  - check INR daily and dose coumadin accordingly, target INR 2-3  - Warfarin 1.5mg on 1/6, then 2 mg on 1/7 and 1/8  - Cont metoprolol for rate control    Patient discharged home with home care.

## 2025-01-08 NOTE — DISCHARGE NOTE PROVIDER - ATTENDING DISCHARGE PHYSICAL EXAMINATION:
Vital Signs Last 24 Hrs  T(F): 97.4 (08 Jan 2025 11:57), Max: 97.8 (07 Jan 2025 20:11)  HR: 60 (08 Jan 2025 11:57) (56 - 95)  BP: 101/59 (08 Jan 2025 11:57) (100/65 - 107/64)  RR: 18 (08 Jan 2025 11:57) (16 - 18)  SpO2: 97% (08 Jan 2025 08:10) (97% - 98%)    PHYSICAL EXAM:  GENERAL: NAD, well-groomed, well-developed  HEAD:  Atraumatic, Normocephalic  EYES: EOMI, conjunctiva and sclera clear  ENMT: Moist mucous membranes, Good dentition, no thrush  NECK: Supple, No JVD  CHEST/LUNG: Clear to auscultation bilaterally, good air entry, non-labored breathing  HEART: IRR; S1/S2, 2/6 murmur   ABDOMEN: Soft, Nontender, Nondistended; Bowel sounds present  VASCULAR: Normal pulses, Normal capillary refill  EXTREMITIES: No calf tenderness, No cyanosis, No edema  LYMPH: Normal; No lymphadenopathy noted  SKIN: Warm, Intact  PSYCH: Normal mood, Normal affect  NERVOUS SYSTEM:  Alert, Good concentration; CN 2-12 intact, No focal deficits

## 2025-01-08 NOTE — DISCHARGE NOTE PROVIDER - CARE PROVIDER_API CALL
Chip Bobo  [address] [address]  Phone: [phone]  Fax: [phone]  Follow Up Time: 2 weeks

## 2025-01-08 NOTE — DISCHARGE NOTE NURSING/CASE MANAGEMENT/SOCIAL WORK - NSDCPEPTCOWACOMP_GEN_ALL_CORE
The patient has received written discharge instructions for Warfarin/Coumadin, including the Warfarin/Coumadin discharge booklet, which contains all of the information listed below. Warfarin/Coumadin is used to prevent new blood clots and keep existing ones from getting bigger. Never skip a dose of Warfarin/Coumadin. If you forget to take your Warfarin/Coumadin, DO NOT take an extra pill to 'catch up'. NEVER TAKE A DOUBLE DOSE. Notify your doctor that you missed a dose. Take Warfarin/Coumadin in the evening at the same time. Warfarin/Coumadin may be taken with other medications or food. Yes...

## 2025-01-08 NOTE — PHARMACOTHERAPY INTERVENTION NOTE - COMMENTS
89yFemale    Indication: Afib  INR Goal: 2-3  Home Dose: per most recent anticoag clinic note patient takes 1 mg on Sun/Sat and 2 mg M/T/W/Th/F  Bridge Therapy: not indicated    Current Medications:  atorvastatin 20 milliGRAM(s) Oral at bedtime  chlorhexidine 2% Cloths 1 Application(s) Topical daily  furosemide   Injectable 40 milliGRAM(s) IV Push two times a day  influenza  Vaccine (HIGH DOSE) 0.5 milliLiter(s) IntraMuscular once  metoprolol tartrate 100 milliGRAM(s) Oral two times a day  pantoprazole    Tablet 40 milliGRAM(s) Oral before breakfast  spironolactone 25 milliGRAM(s) Oral daily  tamsulosin 0.4 milliGRAM(s) Oral at bedtime      hemoglobin 9.3 g/dL (01-07-25 @ 10:20)    hematocrit 31.4 % (01-07-25 @ 10:20)    PLT: 235 K/uL (01-07-25 @ 10:20)    GFR:31 mL/min/1.73m2 (01-07-25 @ 10:20)      Drug Interactions:     INR trend  4.63 ratio (01-03-25 @ 14:20)  4.41 ratio (01-04-25 @ 18:50)  3.26 ratio (01-05-25 @ 14:03)  2.28 ratio (01-06-25 @ 13:00)  2.04 ratio (01-07-25 @ 10:20)      Warfarin administration history:  warfarin: 1.5 milliGRAM(s) (01-06-25 @ 21:58)        1. INR today is:                    [   ] below goal                                            [ x  ] at goal                                            [   ] above goal         2. Recommend Warfarin  2 mg  PO x 1     3. Obtain INR tomorrow AM  Patient follows with the anticoagulation clinic, last seen on 1/2 at this time her INR was elevated = 4.19 and was instructed to skip her Thursday dose. Of note INR was elevated on admission on 1/3 = 4.63 and warfarin was held until 1/6. Patient was also recently here I had confirmed her home dose with her and she noted she varies between the 1 mg and 2 mg dose dependent on her INR check. Patient has history of prosthetic mitral valve replacement and a fib, INR goal is 2-3. INR today is = 2.04 recommend continuing with home dose of 2 mg today and continue to check daily INR.  
89yFemale    Indication: Afib  INR Goal: 2-3  Home Dose: per most recent anticoag clinic note patient takes 1 mg on Sun/Sat and 2 mg M/T/W/Th/F  Bridge Therapy: not indicated    Current Medications:  atorvastatin 20 milliGRAM(s) Oral at bedtime  chlorhexidine 2% Cloths 1 Application(s) Topical daily  dapagliflozin 10 milliGRAM(s) Oral daily  furosemide    Tablet 40 milliGRAM(s) Oral daily  influenza  Vaccine (HIGH DOSE) 0.5 milliLiter(s) IntraMuscular once  metoprolol succinate  milliGRAM(s) Oral daily  pantoprazole    Tablet 40 milliGRAM(s) Oral before breakfast  spironolactone 25 milliGRAM(s) Oral daily  tamsulosin 0.4 milliGRAM(s) Oral at bedtime      hemoglobin 8.9 g/dL (01-08-25 @ 07:20)    hematocrit 30.5 % (01-08-25 @ 07:20)    PLT: 241 K/uL (01-08-25 @ 07:20)    GFR:31 mL/min/1.73m2 (01-08-25 @ 07:20)      Drug Interactions:     INR trend  4.63 ratio (01-03-25 @ 14:20)  4.41 ratio (01-04-25 @ 18:50)  3.26 ratio (01-05-25 @ 14:03)  2.28 ratio (01-06-25 @ 13:00)  2.04 ratio (01-07-25 @ 10:20)  2.13 ratio (01-08-25 @ 07:20)      Warfarin administration history:  warfarin: 1.5 milliGRAM(s) (01-06-25 @ 21:58)  warfarin: 2 milliGRAM(s) (01-07-25 @ 21:30)        1. INR today is:                    [   ] below goal                                             [ x ] at goal                                            [   ] above goal         2. Recommend Warfarin  2 mg PO x 1   3. Obtain INR tomorrow AM    Patient follows with the anticoagulation clinic, last seen on 1/2 at this time her INR was elevated = 4.19 and was instructed to skip her Thursday dose. Of note INR was elevated on admission on 1/3 = 4.63 and warfarin was held until 1/6. Patient was also recently here I had confirmed her home dose with her and she noted she varies between the 1 mg and 2 mg dose dependent on her INR check. Patient has history of bioprosthetic mitral valve replacement and a fib, INR goal is 2-3. INR today is = 2.13 recommend continuing with home dose of 2 mg today and continue to check daily INR.  
89yFemale    Indication:afib  INR Goal:2-3  Home Dose:1 mg sun, sat. 2 mg mon, tue, wed. 0 mg thu  Bridge Therapy:    Current Medications:  atorvastatin 20 milliGRAM(s) Oral at bedtime  chlorhexidine 2% Cloths 1 Application(s) Topical daily  furosemide   Injectable 40 milliGRAM(s) IV Push two times a day  influenza  Vaccine (HIGH DOSE) 0.5 milliLiter(s) IntraMuscular once  metoprolol tartrate 100 milliGRAM(s) Oral two times a day  pantoprazole    Tablet 40 milliGRAM(s) Oral before breakfast  spironolactone 25 milliGRAM(s) Oral daily  tamsulosin 0.4 milliGRAM(s) Oral at bedtime  warfarin 1.5 milliGRAM(s) Oral once      hemoglobin 10.1 g/dL (01-06-25 @ 13:00)    hematocrit 33.8 % (01-06-25 @ 13:00)    PLT: 250 K/uL (01-06-25 @ 13:00)    GFR:25 mL/min/1.73m2 (01-06-25 @ 13:00)      Drug Interactions:       INR trend  4.63 ratio (01-03-25 @ 14:20)  4.41 ratio (01-04-25 @ 18:50)  3.26 ratio (01-05-25 @ 14:03)  2.28 ratio (01-06-25 @ 13:00)      Warfarin administration history:        1. INR today is:               [   ] below goal ----- This is likely due to                                            [ x  ] at goal                                            [   ] above goal ------ This is likely due to        2. Recommend Warfarin    1.5 mg       PO x 1   3. Obtain INR tomorrow AM

## 2025-01-08 NOTE — DISCHARGE NOTE PROVIDER - NSDCMRMEDTOKEN_GEN_ALL_CORE_FT
Aldactone 25 mg oral tablet: 1 tab(s) orally once a day  atorvastatin 20 mg oral tablet: 1 tab(s) orally once a day  dapagliflozin 10 mg oral tablet: 1 tab(s) orally once a day  famotidine 20 mg oral tablet: 1 tab(s) orally once a day  ferrous sulfate 325 mg (65 mg elemental iron) oral tablet: 1 tab(s) orally once a day  furosemide 40 mg oral tablet: 1 tab(s) orally once a day  metoprolol succinate 200 mg oral tablet, extended release: 1 tab(s) orally once a day  warfarin: 2 milligram(s) orally once a day 2 mg on Monday till Friday, then 1 mg on Saturday and Sunday   Intermediate Repair And Flap Additional Text (Will Appearing After The Standard Complex Repair Text): The intermediate repair was not sufficient to completely close the primary defect. The remaining additional defect was repaired with the flap mentioned below.

## 2025-01-08 NOTE — DISCHARGE NOTE NURSING/CASE MANAGEMENT/SOCIAL WORK - PATIENT PORTAL LINK FT
You can access the FollowMyHealth Patient Portal offered by [facility] by registering at the following website: [email or web address]. By joining [facility]’s FollowMyHealth portal, you will also be able to view your health information using other applications (apps) compatible with our system.

## 2025-01-08 NOTE — DISCHARGE NOTE PROVIDER - NSDCCPCAREPLAN_GEN_ALL_CORE_FT
PRINCIPAL DISCHARGE DIAGNOSIS  Diagnosis: Acute exacerbation of chronic heart failure  Assessment and Plan of Treatment: You were admitted with volume overload due to your heart failure. You were started on IV diuretics to help remove the excess fluids. You improved after several days, now you are back to oral diuretic same as your home dose. You will need to continue with all your medications a sprescribed to avoid readmission, in addition to limiting your fluid intake to 1 to 1.5L per day. You should also limit your sodium intake in food. You will need to follow up with the cardiologist a ludwigatient (you have an appt on 1/15). You were also found to have iron deficiency, so you were prescribed a dose of iron IV, then PO iron at home.

## 2025-01-08 NOTE — DISCHARGE NOTE PROVIDER - NSDCFUSCHEDAPPT_GEN_ALL_CORE_FT
Kirsten Quezada  [facility] PreAdmits  Scheduled Appointment: 01/14/2025    Kirsten Quezada  [facility] [place] Physician [facility]  MEDMGMT SI [address]  Scheduled Appointment: 01/14/2025    Raimundo Stewart  [facility] [place] Physician [facility]  HEARTFAIL [address]  Scheduled Appointment: 01/15/2025    [facility]  CARDIOLOGY [address]  Scheduled Appointment: 03/24/2025

## 2025-01-14 ENCOUNTER — APPOINTMENT (OUTPATIENT)
Dept: MEDICATION MANAGEMENT | Facility: CLINIC | Age: 89
End: 2025-01-14

## 2025-01-14 ENCOUNTER — OUTPATIENT (OUTPATIENT)
Dept: OUTPATIENT SERVICES | Facility: HOSPITAL | Age: 89
LOS: 1 days | End: 2025-01-14
Payer: MEDICARE

## 2025-01-14 DIAGNOSIS — Z95.2 PRESENCE OF PROSTHETIC HEART VALVE: Chronic | ICD-10-CM

## 2025-01-14 DIAGNOSIS — Z79.01 LONG TERM (CURRENT) USE OF ANTICOAGULANTS: ICD-10-CM

## 2025-01-14 DIAGNOSIS — Z95.810 PRESENCE OF AUTOMATIC (IMPLANTABLE) CARDIAC DEFIBRILLATOR: ICD-10-CM

## 2025-01-14 DIAGNOSIS — I27.20 PULMONARY HYPERTENSION, UNSPECIFIED: ICD-10-CM

## 2025-01-14 DIAGNOSIS — N17.9 ACUTE KIDNEY FAILURE, UNSPECIFIED: ICD-10-CM

## 2025-01-14 DIAGNOSIS — Z98.890 OTHER SPECIFIED POSTPROCEDURAL STATES: Chronic | ICD-10-CM

## 2025-01-14 DIAGNOSIS — J96.01 ACUTE RESPIRATORY FAILURE WITH HYPOXIA: ICD-10-CM

## 2025-01-14 DIAGNOSIS — I48.91 UNSPECIFIED ATRIAL FIBRILLATION: ICD-10-CM

## 2025-01-14 DIAGNOSIS — N18.30 CHRONIC KIDNEY DISEASE, STAGE 3 UNSPECIFIED: ICD-10-CM

## 2025-01-14 DIAGNOSIS — I50.33 ACUTE ON CHRONIC DIASTOLIC (CONGESTIVE) HEART FAILURE: ICD-10-CM

## 2025-01-14 DIAGNOSIS — I48.0 PAROXYSMAL ATRIAL FIBRILLATION: ICD-10-CM

## 2025-01-14 DIAGNOSIS — Z95.3 PRESENCE OF XENOGENIC HEART VALVE: ICD-10-CM

## 2025-01-14 LAB
INR PPP: 1.7
PT BLD: 19.9

## 2025-01-14 PROCEDURE — 98012 SYNCH AUDIO-ONLY EST SF 10: CPT

## 2025-01-15 ENCOUNTER — APPOINTMENT (OUTPATIENT)
Dept: HEART FAILURE | Facility: CLINIC | Age: 89
End: 2025-01-15
Payer: MEDICARE

## 2025-01-15 ENCOUNTER — NON-APPOINTMENT (OUTPATIENT)
Age: 89
End: 2025-01-15

## 2025-01-15 VITALS
BODY MASS INDEX: 21.86 KG/M2 | SYSTOLIC BLOOD PRESSURE: 110 MMHG | HEIGHT: 66 IN | HEART RATE: 73 BPM | WEIGHT: 136 LBS | OXYGEN SATURATION: 99 % | DIASTOLIC BLOOD PRESSURE: 60 MMHG

## 2025-01-15 DIAGNOSIS — I50.30 UNSPECIFIED DIASTOLIC (CONGESTIVE) HEART FAILURE: ICD-10-CM

## 2025-01-15 DIAGNOSIS — Z79.01 LONG TERM (CURRENT) USE OF ANTICOAGULANTS: ICD-10-CM

## 2025-01-15 DIAGNOSIS — I10 ESSENTIAL (PRIMARY) HYPERTENSION: ICD-10-CM

## 2025-01-15 DIAGNOSIS — I48.91 UNSPECIFIED ATRIAL FIBRILLATION: ICD-10-CM

## 2025-01-15 PROCEDURE — 93000 ELECTROCARDIOGRAM COMPLETE: CPT

## 2025-01-15 PROCEDURE — 99215 OFFICE O/P EST HI 40 MIN: CPT

## 2025-01-15 RX ORDER — DAPAGLIFLOZIN 10 MG/1
10 TABLET, FILM COATED ORAL DAILY
Refills: 0 | Status: ACTIVE | COMMUNITY

## 2025-01-16 DIAGNOSIS — Z23 ENCOUNTER FOR IMMUNIZATION: ICD-10-CM

## 2025-01-16 PROBLEM — I50.30 HEART FAILURE WITH PRESERVED LEFT VENTRICULAR FUNCTION (HFPEF): Status: ACTIVE | Noted: 2025-01-16

## 2025-01-16 PROBLEM — N18.32 STAGE 3B CHRONIC KIDNEY DISEASE: Status: ACTIVE | Noted: 2025-01-16

## 2025-01-17 NOTE — H&P PST ADULT - SKIN
high resolution anoscopy, biopsy and fulguration of lesions, suture ligation of internal hemorrhoids No lesions; no rash

## 2025-01-22 ENCOUNTER — OUTPATIENT (OUTPATIENT)
Dept: OUTPATIENT SERVICES | Facility: HOSPITAL | Age: 89
LOS: 1 days | End: 2025-01-22
Payer: MEDICARE

## 2025-01-22 ENCOUNTER — APPOINTMENT (OUTPATIENT)
Dept: MEDICATION MANAGEMENT | Facility: CLINIC | Age: 89
End: 2025-01-22

## 2025-01-22 DIAGNOSIS — I48.91 UNSPECIFIED ATRIAL FIBRILLATION: ICD-10-CM

## 2025-01-22 DIAGNOSIS — Z79.01 LONG TERM (CURRENT) USE OF ANTICOAGULANTS: ICD-10-CM

## 2025-01-22 DIAGNOSIS — Z95.2 PRESENCE OF PROSTHETIC HEART VALVE: Chronic | ICD-10-CM

## 2025-01-22 DIAGNOSIS — Z98.890 OTHER SPECIFIED POSTPROCEDURAL STATES: Chronic | ICD-10-CM

## 2025-01-22 LAB — INR PPP: 2.44

## 2025-01-22 PROCEDURE — 98012 SYNCH AUDIO-ONLY EST SF 10: CPT

## 2025-01-23 DIAGNOSIS — Z79.01 LONG TERM (CURRENT) USE OF ANTICOAGULANTS: ICD-10-CM

## 2025-01-23 DIAGNOSIS — I48.91 UNSPECIFIED ATRIAL FIBRILLATION: ICD-10-CM

## 2025-01-23 RX ORDER — WARFARIN 2 MG/1
2 TABLET ORAL
Refills: 0 | Status: ACTIVE | COMMUNITY

## 2025-01-23 RX ORDER — CHLORHEXIDINE GLUCONATE 4 %
325 (65 FE) LIQUID (ML) TOPICAL
Refills: 0 | Status: ACTIVE | COMMUNITY

## 2025-01-23 RX ORDER — FUROSEMIDE 40 MG/1
40 TABLET ORAL DAILY
Qty: 90 | Refills: 3 | Status: ACTIVE | COMMUNITY

## 2025-01-23 RX ORDER — FAMOTIDINE 20 MG/1
20 TABLET, FILM COATED ORAL
Refills: 0 | Status: ACTIVE | COMMUNITY

## 2025-01-23 RX ORDER — ATORVASTATIN CALCIUM 20 MG/1
20 TABLET, FILM COATED ORAL
Refills: 0 | Status: ACTIVE | COMMUNITY

## 2025-01-23 RX ORDER — METOPROLOL SUCCINATE 200 MG/1
200 TABLET, EXTENDED RELEASE ORAL
Refills: 0 | Status: ACTIVE | COMMUNITY

## 2025-01-23 RX ORDER — DAPAGLIFLOZIN 10 MG/1
10 TABLET, FILM COATED ORAL
Refills: 0 | Status: ACTIVE | COMMUNITY

## 2025-01-23 RX ORDER — SPIRONOLACTONE 25 MG/1
25 TABLET, FILM COATED ORAL DAILY
Qty: 90 | Refills: 3 | Status: ACTIVE | COMMUNITY

## 2025-01-27 ENCOUNTER — APPOINTMENT (OUTPATIENT)
Dept: GERIATRICS | Facility: HOME HEALTH | Age: 89
End: 2025-01-27
Payer: MEDICARE

## 2025-01-27 VITALS
OXYGEN SATURATION: 98 % | DIASTOLIC BLOOD PRESSURE: 60 MMHG | RESPIRATION RATE: 16 BRPM | HEART RATE: 70 BPM | SYSTOLIC BLOOD PRESSURE: 120 MMHG | TEMPERATURE: 98.2 F

## 2025-01-27 DIAGNOSIS — I10 ESSENTIAL (PRIMARY) HYPERTENSION: ICD-10-CM

## 2025-01-27 DIAGNOSIS — N18.32 CHRONIC KIDNEY DISEASE, STAGE 3B: ICD-10-CM

## 2025-01-27 DIAGNOSIS — I50.22 CHRONIC SYSTOLIC (CONGESTIVE) HEART FAILURE: ICD-10-CM

## 2025-01-27 DIAGNOSIS — I48.91 UNSPECIFIED ATRIAL FIBRILLATION: ICD-10-CM

## 2025-01-27 PROCEDURE — 99342 HOME/RES VST NEW LOW MDM 30: CPT

## 2025-01-29 ENCOUNTER — APPOINTMENT (OUTPATIENT)
Dept: MEDICATION MANAGEMENT | Facility: CLINIC | Age: 89
End: 2025-01-29

## 2025-01-29 LAB
INR PPP: 3.21
PT BLD: 37.5

## 2025-01-31 ENCOUNTER — NON-APPOINTMENT (OUTPATIENT)
Age: 89
End: 2025-01-31

## 2025-02-01 DIAGNOSIS — K64.9 UNSPECIFIED HEMORRHOIDS: ICD-10-CM

## 2025-02-01 DIAGNOSIS — G47.00 INSOMNIA, UNSPECIFIED: ICD-10-CM

## 2025-02-01 RX ORDER — HYDROCORTISONE CREAM 1% 10 MG/G
1 CREAM TOPICAL TWICE DAILY
Qty: 1 | Refills: 3 | Status: ACTIVE | COMMUNITY
Start: 2025-02-01 | End: 1900-01-01

## 2025-02-05 ENCOUNTER — APPOINTMENT (OUTPATIENT)
Dept: MEDICATION MANAGEMENT | Facility: CLINIC | Age: 89
End: 2025-02-05

## 2025-02-05 LAB
INR PPP: 5.15
PT BLD: 60.3

## 2025-02-06 ENCOUNTER — NON-APPOINTMENT (OUTPATIENT)
Age: 89
End: 2025-02-06

## 2025-02-06 RX ORDER — ALPRAZOLAM 0.25 MG/1
0.25 TABLET ORAL
Qty: 30 | Refills: 0 | Status: ACTIVE | COMMUNITY
Start: 2025-02-06 | End: 1900-01-01

## 2025-02-06 RX ORDER — TRAZODONE HYDROCHLORIDE 50 MG/1
50 TABLET ORAL
Qty: 90 | Refills: 3 | Status: DISCONTINUED | COMMUNITY
Start: 2025-02-01 | End: 2025-02-06

## 2025-02-10 RX ORDER — DIGOXIN 125 UG/1
125 TABLET ORAL
Qty: 90 | Refills: 0 | Status: ACTIVE | COMMUNITY
Start: 2024-10-21

## 2025-02-14 NOTE — ED PROVIDER NOTE - OBJECTIVE STATEMENT
89-year-old female past medical history CHF, HTN, HLD, A-fib on Coumadin, presents from home for evaluation of generalized weakness, confusion worsening since yesterday.  Spoke with patient's nephew who is primary healthcare proxy who states patient has 24-hour home health care aide who states patient has been "slowing down" and more confused X1 week.  Nephew denies recent fever, patient complains of pain, vomiting, urinary symptoms.  Patient minimally contributory to history, complaining of congestion with mild cough.  Denies pain and difficulty breathing.

## 2025-02-14 NOTE — H&P ADULT - ASSESSMENT
#Misc  #Code Status:  #DVT ppx:  #GI ppx:  #Diet:  #Activity:  #Inpatient Dispo:  #Discharge Dispo: #KATHY on CKD w/ Mild Hyperkalemia likely due to Increased Diuretic Dose/Dehydration  #Confusion and Agitation  - Patient follows w/ Dr. Macdonald, Dr. Stewart for HF, and Dr. Matias for EP  - Recently had Lasix from 40/20 to 40/40  - Patient has 2+ pitting edema though no JVD or rales; with coinciding KATHY possibly intravascularly depleted  - Given 500cc bolus in ED  - K 5.4 slightly hemolyzed, given Lokelma in ED  - AOx3 at baseline though has been more disoriented and confused  - Daily weight/Monitor output/Fluid Restrict  - Will hold Lasix, Farxiga, and Spironolactone for now  - C/w Metoprolol  - F/u repeat BMP, urine studies  - PT eval    #Hx of Valve Replacement on Coumadin  - Coumadin 2mg M-F, 1mg on Sat and Sun  - Daily INR    #HLD  - C/w Atorvastatin    #Misc  #Code Status: DNR/Intubate  #DVT ppx: Warfarin  #GI ppx: Famotidine  #Diet: DASH, 1.2L fluid restriction  #Activity: AAT  #Inpatient Dispo: Telemetry  #Discharge Dispo: From Home

## 2025-02-14 NOTE — ED PROVIDER NOTE - CLINICAL SUMMARY MEDICAL DECISION MAKING FREE TEXT BOX
89-year-old female presenting for evaluation of generalized weakness.  No falls.  Decreased oral intake.  Per family mildly confused.  Well appearing, NAD, non toxic. NCAT PERRLA EOMI dry mucous membrane neck supple non tender normal wob cta bl rrr abdomen s nt nd no rebound no guarding WWPx4 neuro non focal.  Labs imaging reviewed.  Will admit for further evaluation.

## 2025-02-14 NOTE — H&P ADULT - ATTENDING COMMENTS
#Altered mental status/ toxic metabolic encephalopathy  cth neg  rvp neg  ua neg  cxr as below  b12, folate, tsh; rpr  to confirm baseline mental status    #KATHY  pre-renal vs. cardiorenal  cxr bl opacities, effusions  feurea  renal us  d/w renal; start ns 50 cc/hr  diuretics on hold  trend    #Progress Note Handoff  Pending (specify): kathy  Family discussion:   Disposition: home vs. snf

## 2025-02-14 NOTE — H&P ADULT - HISTORY OF PRESENT ILLNESS
Vitals  Temp: 98.2 -> 98.6  HR: 59  BP: 106/71  O2: 96% on RA  RR: 18    Labs  WBC: 9.57  HgB: 13.1  Plt: 234  BUN: 80  Cr: 2.7 (baseline 1.6)  Trop: 41  K: 5.4 (slightly hemolyzed)  BNP: 22k (42k last admission)    Imaging  CT Head: 1. No evidence of acute intracranial hemorrhage or large territory infarct. 2. Moderate chronic microvascular changes a chronic lacunar infarct within the right caudate head.    CXR: Bibasilar opacity/effusions.    In the ED  500mL NS bolus  Lokelma 10g PO x1 Ms. 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 Since she was discharge in January the patient has been progressively having more episodes of confusion and agitation. Earlier today, the patient was not able to be lifted out of bed and she was very agitated and yelling per her aid. Collateral information was obtained from her nephew who states that her lasix was recently increased from Lasix 40mg AM and 20mg in PM to Lasix 40mg BID. She was found to have KATHY on her labs and was admitted for further management.    Vitals  Temp: 98.2 -> 98.6  HR: 59  BP: 106/71  O2: 96% on RA  RR: 18    Labs  WBC: 9.57  HgB: 13.1  Plt: 234  BUN: 80  Cr: 2.7 (baseline 1.6)  Trop: 41  K: 5.4 (slightly hemolyzed)  BNP: 22k (42k last admission)    Imaging  CT Head: 1. No evidence of acute intracranial hemorrhage or large territory infarct. 2. Moderate chronic microvascular changes a chronic lacunar infarct within the right caudate head.    CXR: Bibasilar opacity/effusions.    In the ED  500mL NS bolus  Lokelma 10g PO x1

## 2025-02-14 NOTE — H&P ADULT - NSHPPHYSICALEXAM_GEN_ALL_CORE
General: NAD, appears comfortable  Head: Keratosis on forehead  Neck: Supple, no JVD  Cardiac: S1 and S2  Pulmonary: No wheezing or rales  Abdominal: Soft, nontender, and nondistended   Extremities: 2+ edema b/l LE  Neurologic: AOx2 to name and location, pleasant, follows commands  Skin: No rash

## 2025-02-14 NOTE — ED PROVIDER NOTE - PHYSICAL EXAMINATION
Vital Signs: I have reviewed the initial vital signs.  CONSTITUTIONAL: Pt in no acute distress laying on stretcher.  SKIN: Skin exam is warm and dry, no acute rash.  HEAD: Normocephalic; atraumatic.  EYES: PERRL, EOM intact; conjunctiva and sclera clear.  ENT: No nasal discharge; airway clear. +DMM.  NECK: Supple; FROM  CARD: S1, S2 normal; no murmurs, gallops, or rubs. Regular rate and rhythm.  RESP: CTAB. No wheezes, rales or rhonchi.  ABD:  soft; non-distended; non-tender; no hepatosplenomegaly.  MSK: Normal ROM. +pitting edema b/l LE. NVI.  NEURO: Alert, oriented. Grossly unremarkable. No focal deficits.

## 2025-02-15 NOTE — CONSULT NOTE ADULT - ASSESSMENT
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  Noted recent adjustment in outpatient diuretic regimen.  Nephrology consulted for mild hyperkalemia and KATHY on CKD.    #HFmrEF  #prosthetic MV  #PAF  #Dyslipidemia  #KATHY on CKD stage 3a  Etiology likely 2/2 intravascular volume depletion from recent increase in Lasix dose  Possible type 2 CRS (chronic heart failure affecting kidney function)  - continue IV hydration, would give LR @ 40-50cc/hr given h/o HF  - check urUrea, urCr, U/A  - check RBUS with PVR bladder imaging  - dose medications for eGFR <30  - no acute indication for RRT at this time  - avoid nephrotoxins:  NSAIDS, IV contrast, Aminoglycosides, fleet enemas  - Low K, Low Phos diet    #Hyperkalemia  Slightly hemolyzed on admission  Given Lokelma x1  - continue resin binders for K>5.7  - will improve with IV hydration to promote kaliuresis    Nephrology will follow

## 2025-02-15 NOTE — CONSULT NOTE ADULT - SUBJECTIVE AND OBJECTIVE BOX
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  Noted recent adjustment in outpatient diuretic regimen.  Nephrology consulted for mild hyperkalemia and KATYH on CKD.  Noted rise in sCr 2.4 from 1.7 baseline, and an increase BNP of 22k on admission labs.  ED imaging noted bibasilar opacities on CXR.  Was given 500cc NS and 1 dose of Lokelma.    PAST MEDICAL & SURGICAL HISTORY:  Afib  HTN (hypertension)  Heart failure  H/O mitral valve replacement  H/O prior ablation treatment, afib    Allergies:  sulfamethoxazole (Rash)    Home Medications Reviewed  Hospital Medications:   MEDICATIONS  (STANDING):  atorvastatin 20 milliGRAM(s) Oral at bedtime  famotidine    Tablet 20 milliGRAM(s) Oral daily  ferrous    sulfate 325 milliGRAM(s) Oral daily  melatonin 5 milliGRAM(s) Oral at bedtime  metoprolol succinate  milliGRAM(s) Oral daily    SOCIAL HISTORY:  Denies ETOH,Smoking,   FAMILY HISTORY:    Intubate      REVIEW OF SYSTEMS:  CONSTITUTIONAL: No weakness, fevers or chills  EYES/ENT: No visual changes;  No vertigo or throat pain   NECK: No pain or stiffness  RESPIRATORY: No cough, wheezing, hemoptysis; + shortness of breath  CARDIOVASCULAR: No chest pain or palpitations.  GASTROINTESTINAL: No abdominal or epigastric pain. No nausea, vomiting, or hematemesis; No diarrhea or constipation. No melena or hematochezia.  GENITOURINARY: No dysuria, frequency, foamy urine, urinary urgency, incontinence or hematuria  NEUROLOGICAL: No numbness or weakness  SKIN: No itching, burning, rashes, or lesions   VASCULAR: + bilateral lower extremity edema.   All other review of systems is negative unless indicated above.    VITALS:  Vital Signs Last 24 Hrs  T(C): 36.1 (15 Feb 2025 08:06), Max: 37 (2025 18:57)  T(F): 97 (15 Feb 2025 08:06), Max: 98.6 (2025 18:57)  HR: 62 (15 Feb 2025 08:06) (59 - 71)  BP: 110/66 (15 Feb 2025 08:06) (99/66 - 110/66)  BP(mean): 80 (15 Feb 2025 08:06) (77 - 80)  RR: 18 (15 Feb 2025 08:06) (18 - 19)  SpO2: 97% (15 Feb 2025 08:06) (96% - 97%)    Parameters below as of 15 Feb 2025 08:06  Patient On (Oxygen Delivery Method): room air        Height (cm): 165.1 ( @ 15:08)  PHYSICAL EXAM:  Constitutional: NAD  HEENT: anicteric sclera, oropharynx clear, MMM  Neck: No JVD  Respiratory: CTAB, no wheezes, rales or rhonchi  Cardiovascular: S1, S2, RRR  Gastrointestinal: BS+, soft, NT/ND  Extremities: No cyanosis or clubbing. 2+ LE peripheral edema  Neurological: A/O x 3, no focal deficits  Psychiatric: Normal mood, normal affect  : No CVA tenderness. No quevedo.   Skin: No rashes  Vascular Access:    LABS:      138  |  99  |  80[HH]  ----------------------------<  107[H]  5.4[H]   |  25  |  2.7[H]    Ca    9.0      2025 18:02  Mg     2.4     -15    TPro  5.9[L]  /  Alb  3.6  /  TBili  0.6  /  DBili      /  AST  33  /  ALT  27  /  AlkPhos  72  02-14    Creatinine Trend: 2.7 <--                        13.3   9.63  )-----------( 236      ( 15 Feb 2025 06:18 )             43.3     Urine Studies:  Urinalysis Basic - ( 2025 20:04 )    Color: Yellow / Appearance: Clear / S.025 / pH:   Gluc:  / Ketone: Negative mg/dL  / Bili: Negative / Urobili: 0.2 mg/dL   Blood:  / Protein: 100 mg/dL / Nitrite: Negative   Leuk Esterase: Negative / RBC: 1 /HPF / WBC 1 /HPF   Sq Epi:  / Non Sq Epi: 0 /HPF / Bacteria: Negative /HPF                RADIOLOGY & ADDITIONAL STUDIES:

## 2025-02-15 NOTE — ED ADULT NURSE REASSESSMENT NOTE - NS ED NURSE REASSESS COMMENT FT1
Patient reassessed. Patient A&O x3. Patient admitted to [facility], remains on cardiac monitor. No s/s pain/discomfort. IVL in place. Fall precautions in place. Safety & comfort measures maintained. Patient reassessed. Patient A&O x1. Patient admitted to TELE, remains on cardiac monitor. No s/s pain/discomfort. IVL in place. Fall precautions in place. Safety & comfort measures maintained.

## 2025-02-15 NOTE — PROGRESS NOTE ADULT - ASSESSMENT
#KATHY on CKD w/ Mild Hyperkalemia likely due to Increased Diuretic Dose / Dehydration  #Confusion and Agitation  - Patient follows w/ Dr. Macdonald, Dr. Stewart for HF, and Dr. Matias for EP  - AOx3 at baseline though has been more disoriented and confused  - Daily weight/Monitor output/Fluid Restrict  - Will hold Lasix, Farxiga, and Spironolactone for now  - C/w Metoprolol  - F/u repeat BMP, urine studies  - PT eval  - Will put on fluids for now and monitor volume status and O2 requirements  - Continue LR at 50cc/hr for now  - RBUS with PVR imaging    #Hx of Valve Replacement on Coumadin  - Coumadin 2mg M-F, 1mg on Sat and Sun  - Daily INR    #HLD  - C/w Atorvastatin    #Misc  #Code Status: DNR/Intubate  #DVT ppx: Warfarin  #GI ppx: Famotidine  #Diet: DASH, 1.2L fluid restriction  #Activity: AAT  #Inpatient Dispo: Telemetry  #Discharge Dispo: From Home

## 2025-02-15 NOTE — PHYSICAL THERAPY INITIAL EVALUATION ADULT - PERTINENT HX OF CURRENT PROBLEM, REHAB EVAL
Ms. 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 Since she was discharge in January the patient has been progressively having more episodes of confusion and agitation. Earlier today, the patient was not able to be lifted out of bed and she was very agitated and yelling per her aid. Collateral information was obtained from her nephew who states that her lasix was recently increased from Lasix 40mg AM and 20mg in PM to Lasix 40mg BID. She was found to have KATHY on her labs and was admitted for further management.

## 2025-02-15 NOTE — PROGRESS NOTE ADULT - SUBJECTIVE AND OBJECTIVE BOX
SUBJECTIVE/OVERNIGHT EVENTS  Today is hospital day 1d. This morning patient was seen and examined at bedside, resting comfortably in bed. No acute or major events overnight.    MEDICATIONS  STANDING MEDICATIONS  atorvastatin 20 milliGRAM(s) Oral at bedtime  famotidine    Tablet 20 milliGRAM(s) Oral daily  ferrous    sulfate 325 milliGRAM(s) Oral daily  lactated ringers. 1000 milliLiter(s) IV Continuous <Continuous>  melatonin 5 milliGRAM(s) Oral at bedtime  metoprolol succinate  milliGRAM(s) Oral daily    PRN MEDICATIONS  acetaminophen     Tablet .. 650 milliGRAM(s) Oral every 6 hours PRN    VITALS  T(F): 97 (02-15-25 @ 08:06), Max: 98.6 (02-14-25 @ 18:57)  HR: 62 (02-15-25 @ 08:06) (59 - 71)  BP: 110/66 (02-15-25 @ 08:06) (99/66 - 110/66)  RR: 18 (02-15-25 @ 08:06) (18 - 19)  SpO2: 97% (02-15-25 @ 08:06) (96% - 97%)    PHYSICAL EXAM  GENERAL: NAD  HEART: RRR, S1, S2, JVP 8cm and IVC 1.6 and non-collapsing  LUNGS: CTAB  ABDOMEN: NTND  EXTREMITIES: 2+ edema  NERVOUS SYSTEM: AO3 but lethargic  SKIN: No rash    LABS             13.3   9.63  )-----------( 236      ( 02-15-25 @ 06:18 )             43.3     137  |  100  |  82  -------------------------<  75   02-15-25 @ 06:18  4.7  |  21  |  2.7    Ca      9.0     02-15-25 @ 06:18  Mg     2.4     02-15-25 @ 06:18    TPro  5.9  /  Alb  3.6  /  TBili  0.6  /  DBili  x   /  AST  33  /  ALT  27  /  AlkPhos  72  /  GGT  x     02-14-25 @ 18:02    PT/INR - ( 02-15-25 @ 06:18 )   PT: 35.60 sec[H];   INR: 2.95 ratio[H]  PTT - ( 02-14-25 @ 18:02 )  PTT:46.5 sec    Troponin T, High Sensitivity Result: 36 ng/L (02-14-25 @ 21:05)  Troponin T, High Sensitivity Result: 41 ng/L (02-14-25 @ 18:02)  Pro-Brain Natriuretic Peptide: 31058 pg/mL (02-14-25 @ 18:02)    Urinalysis Basic - ( 15 Feb 2025 06:18 )    Color: x / Appearance: x / SG: x / pH: x  Gluc: 75 mg/dL / Ketone: x  / Bili: x / Urobili: x   Blood: x / Protein: x / Nitrite: x   Leuk Esterase: x / RBC: x / WBC x   Sq Epi: x / Non Sq Epi: x / Bacteria: x          Urinalysis with Rflx Culture (collected 14 Feb 2025 20:04)      IMAGING

## 2025-02-15 NOTE — PHYSICAL THERAPY INITIAL EVALUATION ADULT - GENERAL OBSERVATIONS, REHAB EVAL
7:38-8:04am Pt encountered supine in bed, A & O x 2, no c/o pain and agreeable to PT. Pt requires Max A in bed mobility and sit/stand transfer. Pt ambulated 4 ft Mod A using RW with decreased step length. Pt demonstrated limited ambulation secondary to weakness and impaired balance. Pt will benefit from skilled PT 3-5x/wk for thera ex, functional mobility training, balance activity and gait training to improve mobility and return to previous level of function.

## 2025-02-15 NOTE — PHYSICAL THERAPY INITIAL EVALUATION ADULT - STANDING BALANCE: STATIC
[facility]  Primary Care       PATIENT NAME: Nathanael Omalley   : 1986    AGE: 36 y.o. DATE: 2023    MRN: [id number]        Reason for Visit / Chief Complaint:  Hypertension (recheck) and other (Pt has been  having  some neck pain down right arm  with tingling and numbness of digits 2-5 )     Subjective:     HPI: Patient here for follow up elevated blood pressure/HTN. Patient states he is taking the blood pressure medication as prescribed; also states he has been taking the topamax as prescribed. States he has not had any more headaches. Blood pressure has much improved since last visit.    Patient states he has pain to right arm at night. States the pain radiates from neck down to right arm; states he has numbness and tingling to right hand.     Patient states he has been having a lot of constipation. States he has seen streaks of blood in stool.     Hypertension  Associated symptoms include neck pain. Pertinent negatives include no chest pain, headaches or shortness of breath.        Review of Systems: Review of Systems   Constitutional:  Negative for fever.   Respiratory:  Negative for cough and shortness of breath.    Cardiovascular:  Negative for chest pain.   Genitourinary:  Negative for dysuria.   Musculoskeletal:  Positive for neck pain. Negative for gait problem.   Skin:  Negative for rash.   Neurological:  Positive for numbness (numbness and tingling to right hand/fingers). Negative for headaches.        Review of patient's allergies indicates:   Allergen Reactions    Ketchup     Orange juice         Med List:  Current Outpatient Medications on File Prior to Visit   Medication Sig Dispense Refill    olmesartan-hydrochlorothiazide (BENICAR HCT) 20-12.5 mg per tablet Take 1 tablet by mouth once daily. 90 tablet 3    topiramate (TOPAMAX) 25 MG tablet Take 1 tablet (25 mg total) by mouth every evening. For headache 30 tablet 11     No current facility-administered medications on  "file prior to visit.       Medical/Social/Family History:  Past Medical History:   Diagnosis Date    Arthritis     Asthma       Social History     Tobacco Use   Smoking Status Never   Smokeless Tobacco Never      Social History     Substance and Sexual Activity   Alcohol Use Never       Family History   Problem Relation Age of Onset    Hypertension Mother     Hyperlipidemia Mother     Heart disease Father     Diabetes Maternal Aunt     Breast cancer Paternal Grandmother       History reviewed. No pertinent surgical history.   Immunization History   Administered Date(s) Administered    COVID-19, MRNA, LN-S, PF (MODERNA FULL 0.5 ML DOSE) 12/02/2021, 12/30/2021          Objective:      Vitals:    02/22/23 1541   BP: 131/87   BP Location: Left arm   Patient Position: Sitting   BP Method: Large (Manual)   Pulse: 92   Resp: 20   Temp: 98.4 °F (36.9 °C)   TempSrc: Oral   SpO2: 97%   Weight: 107 kg (236 lb)   Height: 5' 2" (1.575 m)     Body mass index is 43.16 kg/m².     Physical Exam: Physical Exam  Constitutional:       Appearance: Normal appearance.   HENT:      Head: Normocephalic.      Mouth/Throat:      Mouth: Mucous membranes are moist.   Eyes:      Extraocular Movements: Extraocular movements intact.      Conjunctiva/sclera: Conjunctivae normal.      Pupils: Pupils are equal, round, and reactive to light.   Cardiovascular:      Rate and Rhythm: Normal rate and regular rhythm.      Heart sounds: Normal heart sounds.   Pulmonary:      Effort: Pulmonary effort is normal.      Breath sounds: Normal breath sounds.   Musculoskeletal:         General: Tenderness (patient has tenderness to left, right, and posterior neck with palpation.) present. Normal range of motion.      Cervical back: Normal range of motion.   Skin:     General: Skin is warm and dry.   Neurological:      General: No focal deficit present.      Mental Status: He is alert and oriented to person, place, and time.      Gait: Gait normal.   Psychiatric:    "      Mood and Affect: Mood normal.         Behavior: Behavior normal.              Assessment:          ICD-10-CM ICD-9-CM   1. Primary hypertension  I10 401.9   2. Nonintractable headache, unspecified chronicity pattern, unspecified headache type  R51.9 784.0   3. Neck pain  M54.2 723.1   4. Numbness and tingling in right hand  R20.0 782.0    R20.2    5. Constipation, unspecified constipation type  K59.00 564.00   6. Blood in stool  K92.1 578.1   7. Low back pain, unspecified back pain laterality, unspecified chronicity, unspecified whether sciatica present  M54.50 724.2        Plan:       Primary hypertension  -     Cancel: X-Ray Cervical Spine AP And Lateral; Future; Expected date: 02/22/2023    Nonintractable headache, unspecified chronicity pattern, unspecified headache type  -     Ambulatory referral/consult to Neurology; Future; Expected date: 03/01/2023    Neck pain  -     ketorolac injection 30 mg  -     orphenadrine injection 30 mg  -     Ambulatory referral/consult to Physical/Occupational Therapy; Future; Expected date: 03/01/2023    Numbness and tingling in right hand  -     Ambulatory referral/consult to Neurology; Future; Expected date: 03/01/2023    Constipation, unspecified constipation type  -     lactulose (CHRONULAC) 10 gram/15 mL solution; Take 15 mLs (10 g total) by mouth 2 (two) times daily.  Dispense: 237 mL; Refill: 2    Blood in stool  -     Ambulatory referral/consult to Gastroenterology; Future; Expected date: 03/01/2023    Low back pain, unspecified back pain laterality, unspecified chronicity, unspecified whether sciatica present  -     Ambulatory referral/consult to Physical/Occupational Therapy; Future; Expected date: 03/01/2023          New & refilled meds:  Requested Prescriptions     Signed Prescriptions Disp Refills    lactulose (CHRONULAC) 10 gram/15 mL solution 237 mL 2     Sig: Take 15 mLs (10 g total) by mouth 2 (two) times daily.       Follow up in about 3 months (around  "5/22/2023), or if symptoms worsen or fail to improve, for Hypertension.     Patient Instructions   Patient Education       Controlling Your Blood Pressure Through Lifestyle   The Basics   Written by the doctors and editors at [facility]   What does my lifestyle have to do with my blood pressure? -- The things you do and the foods you eat have a big effect on your blood pressure and your overall health. Following the right lifestyle can:  Lower your blood pressure or keep you from getting high blood pressure in the first place  Reduce your need for blood pressure medicines  Make medicines for high blood pressure work better, if you do take them  Lower the chances that you'll have a heart attack or stroke, or develop kidney disease  Which lifestyle choices will help lower my blood pressure? -- Here's what you can do:  Lose weight (if you are overweight)  Choose a diet rich in fruits, vegetables, and low-fat dairy products, and low in meats, sweets, and refined grains  Eat less salt (sodium)  Do something active for at least 30 minutes a day on most days of the week  Limit the amount of alcohol you drink  If you have high blood pressure, it's also very important to quit smoking (if you smoke). Quitting smoking might not bring your blood pressure down. But it will lower the chances that you'll have a heart attack or stroke, and it will help you feel better and live longer.  Start low and go slow -- The changes listed above might sound like a lot, but don't worry. You don't have to change everything all at once. The key to improving your lifestyle is to "start low and go slow." Choose 1 small, specific thing to change and try doing it for a while. If it works for you, keep doing it until it becomes a habit. If it doesn't, don't give up. Choose something else to change and see how that goes.  Let's say, for example, that you would like to improve your diet. If you're the type of person who eats cheeseburgers and [place] " "fries all the time, you can't switch to eating just salads from one day to the next. When people try to make changes like that, they often fail. Then they feel frustrated and tend to give up. So instead of trying to change everything about your diet in 1 day, change 1 or 2 small things about your diet and give yourself time to get used to those changes. For instance, keep the cheeseburger but give up the French fries. Or eat the same things but cut your portions in half.  As you find things that you are able to change and stick with, keep adding new changes. In time, you will see that you can actually change a lot. You just have to get used to the changes slowly.  Lose weight -- When people think about losing weight, they sometimes make it more complicated than it really is. To lose weight, you have to either eat less or move more. If you do both of those things, it's even better. But there is no single weight-loss diet or activity that's better than any other. When it comes to weight loss, the most effective plan is the one that you'll stick with.  Improve your diet -- There is no single diet that is right for everyone. But in general, a healthy diet can include:  Lots of fruits, vegetables, and whole grains  Some beans, peas, lentils, chickpeas, and similar foods  Some nuts, such as walnuts, almonds, and peanuts  Fat-free or low-fat milk and milk products  Some fish  To have a healthy diet, it's also important to limit or avoid sugar, sweets, meats, and refined grains. (Refined grains are found in white bread, white rice, most forms of pasta, and most packaged "snack" foods.)  Reduce salt -- Many people think that eating a low-sodium diet means avoiding the salt shaker and not adding salt when cooking. The truth is, not adding salt at the table or when you cook will only help a little. Almost all of the sodium you eat is already in the food you buy at the grocery store or at restaurants (figure 1).  The most " "important thing you can do to cut down on sodium is to eat less processed food. That means that you should avoid most foods that are sold in cans, boxes, jars, and bags. You should also eat in restaurants less often.  To reduce the amount of sodium you get, buy fresh or fresh-frozen fruits, vegetables, and meats. (Fresh-frozen foods have had nothing added to them before freezing.) Then you can make meals at home, from scratch, with these ingredients.  As with the other changes, don't try to cut out salt all at once. Instead, choose 1 or 2 foods that have a lot of sodium and try to replace them with low-sodium choices. When you get used to those low-sodium options, find another food or 2 to change. Then keep going, until all the foods you eat are sodium-free or low in sodium.  Become more active -- If you want to be more active, you don't have to go to the gym or get all sweaty. It is possible to increase your activity level while doing everyday things you enjoy. Walking, gardening, and dancing are just a few of the things that you might try. As with all the other changes, the key is not to do too much too fast. If you don't do any activity now, start by walking for just a few minutes every other day. Do that for a few weeks. If you stick with it, try doing it for longer. But if you find that you don't like walking, try a different activity.  Drink less alcohol -- If you are a woman, do not have more than 1 "standard drink" of alcohol a day. If you are a man, do not have more than 2. A "standard drink" is:  A can or bottle that has 12 ounces of beer  A glass that has 5 ounces of wine  A shot that has 1.5 ounces of whiskey  Where should I start? -- If you want to improve your lifestyle, start by making the changes that you think would be easiest for you. If you used to exercise and just got out of the habit, maybe it would be easy for you to start exercising again. Or if you actually like cooking meals from scratch, " "maybe the first thing you should focus on is eating home-cooked meals that are low in sodium.  Whatever you tackle first, choose specific, realistic goals, and give yourself a deadline. For example, do not decide that you are going to "exercise more." Instead, decide that you are going to walk for 10 minutes on Monday, Wednesday, and Friday, and that you are going to do this for the next 2 weeks.  When lifestyle changes are too general, people have a hard time following through.  Now go. You can do it!  All topics are updated as new evidence becomes available and our peer review process is complete.  This topic retrieved from [employer] on: Sep 21, 2021.  Topic 53372 Version 8.0  Release: 29.4.2 - C29.263  © 2021 UpToDate, Inc. and/or its affiliates. All rights reserved.  figure 1: Sources of sodium in your diet     Graphic 10997 Version 2.0    Consumer Information Use and Disclaimer   This information is not specific medical advice and does not replace information you receive from your health care provider. This is only a brief summary of general information. It does NOT include all information about conditions, illnesses, injuries, tests, procedures, treatments, therapies, discharge instructions or life-style choices that may apply to you. You must talk with your health care provider for complete information about your health and treatment options. This information should not be used to decide whether or not to accept your health care provider's advice, instructions or recommendations. Only your health care provider has the knowledge and training to provide advice that is right for you. The use of this information is governed by the [employer] End User License Agreement, available at [email or web address].The use of [employer] content is governed by the [employer] Terms of Use. ©2021 UpToDate, Inc. All rights reserved.  Copyright   © 2021 UpToDate, Inc. and/or its affiliates. All " rights reserved.         Signature: Kristy Card DNP, FNP-C     w/ RW/fair minus

## 2025-02-16 NOTE — PROGRESS NOTE ADULT - SUBJECTIVE AND OBJECTIVE BOX
INTERVAL HPI/OVERNIGHT EVENTS:    SUBJECTIVE: Patient seen and examined at bedside.     no cp, sob, abd pain, fever    OBJECTIVE:    VITAL SIGNS:  Vital Signs Last 24 Hrs  T(C): 36.6 (16 Feb 2025 07:38), Max: 36.6 (15 Feb 2025 17:30)  T(F): 97.8 (16 Feb 2025 07:38), Max: 97.8 (15 Feb 2025 17:30)  HR: 67 (16 Feb 2025 07:38) (63 - 67)  BP: 97/55 (16 Feb 2025 07:38) (85/60 - 100/72)  BP(mean): 69 (16 Feb 2025 07:38) (68 - 69)  RR: 18 (16 Feb 2025 07:38) (18 - 18)  SpO2: 97% (16 Feb 2025 07:38) (93% - 100%)    Parameters below as of 16 Feb 2025 07:38  Patient On (Oxygen Delivery Method): nasal cannula          PHYSICAL EXAM:    General: NAD  HEENT: NC/AT; PERRL, clear conjunctiva  Neck: supple  Respiratory: bl crackles  Cardiovascular: +S1/S2; RRR  Abdomen: soft, NT/ND; +BS x4  Extremities: WWP, 2+ peripheral pulses b/l; no LE edema  Skin: normal color and turgor; no rash  Neurological:    MEDICATIONS:  MEDICATIONS  (STANDING):  atorvastatin 20 milliGRAM(s) Oral at bedtime  famotidine    Tablet 20 milliGRAM(s) Oral daily  ferrous    sulfate 325 milliGRAM(s) Oral daily  melatonin 5 milliGRAM(s) Oral at bedtime  metoprolol succinate  milliGRAM(s) Oral daily  sodium zirconium cyclosilicate 5 Gram(s) Oral two times a day    MEDICATIONS  (PRN):  acetaminophen     Tablet .. 650 milliGRAM(s) Oral every 6 hours PRN Temp greater or equal to 38C (100.4F), Mild Pain (1 - 3), Moderate Pain (4 - 6)      ALLERGIES:  Allergies    sulfamethoxazole (Rash)    Intolerances        LABS:                        13.0   13.52 )-----------( 220      ( 16 Feb 2025 07:11 )             44.3     Hemoglobin: 13.0 g/dL (02-16 @ 07:11)  Hemoglobin: 13.3 g/dL (02-15 @ 06:18)  Hemoglobin: 13.1 g/dL (02-14 @ 18:02)    CBC Full  -  ( 16 Feb 2025 07:11 )  WBC Count : 13.52 K/uL  RBC Count : 4.51 M/uL  Hemoglobin : 13.0 g/dL  Hematocrit : 44.3 %  Platelet Count - Automated : 220 K/uL  Mean Cell Volume : 98.2 fL  Mean Cell Hemoglobin : 28.8 pg  Mean Cell Hemoglobin Concentration : 29.3 g/dL  Auto Neutrophil # : x  Auto Lymphocyte # : x  Auto Monocyte # : x  Auto Eosinophil # : x  Auto Basophil # : x  Auto Neutrophil % : x  Auto Lymphocyte % : x  Auto Monocyte % : x  Auto Eosinophil % : x  Auto Basophil % : x    02-16    142  |  101  |  93[HH]  ----------------------------<  95  5.4[H]   |  22  |  3.2[H]    Ca    8.9      16 Feb 2025 07:11  Mg     2.6     02-16    TPro  5.4[L]  /  Alb  3.5  /  TBili  0.7  /  DBili  x   /  AST  34  /  ALT  39  /  AlkPhos  91  02-16    Creatinine Trend: 3.2<--, 2.7<--, 2.7<--  LIVER FUNCTIONS - ( 16 Feb 2025 07:11 )  Alb: 3.5 g/dL / Pro: 5.4 g/dL / ALK PHOS: 91 U/L / ALT: 39 U/L / AST: 34 U/L / GGT: x           PT/INR - ( 16 Feb 2025 07:11 )   PT: >40.00 sec;   INR: 5.07 ratio         PTT - ( 14 Feb 2025 18:02 )  PTT:46.5 sec    hs Troponin:                36 <<== 02-14-25 @ 21:05                41 <<== 02-14-25 @ 18:02            Urinalysis Basic - ( 16 Feb 2025 07:11 )    Color: x / Appearance: x / SG: x / pH: x  Gluc: 95 mg/dL / Ketone: x  / Bili: x / Urobili: x   Blood: x / Protein: x / Nitrite: x   Leuk Esterase: x / RBC: x / WBC x   Sq Epi: x / Non Sq Epi: x / Bacteria: x      CSF:                      EKG:   MICROBIOLOGY:    Urinalysis with Rflx Culture (collected 14 Feb 2025 20:04)      IMAGING:      Labs, imaging, EKG personally reviewed    RADIOLOGY & ADDITIONAL TESTS: Reviewed.

## 2025-02-16 NOTE — PROGRESS NOTE ADULT - PROBLEM SELECTOR PLAN 2
toxic metabolic encephalopathy  cth neg  appears resolving  rvp neg  ua neg  cxr as below  b12, folate, tsh; rpr

## 2025-02-16 NOTE — PROGRESS NOTE ADULT - NSPROGADDITIONALINFOA_GEN_ALL_CORE
#Progress Note Handoff  Pending (specify): dianelys, inr, cards, tte, cxr  Family discussion: d/w pt at bedside  Disposition: home vs. snf .

## 2025-02-16 NOTE — PATIENT PROFILE ADULT - FUNCTIONAL ASSESSMENT - BASIC MOBILITY 6.
3-calculated by average/Not able to assess (calculate score using [facility] averaging method)

## 2025-02-16 NOTE — PATIENT PROFILE ADULT - FALL HARM RISK - TYPE OF ASSESSMENT
[facility]    
Admission

## 2025-02-16 NOTE — PROGRESS NOTE ADULT - SUBJECTIVE AND OBJECTIVE BOX
Nephrology progress note    Patient is seen and examined, events over the last 24 h noted.  Renal function slightly worsening, unclear UOP.    Allergies:  sulfamethoxazole (Rash)    Hospital Medications:   MEDICATIONS  (STANDING):  atorvastatin 20 milliGRAM(s) Oral at bedtime  famotidine    Tablet 20 milliGRAM(s) Oral daily  ferrous    sulfate 325 milliGRAM(s) Oral daily  furosemide   Injectable 60 milliGRAM(s) IV Push once  melatonin 5 milliGRAM(s) Oral at bedtime  metoprolol succinate  milliGRAM(s) Oral daily  sodium zirconium cyclosilicate 5 Gram(s) Oral two times a day        VITALS:  T(F): 97.8 (02-16-25 @ 07:38), Max: 97.8 (02-15-25 @ 17:30)  HR: 67 (02-16-25 @ 07:38)  BP: 97/55 (02-16-25 @ 07:38)  RR: 18 (02-16-25 @ 07:38)  SpO2: 97% (02-16-25 @ 07:38)  Wt(kg): --        PHYSICAL EXAM:  Constitutional: NAD  HEENT: anicteric sclera, oropharynx clear, MMM  Neck: No JVD  Respiratory: CTAB, no wheezes, rales or rhonchi  Cardiovascular: S1, S2, RRR  Gastrointestinal: BS+, soft, NT/ND  Extremities: No cyanosis or clubbing. No peripheral edema  :  No quevedo.   Skin: No rashes    LABS:  02-16    142  |  101  |  93[HH]  ----------------------------<  95  5.4[H]   |  22  |  3.2[H]    Ca    8.9      16 Feb 2025 07:11  Mg     2.6     02-16    TPro  5.4[L]  /  Alb  3.5  /  TBili  0.7  /  DBili      /  AST  34  /  ALT  39  /  AlkPhos  91  02-16                          13.0   13.52 )-----------( 220      ( 16 Feb 2025 07:11 )             44.3       Urine Studies:  Urinalysis Basic - ( 16 Feb 2025 07:11 )    Color:  / Appearance:  / SG:  / pH:   Gluc: 95 mg/dL / Ketone:   / Bili:  / Urobili:    Blood:  / Protein:  / Nitrite:    Leuk Esterase:  / RBC:  / WBC    Sq Epi:  / Non Sq Epi:  / Bacteria:         RADIOLOGY & ADDITIONAL STUDIES:

## 2025-02-16 NOTE — PROGRESS NOTE ADULT - ASSESSMENT
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  Noted recent adjustment in outpatient diuretic regimen.  Nephrology consulted for mild hyperkalemia and KATHY on CKD.    #HFmrEF  #prosthetic MV  #PAF  #Dyslipidemia  #Hyperkalemia  #KATHY on CKD stage 3a  Etiology likely 2/2 intravascular volume depletion from recent increase in Lasix dose  Possible type 2 CRS (chronic heart failure affecting kidney function)  RBUS noting small kidneys likely reflecting CKD, no hydro  - ok with lasix use as written  - cardio consult  - obtain new TTE  - continue resin binders for K>5.7  - dose medications for eGFR <30  - no acute indication for RRT at this time  - avoid nephrotoxins:  NSAIDS, IV contrast, Aminoglycosides, fleet enemas  - Low K, Low Phos diet    Nephrology will follow

## 2025-02-17 NOTE — DISCHARGE NOTE NURSING/CASE MANAGEMENT/SOCIAL WORK - PATIENT PORTAL LINK FT
You can access the FollowMyHealth Patient Portal offered by [facility] by registering at the following website: [email or web address]. By joining [employer]’s FollowMyHealth portal, you will also be able to view your health information using other applications (apps) compatible with our system.

## 2025-02-17 NOTE — DISCHARGE NOTE NURSING/CASE MANAGEMENT/SOCIAL WORK - FINANCIAL ASSISTANCE
[facility] provides services at a reduced cost to those who are determined to be eligible through [facility]’s financial assistance program. Information regarding [facility]’s financial assistance program can be found by going to [email or web address] or by calling [phone].

## 2025-02-17 NOTE — PROGRESS NOTE ADULT - SUBJECTIVE AND OBJECTIVE BOX
Nephrology progress note    Patient was seen and examined, events over the last 24 h noted .    Allergies:  sulfamethoxazole (Rash)    Hospital Medications:   MEDICATIONS  (STANDING):  atorvastatin 20 milliGRAM(s) Oral at bedtime  famotidine    Tablet 20 milliGRAM(s) Oral daily  ferrous    sulfate 325 milliGRAM(s) Oral daily  melatonin 5 milliGRAM(s) Oral at bedtime  metoprolol succinate  milliGRAM(s) Oral daily        VITALS:  T(F): 97.1 (02-17-25 @ 08:11), Max: 97.6 (02-16-25 @ 16:06)  HR: 64 (02-17-25 @ 08:11)  BP: 91/60 (02-17-25 @ 08:11)  RR: 18 (02-17-25 @ 08:11)  SpO2: 96% (02-17-25 @ 08:11)  Wt(kg): --        PHYSICAL EXAM:  Constitutional: NAD  HEENT: anicteric sclera, oropharynx clear, MMM  Neck: No JVD  Respiratory: CTAB, no wheezes, rales or rhonchi  Cardiovascular: S1, S2, RRR  Gastrointestinal: BS+, soft, NT/ND  Extremities: No cyanosis or clubbing. No peripheral edema  :  No quevedo.   Skin: No rashes    LABS:  02-16    142  |  101  |  93[HH]  ----------------------------<  95  5.4[H]   |  22  |  3.2[H]    Ca    8.9      16 Feb 2025 07:11  Mg     2.6     02-16    TPro  5.4[L]  /  Alb  3.5  /  TBili  0.7  /  DBili      /  AST  34  /  ALT  39  /  AlkPhos  91  02-16                          13.0   13.52 )-----------( 220      ( 16 Feb 2025 07:11 )             44.3       Urine Studies:  Urinalysis Basic - ( 16 Feb 2025 07:11 )    Color:  / Appearance:  / SG:  / pH:   Gluc: 95 mg/dL / Ketone:   / Bili:  / Urobili:    Blood:  / Protein:  / Nitrite:    Leuk Esterase:  / RBC:  / WBC    Sq Epi:  / Non Sq Epi:  / Bacteria:         RADIOLOGY & ADDITIONAL STUDIES:

## 2025-02-17 NOTE — PROGRESS NOTE ADULT - SUBJECTIVE AND OBJECTIVE BOX
SUBJECTIVE/OVERNIGHT EVENTS  Today is hospital day 3d. This morning patient was seen and examined at bedside, resting comfortably in bed. No acute or major events overnight.        MEDICATIONS  STANDING MEDICATIONS  atorvastatin 20 milliGRAM(s) Oral at bedtime  famotidine    Tablet 20 milliGRAM(s) Oral daily  ferrous    sulfate 325 milliGRAM(s) Oral daily  melatonin 5 milliGRAM(s) Oral at bedtime  metoprolol succinate  milliGRAM(s) Oral daily    PRN MEDICATIONS  acetaminophen     Tablet .. 650 milliGRAM(s) Oral every 6 hours PRN    VITALS  T(F): 97.1 (02-17-25 @ 08:11), Max: 97.6 (02-16-25 @ 16:06)  HR: 64 (02-17-25 @ 08:11) (64 - 72)  BP: 91/60 (02-17-25 @ 08:11) (83/61 - 111/64)  RR: 18 (02-17-25 @ 08:11) (18 - 20)  SpO2: 96% (02-17-25 @ 08:11) (95% - 100%)  POCT Blood Glucose.: 98 mg/dL (02-16-25 @ 17:23)    PHYSICAL EXAM  GENERAL  (x  ) NAD, lying in bed comfortably     (  ) obtunded     (  ) lethargic     (  ) somnolent    HEAD  (  ) Atraumatic     (  ) hematoma     (  ) laceration (specify location:       )     NECK  (  ) Supple     (  ) neck stiffness     (  ) nuchal rigidity     (  )  no JVD     (  ) JVD present ( -- cm)    HEART  Rate -->  (x  ) normal rate    (  ) bradycardic    (  ) tachycardic  Rhythm -->  (x  ) regular    (  ) regularly irregular    (  ) irregularly irregular  Murmurs -->  (  ) normal s1/s2    (  ) systolic murmur    (  ) diastolic murmur    (  ) continuous murmur     (  ) S3 present    (  ) S4 present    LUNGS  (  )Unlabored respirations     (  ) tachypnea  (  ) B/L air entry     (  ) decreased breath sounds in:  (location     )    (  ) no adventitious sound     ( x ) crackles  - mild b/l lower  (  ) wheezing      (  ) rhonchi      (specify location:       )  (  ) chest wall tenderness (specify location:       )    ABDOMEN  (x  ) Soft     (  ) tense   |   (  ) nondistended     (  ) distended   |   (  ) +BS     (  ) hypoactive bowel sounds     (  ) hyperactive bowel sounds  (  ) nontender     (  ) RUQ tenderness     (  ) RLQ tenderness     (  ) LLQ tenderness     (  ) epigastric tenderness     (  ) diffuse tenderness  (  ) Splenomegaly      (  ) Hepatomegaly      (  ) Jaundice     (  ) ecchymosis     EXTREMITIES  (  ) Normal     (  ) Rash     (  ) ecchymosis     (  ) varicose veins      (x  ) pitting edema     (  ) non-pitting edema   (  ) ulceration     (  ) gangrene:     (location:     )        LABS             13.0   13.52 )-----------( 220      ( 02-16-25 @ 07:11 )             44.3     142  |  101  |  93  -------------------------<  95   02-16-25 @ 07:11  5.4  |  22  |  3.2    Ca      8.9     02-16-25 @ 07:11  Mg     2.6     02-16-25 @ 07:11    TPro  5.4  /  Alb  3.5  /  TBili  0.7  /  DBili  x   /  AST  34  /  ALT  39  /  AlkPhos  91  /  GGT  x     02-16-25 @ 07:11    PT/INR - ( 02-16-25 @ 07:11 )   PT: >40.00 sec[H];   INR: 5.07 ratio[HH]    Troponin T, High Sensitivity Result: 36 ng/L (02-14-25 @ 21:05)  Troponin T, High Sensitivity Result: 41 ng/L (02-14-25 @ 18:02)  Pro-Brain Natriuretic Peptide: 98059 pg/mL (02-14-25 @ 18:02)    Urinalysis Basic - ( 16 Feb 2025 07:11 )    Color: x / Appearance: x / SG: x / pH: x  Gluc: 95 mg/dL / Ketone: x  / Bili: x / Urobili: x   Blood: x / Protein: x / Nitrite: x   Leuk Esterase: x / RBC: x / WBC x   Sq Epi: x / Non Sq Epi: x / Bacteria: x          Urinalysis with Rflx Culture (collected 14 Feb 2025 20:04)      IMAGING

## 2025-02-17 NOTE — PROGRESS NOTE ADULT - ASSESSMENT
89F PMHx HFpEF, s/p AICD, pAF, mech MVR on coumadin presents for altered mental status and KATHY    # KATHY (acute kidney injury).   -concern for cardiorenal  -cxr bl opacities, effusions  - renal us- Senescent appearance of the kidneys. No hydronephrosis.  - fu cardio 2/17  - echo - pending as of 2/17    #Altered mental status.   - toxic metabolic encephalopathy  -cth neg  -rvp neg  -ua neg  -b12, folate, tsh; rpr ordered  - mental status improved    #mitral valve replacement with mechanical valve  #Chronic atrial fibrillation.   # Supratherapeutic INR.   - on coumadin  - coumadin per INR levels  -toprol 200    # Chronic heart failure with preserved ejection fraction (HFpEF).   - outpt f/u.       89F PMHx HFpEF, s/p AICD, pAF, mech MVR on coumadin presents for altered mental status and KATHY    # KATHY (acute kidney injury).   -concern for cardiorenal  -cxr bl opacities, effusions  - renal us- Senescent appearance of the kidneys. No hydronephrosis.  - fu cardio 2/17  - echo - pending as of 2/17    #Altered mental status.   - toxic metabolic encephalopathy  -cth neg  -rvp neg  -ua neg  -b12, folate, tsh; rpr ordered  - mental status improved    #mitral valve replacement with mechanical valve  #Chronic atrial fibrillation.   # Chronic heart failure with preserved ejection fraction  # Supratherapeutic INR.   - on coumadin  - coumadin per INR levels  -toprol 200

## 2025-02-17 NOTE — DISCHARGE NOTE NURSING/CASE MANAGEMENT/SOCIAL WORK - NSDCVIVACCINE_GEN_ALL_CORE_FT
Influenza, high-dose, trivalent, preservative free; 08-Jan-2025 15:24; Griffin Galeana); [employer]; [id number] (Exp. Date: 01-Jun-2025); IntraMuscular; Deltoid Right.; 0.5 milliLiter(s); VIS (VIS Published: 06-Aug-2021, VIS Presented: 08-Jan-2025);

## 2025-02-17 NOTE — ED ADULT NURSE NOTE - BIRTH SEX
first arm  Independent    LB Dressing Moderate Assist     Minimal Assist    Bathing Moderate Assist  LB tasks due to incontinent of bowel, assist with legs and buttocks, pt able to clean periare while seated on BSC  Minimal Assist    Toileting Moderate Assist   Using BSC, assist with hygiene while standing, assist with clothing management  Minimal Assist    Bed Mobility  Supine to sit: Minimal Assist   Sit to supine: Minimal Assist     Supine to sit: Independent   Sit to supine: Independent    Functional Transfers Minimal Assist   Sit<>stand  Bed, BSC  Cuing on hand placement, body mechanics and balance. Unsteadiness upon standing. Independent    Functional Mobility Minimal Assist   Using [employer], within room  Cuing on AD management, 1LOB with assist to correct. Cuing for pace while turning. Modified [place]    Balance Sitting:     Static:  Fair+    Dynamic:fair+  Standing: fair  Sitting:     Static:  good    Dynamic:good  Standing: fair+   Activity Tolerance Fair  Fair+   Visual/  Perceptual Glasses: Yes   WFL       Hand Dominance Right     Strength ROM Additional Info:    RUE  4/5  WFL good  and wfl FMC/dexterity noted during ADL tasks       LUE 4/5  WFL good  and wfl FMC/dexterity noted during ADL tasks       Hearing: WFL   Sensation:  No c/o numbness or tingling   Tone: WFL   Edema: None noted    Comments:   Nursing approved therapy session. Upon arrival, patient supine in bed and agreeable to OT session, visitors present. Therapist educated pt on role of OT. At end of session, patient supine in bed with call light and phone within reach, alarm on, all lines and tubes intact; nursing aware. Pt demonstrated fair+ understanding of education/techniques and decreased independence and safety during completion of ADL/functional transfer/mobility tasks.  Pt would benefit from continued skilled OT to increase safety and independence with completion of ADL/IADL tasks for functional independence and quality of recommendations to increase functional independence within precautions  Training on energy conservation strategies/techniques to improve independence/tolerance for self-care routine  Functional transfer/mobility training/DME recommendations for increased independence, safety, and fall prevention  Patient/Family education to increase follow through with safety techniques and functional independence  Recommendation of environmental modifications for increased safety with functional transfers/mobility and ADLs  Therapeutic exercise to improve motor endurance, ROM, and functional strength for ADLs/functional transfers  Therapeutic activities to facilitate/challenge dynamic balance, stand tolerance, fine motor dexterity/in-hand manipulation for increased independence with ADLs  Neuro-muscular re-education: facilitation of righting/equilibrium reactions, midline orientation, scapular stability/mobility, normalization of muscle tone, and facilitation of volitional active controled movement    Rehab Potential: Good for established goals     Patient / Family Goal: Return home      Patient and/or family were instructed on functional diagnosis, prognosis/goals and OT plan of care. Demonstrated fair+ understanding. Eval Complexity: Low      Time In: 1358  Time Out: 1425  Total Treatment Time: 23    Min Units   OT Eval Low 97165  X  1   OT Eval Medium 13978      OT Eval High 05368       OT Re-Eval [id number]       Therapeutic Ex 80943       Therapeutic Activities 45465  10 1    ADL/Self Care 76508  13 1    Orthotic Management 70023       Neuro Re-Ed 03856       Non-Billable Time          Evaluation Time includes thorough review of current medical information, gathering information on past medical history/social history and prior level of function, completion of standardized testing/informal observation of tasks, assessment of data and education on plan of care and goals.     Jennifer Shafer OTR/L #[id number] Female

## 2025-02-17 NOTE — PROGRESS NOTE ADULT - ATTENDING COMMENTS
#KATHY (acute kidney injury).   concern for cardiorenal  cxr bl opacities, effusions  renal us no hydro  s/p iv lasix 2/16  cards eval  tte mod-severe AS, MR  repeat bmp    #Altered mental status.   toxic metabolic encephalopathy  cth neg  appears resolving  rvp neg  ua neg  cxr as below    #Supratherapeutic INR.   on coumadin  no evidence bleed  hold coumadin  repeat inr    #Progress Note Handoff  Pending (specify): kathy, inr, monitor mental status  Family discussion: d/w pt at bedside  Disposition: home vs. snf .

## 2025-02-17 NOTE — DISCHARGE NOTE NURSING/CASE MANAGEMENT/SOCIAL WORK - INFLUENZA IMMUNIZATION DATE (APPROXIMATE):
PT RE-EVALUATION    Today's date: 2023  Patient name: Emilia Rothman  : 1949  MRN: [id number]  Referring provider: Toña Tan MD  Dx:   Encounter Diagnosis     ICD-10-CM    1. Piriformis syndrome, right  G57.01       2. Iliotibial band syndrome, right  M76.31       3. Right lumbar radiculopathy  M54.16                      Assessment  Assessment details: 70 yo female dx with right gluteus minimus tear, fatty infiltration of gluteus medium muscle, OA of L hip, Right ITB syndrome, R piriformis syndrome with R sacroiliitis, s/p R THR and leg length discrepancy: Pt atttending  physical therapy for stretching  strengthening program with ambulation training. Pt with known hx of lumbar surgery, radiofrequency ablation in pain management. Prior and current use of heel lift for leg length discrepency. Current amb with use of SLC limited tolerances to short distances currently secondary to pain and gait dysfunction. Prior hx of lumbar fusion L4-5 and pain provocation with lumbar extension and R SB. Pt is anticipated to benefit from skilled care to address impairments and achieve goals outlined. Pt R hip abduction strength 4-/5 to 4/5. Gait dysfunction of dec. Stance time RLE, antalgic gait. POP of R ITB< piriformis and gluteus medius m.    Impairments: abnormal gait, abnormal or restricted ROM, impaired physical strength, lacks appropriate home exercise program, pain with function, weight-bearing intolerance and poor posture   Other impairment: SLS LLE 60 seconds,  RLE 20 seconds with sway no pain increase    Symptom irritability: moderateUnderstanding of Dx/Px/POC: good   Prognosis: fair  Prognosis details: Chronic pain, tear of gluteus minimus, lumbar fusion, prior injections may impact ability to achieve rehab goals of care    Goals  Goals  STG 2-4 weeks  Increase  R hip single limb stance to 20-30 seconds to improve 08-Jan-2025 hip/pelvioc stability with gait-  plateaued at  57-60 seconds  Increase  Core and hip strength by  1/2 MMT  To allow for improved ambulation tolerances and decrased hip pain, no greater than 1-2/10 amb. 40-50 min.with least AD- not achieved  LTG 4-6 weeks:  Increase strength R hip and core mm by 1 MMT to allow for ambulation no to least AD 60 min with pain no greater than 2/10 R hip no t achieved  Improve SLB RLE to 40-60 seconds to allow for improved endurance stability of hip/pelvis with ambulation not achieved- progressing  Patient I with final HEP and self managements to self manage and reduce likelihood of recurrence following d/c to HEP      Plan  Plan details: Care to cont as directed by Dr. Kiran Mcdonnell following assessment 12/7/23. Patient would benefit from: skilled physical therapy  Planned modality interventions: cryotherapy and unattended electrical stimulation  Planned therapy interventions: abdominal trunk stabilization, gait training, functional ROM exercises, stretching, strengthening, neuromuscular re-education, manual therapy, home exercise program, therapeutic exercise, postural training and patient education  Frequency: 2x week  Duration in weeks: 5  Plan of Care beginning date: 11/28/2023  Plan of Care expiration date: 1/2/2024  Treatment plan discussed with: patient        Subjective Evaluation    History of Present Illness  Mechanism of injury:  Pt notes she feels some plateau in progress since initial levels with onset of care in PT. She notes she is compliant with her HEP most days. She notes difficulty with standing tolerances, walking tolerances, stair climbing and functional WB. Sh enotes pian is worse in the AM OOB and improves with use of medications. Pt. Notes pain persists in R posterolateral hip which increases with increase in weight bearing activity. She notes decrease in motivation for ambulation as her hip fatigues and gets sore even with use of cane.  She notes not being able to participate with decorating  and homekeeping, family outings as she would like. She notes thoracic and L hip pain  and at times lumbar pain, all do contribute to her decrease in activity tolerances but feels the R hip is the primary reason for her decrease in WB function. She is seeking care from Dr. Lizette Vega Thursday to learn /discuss if a surgical approach is an option to improve her function. Pt notes she continues to use heel lift,  AD of cane, salon pas, arnica gel, tylenol, mobic or celebrex, CP. No recent  injections in either hip. But + PMH Of injections. Pt medications, PMH and allergies reviewed in chart and with  patient today. Recurrent probem    Quality of life: good    Patient Goals  Patient goals for therapy: decreased pain and increased strength  Patient goal: decrease pain medications to manage her pain, to stand to complete cooking, cleaning, ambulation longer and on stairs improved  Pain  Current pain ratin  At best pain ratin  At worst pain ratin  Location: R buttock, gluteal region and lateral hip, at times thoracic  and lumbar regions  Quality: sharp, discomfort and dull ache  Relieving factors: relaxation, rest, ice and medications  Aggravating factors: standing, walking, stair climbing and lifting  Progression: worsening    Social Support  Steps to enter house: yes  Stairs in house: yes   Lives in: multiple-level home  Lives with: spouse    Employment status: not working (retired)  Hand dominance: right      Diagnostic Tests  Abnormal MRI: tear of gluteus minimus, atrophy of gluteus medius. Treatments  Previous treatment: physical therapy, medication and injection treatment  Current treatment: medication        Objective     Static Posture     Comments  Increase in RLE leg length, R knee flexed during stance, WB on lateral aspect of L foot during stance.      Postural Observations  Seated posture: fair  Standing posture: fair  Correction of posture: makes symptoms worse    Additional Postural Observation Details  Minimal to moderate forward head posturing in seated and standing as well as with ambulation. Pt with increasing lumbar extension toward neutral increases pain in R lower lumbar region and R buttock lateral hip with out radiation into RLE. Palpation   Left   No palpable tenderness to the erector spinae. Right   Hypertonic in the erector spinae. Tenderness of the erector spinae. Trigger point to erector spinae.      Additional Palpation Details  Palpation of R TFL and ITB  pain reported, hypertonic  Palpation of R piriformis m hypertonic and painful    Neurological Testing     Sensation     Lumbar   Left   Intact: light touch    Right   Intact: light touch    Reflexes   Left   Patellar (L4): normal (2+)  Achilles (S1): trace (1+)    Right   Patellar (L4): normal (2+)  Achilles (S1): trace (1+)    Active Range of Motion   Cervical/Thoracic Spine       Thoracic    Flexion:  Restriction level: minimal  Extension:  with pain Restriction level: maximal  Left lateral flexion:  Restriction level: moderate  Right lateral flexion:  Restriction level: moderate  Left rotation:  Restriction level: moderate  Right rotation:  Restriction level: moderate    Lumbar   Flexion:  Restriction level: minimal  Extension:  with pain Restriction level: maximal  Left lateral flexion:  Restriction level: moderate  Right lateral flexion:  with pain Restriction level: maximal    Passive Range of Motion     Additional Passive Range of Motion Details  Flexibility of R ITB  WFLs without pain provocation R hip , LE    Strength/Myotome Testing     Left Hip   Planes of Motion   Flexion: 4+  Extension: 4  Abduction: 4  Adduction: 4+  External rotation: 4+  Internal rotation: 4+    Right Hip   Planes of Motion   Flexion: 4+  Extension: 4  Abduction: 4 (4-/5 to 4/5 , dec in endurance)  Adduction: 4+  External rotation: 4+  Internal rotation: 4    Left Knee   Flexion: 4+  Extension: 4+    Right Knee   Flexion: 4+  Extension: 4+    Left Ankle/Foot   Dorsiflexion: 4+  Plantar flexion: 4+  Inversion: 4+  Eversion: 4+  Great toe flexion: 4+  Great toe extension: 4+    Right Ankle/Foot   Dorsiflexion: 4+  Plantar flexion: 4+  Eversion: 4+  Great toe flexion: 4+  Great toe extension: 4+    Additional Strength Details  TA activation fair to good  hooklying  and seated    Tests     Lumbar     Left   Negative passive SLR. Right   Negative passive SLR. Right Hip   Positive long sit. Ambulation     Observational Gait   Gait: antalgic   Decreased walking speed, stride length and right stance time.      Additional Observational Gait Details  12/5/23- use of SLC with RUE no arm swing, no trunk rotation           Precautions: Lumbar surgery, R THR, L hip OA     Daily Treatment Diary:      Initial Evaluation Date: 10/24/23                                       POC 1/2/2024  Compliance 10/24  10/26  10/30  11/2  11/6  11/9  11/16  11/20  11/30  12/5   Visit Number 1 2  3  4  5  6  7  8  9  10   Re-Eval  [facility]   [facility]          10/24  10/26  10/30  11/2  11/6  11/9  11/16  11/20  11/30 12/5   Manual                      STM R lumbar PVMs, R piri, ITB/TFL   15'  15'  15'  KL  10 PC  10'  thoracic 10 min  R glutes/piri and thoracic 15 min  R Glut, piri, ITB   IASTM same                      Gentle passive ITB no combined flexion and adduction     3 x 30" supine  3 x 30" supine  3 x 30" supine  3 x 30" supine  3 x 30" supine  3 x 30" supine  -  3 x 30     Ther-Ex                      TA setting H/L 10x 5"  5"x15  5" x 15  5"x15  5"x15  10x 2" dead bugs  10x 2" dead bugs  20x 2" dead bugs  20x 2" dead bugs  20x2"  Dead bugs   LTR short arc 10x  5"x10  5" x 10  5"x10  5"x10  5" x 10 5"x10  5"x10  5"x10  5"x10   Hip abd w/knee ext S/L B 10x  15x L, 10x R  2x 10 R and L  2x10 B   2x10 B  3x10 R only  2#  2# 2x10 ea stand               Fire hydrants  1# 2 x 10 L S/L Fire hydrants  1# 2 x 10 L S/L Fire hydrants  1# 2 x 10 L S/L S/L clams 2x10 Fire hydrants 2 x 10 L S/L -   Th ext over roll               open book 5"x10 ea  open book 5"x10 ea D/c   Lumbar ext standing   5"x5  5" x 5  5"x10 over counter  5"x10 over counter  nc           Seated UTR B   10x ea  10x ea      10x 5"  10x 5"  5"x10  5"x10     Seated TA with scap retractions   5"x10  5" x 10  5"x10  5"x10  standing red tband 10x 5" cues for equal weight distrib  standing red tband 15x 5" cues for equal weight distrib  np       B BLE bridge           U bridges 10x r and L  10x ea U bridge  10x ea U bridge  10x ea U bridge  2 x 10 RLE bridge with TA                         nustep   L1 6 min  L1 10m  L2 10 min   L2 10 min  L 2 10  L2 10 min  np       Neuro Re-Ed                      Mini squats     10x 2 x stopped sec to R knee pain  held            SLB Each LE 3 x 10-20 seconds HR A   Ta c hip add        5"X20 5"x20 Ball sqz 5" x 2m   TA c hip abd        GTB 5"x20 GTB 5"x20 Pilates [place] 5" x 20   Lunges with TA             Step ups/lateral          6" 10 x each LE-noted R knee pain as limiting                             Ther-Act                                                               Modalities                      CP/HP R hip   10m 10min  5 mins 10m HP R hip Cp 10 min post Cp 10 min post Cp 10m post CP 10 m post   TENs R hip

## 2025-02-17 NOTE — PROGRESS NOTE ADULT - ASSESSMENT
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  Noted recent adjustment in outpatient diuretic regimen.  Nephrology consulted for mild hyperkalemia and KATHY on CKD.    #HFmrEF  #prosthetic MV  #PAF  #Dyslipidemia  #Hyperkalemia  #KATHY on CKD stage 3a  Etiology likely 2/2 intravascular volume depletion from recent increase in Lasix dose  Possible type 2 CRS (chronic heart failure affecting kidney function)  RBUS noting small kidneys likely reflecting CKD, no hydro  - agree w/ holdign lasix   - obtain new TTE  - can goive Lokelma for mild hyperK   - dose medications for eGFR <30  - no acute indication for RRT at this time  - avoid nephrotoxins:  NSAIDS, IV contrast, Aminoglycosides, fleet enemas  - Low K, Low Phos diet

## 2025-02-18 NOTE — PROGRESS NOTE ADULT - SUBJECTIVE AND OBJECTIVE BOX
SUBJECTIVE/OVERNIGHT EVENTS  Today is hospital day 4d. This morning patient was seen and examined at bedside, resting comfortably in bed. No acute or major events overnight.    HOSPITAL COURSE      CODE STATUS:    FAMILY COMMUNICATION  Contact date:  Name of person contacted:  Relationship to patient:  Communication details:    MEDICATIONS  STANDING MEDICATIONS  atorvastatin 20 milliGRAM(s) Oral at bedtime  famotidine    Tablet 20 milliGRAM(s) Oral daily  ferrous    sulfate 325 milliGRAM(s) Oral daily  furosemide   Injectable 40 milliGRAM(s) IV Push two times a day  melatonin 5 milliGRAM(s) Oral at bedtime  metoprolol succinate  milliGRAM(s) Oral daily    PRN MEDICATIONS  acetaminophen     Tablet .. 650 milliGRAM(s) Oral every 6 hours PRN    VITALS  T(F): 97.3 (02-18-25 @ 05:05), Max: 97.3 (02-18-25 @ 05:05)  HR: 80 (02-18-25 @ 05:05) (64 - 80)  BP: 115/70 (02-18-25 @ 05:05) (86/54 - 115/72)  RR: 18 (02-18-25 @ 05:05) (18 - 19)  SpO2: 97% (02-18-25 @ 05:05) (96% - 97%)    PHYSICAL EXAM  GENERAL  (x  ) NAD, lying in bed comfortably     (  ) obtunded     (  ) lethargic     (  ) somnolent    HEAD  ( x ) Atraumatic     (  ) hematoma     (  ) laceration (specify location:       )     NECK  ( x ) Supple     (  ) neck stiffness     (  ) nuchal rigidity     (  )  no JVD     (  ) JVD present ( -- cm)    HEART  Rate -->  (  x) normal rate    (  ) bradycardic    (  ) tachycardic  Rhythm -->  (  ) regular    (  ) regularly irregular    (  ) irregularly irregular  Murmurs -->  (  ) normal s1/s2    (  ) systolic murmur    (  ) diastolic murmur    (  ) continuous murmur     (  ) S3 present    (  ) S4 present    LUNGS  (x  )Unlabored respirations     (  ) tachypnea  (  ) B/L air entry     (  ) decreased breath sounds in:  (location     )    (  ) no adventitious sound     (  ) crackles     (  ) wheezing      (  ) rhonchi      (specify location:       )  (  ) chest wall tenderness (specify location:       )    ABDOMEN  ( x ) Soft     (  ) tense   |   (  ) nondistended     (  ) distended   |   (  ) +BS     (  ) hypoactive bowel sounds     (  ) hyperactive bowel sounds  (  ) nontender     (  ) RUQ tenderness     (  ) RLQ tenderness     (  ) LLQ tenderness     (  ) epigastric tenderness     (  ) diffuse tenderness  (  ) Splenomegaly      (  ) Hepatomegaly      (  ) Jaundice     (  ) ecchymosis     EXTREMITIES  (x  ) Normal     (  ) Rash     (  ) ecchymosis     (  ) varicose veins      (  ) pitting edema     (  ) non-pitting edema   (  ) ulceration     (  ) gangrene:     (location:     )    NERVOUS SYSTEM  ( x ) A&Ox3     (  ) confused     (  ) lethargic  CN II-XII:     (  ) Intact     (  ) focal deficits  (Specify:     )   Upper extremities:     (  ) strength X/5     (  ) focal deficit (specify:    )  Lower extremities:     (  ) strength  X/5    (  ) focal deficit (specify:    )    SKIN  (  x) No rashes or lesions     (  ) maculopapular rash     (  ) pustules     (  ) vesicles     (  ) ulcer     (  ) ecchymosis     (specify location:     )    (  ) Indwelling Patterson Catheter   Date insterted:    Reason (  ) Critical illness     (  ) urinary retention    (  ) Accurate Ins/Outs Monitoring     (  ) CMO patient    (  ) Central Line  Date inserted:  Location: (  ) Right IJ   (  ) Left IJ   (  ) Right Fem   (  ) Left Fem    (  ) SPC  (  ) pigtail  (  ) PEG tube  (  ) colostomy  (  ) jejunostomy  (  ) U-Dall    LABS             12.6   13.78 )-----------( 208      ( 02-17-25 @ 11:37 )             42.9     139  |  98  |  95  -------------------------<  96   02-17-25 @ 11:37  4.9  |  21  |  3.3    Ca      8.6     02-17-25 @ 11:37  Mg     2.7     02-17-25 @ 11:37      PT/INR - ( 02-17-25 @ 11:37 )   PT: >40.00 sec[H];   INR: 6.70 ratio[HH]  PTT - ( 02-17-25 @ 11:37 )  PTT:51.3 sec      Urinalysis Basic - ( 17 Feb 2025 11:37 )    Color: x / Appearance: x / SG: x / pH: x  Gluc: 96 mg/dL / Ketone: x  / Bili: x / Urobili: x   Blood: x / Protein: x / Nitrite: x   Leuk Esterase: x / RBC: x / WBC x   Sq Epi: x / Non Sq Epi: x / Bacteria: x          IMAGING SUBJECTIVE/OVERNIGHT EVENTS  Today is hospital day 4d. This morning patient was seen and examined at bedside, resting comfortably in bed. No acute or major events overnight.    HOSPITAL COURSE      CODE STATUS:    FAMILY COMMUNICATION  Contact date:  Name of person contacted:  Relationship to patient:  Communication details:    MEDICATIONS  STANDING MEDICATIONS  atorvastatin 20 milliGRAM(s) Oral at bedtime  famotidine    Tablet 20 milliGRAM(s) Oral daily  ferrous    sulfate 325 milliGRAM(s) Oral daily  furosemide   Injectable 40 milliGRAM(s) IV Push two times a day  melatonin 5 milliGRAM(s) Oral at bedtime  metoprolol succinate  milliGRAM(s) Oral daily    PRN MEDICATIONS  acetaminophen     Tablet .. 650 milliGRAM(s) Oral every 6 hours PRN    VITALS  T(F): 97.3 (02-18-25 @ 05:05), Max: 97.3 (02-18-25 @ 05:05)  HR: 80 (02-18-25 @ 05:05) (64 - 80)  BP: 115/70 (02-18-25 @ 05:05) (86/54 - 115/72)  RR: 18 (02-18-25 @ 05:05) (18 - 19)  SpO2: 97% (02-18-25 @ 05:05) (96% - 97%)    PHYSICAL EXAM  GENERAL  (x  ) NAD, lying in bed comfortably     (  ) obtunded     (  ) lethargic     (  ) somnolent    HEAD  ( x ) Atraumatic     (  ) hematoma     (  ) laceration (specify location:       )     NECK  ( x ) Supple     (  ) neck stiffness     (  ) nuchal rigidity     (  )  no JVD     (  ) JVD present ( -- cm)    HEART  Rate -->  (  x) normal rate    (  ) bradycardic    (  ) tachycardic  Rhythm -->  (  ) regular    (  ) regularly irregular    (  ) irregularly irregular  Murmurs -->  (  ) normal s1/s2    (  ) systolic murmur    (  ) diastolic murmur    (  ) continuous murmur     (  ) S3 present    (  ) S4 present    LUNGS  (x  )Unlabored respirations     (  ) tachypnea  (  ) B/L air entry     (  ) decreased breath sounds in:  (location     )    (  ) no adventitious sound     (  ) crackles     (  ) wheezing      (  ) rhonchi      (specify location:       )  (  ) chest wall tenderness (specify location:       )    ABDOMEN  ( x ) Soft     (  ) tense   |   (  ) nondistended     (  ) distended   |   (  ) +BS     (  ) hypoactive bowel sounds     (  ) hyperactive bowel sounds  (  ) nontender     (  ) RUQ tenderness     (  ) RLQ tenderness     (  ) LLQ tenderness     (  ) epigastric tenderness     (  ) diffuse tenderness  (  ) Splenomegaly      (  ) Hepatomegaly      (  ) Jaundice     (  ) ecchymosis     EXTREMITIES  ( ) Normal     (  ) Rash     (  ) ecchymosis     (  ) varicose veins      ( x ) pitting edema     (  ) non-pitting edema   (  ) ulceration     (  ) gangrene:     (location:     )    NERVOUS SYSTEM  ( x ) A&Ox3     (  ) confused     (  ) lethargic  CN II-XII:     (  ) Intact     (  ) focal deficits  (Specify:     )   Upper extremities:     (  ) strength X/5     (  ) focal deficit (specify:    )  Lower extremities:     (  ) strength  X/5    (  ) focal deficit (specify:    )    SKIN  (  x) No rashes or lesions     (  ) maculopapular rash     (  ) pustules     (  ) vesicles     (  ) ulcer     (  ) ecchymosis     (specify location:     )    (  ) Indwelling Patterson Catheter   Date insterted:    Reason (  ) Critical illness     (  ) urinary retention    (  ) Accurate Ins/Outs Monitoring     (  ) CMO patient    (  ) Central Line  Date inserted:  Location: (  ) Right IJ   (  ) Left IJ   (  ) Right Fem   (  ) Left Fem    (  ) SPC  (  ) pigtail  (  ) PEG tube  (  ) colostomy  (  ) jejunostomy  (  ) U-Dall    LABS             12.6   13.78 )-----------( 208      ( 02-17-25 @ 11:37 )             42.9     139  |  98  |  95  -------------------------<  96   02-17-25 @ 11:37  4.9  |  21  |  3.3    Ca      8.6     02-17-25 @ 11:37  Mg     2.7     02-17-25 @ 11:37      PT/INR - ( 02-17-25 @ 11:37 )   PT: >40.00 sec[H];   INR: 6.70 ratio[HH]  PTT - ( 02-17-25 @ 11:37 )  PTT:51.3 sec      Urinalysis Basic - ( 17 Feb 2025 11:37 )    Color: x / Appearance: x / SG: x / pH: x  Gluc: 96 mg/dL / Ketone: x  / Bili: x / Urobili: x   Blood: x / Protein: x / Nitrite: x   Leuk Esterase: x / RBC: x / WBC x   Sq Epi: x / Non Sq Epi: x / Bacteria: x          IMAGING

## 2025-02-18 NOTE — CONSULT NOTE ADULT - SUBJECTIVE AND OBJECTIVE BOX
Date of Admission: 02/14/25    CHIEF COMPLAINT: AMS     HISTORY OF PRESENT ILLNESS:       PAST MEDICAL & SURGICAL HISTORY:  Afib  HTN (hypertension)  Heart failure  H/O mitral valve replacement  H/O prior ablation treatment  afib    FAMILY HISTORY:  [ ] no pertinent family history of premature cardiovascular disease in first degree relatives.  Mother:   Father:   Siblings:     SOCIAL HISTORY:    [ ] Non-smoker  [ ] Smoker  [ ] Alcohol    Allergies    sulfamethoxazole (Rash)    Intolerances    REVIEW OF SYSTEMS: A complete 10-point review of systems was obtained and is negative except as stated in the interval history.    PHYSICAL EXAM:  T(C): 36.2 (02-18-25 @ 13:38), Max: 36.3 (02-18-25 @ 05:05)  HR: 75 (02-18-25 @ 13:38) (65 - 80)  BP: 110/62 (02-18-25 @ 13:38) (86/54 - 115/72)  RR: 18 (02-18-25 @ 13:38) (18 - 19)  SpO2: 97% (02-18-25 @ 05:05) (96% - 97%)  Wt(kg): --  I&O's Summary    17 Feb 2025 07:01  -  18 Feb 2025 07:00  --------------------------------------------------------  IN: 360 mL / OUT: 350 mL / NET: 10 mL    18 Feb 2025 07:01  -  18 Feb 2025 14:49  --------------------------------------------------------  IN: 240 mL / OUT: 100 mL / NET: 140 mL    General Appearance: well appearing, normal for age and gender. 	  Neck: normal JVP,  Eyes: Extra Ocular muscles intact.   Cardiovascular: regular rate and rhythm S1 S2, No JVD, No murmurs, No edema  Respiratory: Lungs clear to auscultation	  Psychiatry: Alert and oriented x 3, Mood & affect appropriate  Gastrointestinal:  Soft, Non-tender  Skin/Integumentary: No rashes, No ecchymoses, No cyanosis	  Neurologic: Non-focal  Musculoskeletal/ extremities: Normal range of motion, No clubbing, cyanosis or edema  Vascular: Peripheral pulses palpable 2+ bilaterally    LABS:	 	                          13.1   13.12 )-----------( 237      ( 18 Feb 2025 06:45 )             44.6     02-18    141  |  99  |  98[HH]  ----------------------------<  70  4.8   |  24  |  3.3[H]    Ca    8.9      18 Feb 2025 06:45  Mg     2.7     02-18    TPro  5.5[L]  /  Alb  3.4[L]  /  TBili  1.1  /  DBili  x   /  AST  76[H]  /  ALT  115[H]  /  AlkPhos  110  02-18    PT/INR - ( 18 Feb 2025 06:45 )   PT: >40.00 sec;   INR: 6.25 ratio         PTT - ( 18 Feb 2025 06:45 )  PTT:51.7 sec    CARDIAC MARKERS:    Home Medications:  Aldactone 25 mg oral tablet: 1 tab(s) orally once a day (14 Feb 2025 22:50)  furosemide 40 mg oral tablet: 1 tab(s) orally 2 times a day (14 Feb 2025 22:49)  warfarin: 2 milligram(s) orally once a day 2 mg on Monday till Friday, then 1 mg on Saturday and Sunday (14 Feb 2025 22:50)    MEDICATIONS  (STANDING):  chlorhexidine 2% Cloths 1 Application(s) Topical daily  famotidine    Tablet 20 milliGRAM(s) Oral daily  ferrous    sulfate 325 milliGRAM(s) Oral daily  melatonin 5 milliGRAM(s) Oral at bedtime  metoprolol succinate  milliGRAM(s) Oral daily    MEDICATIONS  (PRN):  acetaminophen     Tablet .. 650 milliGRAM(s) Oral every 6 hours PRN Temp greater or equal to 38C (100.4F), Mild Pain (1 - 3), Moderate Pain (4 - 6)         Date of Admission: 02/14/25    CHIEF COMPLAINT: AMS     HISTORY OF PRESENT ILLNESS:   89 year old female, follows with Dr. 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 Labs noted for KATHY (cr 2.7) on admission, baseline cr 1.6. ProBNP 22k (previously 42k), CXR showing b/l pleural effusions and started on IV Lasix 40 mg BID then discontinued to due to worsening creatinine. Of note, patient was last seen in the HF clinic ~ 1 week ago on 2/10/24, noted to be mildly hypervolemic on exam, JVP ~8 cm. Home medications: lasix 40 mg AM/ 20 mg PM, Toprol  mg QD, Farxiga 10 mg QD, spironolactone 25 mg QD. Patient lives at home alone with her HHA who supports her medical care.      PAST MEDICAL & SURGICAL HISTORY:  Afib  HTN (hypertension)  Heart failure  H/O mitral valve replacement  H/O prior ablation treatment  afib    FAMILY HISTORY:  [ ] no pertinent family history of premature cardiovascular disease in first degree relatives.  Mother:   Father:   Siblings:     SOCIAL HISTORY:    [ ] Non-smoker  [ ] Smoker  [ ] Alcohol    Allergies    sulfamethoxazole (Rash)    Intolerances    REVIEW OF SYSTEMS: A complete 10-point review of systems was obtained and is negative except as stated in the interval history.    PHYSICAL EXAM:  T(C): 36.2 (02-18-25 @ 13:38), Max: 36.3 (02-18-25 @ 05:05)  HR: 75 (02-18-25 @ 13:38) (65 - 80)  BP: 110/62 (02-18-25 @ 13:38) (86/54 - 115/72)  RR: 18 (02-18-25 @ 13:38) (18 - 19)  SpO2: 97% (02-18-25 @ 05:05) (96% - 97%)  Wt(kg): --    I&O's Summary    17 Feb 2025 07:01  -  18 Feb 2025 07:00  --------------------------------------------------------  IN: 360 mL / OUT: 350 mL / NET: 10 mL    18 Feb 2025 07:01  -  18 Feb 2025 14:49  --------------------------------------------------------  IN: 240 mL / OUT: 100 mL / NET: 140 mL    General Appearance: well appearing, normal for age and gender. 	  Neck: normal JVP,  Eyes: Extra Ocular muscles intact.   Cardiovascular: regular rate and rhythm S1 S2, No JVD, No murmurs, +1 b/l LE pitting edema.   Respiratory: b/l diminished BS  Psychiatry: Alert and oriented x 3, Mood & affect appropriate  Gastrointestinal:  Soft, Non-tender  Skin/Integumentary: No rashes, No ecchymoses, No cyanosis	  Neurologic: Non-focal  Musculoskeletal/ extremities: Normal range of motion, No clubbing, cyanosis.   Vascular: Peripheral pulses palpable 2+ bilaterally    LABS:	 	                          13.1   13.12 )-----------( 237      ( 18 Feb 2025 06:45 )             44.6     02-18    141  |  99  |  98[HH]  ----------------------------<  70  4.8   |  24  |  3.3[H]    Ca    8.9      18 Feb 2025 06:45  Mg     2.7     02-18    TPro  5.5[L]  /  Alb  3.4[L]  /  TBili  1.1  /  DBili  x   /  AST  76[H]  /  ALT  115[H]  /  AlkPhos  110  02-18    PT/INR - ( 18 Feb 2025 06:45 )   PT: >40.00 sec;   INR: 6.25 ratio         PTT - ( 18 Feb 2025 06:45 )  PTT:51.7 sec    CARDIAC MARKERS:    Home Medications:  Aldactone 25 mg oral tablet: 1 tab(s) orally once a day (14 Feb 2025 22:50)  furosemide 40 mg oral tablet: 1 tab(s) orally 2 times a day (14 Feb 2025 22:49)  warfarin: 2 milligram(s) orally once a day 2 mg on Monday till Friday, then 1 mg on Saturday and Sunday (14 Feb 2025 22:50)    MEDICATIONS  (STANDING):  chlorhexidine 2% Cloths 1 Application(s) Topical daily  famotidine    Tablet 20 milliGRAM(s) Oral daily  ferrous    sulfate 325 milliGRAM(s) Oral daily  melatonin 5 milliGRAM(s) Oral at bedtime  metoprolol succinate  milliGRAM(s) Oral daily    MEDICATIONS  (PRN):  acetaminophen     Tablet .. 650 milliGRAM(s) Oral every 6 hours PRN Temp greater or equal to 38C (100.4F), Mild Pain (1 - 3), Moderate Pain (4 - 6)

## 2025-02-18 NOTE — PROGRESS NOTE ADULT - TIME BILLING
direct patient care and chart review  -coordinated current plan of care with medical staff on MDR
time spent on review of labs, imaging studies, old records, obtaining history, personally examining patient, counselling and communicating with patient/ family, entering orders for medications/tests/etc, discussions with other health care providers, documentation in electronic health records, independent interpretation of labs, imaging/procedure results and care coordination.
time spent on review of labs, imaging studies, old records, obtaining history, personally examining patient, counselling and communicating with patient/ family, entering orders for medications/tests/etc, discussions with other health care providers, documentation in electronic health records, independent interpretation of labs, imaging/procedure results and care coordination.

## 2025-02-18 NOTE — PROGRESS NOTE ADULT - SUBJECTIVE AND OBJECTIVE BOX
seen and examined  24 h events noted   no distress         PAST HISTORY  --------------------------------------------------------------------------------  No significant changes to PMH, PSH, FHx, SHx, unless otherwise noted    ALLERGIES & MEDICATIONS  --------------------------------------------------------------------------------  Allergies    sulfamethoxazole (Rash)    Intolerances      Standing Inpatient Medications  chlorhexidine 2% Cloths 1 Application(s) Topical daily  famotidine    Tablet 20 milliGRAM(s) Oral daily  ferrous    sulfate 325 milliGRAM(s) Oral daily  melatonin 5 milliGRAM(s) Oral at bedtime  metoprolol succinate  milliGRAM(s) Oral daily    PRN Inpatient Medications  acetaminophen     Tablet .. 650 milliGRAM(s) Oral every 6 hours PRN          VITALS/PHYSICAL EXAM  --------------------------------------------------------------------------------  T(C): 36.3 (02-18-25 @ 05:05), Max: 36.3 (02-18-25 @ 05:05)  HR: 80 (02-18-25 @ 05:05) (65 - 80)  BP: 115/70 (02-18-25 @ 05:05) (86/54 - 115/72)  RR: 18 (02-18-25 @ 05:05) (18 - 19)  SpO2: 97% (02-18-25 @ 05:05) (96% - 97%)  Wt(kg): --        02-17-25 @ 07:01  -  02-18-25 @ 07:00  --------------------------------------------------------  IN: 360 mL / OUT: 350 mL / NET: 10 mL      Physical Exam:  	Gen: NAD  	Pulm: decrease BS  B/L  	CV: S1S2; no rub  	Abd: +distended      LABS/STUDIES  --------------------------------------------------------------------------------              13.1   13.12 >-----------<  237      [02-18-25 @ 06:45]              44.6     141  |  99  |  98  ----------------------------<  70      [02-18-25 @ 06:45]  4.8   |  24  |  3.3        Ca     8.9     [02-18-25 @ 06:45]      Mg     2.7     [02-18-25 @ 06:45]    TPro  5.5  /  Alb  3.4  /  TBili  1.1  /  DBili  x   /  AST  76  /  ALT  115  /  AlkPhos  110  [02-18-25 @ 06:45]    PT/INR: PT >40.00, INR 6.25       [02-18-25 @ 06:45]  PTT: 51.7       [02-18-25 @ 06:45]    Creatinine Trend:  SCr 3.3 [02-18 @ 06:45]  SCr 3.3 [02-17 @ 11:37]  SCr 3.2 [02-16 @ 07:11]  SCr 2.7 [02-15 @ 06:18]  SCr 2.7 [02-14 @ 18:02]    Urinalysis - [02-18-25 @ 06:45]      Color  / Appearance  / SG  / pH       Gluc 70 / Ketone   / Bili  / Urobili        Blood  / Protein  / Leuk Est  / Nitrite       RBC  / WBC  / Hyaline  / Gran  / Sq Epi  / Non Sq Epi  / Bacteria       Iron 29, TIBC 279, %sat 10      [01-04-25 @ 18:50]  Ferritin 119      [01-04-25 @ 18:50]

## 2025-02-18 NOTE — PROGRESS NOTE ADULT - SUBJECTIVE AND OBJECTIVE BOX
JORDAN CHEEMA  89y  Female      Patient is a 89y old  Female who presents with a chief complaint of Weakness and KATHY (2025 14:46)      INTERVAL HPI/OVERNIGHT EVENTS:    REVIEW OF SYSTEMS:  CONSTITUTIONAL: No fever, weight loss, or fatigue  EYES: No eye pain, visual disturbances, or discharge  NECK: No pain or stiffness  RESPIRATORY: No cough, wheezing, chills or hemoptysis; No shortness of breath  CARDIOVASCULAR: No chest pain, palpitations, dizziness, or leg swelling  GASTROINTESTINAL: No abdominal or epigastric pain. No nausea, vomiting, or hematemesis; No diarrhea or constipation. No melena or hematochezia.  GENITOURINARY: No dysuria, frequency, hematuria, or incontinence  NEUROLOGICAL: No headaches, memory loss, loss of strength, numbness, or tremors  MUSCULOSKELETAL: No joint pain or swelling; No muscle, back, or extremity pain    T(C): 36.2 (25 @ 13:38), Max: 36.3 (25 @ 05:05)  HR: 75 (25 @ 13:38) (65 - 80)  BP: 110/62 (25 @ 13:38) (86/54 - 115/72)  RR: 18 (25 @ 13:38) (18 - 19)  SpO2: 97% (25 @ 05:05) (96% - 97%)  Wt(kg): --Vital Signs Last 24 Hrs  T(C): 36.2 (2025 13:38), Max: 36.3 (2025 05:05)  T(F): 97.1 (2025 13:38), Max: 97.3 (2025 05:05)  HR: 75 (2025 13:38) (65 - 80)  BP: 110/62 (2025 13:38) (86/54 - 115/72)  BP(mean): 80 (2025 05:05) (65 - 83)  RR: 18 (2025 13:38) (18 - 19)  SpO2: 97% (2025 05:05) (96% - 97%)    Parameters below as of 2025 21:51  Patient On (Oxygen Delivery Method): nasal cannula  O2 Flow (L/min): 2      PHYSICAL EXAM:  GENERAL: NAD, well-groomed, well-developed  HEAD:  Atraumatic, Normocephalic  EYES: EOMI, PERRLA, conjunctiva and sclera clear  NERVOUS SYSTEM:  Alert & Oriented X 4, Good concentration; Motor Strength 5/5 B/L upper and lower extremities; DTRs 2+ intact and symmetric  CHEST/LUNG: Clear to percussion bilaterally; No rales, rhonchi, wheezing, or rubs  CV/HEART: Regular rate and rhythm; No murmurs, rubs, or gallops  GI/ABDOMEN: Soft, Nontender, Nondistended; Bowel sounds present  EXTREMITIES:  2+ Peripheral Pulses, No clubbing, cyanosis, or edema  SKIN: No rashes or lesions    LAB:                        13.1   13.12 )-----------( 237      ( 2025 06:45 )             44.6     02-    141  |  99  |  98[HH]  ----------------------------<  70  4.8   |  24  |  3.3[H]    Ca    8.9      2025 06:45  Mg     2.7     -18    TPro  5.5[L]  /  Alb  3.4[L]  /  TBili  1.1  /  DBili  x   /  AST  76[H]  /  ALT  115[H]  /  AlkPhos  110  -18    Daily Weight in k.9 (2025 05:48)  CAPILLARY BLOOD GLUCOSE        Urinalysis Basic - ( 2025 06:45 )    Color: x / Appearance: x / SG: x / pH: x  Gluc: 70 mg/dL / Ketone: x  / Bili: x / Urobili: x   Blood: x / Protein: x / Nitrite: x   Leuk Esterase: x / RBC: x / WBC x   Sq Epi: x / Non Sq Epi: x / Bacteria: x      LIVER FUNCTIONS - ( 2025 06:45 )  Alb: 3.4 g/dL / Pro: 5.5 g/dL / ALK PHOS: 110 U/L / ALT: 115 U/L / AST: 76 U/L / GGT: x               RADIOLOGY:    Imaging Personally visualized and  Reviewed:  [y ] YES  [ ] NO    HEALTH ISSUES - PROBLEM Dx:  Altered mental status    KATHY (acute kidney injury)    Supratherapeutic INR    Chronic heart failure with preserved ejection fraction (HFpEF)    Chronic atrial fibrillation    H/O mitral valve replacement with mechanical valve    On deep vein thrombosis (DVT) prophylaxis        MEDS;    acetaminophen     Tablet .. 650 milliGRAM(s) Oral every 6 hours PRN  chlorhexidine 2% Cloths 1 Application(s) Topical daily  famotidine    Tablet 20 milliGRAM(s) Oral daily  ferrous    sulfate 325 milliGRAM(s) Oral daily  melatonin 5 milliGRAM(s) Oral at bedtime  metoprolol succinate  milliGRAM(s) Oral daily        # Progress Note Handoff  PENDING as follows  consults:  Test: CBC  Other:  Family discussion:  Disposition:  Home ____ or SNF _likely____ Other _____ Unknown at this time ____       JORDAN CHEEMA  89y  Female      Patient is a 89y old  Female who presents with a chief complaint of Weakness and KATHY (2025 14:46)      INTERVAL HPI/OVERNIGHT EVENTS:  pt seen this am  -first encounter   -hold lasix and check IVC at bedside     -Vital Signs Last 24 Hrs  T(C): 36.2 (2025 13:38), Max: 36.3 (2025 05:05)  T(F): 97.1 (2025 13:38), Max: 97.3 (2025 05:05)  HR: 75 (2025 13:38) (65 - 80)  BP: 110/62 (2025 13:38) (86/54 - 115/72)  BP(mean): 80 (2025 05:05) (65 - 83)  RR: 18 (2025 13:38) (18 - 19)  SpO2: 97% (2025 05:05) (96% - 97%)    Parameters below as of 2025 21:51  Patient On (Oxygen Delivery Method): nasal cannula  O2 Flow (L/min): 2      PHYSICAL EXAM:  GENERAL: Not in distress   HEAD:  Atraumatic, Normocephalic  EYES: EOMI, PERRLA, conjunctiva and sclera clear  NERVOUS SYSTEM:  Alert & Oriented X 1  CHEST/LUNG: bibasilar crackles mild   CV/HEART: irregular rhythm   GI/ABDOMEN: Soft abdomen   EXTREMITIES:  + 1 edema LE     LAB:                        13.1   13.12 )-----------( 237      ( 2025 06:45 )             44.6     02-    141  |  99  |  98[HH]  ----------------------------<  70  4.8   |  24  |  3.3[H]    Ca    8.9      2025 06:45  Mg     2.7         TPro  5.5[L]  /  Alb  3.4[L]  /  TBili  1.1  /  DBili  x   /  AST  76[H]  /  ALT  115[H]  /  AlkPhos  110  -18    Daily Weight in k.9 (2025 05:48)  CAPILLARY BLOOD GLUCOSE        Urinalysis Basic - ( 2025 06:45 )    Color: x / Appearance: x / SG: x / pH: x  Gluc: 70 mg/dL / Ketone: x  / Bili: x / Urobili: x   Blood: x / Protein: x / Nitrite: x   Leuk Esterase: x / RBC: x / WBC x   Sq Epi: x / Non Sq Epi: x / Bacteria: x      LIVER FUNCTIONS - ( 2025 06:45 )  Alb: 3.4 g/dL / Pro: 5.5 g/dL / ALK PHOS: 110 U/L / ALT: 115 U/L / AST: 76 U/L / GGT: x               RADIOLOGY:    Imaging Personally visualized and  Reviewed:  [y ] YES  [ ] NO    HEALTH ISSUES - PROBLEM Dx:  Altered mental status    KATHY (acute kidney injury)    Supratherapeutic INR    Chronic heart failure with preserved ejection fraction (HFpEF)    Chronic atrial fibrillation    H/O mitral valve replacement with mechanical valve    On deep vein thrombosis (DVT) prophylaxis        MEDS;    acetaminophen     Tablet .. 650 milliGRAM(s) Oral every 6 hours PRN  chlorhexidine 2% Cloths 1 Application(s) Topical daily  famotidine    Tablet 20 milliGRAM(s) Oral daily  ferrous    sulfate 325 milliGRAM(s) Oral daily  melatonin 5 milliGRAM(s) Oral at bedtime  metoprolol succinate  milliGRAM(s) Oral daily

## 2025-02-18 NOTE — PROGRESS NOTE ADULT - ASSESSMENT
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  Noted recent adjustment in outpatient diuretic regimen.  Nephrology consulted for mild hyperkalemia and KATHY on CKD.  #HFmrEF  #prosthetic MV  #PAF  #Dyslipidemia  #Hyperkalemia  #KATHY on CKD stage 3a  Etiology likely 2/2 intravascular volume depletion from recent increase in Lasix dose  Possible type 2 CRS (chronic heart failure affecting kidney function)  RBUS noting small kidneys likely reflecting CKD, no hydro  - off diuretics   - cr stable    - echo : moderate to severe AS / moderate MR / cardiology f/up  - document accurate UO/ check bladder scan   - check ph level  - if bp remains on the low side/ start midodrine    - dose medications for eGFR <30  - no acute indication for RRT at this time  will follow

## 2025-02-18 NOTE — PROGRESS NOTE ADULT - ASSESSMENT
#KATHY on CKD w/ Mild Hyperkalemia likely due to Increased Diuretic Dose/Dehydration  #Confusion and Agitation  - Patient follows w/ Dr. Macdonald, Dr. Stewart for HF, and Dr. Matias for EP  - Recently had Lasix from 40/20 to 40/40  - Patient has 2+ pitting edema though no JVD or rales; with coinciding KATHY possibly intravascularly depleted  - Given 500cc bolus in ED  - K 5.4 slightly hemolyzed, given Lokelma in ED  - AOx3 at baseline though has been more disoriented and confused  - Daily weight/Monitor output/Fluid Restrict  - Will hold Lasix, Farxiga, and Spironolactone for now  - C/w Metoprolol  - F/u repeat BMP, urine studies  - PT eval    #Hx of Valve Replacement on Coumadin  - Coumadin 2mg M-F, 1mg on Sat and Sun  - Daily INR    #HLD  - C/w Atorvastatin    #Misc  #Code Status: DNR/Intubate  #DVT ppx: Warfarin  #GI ppx: Famotidine  #Diet: DASH, 1.2L fluid restriction  #Activity: AAT  #Inpatient Dispo: Telemetry  #Discharge Dispo: From Home         #KATHY on CKD w/ Mild Hyperkalemia likely due to Increased Diuretic Dose/Dehydration  #Confusion and Agitation  - Patient follows w/ Dr. Macdonald, Dr. Stewart for HF, and Dr. Matias for EP  - Recently had Lasix from 40/20 to 40/40  - Patient has 2+ pitting edema though no JVD or rales; with coinciding KATHY possibly intravascularly depleted  - Given 500cc bolus in ED  - K 5.4 slightly hemolyzed, given Lokelma in ED  - AOx3 at baseline though has been more disoriented and confused  - Daily weight/Monitor output/Fluid Restrict  - Will hold Lasix, Farxiga, and Spironolactone for now  - C/w Metoprolol  - F/u repeat BMP, urine studies  - PT eval    #Hx of Valve Replacement on Coumadin  - Coumadin 2mg M-F, 1mg on Sat and Sun  - Daily INR    #HLD  - C/w Atorvastatin    #elevated LFTs  - RUQ US  - hold statin  - trend LFTs    #Misc  #Code Status: DNR/Intubate  #DVT ppx: Warfarin  #GI ppx: Famotidine  #Diet: DASH, 1.2L fluid restriction  #Activity: AAT  #Inpatient Dispo: Telemetry  #Discharge Dispo: From Home         #KATHY on CKD w/ Mild Hyperkalemia likely due to Increased Diuretic Dose/Dehydration  #Confusion and Agitation  - Patient follows w/ Dr. Macdonald, Dr. Stewart for HF, and Dr. Matias for EP  - Recently had Lasix from 40/20 to 40/40  - Patient has 2+ pitting edema though no JVD or rales; with coinciding KATHY possibly intravascularly depleted  - Given 500cc bolus in ED  - K 5.4 slightly hemolyzed, given Lokelma in ED  - AOx3 at baseline though has been more disoriented and confused  - Daily weight/Monitor output/Fluid Restrict  - Will hold Lasix, Farxiga, and Spironolactone for now  - C/w Metoprolol  - F/u repeat BMP, urine studies  - PT eval  - f/u HF recs    #Hx of Valve Replacement on Coumadin  - Coumadin 2mg M-F, 1mg on Sat and Sun  - Daily INR    #HLD  - C/w Atorvastatin    #elevated LFTs  - RUQ US  - hold statin  - trend LFTs    #Misc  #Code Status: DNR/Intubate  #DVT ppx: Warfarin  #GI ppx: Famotidine  #Diet: DASH, 1.2L fluid restriction  #Activity: AAT  #Inpatient Dispo: Telemetry  #Discharge Dispo: From Home

## 2025-02-18 NOTE — PROGRESS NOTE ADULT - ASSESSMENT
89F PMHx HFpEF, s/p AICD, pAF, mech MVR on coumadin here with altered mental status. KATHY.     ·	 KATHY on CKD stage III - likely overdiuresis   -concern for cardiorenal  -received diuresis - hold lasix - IVC checked at bedside - collapsible; consulted HF team     ·	Toxic metabolic encephalopathy  -CT head negative   -likely renal failure     ·	CHF   Problem/Plan - 3:  ·  Problem: Supratherapeutic INR.   ·  Plan: on coumadin  no evidence bleed - no need for ffp or vit K  hold coumadin  -daily INR      Problem/Plan - 4:  ·  Problem: Chronic heart failure with preserved ejection fraction (HFpEF).   ·  Plan: outpt f/u.     Problem/Plan - 5:  ·  Problem: Chronic atrial fibrillation.   ·  Plan: toprol 200  hold coumadin.     Problem/Plan - 6:  ·  Problem: H/O mitral valve replacement with mechanical valve.   ·  Plan: hold coumadin.     Problem/Plan - 7:  ·  Problem: On deep vein thrombosis (DVT) prophylaxis.   ·  Plan: coumadin on hold.  INR > 6   89F PMHx HFpEF, s/p AICD, pAF, mech MVR on coumadin here with altered mental status. KATHY.     ·	 KATHY on CKD stage III - likely overdiuresis in the community  -concern for cardiorenal  -received diuresis - hold lasix - IVC checked at bedside - collapsible; consulted HF team     ·	Toxic metabolic encephalopathy  -CT head negative   -likely underlying renal failure     ·	CHFpEF - chronic (not acute)  -currently off lasix   -HF consulted  -monitor kidney function   -echo noted     ·	mitral valve replacement with mechanical valve.   ·	Supratherapeutic INR  ·	Chronic afib   -pt on long term AC/coumadin   -INR > 6 - hold ac  -no need for FFP or vit K - no active bleeding   -on long acting BB - monitor on tele       DISPO: active medical issues, tele monitoring, bmp and inr f/ups -HF consulted     Attending Physician Dr. Linette Valle # [id number]

## 2025-02-18 NOTE — CONSULT NOTE ADULT - NS ATTEND AMEND GEN_ALL_CORE FT
89-year-old female past medical history heart failure with improved EF, AF, hypertension mental status.  Patient last seen in HF clinic with mild hyper bulimia and no significant changes in medications.    Labs reviewed notable for creatinine ~3 increased from baseline 1.6.  proBNP 22K.  Chest x-ray with mild bilateral pleural effusion.  On exam she is warm and well-perfused.  She appears to be only slightly hypervolemic but given KATHY on CKD recommend stopping diuresis.  Recommend stopping RAAS agents at the moment.      Patient has a poor prognosis and would not recommend any aggressive measures.     Raimundo Stewart MD  Interventional Cardiology/Advanced Heart Failure Transplant

## 2025-02-18 NOTE — CONSULT NOTE ADULT - ASSESSMENT
89 year old female, follows with Dr. Stewart, with PMHx of HFimpEF (EF 55-60%) s/p ICD, PAF, prosthetic mvr (on warfarin), and HTN who presented with AMS and KATHY (baseline Cr 1.6). She was fluid overloaded then initiated on IV diuresis. Patient's renal function continued to worsen therefore, IV diuretics were discontinued. Heart failure consulted for further mgmt.     Cardiac Studies    Impression:   HFimpEF / PAF / KATHY likely overdiuresis     Plan of Care:   Patient appears compensated, nearing euvolemia   JVP  ~6-7 cm   Hold off any diuretics given renal dysfunction        Re-assess BMP tomorrow AM                     Continue on Toprol  mg QD  Hold home spironolactone and farxiga 2/2 KATHY  I/Os, monitor renal function, daily weights   Discussed with primary team

## 2025-02-19 NOTE — CHART NOTE - NSCHARTNOTEFT_GEN_A_CORE
Transfer Note    Transfer from: 3c    Transfer to: (  ) Medicine    (  ) Telemetry     (  ) RCU       (  ) CEU    (  ) VENT                        (  ) Palliative    (  ) Stroke Unit    (x  ) MICU    (  ) CCU    Signout given to:     ----------------------------------------------------------------------------------------------------------  HPI / ICU COURSE:    HPI:  Ms. 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 Since she was discharge in January the patient has been progressively having more episodes of confusion and agitation. Earlier today, the patient was not able to be lifted out of bed and she was very agitated and yelling per her aid. Collateral information was obtained from her nephew who states that her lasix was recently increased from Lasix 40mg AM and 20mg in PM to Lasix 40mg BID. She was found to have KATHY on her labs and was admitted for further management.    Vitals  Temp: 98.2 -> 98.6  HR: 59  BP: 106/71  O2: 96% on RA  RR: 18    Labs  WBC: 9.57  HgB: 13.1  Plt: 234  BUN: 80  Cr: 2.7 (baseline 1.6)  Trop: 41  K: 5.4 (slightly hemolyzed)  BNP: 22k (42k last admission)    Imaging  CT Head: 1. No evidence of acute intracranial hemorrhage or large territory infarct. 2. Moderate chronic microvascular changes a chronic lacunar infarct within the right caudate head.    CXR: Bibasilar opacity/effusions.    In the ED  500mL NS bolus  Lokelma 10g PO x1 (14 Feb 2025 21:36)    3C course: hold lasix per HF. was on home metop succinate 200mg qd for afib.   2/19 pm RR called for hypotension and AMS. BP 70/40 HR 77, sat 85%. EKG NSR. FS 23. Gave 1L LR bolus. started levo. NRB then BiPAP.  Gave 2 pushes of D50. repeat BP after levo 120/70. stat labs drawn: CBC BMP Mag lactate BCx ABG BNP procal INR. stat ABG on NRB: ph 7.28 PO2 185  PCO2 29  lactate 7.1        --------------------------------------------------------------------------------------------------------  PMH/PSH:  PAST MEDICAL & SURGICAL HISTORY:  Afib    HTN (hypertension)    Heart failure    H/O mitral valve replacement    H/O prior ablation treatment  afib        -------------------------------------------------------------------------------------------------------  PHYSICAL EXAM:  General: NAD  HEENT: Atraumatic  Respiratory: CTAB  Cardiac: RRR  Abdomen: soft, non-tender, non-distended  Extremities: warm and well-perfused  Neuro: A+Ox4. No FND    Vital Signs Last 24 Hrs  T(C): 36.2 (19 Feb 2025 11:15), Max: 36.3 (19 Feb 2025 05:17)  T(F): 97.1 (19 Feb 2025 11:15), Max: 97.3 (19 Feb 2025 05:17)  HR: 67 (19 Feb 2025 17:57) (63 - 67)  BP: 70/40 (19 Feb 2025 19:40) (70/40 - 110/73)  BP(mean): 80 (19 Feb 2025 11:15) (80 - 83)  RR: 18 (19 Feb 2025 11:15) (18 - 18)  SpO2: 97% (19 Feb 2025 11:15) (95% - 97%)    Parameters below as of 19 Feb 2025 08:00  Patient On (Oxygen Delivery Method): nasal cannula  O2 Flow (L/min): 2      I&O's Summary    18 Feb 2025 07:01  -  19 Feb 2025 07:00  --------------------------------------------------------  IN: 720 mL / OUT: 900 mL / NET: -180 mL    19 Feb 2025 07:01  -  19 Feb 2025 20:46  --------------------------------------------------------  IN: 416 mL / OUT: 0 mL / NET: 416 mL        --------------------------------------------------------------------------------------------------------  LABS:                               12.2   13.86 )-----------( 171      ( 19 Feb 2025 20:25 )             41.5       02-19    137  |  98  |  111[HH]  ----------------------------<  71  5.4[H]   |  15[L]  |  3.6[H]    Ca    9.1      19 Feb 2025 12:52  Phos  7.9     02-19  Mg     2.6     02-19    TPro  5.5[L]  /  Alb  3.4[L]  /  TBili  1.1  /  DBili  x   /  AST  76[H]  /  ALT  115[H]  /  AlkPhos  110  02-18      PT/INR - ( 19 Feb 2025 12:52 )   PT: >40.00 sec;   INR: 9.56 ratio         PTT - ( 18 Feb 2025 06:45 )  PTT:51.7 sec    ABG - ( 19 Feb 2025 20:22 )  pH, Arterial: 7.28  pH, Blood: x     /  pCO2: 29    /  pO2: 185   / HCO3: 14    / Base Excess: -11.8 /  SaO2: 99.7                CULTURE RESULTS:                02-14-25 @ 20:04  Specimen Source: --  Method Type: --  Gram Stain - RRL: --  Gram Stain - Wound: --  Bacteria: Negative  Culture Results: --      Specimen Source:   Method Type:   Gram Stain:   Culture Results:   Bacteria: Bacteria: Negative /HPF (02-14-25 @ 20:04)        -------------------------------------------------------------------------------------------------  RADIOLOGY:      ---------------------------------------------------------------------------------------------------  ASSESSMENT & PLAN:     #HFpEF  #KATHY on CKD w/ Mild Hyperkalemia likely due to Increased Diuretic Dose/Dehydration  #AMS  - AOx3 at baseline though has been more disoriented and confused  - TTE 2/16/25: EF 56. moderate eccentric mitral regurgitation. severe pulmonary hypertension. Moderate tricuspid regurgitation. Moderate to severe aortic valve stenosis  - Patient follows w/ Dr. Macdonald, Dr. Stewart for HF, and Dr. Matias for EP  - Recently had Lasix from 40/20 to 40/40  - Patient has 2+ pitting edema though no JVD or rales; with coinciding KATHY possibly intravascularly depleted  - Given 500cc bolus in ED  - Daily weight/Monitor output/Fluid Restrict  - Will hold Lasix, Farxiga, and Spironolactone for now  - PT eval  - f/u HF recs: hold lasix.   - RR on 2/19: s/p 1L LR. on levo  - dc'ed metop    #leukocytosis   - SIRS neg  - f/u CXR      #pAfib  # s/p AICD  - C/w Metoprolol succinate 200mg      #Hx of Valve Replacement on Coumadin  - Coumadin 2mg M-F, 1mg on Sat and Sun  - prosthetic per cardio note but mechanical per son  - INR 2/18: 6.25. holding coumadin for now  - Daily INR: 9. -> start Vit K 5mg . repeat INR       #HLD  - hold Atorvastatin due to elevated LFTs      #elevated LFTs  - RUQ US: mild gallbladder wall edema. suspected stone in fundus  - hold statin  - trend LFTs      #Misc  #Code Status: DNR/Intubate  #DVT ppx: Warfarin  #GI ppx: Famotidine  #Diet: DASH, 1.2L fluid restriction  #Activity: AAT  #Inpatient Dispo: Telemetry  #Discharge Dispo: From Home            FOR FOLLOW UP:  [ f/u stat labs]   [ ]   [ ] Transfer Note    Transfer from: 3c    Transfer to: (  ) Medicine    (  ) Telemetry     (  ) RCU       (  ) CEU    (  ) VENT                        (  ) Palliative    (  ) Stroke Unit    (x  ) MICU    (  ) CCU    Signout given to:     ----------------------------------------------------------------------------------------------------------  HPI / ICU COURSE:    HPI:  Ms. 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 Since she was discharge in January the patient has been progressively having more episodes of confusion and agitation. Earlier today, the patient was not able to be lifted out of bed and she was very agitated and yelling per her aid. Collateral information was obtained from her nephew who states that her lasix was recently increased from Lasix 40mg AM and 20mg in PM to Lasix 40mg BID. She was found to have KATHY on her labs and was admitted for further management.      ED hold course:  - 2/15: hold lasix. LR @ 50ml/hr. RBUS: neg for hydro. nephro following: hold lasix. c/w home metop succinate 200mg qd for afib.   - 2/16: TTE EF 56. severe pHTN. mod TR. mod to severe AS. mod eccentric MR. hold coumadin due to supratherapeutic INR. s/p lasix 60mg ivp* 1 dose  - 2/17: s/p lasix 40mg vi bid      3C course:   - 2/18: POCUS IVC: 1.5cm, collapse. hold lasix (she was on lasix 40mg iv bid prior). hold statin due to elevated LFTs. CXR showed stable b/l opacities. RUQ: mild gallbladder wall edema. suspected stone in fundus.  - 2/19 pm: RR called for hypotension and AMS. BP 70/40 HR 77, sat 85%. EKG NSR. FS 23. Gave 1L LR bolus. started levo. NRB then BiPAP.  Gave 2 pushes of D50. repeat. upgrade to ICU        --------------------------------------------------------------------------------------------------------  PMH/PSH:  PAST MEDICAL & SURGICAL HISTORY:  Afib    HTN (hypertension)    Heart failure    H/O mitral valve replacement    H/O prior ablation treatment  afib        -------------------------------------------------------------------------------------------------------  PHYSICAL EXAM:  General: NAD  HEENT: Atraumatic  Respiratory: CTAB  Cardiac: RRR  Abdomen: soft, non-tender, non-distended  Extremities: warm and well-perfused  Neuro: A+Ox4. No FND    Vital Signs Last 24 Hrs  T(C): 36.2 (19 Feb 2025 11:15), Max: 36.3 (19 Feb 2025 05:17)  T(F): 97.1 (19 Feb 2025 11:15), Max: 97.3 (19 Feb 2025 05:17)  HR: 67 (19 Feb 2025 17:57) (63 - 67)  BP: 70/40 (19 Feb 2025 19:40) (70/40 - 110/73)  BP(mean): 80 (19 Feb 2025 11:15) (80 - 83)  RR: 18 (19 Feb 2025 11:15) (18 - 18)  SpO2: 97% (19 Feb 2025 11:15) (95% - 97%)    Parameters below as of 19 Feb 2025 08:00  Patient On (Oxygen Delivery Method): nasal cannula  O2 Flow (L/min): 2      I&O's Summary    18 Feb 2025 07:01  -  19 Feb 2025 07:00  --------------------------------------------------------  IN: 720 mL / OUT: 900 mL / NET: -180 mL    19 Feb 2025 07:01  -  19 Feb 2025 20:46  --------------------------------------------------------  IN: 416 mL / OUT: 0 mL / NET: 416 mL        --------------------------------------------------------------------------------------------------------  LABS:                               12.2   13.86 )-----------( 171      ( 19 Feb 2025 20:25 )             41.5       02-19    137  |  98  |  111[HH]  ----------------------------<  71  5.4[H]   |  15[L]  |  3.6[H]    Ca    9.1      19 Feb 2025 12:52  Phos  7.9     02-19  Mg     2.6     02-19    TPro  5.5[L]  /  Alb  3.4[L]  /  TBili  1.1  /  DBili  x   /  AST  76[H]  /  ALT  115[H]  /  AlkPhos  110  02-18      PT/INR - ( 19 Feb 2025 12:52 )   PT: >40.00 sec;   INR: 9.56 ratio         PTT - ( 18 Feb 2025 06:45 )  PTT:51.7 sec    ABG - ( 19 Feb 2025 20:22 )  pH, Arterial: 7.28  pH, Blood: x     /  pCO2: 29    /  pO2: 185   / HCO3: 14    / Base Excess: -11.8 /  SaO2: 99.7                CULTURE RESULTS:                02-14-25 @ 20:04  Specimen Source: --  Method Type: --  Gram Stain - RRL: --  Gram Stain - Wound: --  Bacteria: Negative  Culture Results: --      Specimen Source:   Method Type:   Gram Stain:   Culture Results:   Bacteria: Bacteria: Negative /HPF (02-14-25 @ 20:04)        -------------------------------------------------------------------------------------------------  RADIOLOGY:      ---------------------------------------------------------------------------------------------------  ASSESSMENT & PLAN:     #HFpEF  #KATHY on CKD w/ Mild Hyperkalemia likely due to Increased Diuretic Dose/Dehydration vs. cardiorenal  #AMS  - AOx3 at baseline though has been more disoriented and confused  - TTE 2/16/25: EF 56. moderate eccentric mitral regurgitation. severe pulmonary hypertension. Moderate tricuspid regurgitation. Moderate to severe aortic valve stenosis  - Patient follows w/ Dr. Macdonald, Dr. Stewart for HF, and Dr. Matias for EP  - Recently had Lasix from 40/20 to 40/40 OP  - Patient has 2+ pitting edema though no JVD or rales; with coinciding KATHY possibly intravascularly depleted  - Given 500cc bolus in ED  - Daily weight/Monitor output/Fluid Restrict  - Will hold Lasix, Farxiga, and Spironolactone for now  - per HF recs 2/19: hold lasix. slightly hypervolemic today but would still hold diuresis in setting of KATHY  - RR on 2/19 for hypotension: s/p 1L LR. on levo  - f/u proBNP      #leukocytosis   - RVP neg  - UA neg  - f/u BCx  - f/u procal  - f/u CXR: stable b/l opacities, unlikely PNA  - unclear source for leukocytosis->consider Abx If persistently hypotensive on levo      #pAfib  # s/p AICD  - was Metoprolol succinate 200mg-> dc'ed       #Hx of Valve Replacement on Coumadin  - Coumadin 2mg M-F, 1mg on Sat and Sun  - prosthetic per cardio note but mechanical per son  - INR 2/18: 6.25. holding coumadin for now  - Daily INR: 9. -> s/p Vit K 5mg* 2 doses .  - f/u repeat INR , if still high consider FFP      #HLD  - hold Atorvastatin due to elevated LFTs      #elevated LFTs  - RUQ US: mild gallbladder wall edema. suspected stone in fundus  - hold statin  - trend LFTs      #Misc  #Code Status: DNR/Intubate  #DVT ppx: Warfarin on hold   #GI ppx: Famotidine  #Diet: DASH, 1.2L fluid restriction  #Activity: AAT  #Inpatient Dispo: Telemetry  #Discharge Dispo: From Home          FOR FOLLOW UP:  [ f/u stat labs: CBC BMP Mag BCx proBNP procal INR]   [ if INR still high consider FFP]   [ consider Abx if persistently hypotensive on levo]

## 2025-02-19 NOTE — RAPID RESPONSE TEAM SUMMARY - NSSITUATIONBACKGROUNDRRT_GEN_ALL_CORE
RR called for hypotension. BP 70/40 HR 77, sat 85%. EKG NSR    Gave 500 LR bolus. NRB. started levo    stat labs drawn: CBC BMP Mag lactate BCx ABG BNP procal INR RR called for hypotension. BP 70/40 HR 77, sat 85%. EKG NSR. FS 23    Gave 500 LR bolus. NRB. started levo. Gave 2 pushes of D50    stat labs drawn: CBC BMP Mag lactate BCx ABG BNP procal INR    stat ABG on NRB: Po2 RR called for hypotension and AMS. BP 70/40 HR 77, sat 85%. EKG NSR. FS 23    Gave 500 LR bolus. NRB then BiPAP. started levo. Gave 2 pushes of D50    repeat BP after levo 120/70    stat labs drawn: CBC BMP Mag lactate BCx ABG BNP procal INR      stat ABG on NRB: ph 7.28 PO2 185  PCO2 29  lactate 7.1    called crit accepted to ICU    updated family Ahsan Bucio   RR called for hypotension and AMS. BP 70/40 HR 77, sat 85%. EKG NSR. FS 23    Gave 1L LR bolus. started levo. NRB then BiPAP.  Gave 2 pushes of D50    repeat BP after levo 120/70    stat labs drawn: CBC BMP Mag lactate BCx ABG BNP procal INR      stat ABG on NRB: ph 7.28 PO2 185  PCO2 29  lactate 7.1    called crit accepted to ICU    code status confirmed earlier today with Ahsan Bucio: DNR/Intubate    updated family Ahsan Bucio

## 2025-02-19 NOTE — PROGRESS NOTE ADULT - ASSESSMENT
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 found to have KATHY likely due to overdiuresis      Plan    #HFpEF  #KATHY on CKD w/ Mild Hyperkalemia likely due to Increased Diuretic Dose/Dehydration  #AMS  - AOx3 at baseline though has been more disoriented and confused  - TTE 2/16/25: EF 56. moderate eccentric mitral regurgitation. severe pulmonary hypertension. Moderate tricuspid regurgitation. Moderate to severe aortic valve stenosis  - Patient follows w/ Dr. Macdonald, Dr. Stewart for HF, and Dr. Matias for EP  - Recently had Lasix from 40/20 to 40/40  - Patient has 2+ pitting edema though no JVD or rales; with coinciding KATHY possibly intravascularly depleted  - Given 500cc bolus in ED  - Daily weight/Monitor output/Fluid Restrict  - Will hold Lasix, Farxiga, and Spironolactone for now  - PT eval  - f/u HF recs: hold lasix.       #pAfib  # s/p AICD  - C/w Metoprolol succinate 200mg      #Hx of Valve Replacement on Coumadin  - Coumadin 2mg M-F, 1mg on Sat and Sun  - prosthetic per cardio note but mechanical per son  - INR 2/18: 6.25. holding coumadin for now  - Daily INR      #HLD  - hold Atorvastatin due to elevated LFTs      #elevated LFTs  - RUQ US: mild gallbladder wall edema. 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 found to have KATHY likely due to overdiuresis      Plan    #HFpEF  #KATHY on CKD w/ Mild Hyperkalemia likely due to Increased Diuretic Dose/Dehydration  #AMS  - AOx3 at baseline though has been more disoriented and confused  - TTE 2/16/25: EF 56. moderate eccentric mitral regurgitation. severe pulmonary hypertension. Moderate tricuspid regurgitation. Moderate to severe aortic valve stenosis  - Patient follows w/ Dr. Macdonald, Dr. Stewart for HF, and Dr. Matias for EP  - Recently had Lasix from 40/20 to 40/40  - Patient has 2+ pitting edema though no JVD or rales; with coinciding KATHY possibly intravascularly depleted  - Given 500cc bolus in ED  - Daily weight/Monitor output/Fluid Restrict  - Will hold Lasix, Farxiga, and Spironolactone for now  - PT eval  - f/u HF recs: hold lasix.     #leukocytosis   - SIRS neg  - f/u CXR      #pAfib  # s/p AICD  - C/w Metoprolol succinate 200mg      #Hx of Valve Replacement on Coumadin  - Coumadin 2mg M-F, 1mg on Sat and Sun  - prosthetic per cardio note but mechanical per son  - INR 2/18: 6.25. holding coumadin for now  - Daily INR: 9. -> start Vit K 5mg . repeat INR       #HLD  - hold Atorvastatin due to elevated LFTs      #elevated LFTs  - RUQ US: mild gallbladder wall edema. suspected stone in fundus  - hold statin  - trend LFTs      #Misc  #Code Status: DNR/Intubate  #DVT ppx: Warfarin  #GI ppx: Famotidine  #Diet: DASH, 1.2L fluid restriction  #Activity: AAT  #Inpatient Dispo: Telemetry  #Discharge Dispo: From Home

## 2025-02-19 NOTE — CHART NOTE - NSCHARTNOTEFT_GEN_A_CORE
BP 90/40 MAP 58.    spoke with Dr Vale: midodrine 5mg. decrease metop succinate from 200mg qd to 175mg. hold off on IVF in the setting of HFpEF.    f/u BNP, procal, lactate, INR @ 8 BP 90/40 MAP 58.    spoke with Dr Vale: midodrine 5mg. decrease metop succinate from 200mg qd to 175mg. hold off on IVF in the setting of HFpEF. WBC 15K but will hold off on Abx for now.     f/u BNP, procal, lactate, INR @ 8

## 2025-02-19 NOTE — PROGRESS NOTE ADULT - ASSESSMENT
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 found to have KATHY likely due to overdiuresis    #HFpEF  #KATHY on CKD w/ Mild Hyperkalemia likely due to Increased Diuretic Dose/Dehydration  #AMS  - AOx3 at baseline though now x2  - TTE 2/16/25: EF 56. moderate eccentric mitral regurgitation. severe pulmonary hypertension. Moderate tricuspid regurgitation. Moderate to severe aortic valve stenosis  - Patient follows w/ Dr. Macdonald, Dr. Stewart for HF, and Dr. Matias for EP  - Recently had Lasix from 40/20 to 40/40  - Patient has 2+ pitting edema though no JVD or rales; with coinciding KATHY possibly intravascularly depleted  - Given 500cc bolus in ED  - Daily weight/Monitor output/Fluid Restrict  - Will hold Lasix, Farxiga, and Spironolactone for now  - PT eval  - worsening kathy creatinine 3.6 today from baseline 1.5  - nephrology consult appreciated   - f/u HF recs: hold lasix.     #leukocytosis   - SIRS neg  - f/u CXR-worsening, procal and bnp ordered       #pAfib  # s/p AICD  - C/w Metoprolol succinate 200mg      #Hx of Valve Replacement on Coumadin  - Coumadin 2mg M-F, 1mg on Sat and Sun  - prosthetic per cardio note but mechanical per son  - INR 2/18: 6.25. holding coumadin for now  - Daily INR: 9. -> start Vit K 5mg . repeat INR       #HLD  - hold Atorvastatin due to elevated LFTs      #elevated LFTs  - RUQ US: mild gallbladder wall edema. suspected stone in fundus  - hold statin  - trend LFTs      #Misc  #Code Status: DNR/Intubate  #DVT ppx: Warfarin  #GI ppx: Famotidine  #Diet: DASH, 1.2L fluid restriction  #Activity: AAT  #Inpatient Dispo: Telemetry  #Discharge Dispo: From Home  #Pending: INR, procal, bnp, wbc trend, creatinine

## 2025-02-19 NOTE — PROGRESS NOTE ADULT - SUBJECTIVE AND OBJECTIVE BOX
SUBJECTIVE/OVERNIGHT EVENTS  Today is hospital day 5d. This morning patient was seen and examined at bedside, resting comfortably in bed. No acute or major events overnight.    HOSPITAL COURSE      CODE STATUS:    FAMILY COMMUNICATION  Contact date:  Name of person contacted:  Relationship to patient:  Communication details:    MEDICATIONS  STANDING MEDICATIONS  chlorhexidine 2% Cloths 1 Application(s) Topical daily  famotidine    Tablet 20 milliGRAM(s) Oral daily  ferrous    sulfate 325 milliGRAM(s) Oral daily  melatonin 5 milliGRAM(s) Oral at bedtime  metoprolol succinate  milliGRAM(s) Oral daily    PRN MEDICATIONS  acetaminophen     Tablet .. 650 milliGRAM(s) Oral every 6 hours PRN    VITALS  T(F): 97.3 (02-19-25 @ 05:17), Max: 97.3 (02-18-25 @ 20:31)  HR: 63 (02-19-25 @ 05:17) (63 - 75)  BP: 110/73 (02-19-25 @ 05:17) (94/66 - 110/73)  RR: 18 (02-19-25 @ 05:17) (18 - 19)  SpO2: 95% (02-19-25 @ 05:17) (95% - 96%)    PHYSICAL EXAM  GENERAL  ( x ) NAD, lying in bed comfortably     (  ) obtunded     (  ) lethargic     (  ) somnolent    HEAD  ( x ) Atraumatic     (  ) hematoma     (  ) laceration (specify location:       )     NECK  ( x ) Supple     (  ) neck stiffness     (  ) nuchal rigidity     (  )  no JVD     (  ) JVD present ( -- cm)    HEART  Rate -->  ( x ) normal rate    (  ) bradycardic    (  ) tachycardic  Rhythm -->  (  ) regular    (  ) regularly irregular    (  ) irregularly irregular  Murmurs -->  (  ) normal s1/s2    (  ) systolic murmur    (  ) diastolic murmur    (  ) continuous murmur     (  ) S3 present    (  ) S4 present    LUNGS  ( x )Unlabored respirations     (  ) tachypnea  (  ) B/L air entry     (  ) decreased breath sounds in:  (location     )    (  ) no adventitious sound     (  ) crackles     (  ) wheezing      (  ) rhonchi      (specify location:       )  (  ) chest wall tenderness (specify location:       )    ABDOMEN  ( x ) Soft     (  ) tense   |   (  ) nondistended     (  ) distended   |   (  ) +BS     (  ) hypoactive bowel sounds     (  ) hyperactive bowel sounds  (  ) nontender     (  ) RUQ tenderness     (  ) RLQ tenderness     (  ) LLQ tenderness     (  ) epigastric tenderness     (  ) diffuse tenderness  (  ) Splenomegaly      (  ) Hepatomegaly      (  ) Jaundice     (  ) ecchymosis     EXTREMITIES  (  ) Normal     (  ) Rash     (  ) ecchymosis     (  ) varicose veins      (x) pitting edema     (  ) non-pitting edema   (  ) ulceration     (  ) gangrene:     (location:     )    NERVOUS SYSTEM  (  ) A&Ox3     ( x ) confused     (  ) lethargic  CN II-XII:     (  ) Intact     (  ) focal deficits  (Specify:     )   Upper extremities:     (  ) strength X/5     (  ) focal deficit (specify:    )  Lower extremities:     (  ) strength  X/5    (  ) focal deficit (specify:    )    SKIN  ( x ) No rashes or lesions     (  ) maculopapular rash     (  ) pustules     (  ) vesicles     (  ) ulcer     (  ) ecchymosis     (specify location:     )    (  ) Indwelling Patterson Catheter   Date insterted:    Reason (  ) Critical illness     (  ) urinary retention    (  ) Accurate Ins/Outs Monitoring     (  ) CMO patient    (  ) Central Line  Date inserted:  Location: (  ) Right IJ   (  ) Left IJ   (  ) Right Fem   (  ) Left Fem    (  ) SPC  (  ) pigtail  (  ) PEG tube  (  ) colostomy  (  ) jejunostomy  (  ) U-Dall    LABS             13.1   13.12 )-----------( 237      ( 02-18-25 @ 06:45 )             44.6     141  |  99  |  98  -------------------------<  70   02-18-25 @ 06:45  4.8  |  24  |  3.3    Ca      8.9     02-18-25 @ 06:45  Mg     2.7     02-18-25 @ 06:45    TPro  5.5  /  Alb  3.4  /  TBili  1.1  /  DBili  x   /  AST  76  /  ALT  115  /  AlkPhos  110  /  GGT  x     02-18-25 @ 06:45    PT/INR - ( 02-18-25 @ 06:45 )   PT: >40.00 sec[H];   INR: 6.25 ratio[HH]  PTT - ( 02-18-25 @ 06:45 )  PTT:51.7 sec      Urinalysis Basic - ( 18 Feb 2025 06:45 )    Color: x / Appearance: x / SG: x / pH: x  Gluc: 70 mg/dL / Ketone: x  / Bili: x / Urobili: x   Blood: x / Protein: x / Nitrite: x   Leuk Esterase: x / RBC: x / WBC x   Sq Epi: x / Non Sq Epi: x / Bacteria: x          IMAGING

## 2025-02-19 NOTE — PHARMACOTHERAPY INTERVENTION NOTE - INTERVENTION TYPE RECOOMEND
Pharmacokinetics Evaluation
Dose Optimization/Non-renal Dose Adjustment

## 2025-02-19 NOTE — PROGRESS NOTE ADULT - SUBJECTIVE AND OBJECTIVE BOX
Patient is a 89y old  Female who presents with a chief complaint of Weakness and KATHY (19 Feb 2025 11:33)      Patient seen and examined at bedside.    ALLERGIES:  sulfamethoxazole (Rash)    MEDICATIONS:  acetaminophen     Tablet .. 650 milliGRAM(s) Oral every 6 hours PRN  chlorhexidine 2% Cloths 1 Application(s) Topical daily  famotidine    Tablet 20 milliGRAM(s) Oral daily  ferrous    sulfate 325 milliGRAM(s) Oral daily  melatonin 5 milliGRAM(s) Oral at bedtime  metoprolol succinate  milliGRAM(s) Oral daily  phytonadione  IVPB 5 milliGRAM(s) IV Intermittent once  sodium zirconium cyclosilicate 10 Gram(s) Oral once    Vital Signs Last 24 Hrs  T(F): 97.1 (19 Feb 2025 11:15), Max: 97.3 (18 Feb 2025 20:31)  HR: 65 (19 Feb 2025 11:15) (63 - 75)  BP: 102/69 (19 Feb 2025 11:15) (94/66 - 110/73)  RR: 18 (19 Feb 2025 11:15) (18 - 19)  SpO2: 97% (19 Feb 2025 11:15) (95% - 97%)  I&O's Summary    18 Feb 2025 07:01  -  19 Feb 2025 07:00  --------------------------------------------------------  IN: 720 mL / OUT: 900 mL / NET: -180 mL        PHYSICAL EXAM:  General: NAD, A/O x 2  ENT: MMM  Neck: Supple, No JVD  Lungs: scattered crackles   Cardio: RRR, S1/S2, 2/6 murmur   Abdomen: Soft, Nontender, Nondistended; Bowel sounds present  Extremities: No cyanosis, No edema    LABS:                        13.2   15.01 )-----------( 208      ( 19 Feb 2025 12:52 )             43.1     02-19    137  |  98  |  111  ----------------------------<  71  5.4   |  15  |  3.6    Ca    9.1      19 Feb 2025 12:52  Phos  7.9     02-19  Mg     2.6     02-19    TPro  5.5  /  Alb  3.4  /  TBili  1.1  /  DBili  x   /  AST  76  /  ALT  115  /  AlkPhos  110  02-18      PT/INR - ( 19 Feb 2025 12:52 )   PT: >40.00 sec;   INR: 9.56 ratio         PTT - ( 18 Feb 2025 06:45 )  PTT:51.7 sec                          Urinalysis Basic - ( 19 Feb 2025 12:52 )    Color: x / Appearance: x / SG: x / pH: x  Gluc: 71 mg/dL / Ketone: x  / Bili: x / Urobili: x   Blood: x / Protein: x / Nitrite: x   Leuk Esterase: x / RBC: x / WBC x   Sq Epi: x / Non Sq Epi: x / Bacteria: x            RADIOLOGY & ADDITIONAL TESTS:    Care Discussed with Consultants/Other Providers:

## 2025-02-19 NOTE — CHART NOTE - NSCHARTNOTEFT_GEN_A_CORE
Called son Ahsan Bucio who is a MD ([phone]). He wishes pt to remain DNR/intubate. He also states that pt has a mechanical (NOT prosthetic valve). He is not clear why pt has AICD. Called kassi Bucio who is a MD ([phone] He wishes pt to remain DNR/intubate. He also states that pt has a mechanical (NOT prosthetic valve). He is not clear why pt has AICD.

## 2025-02-19 NOTE — PROGRESS NOTE ADULT - SUBJECTIVE AND OBJECTIVE BOX
seen and examined  24 h events noted   no distress         PAST HISTORY  --------------------------------------------------------------------------------  No significant changes to PMH, PSH, FHx, SHx, unless otherwise noted    ALLERGIES & MEDICATIONS  --------------------------------------------------------------------------------  Allergies    sulfamethoxazole (Rash)    Intolerances      Standing Inpatient Medications  chlorhexidine 2% Cloths 1 Application(s) Topical daily  famotidine    Tablet 20 milliGRAM(s) Oral daily  ferrous    sulfate 325 milliGRAM(s) Oral daily  melatonin 5 milliGRAM(s) Oral at bedtime  metoprolol succinate  milliGRAM(s) Oral daily    PRN Inpatient Medications  acetaminophen     Tablet .. 650 milliGRAM(s) Oral every 6 hours PRN          VITALS/PHYSICAL EXAM  --------------------------------------------------------------------------------  T(C): 36.3 (02-19-25 @ 05:17), Max: 36.3 (02-18-25 @ 20:31)  HR: 63 (02-19-25 @ 05:17) (63 - 75)  BP: 110/73 (02-19-25 @ 05:17) (94/66 - 110/73)  RR: 18 (02-19-25 @ 05:17) (18 - 19)  SpO2: 95% (02-19-25 @ 05:17) (95% - 96%)  Wt(kg): --        02-18-25 @ 07:01  -  02-19-25 @ 07:00  --------------------------------------------------------  IN: 720 mL / OUT: 900 mL / NET: -180 mL      Physical Exam:  	Gen: NAD  	Pulm: decrease BS  B/L  	CV:  S1S2; no rub  	Abd: +distended  	LE: edema      LABS/STUDIES  --------------------------------------------------------------------------------              13.1   13.12 >-----------<  237      [02-18-25 @ 06:45]              44.6     141  |  99  |  98  ----------------------------<  70      [02-18-25 @ 06:45]  4.8   |  24  |  3.3        Ca     8.9     [02-18-25 @ 06:45]      Mg     2.7     [02-18-25 @ 06:45]    TPro  5.5  /  Alb  3.4  /  TBili  1.1  /  DBili  x   /  AST  76  /  ALT  115  /  AlkPhos  110  [02-18-25 @ 06:45]    PT/INR: PT >40.00, INR 6.25       [02-18-25 @ 06:45]  PTT: 51.7       [02-18-25 @ 06:45]    Creatinine Trend:  SCr 3.3 [02-18 @ 06:45]  SCr 3.3 [02-17 @ 11:37]  SCr 3.2 [02-16 @ 07:11]  SCr 2.7 [02-15 @ 06:18]  SCr 2.7 [02-14 @ 18:02]    Urinalysis - [02-18-25 @ 06:45]      Color  / Appearance  / SG  / pH       Gluc 70 / Ketone   / Bili  / Urobili        Blood  / Protein  / Leuk Est  / Nitrite       RBC  / WBC  / Hyaline  / Gran  / Sq Epi  / Non Sq Epi  / Bacteria       Iron 29, TIBC 279, %sat 10      [01-04-25 @ 18:50]  Ferritin 119      [01-04-25 @ 18:50]

## 2025-02-19 NOTE — PROGRESS NOTE ADULT - ASSESSMENT
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  Noted recent adjustment in outpatient diuretic regimen.  Nephrology consulted for mild hyperkalemia and KATHY on CKD.  #HFmrEF  #prosthetic MV  #PAF  #Dyslipidemia  #Hyperkalemia  #KATHY on CKD stage 3a  Etiology likely 2/2 intravascular volume depletion from recent increase in Lasix dose  Possible type 2 CRS (chronic heart failure affecting kidney function)  RBUS noting small kidneys likely reflecting CKD, no hydro  - off diuretics / might need to resume in am   - cr stable  / check repeat   - echo : moderate to severe AS / moderate MR / cardiology CHF  f/up noted   - document accurate UO/ check bladder scan   - check ph level  - repeat INR   - if bp remains on the low side/ start midodrine    - dose medications for eGFR <30  - no acute indication for RRT at this time  will follow

## 2025-02-20 NOTE — PROCEDURE NOTE - NSPROCDETAILS_GEN_ALL_CORE
location identified, draped/prepped, sterile technique used, needle inserted/introduced
guidewire recovered

## 2025-02-20 NOTE — GOALS OF CARE CONVERSATION - ADVANCED CARE PLANNING - CONVERSATION DETAILS
spoke with family dr mehreen small gave updates family expressed understanding pt is now dnr dni molst form completed in chart

## 2025-02-20 NOTE — PROGRESS NOTE ADULT - ASSESSMENT
IMPRESSION:    Acute hypoxemic resp failure on NIV  shock/ on levophed possible septic  Valvular disease/ multiorgan failure  Lactic Acidosis  Metabolic acidosis  hypoglycemia      PLAN:    CNS: Avoid CNS depressnt    HEENT:  Oral care    PULMONARY:  HOB @ 45 degrees, keep NIV, decrease FIO2, aspiration precaution    CARDIOVASCULAR: bicarb drip, Hold diuretics, trend LA. EKG    GI: GI prophylaxis Feeding  npo RUQ sono    RENAL:  F/u  lytes.  Correct as needed. accurate I/O, quevedo catheter    INFECTIOUS DISEASE: abx Vanco and Cefepime, pancx, Procal.LActate. f/u blood cultures    HEMATOLOGICAL:  DVT prophylaxis. Fup CBC, INR    ENDOCRINE:  Follow up FS.  Insulin protocol if needed. HC 50 Q 6      very poor prognosis  palliative care eval  fem line/ a line IMPRESSION:    Acute hypoxemic resp failure on NIV  shock/ on levophed possible septic  Valvular disease/ multiorgan failure  Lactic Acidosis  Metabolic acidosis  hypoglycemia      PLAN:    CNS: Avoid CNS depressnt    HEENT:  Oral care    PULMONARY:  HOB @ 45 degrees, keep NIV, decrease FIO2, aspiration precaution    CARDIOVASCULAR: bicarb drip, Hold diuretics, trend LA. EKG    GI: GI prophylaxis Feeding  npo RUQ sono    RENAL:  F/u  lytes.  Correct as needed. accurate I/O, quevedo catheter    INFECTIOUS DISEASE: abx Vanco and Cefepime, pancx, Procal.LActate. f/u blood cultures    HEMATOLOGICAL:  DVT prophylaxis. Fup CBC, INR    ENDOCRINE:  Follow up FS.  Insulin protocol if needed. HC 50 Q 6      very poor prognosis  palliative care eval  fem line/ a line  Spoke with nephew states he wants to hold off on HD until he comes to hospital tmrw.

## 2025-02-20 NOTE — PHARMACOTHERAPY INTERVENTION NOTE - COMMENTS
89yFemale    Indication: afib and prosthetic valves  INR Goal: 2-3  Home Dose: 2 mg Mon, Tues, Friday and 1 mg rest of the week  Bridge Therapy:    Current Medications:  acetaminophen     Tablet .. 650 milliGRAM(s) Oral every 6 hours PRN  chlorhexidine 2% Cloths 1 Application(s) Topical daily  famotidine    Tablet 20 milliGRAM(s) Oral daily  ferrous    sulfate 325 milliGRAM(s) Oral daily  melatonin 5 milliGRAM(s) Oral at bedtime  metoprolol succinate  milliGRAM(s) Oral daily      hemoglobin 13.2 g/dL (02-19-25 @ 12:52)    hematocrit 43.1 % (02-19-25 @ 12:52)    PLT: 208 K/uL (02-19-25 @ 12:52)    GFR:12 mL/min/1.73m2 (02-19-25 @ 12:52)      Drug Interactions: vitamin K IV 5 mg    INR trend  2.41 ratio (02-14-25 @ 18:02)  2.95 ratio (02-15-25 @ 06:18)  5.07 ratio (02-16-25 @ 07:11)  6.70 ratio (02-17-25 @ 11:37)  6.25 ratio (02-18-25 @ 06:45)  9.56 ratio (02-19-25 @ 12:52)      Warfarin administration history:  warfarin: 2 milliGRAM(s) (02-15-25 @ 00:49)        1. INR today is:               [   ] below goal ----- This is likely due to                                            [   ] at goal                                            [ x  ] above goal ------ This is likely due to unclear reasons        2. Recommend holding Warfarin      tonight  3. Obtain INR tomorrow AM    
cefepime 1g IV q12h, SCr 4.2, recommended adjusting to q24h
89yFemale    Indication:  INR Goal:  Home Dose:  Bridge Therapy:    Current Medications:  acetaminophen     Tablet .. 650 milliGRAM(s) Oral every 6 hours PRN  atorvastatin 20 milliGRAM(s) Oral at bedtime  famotidine    Tablet 20 milliGRAM(s) Oral daily  ferrous    sulfate 325 milliGRAM(s) Oral daily  furosemide   Injectable 40 milliGRAM(s) IV Push two times a day  melatonin 5 milliGRAM(s) Oral at bedtime  metoprolol succinate  milliGRAM(s) Oral daily      hemoglobin 12.6 g/dL (02-17-25 @ 11:37)    hematocrit 42.9 % (02-17-25 @ 11:37)    PLT: 208 K/uL (02-17-25 @ 11:37)    GFR:13 mL/min/1.73m2 (02-17-25 @ 11:37)      Drug Interactions:       INR trend  2.41 ratio (02-14-25 @ 18:02)  2.95 ratio (02-15-25 @ 06:18)  5.07 ratio (02-16-25 @ 07:11)  6.70 ratio (02-17-25 @ 11:37)      Warfarin administration history:  warfarin: 2 milliGRAM(s) (02-15-25 @ 00:49)        1. INR today is:               [   ] below goal ----- This is likely due to                                            [   ] at goal                                            [  x ] above goal ------ This is likely due to        2. Recommend Warfarin    0 mg       PO x 1   3. Obtain INR tomorrow AM    
89yFemale    Indication: afib  INR Goal: 2-3  Home Dose: 2mg on mon/tue/fri and 1mg for rest of the week  Bridge Therapy:    Current Medications:  acetaminophen     Tablet .. 650 milliGRAM(s) Oral every 6 hours PRN  chlorhexidine 2% Cloths 1 Application(s) Topical daily  famotidine    Tablet 20 milliGRAM(s) Oral daily  ferrous    sulfate 325 milliGRAM(s) Oral daily  melatonin 5 milliGRAM(s) Oral at bedtime  metoprolol succinate  milliGRAM(s) Oral daily      hemoglobin 13.1 g/dL (02-18-25 @ 06:45)    hematocrit 44.6 % (02-18-25 @ 06:45)    PLT: 237 K/uL (02-18-25 @ 06:45)    GFR:13 mL/min/1.73m2 (02-18-25 @ 06:45)        Drug Interactions:       INR trend  2.41 ratio (02-14-25 @ 18:02)  2.95 ratio (02-15-25 @ 06:18)  5.07 ratio (02-16-25 @ 07:11)  6.70 ratio (02-17-25 @ 11:37)  6.25 ratio (02-18-25 @ 06:45)      Warfarin administration history:  warfarin: 2 milliGRAM(s) (02-15-25 @ 00:49)          1. INR today is:       6.25        [   ] below goal ----- This is likely due to                                            [   ] at goal                                            [  x ] above goal ------ This is likely due to        2. Recommend to hold warfarin.    3. Obtain INR tomorrow AM    
89yFemale    Indication:afib, prosthetic valves?   INR Goal: 2-3   Home Dose: 2 mg M, Tue, Fri and 1 mg rest of the week  Bridge Therapy:    Current Medications:  acetaminophen     Tablet .. 650 milliGRAM(s) Oral every 6 hours PRN  chlorhexidine 2% Cloths 1 Application(s) Topical daily  famotidine    Tablet 20 milliGRAM(s) Oral daily  ferrous    sulfate 325 milliGRAM(s) Oral daily  melatonin 5 milliGRAM(s) Oral at bedtime  metoprolol succinate  milliGRAM(s) Oral daily      hemoglobin 13.2 g/dL (02-19-25 @ 12:52)    hematocrit 43.1 % (02-19-25 @ 12:52)    PLT: 208 K/uL (02-19-25 @ 12:52)    GFR:13 mL/min/1.73m2 (02-18-25 @ 06:45)      Drug Interactions:     INR trend  2.41 ratio (02-14-25 @ 18:02)  2.95 ratio (02-15-25 @ 06:18)  5.07 ratio (02-16-25 @ 07:11)  6.70 ratio (02-17-25 @ 11:37)  6.25 ratio (02-18-25 @ 06:45)      Warfarin administration history:  warfarin: 2 milliGRAM(s) (02-15-25 @ 00:49)        1. INR today is:               [   ] below goal ----- This is likely due to                                            [   ] at goal                                            [   ] above goal ------ This is likely due to        2. pending INR lab  3. Obtain INR tomorrow AM

## 2025-02-20 NOTE — PROGRESS NOTE ADULT - ASSESSMENT
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  Noted recent adjustment in outpatient diuretic regimen.  Nephrology consulted for mild hyperkalemia and KATHY on CKD.  #HFmrEF  #prosthetic MV  #PAF  #Dyslipidemia  #Hyperkalemia  #KATHY on CKD stage 3a  Etiology likely 2/2 intravascular volume depletion from recent increase in Lasix dose  Possible type 2 CRS (chronic heart failure affecting kidney function)  RBUS noting small kidneys likely reflecting CKD, no hydro  now ATN with worsening kidney function / in resp distress   - off diuretics / started on bicarb drip   - suggest bumex IV in PM  - cr up hyperkalemic acidotic  - lokelma 10 g q8h   - echo : moderate to severe AS / moderate MR / cardiology CHF  f/up noted   - document accurate UO  - check ph level  - may be CMO / DNR DNI in effect   - dose medications for eGFR <15 ml/min   -prognosis guarded   will follow

## 2025-02-20 NOTE — CONSULT NOTE ADULT - CONVERSATION DETAILS
Discussed with patient's nephew/HCP, Dr. Ahsan Bucio. He is familiar with palliative care. He understands the severity of patient's illness. At this time, he is most concerned about how patient's sister, Betty, will react to the news that patient is critically ill. Based on her response, Ahsan will decided on the next steps. No questions for the palliative care team at this time.

## 2025-02-20 NOTE — CONSULT NOTE ADULT - SUBJECTIVE AND OBJECTIVE BOX
HPI:  Ms. Bucio is a  88 yo F with PMH significant for HFimpEF 55-60%, s/p AICD, paroxysmal A-Fib on Coumadin, prosthetic mitral valve who presented for progressive weakness, agitation, and difficulty ambulating over the last week. Since she was discharge in January the patient has been progressively having more episodes of confusion and agitation. Earlier today, the patient was not able to be lifted out of bed and she was very agitated and yelling per her aid. Collateral information was obtained from her nephew who states that her lasix was recently increased from Lasix 40mg AM and 20mg in PM to Lasix 40mg BID. She was found to have KATHY on her labs and was admitted for further management.    Vitals  Temp: 98.2 -> 98.6  HR: 59  BP: 106/71  O2: 96% on RA  RR: 18    Labs  WBC: 9.57  HgB: 13.1  Plt: 234  BUN: 80  Cr: 2.7 (baseline 1.6)  Trop: 41  K: 5.4 (slightly hemolyzed)  BNP: 22k (42k last admission)    Imaging  CT Head: 1. No evidence of acute intracranial hemorrhage or large territory infarct. 2. Moderate chronic microvascular changes a chronic lacunar infarct within the right caudate head.    CXR: Bibasilar opacity/effusions.    In the ED  500mL NS bolus  Lokelma 10g PO x1 (14 Feb 2025 21:36)    Patient lying in bed. Does not follow commands. No family present.       ITEMS NOT CHECKED ARE NOT PRESENT    SOCIAL HISTORY:   Significant other/partner[ ]  Children[ ]  Confucianist/Spirituality:  Substance hx:  [ ]   Tobacco hx:  [ ]   Alcohol hx: [ ]   Living Situation: [ ]Home  [ ]Long term care  [ ]Rehab [ ]Other  Home Services: [ ] HHA [ ] Visting RN [ ] Hospice  Occupation:  Home Opioid hx:  [ ] Y [ ] N [ ] I-Stop Reference No:     ADVANCE DIRECTIVES:    [ ] Full Code [ ] DNR  MOLST  [ ]  Living Will  [ ]   DECISION MAKER(s):  [x ] Health Care Proxy(s)  [ ] Surrogate(s)  [ ] Guardian           Name(s): Phone Number(s):  Ahsan Bucio      BASELINE (I)ADL(s) (prior to admission):  [place]: [ ]Total  [ ] Moderate [ ]Dependent  Palliative Performance Status Version 2:         %    [email or web address]_performance_scale_[email or web address]    Allergies    sulfamethoxazole (Rash)    Intolerances    MEDICATIONS  (STANDING):  cefepime   IVPB      chlorhexidine 2% Cloths 1 Application(s) Topical daily  famotidine    Tablet 20 milliGRAM(s) Oral daily  ferrous    sulfate 325 milliGRAM(s) Oral daily  hydrocortisone sodium succinate Injectable 50 milliGRAM(s) IV Push every 6 hours  lactated ringers Bolus 500 milliLiter(s) IV Bolus once  melatonin 5 milliGRAM(s) Oral at bedtime  norepinephrine Infusion 0.05 MICROgram(s)/kG/Min (3.08 mL/Hr) IV Continuous <Continuous>  sodium bicarbonate  Infusion 0.137 mEq/kG/Hr (60 mL/Hr) IV Continuous <Continuous>    MEDICATIONS  (PRN):  acetaminophen     Tablet .. 650 milliGRAM(s) Oral every 6 hours PRN Temp greater or equal to 38C (100.4F), Mild Pain (1 - 3), Moderate Pain (4 - 6)      PRESENT SYMPTOMS: [x ]Unable to obtain due to poor mentation   Source if other than patient:  [ ]Family   [ ]Team     Pain: [ ]yes [ ]no  QOL impact -   Location -                    Aggravating factors -  Alleviating factors -   Quality -  Radiation -  Timing -   Severity (0-10 scale):  Minimal acceptable level (0-10 scale):     CPOT:  2  https://www.[facility].org/getattachment/[email or web address]-[email or web address]/Critical-Care-Pain-Observation-Tool-(CPOT)    PAIN AD Score:   http://geriatrictoolkit.[facility]/cog/painad.pdf     Dyspnea:                           [ ]None[ ]Mild [ ]Moderate [ ]Severe     Respiratory Distress Observation Scale (RDOS): 2  A score of 0 to 2 signifies little or no respiratory distress, 3 signifies mild distress, scores 4 to 6 indicate moderate distress, and scores greater than 7 signify severe distress  [email or web address]://[email or web address]/sites/default/files/PDFS/497046-goxtsibxiag-rnqyacwj-hmiemczgmwn-omajv.pdf    Anxiety:                             [ ]None[ ]Mild [ ]Moderate [ ]Severe   Fatigue:                             [ ]None[ ]Mild [ ]Moderate [ ]Severe   Nausea:                             [ ]None[ ]Mild [ ]Moderate [ ]Severe   Loss of appetite:              [ ]None[ ]Mild [ ]Moderate [ ]Severe   Constipation:                    [ ]None[ ]Mild [ ]Moderate [ ]Severe    Other Symptoms:  [ ]All other review of systems negative     Palliative Performance Status Version 2:         %    [email or web address]/[email or web address]_performance_scale_[email or web address].pdf  PHYSICAL EXAM:    GENERAL:  NAD   PULMONARY:  Non labored breathing  NEUROLOGIC: Does not follow commands  BEHAVIORAL/PSYCH:  Calm    LABS: I have reviewed daily labs                          13.4   19.94 )-----------( 178      ( 20 Feb 2025 04:30 )             44.3       02-20    138  |  100  |  111[HH]  ----------------------------<  146[H]  5.3[H]   |  11[L]  |  4.0[H]    Ca    8.5      20 Feb 2025 09:30  Phos  7.9     02-19  Mg     2.8     02-20    TPro  5.5[L]  /  Alb  3.4[L]  /  TBili  2.7[H]  /  DBili  1.7[H]  /  AST  1871[H]  /  ALT  1591[H]  /  AlkPhos  135[H]  02-20          RADIOLOGY & ADDITIONAL STUDIES: I have reviewed new imaging    PROTEIN CALORIE MALNUTRITION PRESENT: [ ]mild [ ]moderate [ ]severe [ ]underweight [ ]morbid obesity  https://www.andeal.org/vault/7640/web/files/ONC/Table_Clinical%20Characteristics%20to%20Document%20Malnutrition-White%20JV%20et%20al%202012.pdf    Height (cm): 162.6 (02-19-25 @ 22:15), 165.1 (01-04-25 @ 14:51), 165 (12-13-24 @ 08:50)  Weight (kg): 65.7 (02-19-25 @ 22:15), 63.4 (01-08-25 @ 05:48), 60.8 (12-13-24 @ 08:50)  BMI (kg/m2): 24.8 (02-19-25 @ 22:15), 23.3 (01-08-25 @ 05:48), 22.3 (01-04-25 @ 14:51)    [ ]PPSV2 < or = to 30% [ ]significant weight loss  [ ]poor nutritional intake  [ ]anasarca      [ ]Artificial Nutrition      Palliative Care Spiritual/Emotional Screening Tool Question  Severity (0-4):                    OR                    [ x] Unable to determine. Will assess at later time if appropriate.  Score of 2 or greater indicates recommendation of Chaplaincy and/or SW referral  Chaplaincy Referral: [ ] Yes [ ] Refused [ ] Following     Caregiver Reinholds:  [ ] Yes [ ] No    OR    [x ] Unable to determine. Will assess at later time if appropriate.  Social Work Referral [ ]  Patient and Family Centered Care Referral [ ]    Anticipatory Grief Present: [ ] Yes [ ] No    OR     [ x] Unable to determine. Will assess at later time if appropriate.  Social Work Referral [ ]  Patient and Family Centered Care Referral [ ]    REFERRALS:   [ ]Chaplaincy  [ ]Hospice  [ ]Child Life  [ ]Social Work  [ ]Case management [ ]Holistic Therapy     Palliative care education provided to patient and/or family

## 2025-02-20 NOTE — CONSULT NOTE ADULT - ASSESSMENT
IMPRESSION:      PLAN:    CNS: DAILY SEDATION VACATION    HEENT:  Oral care    PULMONARY:  HOB @ 45 degrees    CARDIOVASCULAR:    GI: GI prophylaxis                                          Feeding     RENAL:  F/u  lytes.  Correct as needed. accurate I/O    INFECTIOUS DISEASE:    HEMATOLOGICAL:  DVT prophylaxis.    ENDOCRINE:  Follow up FS.  Insulin protocol if needed.    MUSCULOSKELETAL:    CODE STATUS: FULL CODE    DISPOSITION: Pt requires continued monitoring in the MICU     IMPRESSION:    Acute hypoxemic resp failure on NIV  shock/ on levophed possible septic  Valvular disease/ multiorgan failure  Lactic Acidosis  Metabolic acidosis  hypoglycemia      PLAN:    CNS: Avoid CNS depressnt    HEENT:  Oral care    PULMONARY:  HOB @ 45 degrees, keep NIV, decrease FIO2, aspiration precaution    CARDIOVASCULAR: bicarb drip, Hold diuretics, trend LA    GI: GI prophylaxis                                          Feeding  npo RUQ sono    RENAL:  F/u  lytes.  Correct as needed. accurate I/O, quevedo catheter    INFECTIOUS DISEASE: abx, pancx, Procal    HEMATOLOGICAL:  DVT prophylaxis. Fup CBC, INR    ENDOCRINE:  Follow up FS.  Insulin protocol if needed. HC 50 Q 6      very poor prognosis  palliative care eval  fem line/ a line

## 2025-02-20 NOTE — CONSULT NOTE ADULT - SUBJECTIVE AND OBJECTIVE BOX
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 Since she was discharge in January the patient has been progressively having more episodes of confusion and agitation. Earlier today, the patient was not able to be lifted out of bed and she was very agitated and yelling per her aid. Collateral information was obtained from her nephew who states that her lasix was recently increased from Lasix 40mg AM and 20mg in PM to Lasix 40mg BID. She was found to have KATHY on her labs and was admitted for further management.    Vitals  Temp: 98.2 -> 98.6  HR: 59  BP: 106/71  O2: 96% on RA  RR: 18    Labs  WBC: 9.57  HgB: 13.1  Plt: 234  BUN: 80  Cr: 2.7 (baseline 1.6)  Trop: 41  K: 5.4 (slightly hemolyzed)  BNP: 22k (42k last admission)    Imaging  CT Head: 1. No evidence of acute intracranial hemorrhage or large territory infarct. 2. Moderate chronic microvascular changes a chronic lacunar infarct within the right caudate head.    CXR: Bibasilar opacity/effusions.    In the ED  500mL NS bolus  Lokelma 10g PO x1 (14 Feb 2025 21:36)  admitted to floor, seen by renal/ cardio intermittent diuresis, RRT called worsening status, hypotensive, started on pressors, increase LA, multiorgan failure, upgrade to MICU      PAST MEDICAL & SURGICAL HISTORY:  Afib      HTN (hypertension)      Heart failure      H/O mitral valve replacement      H/O prior ablation treatment  afib          SOCIAL HX:   Smoking  -        REVIEW OF SYSTEMS SEE HPI    Allergies    sulfamethoxazole (Rash)    Intolerances        acetaminophen     Tablet .. 650 milliGRAM(s) Oral every 6 hours PRN  chlorhexidine 2% Cloths 1 Application(s) Topical daily  dextrose 50% Injectable 50 milliLiter(s) IV Push once  dextrose 50% Injectable 50 milliLiter(s) IV Push once  famotidine    Tablet 20 milliGRAM(s) Oral daily  ferrous    sulfate 325 milliGRAM(s) Oral daily  insulin regular  human recombinant 5 Unit(s) IV Push once  lactated ringers Bolus 500 milliLiter(s) IV Bolus once  melatonin 5 milliGRAM(s) Oral at bedtime  norepinephrine Infusion 0.05 MICROgram(s)/kG/Min IV Continuous <Continuous>  : Home Meds:      PHYSICAL EXAM    ICU Vital Signs Last 24 Hrs  T(C): 36.6 (19 Feb 2025 22:00), Max: 36.6 (19 Feb 2025 22:00)  T(F): 97.8 (19 Feb 2025 22:00), Max: 97.8 (19 Feb 2025 22:00)  HR: 60 (20 Feb 2025 04:00) (54 - 69)  BP: 78/52 (19 Feb 2025 23:30) (70/40 - 126/56)  BP(mean): 61 (19 Feb 2025 23:30) (61 - 80)  ABP: 106/48 (20 Feb 2025 04:00) (106/48 - 157/71)  ABP(mean): 71 (20 Feb 2025 04:00) (71 - 103)  RR: 25 (20 Feb 2025 04:00) (18 - 38)  SpO2: 98% (20 Feb 2025 04:00) (78% - 100%)    O2 Parameters below as of 20 Feb 2025 03:00  Patient On (Oxygen Delivery Method): BiPAP/CPAP            General:  HEENT:  YOCASTA              Lymph Nodes: No cervical LN   Lungs: Bilateral BS  Cardiovascular: Regular  Abdomen: Soft, Positive BS  Extremities: No clubbing  Skin: Warm  Neurological: Non focal       02-18-25 @ 07:01  -  02-19-25 @ 07:00  --------------------------------------------------------  IN:    Oral Fluid: 720 mL  Total IN: 720 mL    OUT:    Intermittent Catheterization - Urethral (mL): 700 mL    Voided (mL): 200 mL  Total OUT: 900 mL    Total NET: -180 mL      02-19-25 @ 07:01  -  02-20-25 @ 06:54  --------------------------------------------------------  IN:    IV PiggyBack: 50 mL    Oral Fluid: 416 mL  Total IN: 466 mL    OUT:    Intermittent Catheterization - Urethral (mL): 350 mL  Total OUT: 350 mL    Total NET: 116 mL          LABS:                          13.4   19.94 )-----------( 178      ( 20 Feb 2025 04:30 )             44.3                                               02-20    137  |  98  |  109[HH]  ----------------------------<  67[L]  5.8[H]   |  12[L]  |  4.2[HH]    Ca    8.7      20 Feb 2025 04:30  Phos  7.9     02-19  Mg     2.8     02-20    TPro  4.8[L]  /  Alb  2.8[L]  /  TBili  1.8[H]  /  DBili  0.4[H]  /  AST  682[H]  /  ALT  647[H]  /  AlkPhos  102  02-20      PT/INR - ( 20 Feb 2025 04:30 )   PT: >40.00 sec;   INR: 9.17 ratio                                                Urinalysis Basic - ( 20 Feb 2025 04:30 )    Color: x / Appearance: x / SG: x / pH: x  Gluc: 67 mg/dL / Ketone: x  / Bili: x / Urobili: x   Blood: x / Protein: x / Nitrite: x   Leuk Esterase: x / RBC: x / WBC x   Sq Epi: x / Non Sq Epi: x / Bacteria: x                                                  LIVER FUNCTIONS - ( 20 Feb 2025 00:10 )  Alb: 2.8 g/dL / Pro: 4.8 g/dL / ALK PHOS: 102 U/L / ALT: 647 U/L / AST: 682 U/L / GGT: x                                                                                                                                   ABG - ( 20 Feb 2025 03:43 )  pH, Arterial: 7.24  pH, Blood: x     /  pCO2: 23    /  pO2: 262   / HCO3: 10    / Base Excess: -15.6 /  SaO2: 100.0               MEDICATIONS  (STANDING):  chlorhexidine 2% Cloths 1 Application(s) Topical daily  dextrose 50% Injectable 50 milliLiter(s) IV Push once  dextrose 50% Injectable 50 milliLiter(s) IV Push once  famotidine    Tablet 20 milliGRAM(s) Oral daily  ferrous    sulfate 325 milliGRAM(s) Oral daily  insulin regular  human recombinant 5 Unit(s) IV Push once  lactated ringers Bolus 500 milliLiter(s) IV Bolus once  melatonin 5 milliGRAM(s) Oral at bedtime  norepinephrine Infusion 0.05 MICROgram(s)/kG/Min (3.08 mL/Hr) IV Continuous <Continuous>    MEDICATIONS  (PRN):  acetaminophen     Tablet .. 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 Since she was discharge in January the patient has been progressively having more episodes of confusion and agitation. Earlier today, the patient was not able to be lifted out of bed and she was very agitated and yelling per her aid. Collateral information was obtained from her nephew who states that her lasix was recently increased from Lasix 40mg AM and 20mg in PM to Lasix 40mg BID. She was found to have KATHY on her labs and was admitted for further management.    Vitals  Temp: 98.2 -> 98.6  HR: 59  BP: 106/71  O2: 96% on RA  RR: 18    Labs  WBC: 9.57  HgB: 13.1  Plt: 234  BUN: 80  Cr: 2.7 (baseline 1.6)  Trop: 41  K: 5.4 (slightly hemolyzed)  BNP: 22k (42k last admission)    Imaging  CT Head: 1. No evidence of acute intracranial hemorrhage or large territory infarct. 2. Moderate chronic microvascular changes a chronic lacunar infarct within the right caudate head.    CXR: Bibasilar opacity/effusions.    In the ED  500mL NS bolus  Lokelma 10g PO x1 (14 Feb 2025 21:36)  admitted to floor, seen by renal/ cardio intermittent diuresis, RRT called worsening status, hypotensive, started on pressors, increase LA, multiorgan failure, upgrade to MICU, this levophed 0.2, BIPAP 12/6 100%      PAST MEDICAL & SURGICAL HISTORY:  Afib      HTN (hypertension)      Heart failure      H/O mitral valve replacement      H/O prior ablation treatment  afib          SOCIAL HX:   Smoking  -        REVIEW OF SYSTEMS SEE HPI    Allergies    sulfamethoxazole (Rash)    Intolerances        acetaminophen     Tablet .. 650 milliGRAM(s) Oral every 6 hours PRN  chlorhexidine 2% Cloths 1 Application(s) Topical daily  dextrose 50% Injectable 50 milliLiter(s) IV Push once  dextrose 50% Injectable 50 milliLiter(s) IV Push once  famotidine    Tablet 20 milliGRAM(s) Oral daily  ferrous    sulfate 325 milliGRAM(s) Oral daily  insulin regular  human recombinant 5 Unit(s) IV Push once  lactated ringers Bolus 500 milliLiter(s) IV Bolus once  melatonin 5 milliGRAM(s) Oral at bedtime  norepinephrine Infusion 0.05 MICROgram(s)/kG/Min IV Continuous <Continuous>  : Home Meds:      PHYSICAL EXAM    ICU Vital Signs Last 24 Hrs  T(C): 36.6 (19 Feb 2025 22:00), Max: 36.6 (19 Feb 2025 22:00)  T(F): 97.8 (19 Feb 2025 22:00), Max: 97.8 (19 Feb 2025 22:00)  HR: 60 (20 Feb 2025 04:00) (54 - 69)  BP: 78/52 (19 Feb 2025 23:30) (70/40 - 126/56)  BP(mean): 61 (19 Feb 2025 23:30) (61 - 80)  ABP: 106/48 (20 Feb 2025 04:00) (106/48 - 157/71)  ABP(mean): 71 (20 Feb 2025 04:00) (71 - 103)  RR: 25 (20 Feb 2025 04:00) (18 - 38)  SpO2: 98% (20 Feb 2025 04:00) (78% - 100%)    O2 Parameters below as of 20 Feb 2025 03:00  Patient On (Oxygen Delivery Method): BiPAP/CPAP            General: ill Looking  Lungs: Bilateral rhonchi  Cardiovascular: BENI 2/6  Abdomen: Soft, Positive BS  Extremities: No clubbing  Obtunded      02-18-25 @ 07:01  -  02-19-25 @ 07:00  --------------------------------------------------------  IN:    Oral Fluid: 720 mL  Total IN: 720 mL    OUT:    Intermittent Catheterization - Urethral (mL): 700 mL    Voided (mL): 200 mL  Total OUT: 900 mL    Total NET: -180 mL      02-19-25 @ 07:01  -  02-20-25 @ 06:54  --------------------------------------------------------  IN:    IV PiggyBack: 50 mL    Oral Fluid: 416 mL  Total IN: 466 mL    OUT:    Intermittent Catheterization - Urethral (mL): 350 mL  Total OUT: 350 mL    Total NET: 116 mL          LABS:                          13.4   19.94 )-----------( 178      ( 20 Feb 2025 04:30 )             44.3                                               02-20    137  |  98  |  109[HH]  ----------------------------<  67[L]  5.8[H]   |  12[L]  |  4.2[HH]    Ca    8.7      20 Feb 2025 04:30  Phos  7.9     02-19  Mg     2.8     02-20    TPro  4.8[L]  /  Alb  2.8[L]  /  TBili  1.8[H]  /  DBili  0.4[H]  /  AST  682[H]  /  ALT  647[H]  /  AlkPhos  102  02-20      PT/INR - ( 20 Feb 2025 04:30 )   PT: >40.00 sec;   INR: 9.17 ratio                                                Urinalysis Basic - ( 20 Feb 2025 04:30 )    Color: x / Appearance: x / SG: x / pH: x  Gluc: 67 mg/dL / Ketone: x  / Bili: x / Urobili: x   Blood: x / Protein: x / Nitrite: x   Leuk Esterase: x / RBC: x / WBC x   Sq Epi: x / Non Sq Epi: x / Bacteria: x                                                  LIVER FUNCTIONS - ( 20 Feb 2025 00:10 )  Alb: 2.8 g/dL / Pro: 4.8 g/dL / ALK PHOS: 102 U/L / ALT: 647 U/L / AST: 682 U/L / GGT: x                                                                                                                                   ABG - ( 20 Feb 2025 03:43 )  pH, Arterial: 7.24  pH, Blood: x     /  pCO2: 23    /  pO2: 262   / HCO3: 10    / Base Excess: -15.6 /  SaO2: 100.0               MEDICATIONS  (STANDING):  chlorhexidine 2% Cloths 1 Application(s) Topical daily  dextrose 50% Injectable 50 milliLiter(s) IV Push once  dextrose 50% Injectable 50 milliLiter(s) IV Push once  famotidine    Tablet 20 milliGRAM(s) Oral daily  ferrous    sulfate 325 milliGRAM(s) Oral daily  insulin regular  human recombinant 5 Unit(s) IV Push once  lactated ringers Bolus 500 milliLiter(s) IV Bolus once  melatonin 5 milliGRAM(s) Oral at bedtime  norepinephrine Infusion 0.05 MICROgram(s)/kG/Min (3.08 mL/Hr) IV Continuous <Continuous>    MEDICATIONS  (PRN):  acetaminophen     Tablet .. 650 milliGRAM(s) Oral every 6 hours PRN Temp greater or equal to 38C (100.4F), Mild Pain (1 - 3), Moderate Pain (4 - 6)      CXR noted

## 2025-02-20 NOTE — CONSULT NOTE ADULT - ASSESSMENT
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 Hospital course complicated by shock and respiratory failure.     Discussed with patient's nephew/HCP, Dr. Ahsan Bucio. He is familiar with palliative care. He understands the severity of patient's illness. At this time, he is most concerned about how patient's sister, Betty, will react to the news that patient is critically ill. Based on her response, Ahsan will decided on the next steps. No questions for the palliative care team at this time.     Plan:  - symptoms per primary team  - ongoing [facility] discussions  - I provided my contact information to Ahsan  - DNR/DNI     Palliative care will continue to follow.   Please call [phone] with questions or concerns 24/7.   _____________  Liu Jackson MD  Palliative Medicine  [facility]  [phone]

## 2025-02-20 NOTE — PROGRESS NOTE ADULT - SUBJECTIVE AND OBJECTIVE BOX
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 Since she was discharge in January the patient has been progressively having more episodes of confusion and agitation. Earlier today, the patient was not able to be lifted out of bed and she was very agitated and yelling per her aid. Collateral information was obtained from her nephew who states that her lasix was recently increased from Lasix 40mg AM and 20mg in PM to Lasix 40mg BID. She was found to have KATHY on her labs and was admitted for further management.    Vitals  Temp: 98.2 -> 98.6  HR: 59  BP: 106/71  O2: 96% on RA  RR: 18    Labs  WBC: 9.57  HgB: 13.1  Plt: 234  BUN: 80  Cr: 2.7 (baseline 1.6)  Trop: 41  K: 5.4 (slightly hemolyzed)  BNP: 22k (42k last admission)    Imaging  CT Head: 1. No evidence of acute intracranial hemorrhage or large territory infarct. 2. Moderate chronic microvascular changes a chronic lacunar infarct within the right caudate head.    CXR: Bibasilar opacity/effusions.    In the ED  500mL NS bolus  Lokelma 10g PO x1 (14 Feb 2025 21:36)  admitted to floor, seen by renal/ cardio intermittent diuresis, RRT called worsening status, hypotensive, started on pressors, increase LA, multiorgan failure, upgrade to MICU, this levophed 0.2, BIPAP 12/6 100%      PAST MEDICAL & SURGICAL HISTORY:  Afib      HTN (hypertension)      Heart failure      H/O mitral valve replacement      H/O prior ablation treatment  afib          SOCIAL HX:   Smoking  -        REVIEW OF SYSTEMS SEE HPI    Allergies    sulfamethoxazole (Rash)    Intolerances        acetaminophen     Tablet .. 650 milliGRAM(s) Oral every 6 hours PRN  chlorhexidine 2% Cloths 1 Application(s) Topical daily  dextrose 50% Injectable 50 milliLiter(s) IV Push once  dextrose 50% Injectable 50 milliLiter(s) IV Push once  famotidine    Tablet 20 milliGRAM(s) Oral daily  ferrous    sulfate 325 milliGRAM(s) Oral daily  insulin regular  human recombinant 5 Unit(s) IV Push once  lactated ringers Bolus 500 milliLiter(s) IV Bolus once  melatonin 5 milliGRAM(s) Oral at bedtime  norepinephrine Infusion 0.05 MICROgram(s)/kG/Min IV Continuous <Continuous>  : Home Meds:      PHYSICAL EXAM    ICU Vital Signs Last 24 Hrs  T(C): 36.6 (19 Feb 2025 22:00), Max: 36.6 (19 Feb 2025 22:00)  T(F): 97.8 (19 Feb 2025 22:00), Max: 97.8 (19 Feb 2025 22:00)  HR: 60 (20 Feb 2025 04:00) (54 - 69)  BP: 78/52 (19 Feb 2025 23:30) (70/40 - 126/56)  BP(mean): 61 (19 Feb 2025 23:30) (61 - 80)  ABP: 106/48 (20 Feb 2025 04:00) (106/48 - 157/71)  ABP(mean): 71 (20 Feb 2025 04:00) (71 - 103)  RR: 25 (20 Feb 2025 04:00) (18 - 38)  SpO2: 98% (20 Feb 2025 04:00) (78% - 100%)    O2 Parameters below as of 20 Feb 2025 03:00  Patient On (Oxygen Delivery Method): BiPAP/CPAP            General: ill Looking  Lungs: Bilateral rhonchi  Cardiovascular: BENI 2/6  Abdomen: Soft, Positive BS  Extremities: No clubbing  Obtunded      02-18-25 @ 07:01  -  02-19-25 @ 07:00  --------------------------------------------------------  IN:    Oral Fluid: 720 mL  Total IN: 720 mL    OUT:    Intermittent Catheterization - Urethral (mL): 700 mL    Voided (mL): 200 mL  Total OUT: 900 mL    Total NET: -180 mL      02-19-25 @ 07:01  -  02-20-25 @ 06:54  --------------------------------------------------------  IN:    IV PiggyBack: 50 mL    Oral Fluid: 416 mL  Total IN: 466 mL    OUT:    Intermittent Catheterization - Urethral (mL): 350 mL  Total OUT: 350 mL    Total NET: 116 mL          LABS:                          13.4   19.94 )-----------( 178      ( 20 Feb 2025 04:30 )             44.3                                               02-20    137  |  98  |  109[HH]  ----------------------------<  67[L]  5.8[H]   |  12[L]  |  4.2[HH]    Ca    8.7      20 Feb 2025 04:30  Phos  7.9     02-19  Mg     2.8     02-20    TPro  4.8[L]  /  Alb  2.8[L]  /  TBili  1.8[H]  /  DBili  0.4[H]  /  AST  682[H]  /  ALT  647[H]  /  AlkPhos  102  02-20      PT/INR - ( 20 Feb 2025 04:30 )   PT: >40.00 sec;   INR: 9.17 ratio                                                Urinalysis Basic - ( 20 Feb 2025 04:30 )    Color: x / Appearance: x / SG: x / pH: x  Gluc: 67 mg/dL / Ketone: x  / Bili: x / Urobili: x   Blood: x / Protein: x / Nitrite: x   Leuk Esterase: x / RBC: x / WBC x   Sq Epi: x / Non Sq Epi: x / Bacteria: x                                                  LIVER FUNCTIONS - ( 20 Feb 2025 00:10 )  Alb: 2.8 g/dL / Pro: 4.8 g/dL / ALK PHOS: 102 U/L / ALT: 647 U/L / AST: 682 U/L / GGT: x                                                                                                                                   ABG - ( 20 Feb 2025 03:43 )  pH, Arterial: 7.24  pH, Blood: x     /  pCO2: 23    /  pO2: 262   / HCO3: 10    / Base Excess: -15.6 /  SaO2: 100.0               MEDICATIONS  (STANDING):  chlorhexidine 2% Cloths 1 Application(s) Topical daily  dextrose 50% Injectable 50 milliLiter(s) IV Push once  dextrose 50% Injectable 50 milliLiter(s) IV Push once  famotidine    Tablet 20 milliGRAM(s) Oral daily  ferrous    sulfate 325 milliGRAM(s) Oral daily  insulin regular  human recombinant 5 Unit(s) IV Push once  lactated ringers Bolus 500 milliLiter(s) IV Bolus once  melatonin 5 milliGRAM(s) Oral at bedtime  norepinephrine Infusion 0.05 MICROgram(s)/kG/Min (3.08 mL/Hr) IV Continuous <Continuous>    MEDICATIONS  (PRN):  acetaminophen     Tablet .. 650 milliGRAM(s) Oral every 6 hours PRN Temp greater or equal to 38C (100.4F), Mild Pain (1 - 3), Moderate Pain (4 - 6)      CXR noted

## 2025-02-20 NOTE — ADVANCED PRACTICE NURSE CONSULT - ASSESSMENT
Patient received lying in bed. On BIPAP. Limited mobility. Incontinent of urine and stool. High risk for pressure injury development and progression.    Dry intact hyperpigmentation to sacrococcygeal region.     Skin assessed with primary RN bedside.     
History of Present Illness:   Ms. 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 Since she was discharge in January the patient has been progressively having more episodes of confusion and agitation. Earlier today, the patient was not able to be lifted out of bed and she was very agitated and yelling per her aid. Collateral information was obtained from her nephew who states that her lasix was recently increased from Lasix 40mg AM and 20mg in PM to Lasix 40mg BID. She was found to have KATHY on her labs and was admitted for further management.    Patient received lying in bed. Awake. Limited mobility. Incontinent of urine and stool. High risk for pressure injury development and progression.    Old healing wound to right buttock. Dry intact hyperpigmentation.     Dry intact hyperpigmentation to left buttock.     Blanchable redness to bilateral heels at time of assessment.

## 2025-02-20 NOTE — PROGRESS NOTE ADULT - SUBJECTIVE AND OBJECTIVE BOX
Nephrology progress note    THIS IS AN INCOMPLETE NOTE . FULL NOTE TO FOLLOW SHORTLY    Patient is seen and examined, events over the last 24 h noted .    Allergies:  sulfamethoxazole (Rash)    Hospital Medications:   MEDICATIONS  (STANDING):  chlorhexidine 2% Cloths 1 Application(s) Topical daily  famotidine    Tablet 20 milliGRAM(s) Oral daily  ferrous    sulfate 325 milliGRAM(s) Oral daily  lactated ringers Bolus 500 milliLiter(s) IV Bolus once  melatonin 5 milliGRAM(s) Oral at bedtime  norepinephrine Infusion 0.05 MICROgram(s)/kG/Min (3.08 mL/Hr) IV Continuous <Continuous>        VITALS:  T(F): 96.8 (02-20-25 @ 04:00), Max: 97.8 (02-19-25 @ 22:00)  HR: 62 (02-20-25 @ 07:22)  BP: 78/52 (02-19-25 @ 23:30)  RR: 25 (02-20-25 @ 07:00)  SpO2: 100% (02-20-25 @ 07:22)  Wt(kg): --    02-18 @ 07:01  -  02-19 @ 07:00  --------------------------------------------------------  IN: 720 mL / OUT: 900 mL / NET: -180 mL    02-19 @ 07:01  -  02-20 @ 07:00  --------------------------------------------------------  IN: 466 mL / OUT: 350 mL / NET: 116 mL      Height (cm): 162.6 (02-19 @ 22:15)  Weight (kg): 65.7 (02-19 @ 22:15)  BMI (kg/m2): 24.8 (02-19 @ 22:15)  BSA (m2): 1.71 (02-19 @ 22:15)    PHYSICAL EXAM:  Constitutional: NAD  HEENT: anicteric sclera, oropharynx clear, MMM  Neck: No JVD  Respiratory: CTAB, no wheezes, rales or rhonchi  Cardiovascular: S1, S2, RRR  Gastrointestinal: BS+, soft, NT/ND  Extremities: No cyanosis or clubbing. No peripheral edema  :  No quevedo.   Skin: No rashes    LABS:  02-20    137  |  98  |  109[HH]  ----------------------------<  67[L]  5.8[H]   |  12[L]  |  4.2[HH]    Ca    8.7      20 Feb 2025 04:30  Phos  7.9     02-19  Mg     2.8     02-20    TPro  4.8[L]  /  Alb  2.8[L]  /  TBili  1.8[H]  /  DBili  0.4[H]  /  AST  682[H]  /  ALT  647[H]  /  AlkPhos  102  02-20                          13.4   19.94 )-----------( 178      ( 20 Feb 2025 04:30 )             44.3       Urine Studies:  Urinalysis Basic - ( 20 Feb 2025 04:30 )    Color:  / Appearance:  / SG:  / pH:   Gluc: 67 mg/dL / Ketone:   / Bili:  / Urobili:    Blood:  / Protein:  / Nitrite:    Leuk Esterase:  / RBC:  / WBC    Sq Epi:  / Non Sq Epi:  / Bacteria:           Iron 29, TIBC 279, %sat 10      [01-04-25 @ 18:50]  Ferritin 119      [01-04-25 @ 18:50]          RADIOLOGY & ADDITIONAL STUDIES:   Nephrology progress note    Patient is seen and examined, events over the last 24 h noted .  Lying in bed   upgraded overnight for hypotension   on pressors   on BIPAP    Allergies:  sulfamethoxazole (Rash)    Hospital Medications:   MEDICATIONS  (STANDING):  famotidine    Tablet 20 milliGRAM(s) Oral daily  ferrous    sulfate 325 milliGRAM(s) Oral daily  lactated ringers Bolus 500 milliLiter(s) IV Bolus once  melatonin 5 milliGRAM(s) Oral at bedtime  norepinephrine Infusion 0.05 MICROgram(s)/kG/Min (3.08 mL/Hr) IV Continuous <Continuous>        VITALS:  T(F): 96.8 (02-20-25 @ 04:00), Max: 97.8 (02-19-25 @ 22:00)  HR: 62 (02-20-25 @ 07:22)  BP: 78/52 (02-19-25 @ 23:30)  RR: 25 (02-20-25 @ 07:00)  SpO2: 100% (02-20-25 @ 07:22)      02-18 @ 07:01  -  02-19 @ 07:00  --------------------------------------------------------  IN: 720 mL / OUT: 900 mL / NET: -180 mL    02-19 @ 07:01  -  02-20 @ 07:00  --------------------------------------------------------  IN: 466 mL / OUT: 350 mL / NET: 116 mL      Height (cm): 162.6 (02-19 @ 22:15)  Weight (kg): 65.7 (02-19 @ 22:15)  BMI (kg/m2): 24.8 (02-19 @ 22:15)  BSA (m2): 1.71 (02-19 @ 22:15)    PHYSICAL EXAM:  Constitutional: lethargic   Respiratory: CTAB, no wheezes, rales or rhonchi  Cardiovascular: S1, S2, RRR  Gastrointestinal: BS+, soft, NT/ND  Extremities: No cyanosis or clubbing. No peripheral edema  :  quevedo.   Skin: No rashes    LABS:  02-20    137  |  98  |  109[HH]  ----------------------------<  67[L]  5.8[H]   |  12[L]  |  4.2[HH]    Ca    8.7      20 Feb 2025 04:30  Phos  7.9     02-19  Mg     2.8     02-20    TPro  4.8[L]  /  Alb  2.8[L]  /  TBili  1.8[H]  /  DBili  0.4[H]  /  AST  682[H]  /  ALT  647[H]  /  AlkPhos  102  02-20                          13.4   19.94 )-----------( 178      ( 20 Feb 2025 04:30 )             44.3       Urine Studies:  Urinalysis Basic - ( 20 Feb 2025 04:30 )    Color:  / Appearance:  / SG:  / pH:   Gluc: 67 mg/dL / Ketone:   / Bili:  / Urobili:    Blood:  / Protein:  / Nitrite:    Leuk Esterase:  / RBC:  / WBC    Sq Epi:  / Non Sq Epi:  / Bacteria:           Iron 29, TIBC 279, %sat 10      [01-04-25 @ 18:50]  Ferritin 119      [01-04-25 @ 18:50]          RADIOLOGY & ADDITIONAL STUDIES:

## 2025-02-20 NOTE — PHYSICAL THERAPY INITIAL EVALUATION ADULT - SPECIFY REASON(S)
Hold PT at this time. Pt. will require new consult and activity orders when appropriate s/p upgrade to ICU. Will f/u with PT as appropriate.

## 2025-02-20 NOTE — PROCEDURE NOTE - NSPOSTCAREGUIDE_GEN_A_CORE
Verbal/written post procedure instructions were given to patient/caregiver
Verbal/written post procedure instructions were given to patient/caregiver
daughter

## 2025-02-20 NOTE — CONSULT NOTE ADULT - CONSULT REASON
KATHY on CKD with hyperK, likely from diuretic overuse
hypotension/ multiorgan failure
ADHF, patient of Dr. Stewart
GOC
No Vaccines Administered.

## 2025-02-20 NOTE — ADVANCED PRACTICE NURSE CONSULT - RECOMMEDATIONS
Cleanse wound with soap and water.   Pat dry, apply triad twice a day and prn for soiling. Avoid Allevyn to area    Maintain pressure injury prevention.   Keep skin clean.   Maintain incontinence care.   Monitor skin for changes and notify provider   
Cleanse sacrococcygeal region with soap and water.   Pat dry, apply triad twice a day and prn for soiling. Avoid Allevyn to area    Maintain pressure injury prevention.   Keep skin clean.   Maintain incontinence care.   Monitor skin for changes and notify provider   Case discussed with primary RN bedside

## 2025-02-21 NOTE — PROGRESS NOTE ADULT - PROVIDER SPECIALTY LIST ADULT
Critical Care
MICU
Nephrology
Palliative Care
Hospitalist
Internal Medicine
Nephrology
Nephrology
Hospitalist
Internal Medicine
Nephrology
Nephrology
MICU
Nephrology
Internal Medicine

## 2025-02-21 NOTE — PROGRESS NOTE ADULT - REASON FOR ADMISSION
Weakness and KATHY

## 2025-02-21 NOTE — DIETITIAN INITIAL EVALUATION ADULT - OTHER CALCULATIONS
Using ABW: ENERGY: 8827-9242 kcal/day (25-30 kcal/kg); PROTEIN: 79-99 g/day (1.2-1.5 g/kg); FLUID: 1mL/kcal -- with consideration for age, BMI, acuity of illness, SDTI

## 2025-02-21 NOTE — PROGRESS NOTE ADULT - SUBJECTIVE AND OBJECTIVE BOX
Over Night Events: events noted, palliative/ renal reviewed, worsening status    PHYSICAL EXAM    ICU Vital Signs Last 24 Hrs  T(C): 36.6 (21 Feb 2025 04:00), Max: 36.6 (21 Feb 2025 04:00)  T(F): 97.8 (21 Feb 2025 04:00), Max: 97.8 (21 Feb 2025 04:00)  HR: 54 (21 Feb 2025 06:00) (54 - 73)  ABP: 114/41 (21 Feb 2025 06:00) (103/44 - 132/61)  ABP(mean): 65 (21 Feb 2025 06:00) (61 - 88)  RR: 39 (21 Feb 2025 06:00) (24 - 41)  SpO2: 56% (21 Feb 2025 02:45) (56% - 100%)    O2 Parameters below as of 21 Feb 2025 06:00  Patient On (Oxygen Delivery Method): BiPAP/CPAP            General: ill looking  Lungs: Bilateral rhonchi  Cardiovascular: bradycardic  Abdomen: Soft, Positive BS  not following commands      02-19-25 @ 07:01  -  02-20-25 @ 07:00  --------------------------------------------------------  IN:    IV PiggyBack: 50 mL    Oral Fluid: 416 mL  Total IN: 466 mL    OUT:    Intermittent Catheterization - Urethral (mL): 350 mL  Total OUT: 350 mL    Total NET: 116 mL      02-20-25 @ 07:01  -  02-21-25 @ 06:50  --------------------------------------------------------  IN:    IV PiggyBack: 150 mL    Norepinephrine: 285.3 mL    Sodium Bicarbonate: 930 mL  Total IN: 1365.3 mL    OUT:    Indwelling Catheter - Urethral (mL): 15 mL  Total OUT: 15 mL    Total NET: 1350.3 mL          LABS:                          12.6   21.10 )-----------( 102      ( 21 Feb 2025 05:10 )             43.9                                               02-21    138  |  97[L]  |  110[HH]  ----------------------------<  16[LL]  7.1[HH]   |  8[LL]  |  5.0[HH]    Ca    7.9[L]      21 Feb 2025 05:10  Phos  7.9     02-19  Mg     2.7     02-21    TPro  5.5[L]  /  Alb  3.4[L]  /  TBili  2.7[H]  /  DBili  1.7[H]  /  AST  1871[H]  /  ALT  1591[H]  /  AlkPhos  135[H]  02-20      PT/INR - ( 21 Feb 2025 05:10 )   PT: >40.00 sec;   INR: 5.14 ratio                                                Urinalysis Basic - ( 21 Feb 2025 05:10 )    Color: x / Appearance: x / SG: x / pH: x  Gluc: 16 mg/dL / Ketone: x  / Bili: x / Urobili: x   Blood: x / Protein: x / Nitrite: x   Leuk Esterase: x / RBC: x / WBC x   Sq Epi: x / Non Sq Epi: x / Bacteria: x                                                  LIVER FUNCTIONS - ( 20 Feb 2025 11:40 )  Alb: 3.4 g/dL / Pro: 5.5 g/dL / ALK PHOS: 135 U/L / ALT: 1591 U/L / AST: 1871 U/L / GGT: x                                                                                                                                   ABG - ( 21 Feb 2025 03:06 )  pH, Arterial: 7.15  pH, Blood: x     /  pCO2: 21    /  pO2: 317   / HCO3: 7     / Base Excess: -19.7 /  SaO2: 100.0               MEDICATIONS  (STANDING):  cefepime   IVPB 1000 milliGRAM(s) IV Intermittent every 24 hours  cefepime   IVPB      chlorhexidine 2% Cloths 1 Application(s) Topical daily  dextrose 50% Injectable 50 milliLiter(s) IV Push once  famotidine    Tablet 20 milliGRAM(s) Oral daily  ferrous    sulfate 325 milliGRAM(s) Oral daily  hydrocortisone sodium succinate Injectable 50 milliGRAM(s) IV Push every 6 hours  insulin regular  human recombinant 5 Unit(s) IV Push once  lactated ringers Bolus 500 milliLiter(s) IV Bolus once  melatonin 5 milliGRAM(s) Oral at bedtime  norepinephrine Infusion 0.05 MICROgram(s)/kG/Min (3.08 mL/Hr) IV Continuous <Continuous>  sodium bicarbonate  Infusion 0.137 mEq/kG/Hr (60 mL/Hr) IV Continuous <Continuous>    MEDICATIONS  (PRN):  acetaminophen     Tablet .. 650 milliGRAM(s) Oral every 6 hours PRN Temp greater or equal to 38C (100.4F), Mild Pain (1 - 3), Moderate Pain (4 - 6)       Over Night Events: events noted, palliative/ renal reviewed, worsening status, on levophed 0.65, hypoglycemia    PHYSICAL EXAM    ICU Vital Signs Last 24 Hrs  T(C): 36.6 (21 Feb 2025 04:00), Max: 36.6 (21 Feb 2025 04:00)  T(F): 97.8 (21 Feb 2025 04:00), Max: 97.8 (21 Feb 2025 04:00)  HR: 54 (21 Feb 2025 06:00) (54 - 73)  ABP: 114/41 (21 Feb 2025 06:00) (103/44 - 132/61)  ABP(mean): 65 (21 Feb 2025 06:00) (61 - 88)  RR: 39 (21 Feb 2025 06:00) (24 - 41)  SpO2: 56% (21 Feb 2025 02:45) (56% - 100%)    O2 Parameters below as of 21 Feb 2025 06:00  Patient On (Oxygen Delivery Method): BiPAP/CPAP            General: ill looking  Lungs: Bilateral rhonchi  Cardiovascular: bradycardic  Abdomen: Soft, Positive BS  not following commands  mottled extremities      02-19-25 @ 07:01  -  02-20-25 @ 07:00  --------------------------------------------------------  IN:    IV PiggyBack: 50 mL    Oral Fluid: 416 mL  Total IN: 466 mL    OUT:    Intermittent Catheterization - Urethral (mL): 350 mL  Total OUT: 350 mL    Total NET: 116 mL      02-20-25 @ 07:01  -  02-21-25 @ 06:50  --------------------------------------------------------  IN:    IV PiggyBack: 150 mL    Norepinephrine: 285.3 mL    Sodium Bicarbonate: 930 mL  Total IN: 1365.3 mL    OUT:    Indwelling Catheter - Urethral (mL): 15 mL  Total OUT: 15 mL    Total NET: 1350.3 mL          LABS:                          12.6   21.10 )-----------( 102      ( 21 Feb 2025 05:10 )             43.9                                               02-21    138  |  97[L]  |  110[HH]  ----------------------------<  16[LL]  7.1[HH]   |  8[LL]  |  5.0[HH]    Ca    7.9[L]      21 Feb 2025 05:10  Phos  7.9     02-19  Mg     2.7     02-21    TPro  5.5[L]  /  Alb  3.4[L]  /  TBili  2.7[H]  /  DBili  1.7[H]  /  AST  1871[H]  /  ALT  1591[H]  /  AlkPhos  135[H]  02-20      PT/INR - ( 21 Feb 2025 05:10 )   PT: >40.00 sec;   INR: 5.14 ratio                                                Urinalysis Basic - ( 21 Feb 2025 05:10 )    Color: x / Appearance: x / SG: x / pH: x  Gluc: 16 mg/dL / Ketone: x  / Bili: x / Urobili: x   Blood: x / Protein: x / Nitrite: x   Leuk Esterase: x / RBC: x / WBC x   Sq Epi: x / Non Sq Epi: x / Bacteria: x                                                  LIVER FUNCTIONS - ( 20 Feb 2025 11:40 )  Alb: 3.4 g/dL / Pro: 5.5 g/dL / ALK PHOS: 135 U/L / ALT: 1591 U/L / AST: 1871 U/L / GGT: x                                                                                                                                   ABG - ( 21 Feb 2025 03:06 )  pH, Arterial: 7.15  pH, Blood: x     /  pCO2: 21    /  pO2: 317   / HCO3: 7     / Base Excess: -19.7 /  SaO2: 100.0               MEDICATIONS  (STANDING):  cefepime   IVPB 1000 milliGRAM(s) IV Intermittent every 24 hours  cefepime   IVPB      chlorhexidine 2% Cloths 1 Application(s) Topical daily  dextrose 50% Injectable 50 milliLiter(s) IV Push once  famotidine    Tablet 20 milliGRAM(s) Oral daily  ferrous    sulfate 325 milliGRAM(s) Oral daily  hydrocortisone sodium succinate Injectable 50 milliGRAM(s) IV Push every 6 hours  insulin regular  human recombinant 5 Unit(s) IV Push once  lactated ringers Bolus 500 milliLiter(s) IV Bolus once  melatonin 5 milliGRAM(s) Oral at bedtime  norepinephrine Infusion 0.05 MICROgram(s)/kG/Min (3.08 mL/Hr) IV Continuous <Continuous>  sodium bicarbonate  Infusion 0.137 mEq/kG/Hr (60 mL/Hr) IV Continuous <Continuous>    MEDICATIONS  (PRN):  acetaminophen     Tablet .. 650 milliGRAM(s) Oral every 6 hours PRN Temp greater or equal to 38C (100.4F), Mild Pain (1 - 3), Moderate Pain (4 - 6)

## 2025-02-21 NOTE — PROGRESS NOTE ADULT - SUBJECTIVE AND OBJECTIVE BOX
Over Night Events: events noted, palliative/ renal reviewed, worsening status, on levophed 0.65, hypoglycemia glucose at 24, was given 2 AMPs of D50. lactate: 15.    PHYSICAL EXAM    ICU Vital Signs Last 24 Hrs  T(C): 36.6 (21 Feb 2025 04:00), Max: 36.6 (21 Feb 2025 04:00)  T(F): 97.8 (21 Feb 2025 04:00), Max: 97.8 (21 Feb 2025 04:00)  HR: 54 (21 Feb 2025 06:00) (54 - 73)  ABP: 114/41 (21 Feb 2025 06:00) (103/44 - 132/61)  ABP(mean): 65 (21 Feb 2025 06:00) (61 - 88)  RR: 39 (21 Feb 2025 06:00) (24 - 41)  SpO2: 56% (21 Feb 2025 02:45) (56% - 100%)    O2 Parameters below as of 21 Feb 2025 06:00  Patient On (Oxygen Delivery Method): BiPAP/CPAP            General: ill looking  Lungs: Bilateral rhonchi  Cardiovascular: bradycardic  Abdomen: Soft, Positive BS  not following commands  mottled extremities      02-19-25 @ 07:01  -  02-20-25 @ 07:00  --------------------------------------------------------  IN:    IV PiggyBack: 50 mL    Oral Fluid: 416 mL  Total IN: 466 mL    OUT:    Intermittent Catheterization - Urethral (mL): 350 mL  Total OUT: 350 mL    Total NET: 116 mL      02-20-25 @ 07:01  -  02-21-25 @ 06:50  --------------------------------------------------------  IN:    IV PiggyBack: 150 mL    Norepinephrine: 285.3 mL    Sodium Bicarbonate: 930 mL  Total IN: 1365.3 mL    OUT:    Indwelling Catheter - Urethral (mL): 15 mL  Total OUT: 15 mL    Total NET: 1350.3 mL          LABS:                          12.6   21.10 )-----------( 102      ( 21 Feb 2025 05:10 )             43.9                                               02-21    138  |  97[L]  |  110[HH]  ----------------------------<  16[LL]  7.1[HH]   |  8[LL]  |  5.0[HH]    Ca    7.9[L]      21 Feb 2025 05:10  Phos  7.9     02-19  Mg     2.7     02-21    TPro  5.5[L]  /  Alb  3.4[L]  /  TBili  2.7[H]  /  DBili  1.7[H]  /  AST  1871[H]  /  ALT  1591[H]  /  AlkPhos  135[H]  02-20      PT/INR - ( 21 Feb 2025 05:10 )   PT: >40.00 sec;   INR: 5.14 ratio                                                Urinalysis Basic - ( 21 Feb 2025 05:10 )    Color: x / Appearance: x / SG: x / pH: x  Gluc: 16 mg/dL / Ketone: x  / Bili: x / Urobili: x   Blood: x / Protein: x / Nitrite: x   Leuk Esterase: x / RBC: x / WBC x   Sq Epi: x / Non Sq Epi: x / Bacteria: x                                                  LIVER FUNCTIONS - ( 20 Feb 2025 11:40 )  Alb: 3.4 g/dL / Pro: 5.5 g/dL / ALK PHOS: 135 U/L / ALT: 1591 U/L / AST: 1871 U/L / GGT: x                                                                                                                                   ABG - ( 21 Feb 2025 03:06 )  pH, Arterial: 7.15  pH, Blood: x     /  pCO2: 21    /  pO2: 317   / HCO3: 7     / Base Excess: -19.7 /  SaO2: 100.0               MEDICATIONS  (STANDING):  cefepime   IVPB 1000 milliGRAM(s) IV Intermittent every 24 hours  cefepime   IVPB      chlorhexidine 2% Cloths 1 Application(s) Topical daily  dextrose 50% Injectable 50 milliLiter(s) IV Push once  famotidine    Tablet 20 milliGRAM(s) Oral daily  ferrous    sulfate 325 milliGRAM(s) Oral daily  hydrocortisone sodium succinate Injectable 50 milliGRAM(s) IV Push every 6 hours  insulin regular  human recombinant 5 Unit(s) IV Push once  lactated ringers Bolus 500 milliLiter(s) IV Bolus once  melatonin 5 milliGRAM(s) Oral at bedtime  norepinephrine Infusion 0.05 MICROgram(s)/kG/Min (3.08 mL/Hr) IV Continuous <Continuous>  sodium bicarbonate  Infusion 0.137 mEq/kG/Hr (60 mL/Hr) IV Continuous <Continuous>    MEDICATIONS  (PRN):  acetaminophen     Tablet .. 650 milliGRAM(s) Oral every 6 hours PRN Temp greater or equal to 38C (100.4F), Mild Pain (1 - 3), Moderate Pain (4 - 6)

## 2025-02-21 NOTE — DIETITIAN INITIAL EVALUATION ADULT - OTHER INFO
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 On BiPAP/CPAP.  # Acute hypoxemic resp failure on NIV  # shock/ on levophed possible septic/ cardiogenic  # Valvular disease/ multiorgan failure  # Lactic Acidosis  # renal failure/ hyperkalemia  # Metabolic acidosis  # hypoglycemia    Pertinent Subjective Information: Pt NPO at this time; on BiPAP/CPAP. Per flow sheet, pt had fair appetite; consumed 51-75% of meal on 2/19.    Weight hx: UBW unknown. Current dosing weight is 65.7 KG. 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 On BiPAP/CPAP.  # Acute hypoxemic resp failure on NIV  # shock/ on levophed possible septic/ cardiogenic  # Valvular disease/ multiorgan failure  # Lactic Acidosis  # renal failure/ hyperkalemia  # Metabolic acidosis  # hypoglycemia    Per Palliative Care note on 2/21: Met with Ahsan and his wife, Brigette, at bedside. They would like for patient to transition to CMO later today.   - once family is ready to fully transition to CMO (after Betty leaves):  - administer dilaudid 10-15min prior to removal of HFNC  - can remove HFNC, pressors, and other life prolonging interventions at the same time    Pertinent Subjective Information: Pt NPO at this time; on BiPAP/CPAP. Per flow sheet, pt had fair appetite; consumed 51-75% of meal on 2/19.    Weight hx: UBW unknown. Current dosing weight is 65.7 KG. Previous admission weight was 63.4 KG per HIE on 1/8/25. 3.5% unintentional weight gain in over 1 month compared to current dosing weight. Pt does not meet weight criteria for malnutrition at this time.

## 2025-02-21 NOTE — DIETITIAN INITIAL EVALUATION ADULT - NUTRITIONGOAL OUTCOME1
Pt to meet at least 51-75% of estimated protein needs within 3-5 days if medically feasible and within GOC

## 2025-02-21 NOTE — PROGRESS NOTE ADULT - ASSESSMENT
IMPRESSION:    Acute hypoxemic resp failure on NIV  shock/ on levophed possible septic/ cardiogenic  Valvular disease/ multiorgan failure  Lactic Acidosis  renal failure/ hyperkalemia  Metabolic acidosis  hypoglycemia      PLAN:    CNS: Avoid CNS depressant    HEENT:  Oral care    PULMONARY:  HOB @ 45 degrees, keep NIV, decrease FIO2, aspiration precaution    CARDIOVASCULAR: bicarb drip, Hold diuretics, trend LA    GI: GI prophylaxis Feeding  npo RUQ sono noted    RENAL:  F/u  lytes.  Correct as needed. accurate I/O, quevedo catheter, bicarb push/ insulin d50, not candidate for RRT    INFECTIOUS DISEASE: abx, pancx, Procal    HEMATOLOGICAL:  DVT prophylaxis. Fup CBC, INR    ENDOCRINE:  Follow up FS.  Insulin protocol if needed. HC 50 Q 6      very poor prognosis  palliative care eval  fem line/ a line no changes  Condition futile  will speak to nephew upon arrival in regards to plan

## 2025-02-21 NOTE — PROGRESS NOTE ADULT - SUBJECTIVE AND OBJECTIVE BOX
HPI:  Ms. 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 Since she was discharge in January the patient has been progressively having more episodes of confusion and agitation. Earlier today, the patient was not able to be lifted out of bed and she was very agitated and yelling per her aid. Collateral information was obtained from her nephew who states that her lasix was recently increased from Lasix 40mg AM and 20mg in PM to Lasix 40mg BID. She was found to have KATHY on her labs and was admitted for further management.    Vitals  Temp: 98.2 -> 98.6  HR: 59  BP: 106/71  O2: 96% on RA  RR: 18    Labs  WBC: 9.57  HgB: 13.1  Plt: 234  BUN: 80  Cr: 2.7 (baseline 1.6)  Trop: 41  K: 5.4 (slightly hemolyzed)  BNP: 22k (42k last admission)    Imaging  CT Head: 1. No evidence of acute intracranial hemorrhage or large territory infarct. 2. Moderate chronic microvascular changes a chronic lacunar infarct within the right caudate head.    CXR: Bibasilar opacity/effusions.    In the ED  500mL NS bolus  Lokelma 10g PO x1 (14 Feb 2025 21:36)    Interval history:  Patient lying in bed.   Discussed with family.       ITEMS NOT CHECKED ARE NOT PRESENT    SOCIAL HISTORY:   Significant other/partner[ ]  Children[ ]  Anglican/Spirituality:  Substance hx:  [ ]   Tobacco hx:  [ ]   Alcohol hx: [ ]   Living Situation: [ ]Home  [ ]Long term care  [ ]Rehab [ ]Other  Home Services: [ ] HHA [ ] Visting RN [ ] Hospice  Occupation:  Home Opioid hx:  [ ] Y [ ] N [ ] I-Stop Reference No:     ADVANCE DIRECTIVES:    [ ] Full Code [ ] DNR  MOLST  [ ]  Living Will  [ ]   DECISION MAKER(s):  [x ] Health Care Proxy(s)  [ ] Surrogate(s)  [ ] Guardian           Name(s): Phone Number(s):  Ahsan Bucio      BASELINE (I)ADL(s) (prior to admission):  Titus: [ ]Total  [ ] Moderate [ ]Dependent  Palliative Performance Status Version 2:         %    [email or web address]    Allergies    sulfamethoxazole (Rash)    Intolerances    MEDICATIONS  (STANDING):  cefepime   IVPB 1000 milliGRAM(s) IV Intermittent every 24 hours  cefepime   IVPB      chlorhexidine 2% Cloths 1 Application(s) Topical daily  famotidine    Tablet 20 milliGRAM(s) Oral daily  ferrous    sulfate 325 milliGRAM(s) Oral daily  glycopyrrolate Injectable 0.2 milliGRAM(s) IV Push every 6 hours  hydrocortisone sodium succinate Injectable 50 milliGRAM(s) IV Push every 6 hours  lactated ringers Bolus 500 milliLiter(s) IV Bolus once  melatonin 5 milliGRAM(s) Oral at bedtime  norepinephrine Infusion 0.05 MICROgram(s)/kG/Min (3.08 mL/Hr) IV Continuous <Continuous>  sodium bicarbonate  Infusion 0.137 mEq/kG/Hr (60 mL/Hr) IV Continuous <Continuous>    MEDICATIONS  (PRN):  acetaminophen     Tablet .. 650 milliGRAM(s) Oral every 6 hours PRN Temp greater or equal to 38C (100.4F), Mild Pain (1 - 3), Moderate Pain (4 - 6)  HYDROmorphone  Injectable 0.5 milliGRAM(s) IV Push every 15 minutes PRN pain/dyspnea/tachypnea  LORazepam   Injectable 0.5 milliGRAM(s) IV Push every 30 minutes PRN anxiety/agitation      PRESENT SYMPTOMS: [x ]Unable to obtain due to poor mentation   Source if other than patient:  [ ]Family   [ ]Team     Pain: [ ]yes [ ]no  QOL impact -   Location -                    Aggravating factors -  Alleviating factors -   Quality -  Radiation -  Timing -   Severity (0-10 scale):  Minimal acceptable level (0-10 scale):     CPOT:  2  https://[email or web address]/getattachment/[email or web address]/Critical-Care-Pain-Observation-Tool-(CPOT)    PAIN AD Score:   http://geriatrictoolkit.[email or web address]/painad.pdf     Dyspnea:                           [ ]None[ ]Mild [ ]Moderate [ ]Severe     Respiratory Distress Observation Scale (RDOS): 2  A score of 0 to 2 signifies little or no respiratory distress, 3 signifies mild distress, scores 4 to 6 indicate moderate distress, and scores greater than 7 signify severe distress  [email or web address]/sites/default/files/[email or web address]-tdyaedkbtoj-ebrbyldi-jivqqeiliku-hways.pdf    Anxiety:                             [ ]None[ ]Mild [ ]Moderate [ ]Severe   Fatigue:                             [ ]None[ ]Mild [ ]Moderate [ ]Severe   Nausea:                             [ ]None[ ]Mild [ ]Moderate [ ]Severe   Loss of appetite:              [ ]None[ ]Mild [ ]Moderate [ ]Severe   Constipation:                    [ ]None[ ]Mild [ ]Moderate [ ]Severe    Other Symptoms:  [ ]All other review of systems negative     Palliative Performance Status Version 2:         %    [email or web address]_[email or web address]  PHYSICAL EXAM:    GENERAL:  NAD   PULMONARY:  Non labored breathing  NEUROLOGIC: Does not follow commands  BEHAVIORAL/PSYCH:  Calm    LABS: I have reviewed daily labs                     RADIOLOGY & ADDITIONAL STUDIES: I have reviewed new imaging    PROTEIN CALORIE MALNUTRITION PRESENT: [ ]mild [ ]moderate [ ]severe [ ]underweight [ ]morbid obesity  https://www.[facility].org/vault/2440/web/files/ONC/Table_Clinical%20Characteristics%20to%20Document%20Malnutrition-White%20JV%20et%20al%791746.pdf    Height (cm): 162.6 (02-19-25 @ 22:15), 165.1 (01-04-25 @ 14:51), 165 (12-13-24 @ 08:50)  Weight (kg): 65.7 (02-19-25 @ 22:15), 63.4 (01-08-25 @ 05:48), 60.8 (12-13-24 @ 08:50)  BMI (kg/m2): 24.8 (02-19-25 @ 22:15), 23.3 (01-08-25 @ 05:48), 22.3 (01-04-25 @ 14:51)    [ ]PPSV2 < or = to 30% [ ]significant weight loss  [ ]poor nutritional intake  [ ]anasarca      [ ]Artificial Nutrition      Palliative Care Spiritual/Emotional Screening Tool Question  Severity (0-4):                    OR                    [ x] Unable to determine. Will assess at later time if appropriate.  Score of 2 or greater indicates recommendation of Chaplaincy and/or SW referral  Chaplaincy Referral: [ ] Yes [ ] Refused [ ] Following     Caregiver [place]:  [ ] Yes [ ] No    OR    [x ] Unable to determine. Will assess at later time if appropriate.  Social Work Referral [ ]  Patient and Family Centered Care Referral [ ]    Anticipatory Grief Present: [ ] Yes [ ] No    OR     [ x] Unable to determine. Will assess at later time if appropriate.  Social Work Referral [ ]  Patient and Family Centered Care Referral [ ]    REFERRALS:   [ ]Chaplaincy  [ ]Hospice  [ ]Child Life  [ ]Social Work  [ ]Case management [ ]Holistic Therapy     Palliative care education provided to patient and/or family

## 2025-02-21 NOTE — PROGRESS NOTE ADULT - SUBJECTIVE AND OBJECTIVE BOX
Nephrology progress note    THIS IS AN INCOMPLETE NOTE . FULL NOTE TO FOLLOW SHORTLY    Patient is seen and examined, events over the last 24 h noted .    Allergies:  sulfamethoxazole (Rash)    Hospital Medications:   MEDICATIONS  (STANDING):  calcium gluconate IVPB 2 Gram(s) IV Intermittent once  cefepime   IVPB 1000 milliGRAM(s) IV Intermittent every 24 hours  cefepime   IVPB      chlorhexidine 2% Cloths 1 Application(s) Topical daily  dextrose 50% Injectable 50 milliLiter(s) IV Push once  famotidine    Tablet 20 milliGRAM(s) Oral daily  ferrous    sulfate 325 milliGRAM(s) Oral daily  hydrocortisone sodium succinate Injectable 50 milliGRAM(s) IV Push every 6 hours  insulin regular  human recombinant 10 Unit(s) IV Push once  lactated ringers Bolus 500 milliLiter(s) IV Bolus once  melatonin 5 milliGRAM(s) Oral at bedtime  norepinephrine Infusion 0.05 MICROgram(s)/kG/Min (3.08 mL/Hr) IV Continuous <Continuous>  sodium bicarbonate  Infusion 0.137 mEq/kG/Hr (60 mL/Hr) IV Continuous <Continuous>        VITALS:  T(F): 97.8 (02-21-25 @ 04:00), Max: 97.8 (02-21-25 @ 04:00)  HR: 61 (02-21-25 @ 07:26)  BP: --  RR: 35 (02-21-25 @ 07:00)  SpO2: 56% (02-21-25 @ 02:45)  Wt(kg): --    02-19 @ 07:01  -  02-20 @ 07:00  --------------------------------------------------------  IN: 466 mL / OUT: 350 mL / NET: 116 mL    02-20 @ 07:01  -  02-21 @ 07:00  --------------------------------------------------------  IN: 1435.3 mL / OUT: 15 mL / NET: 1420.3 mL          PHYSICAL EXAM:  Constitutional: NAD  HEENT: anicteric sclera, oropharynx clear, MMM  Neck: No JVD  Respiratory: CTAB, no wheezes, rales or rhonchi  Cardiovascular: S1, S2, RRR  Gastrointestinal: BS+, soft, NT/ND  Extremities: No cyanosis or clubbing. No peripheral edema  :  No quevedo.   Skin: No rashes    LABS:  02-21    138  |  97[L]  |  110[HH]  ----------------------------<  16[LL]  7.1[HH]   |  8[LL]  |  5.0[HH]    Ca    7.9[L]      21 Feb 2025 05:10  Phos  7.9     02-19  Mg     2.7     02-21    TPro  5.5[L]  /  Alb  3.4[L]  /  TBili  2.7[H]  /  DBili  1.7[H]  /  AST  1871[H]  /  ALT  1591[H]  /  AlkPhos  135[H]  02-20                          12.6   21.10 )-----------( 102      ( 21 Feb 2025 05:10 )             43.9       Urine Studies:  Urinalysis Basic - ( 21 Feb 2025 05:10 )    Color:  / Appearance:  / SG:  / pH:   Gluc: 16 mg/dL / Ketone:   / Bili:  / Urobili:    Blood:  / Protein:  / Nitrite:    Leuk Esterase:  / RBC:  / WBC    Sq Epi:  / Non Sq Epi:  / Bacteria:           Iron 29, TIBC 279, %sat 10      [01-04-25 @ 18:50]  Ferritin 119      [01-04-25 @ 18:50]          RADIOLOGY & ADDITIONAL STUDIES:   Nephrology progress note    Patient is seen and examined, events over the last 24 h noted .  Lying in bed  on BIPAP  multiple pressors   mottled ischemic limbs     Allergies:  sulfamethoxazole (Rash)    Hospital Medications:   MEDICATIONS  (STANDING):  calcium gluconate IVPB 2 Gram(s) IV Intermittent once  cefepime   IVPB 1000 milliGRAM(s) IV Intermittent every 24 hours  famotidine    Tablet 20 milliGRAM(s) Oral daily  ferrous    sulfate 325 milliGRAM(s) Oral daily  hydrocortisone sodium succinate Injectable 50 milliGRAM(s) IV Push every 6 hours  insulin regular  human recombinant 10 Unit(s) IV Push once  lactated ringers Bolus 500 milliLiter(s) IV Bolus once  melatonin 5 milliGRAM(s) Oral at bedtime  norepinephrine Infusion 0.05 MICROgram(s)/kG/Min (3.08 mL/Hr) IV Continuous <Continuous>  sodium bicarbonate  Infusion 0.137 mEq/kG/Hr (60 mL/Hr) IV Continuous <Continuous>        VITALS:  T(F): 97.8 (02-21-25 @ 04:00), Max: 97.8 (02-21-25 @ 04:00)  HR: 61 (02-21-25 @ 07:26)  BP: --  RR: 35 (02-21-25 @ 07:00)  SpO2: 56% (02-21-25 @ 02:45)      02-19 @ 07:01  -  02-20 @ 07:00  --------------------------------------------------------  IN: 466 mL / OUT: 350 mL / NET: 116 mL    02-20 @ 07:01  -  02-21 @ 07:00  --------------------------------------------------------  IN: 1435.3 mL / OUT: 15 mL / NET: 1420.3 mL          PHYSICAL EXAM:  Constitutional: unresponsive in BIPAP  Respiratory: CTAB,  Cardiovascular: S1, S2, RRR  Gastrointestinal: BS+, soft, NT/ND  Extremities: No cyanosis or clubbing. No peripheral edema  :  No quevedo.   Skin: No rashes    LABS:  02-21    138  |  97[L]  |  110[HH]  ----------------------------<  16[LL]  7.1[HH]   |  8[LL]  |  5.0[HH]    Ca    7.9[L]      21 Feb 2025 05:10  Phos  7.9     02-19  Mg     2.7     02-21    TPro  5.5[L]  /  Alb  3.4[L]  /  TBili  2.7[H]  /  DBili  1.7[H]  /  AST  1871[H]  /  ALT  1591[H]  /  AlkPhos  135[H]  02-20                          12.6   21.10 )-----------( 102      ( 21 Feb 2025 05:10 )             43.9       Urine Studies:  Urinalysis Basic - ( 21 Feb 2025 05:10 )    Color:  / Appearance:  / SG:  / pH:   Gluc: 16 mg/dL / Ketone:   / Bili:  / Urobili:    Blood:  / Protein:  / Nitrite:    Leuk Esterase:  / RBC:  / WBC    Sq Epi:  / Non Sq Epi:  / Bacteria:           Iron 29, TIBC 279, %sat 10      [01-04-25 @ 18:50]  Ferritin 119      [01-04-25 @ 18:50]          RADIOLOGY & ADDITIONAL STUDIES:

## 2025-02-21 NOTE — DISCHARGE NOTE FOR THE EXPIRED PATIENT - HOSPITAL COURSE
HPI:  Ms. 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 Since she was discharge in January the patient has been progressively having more episodes of confusion and agitation. Earlier today, the patient was not able to be lifted out of bed and she was very agitated and yelling per her aid. Collateral information was obtained from her nephew who states that her lasix was recently increased from Lasix 40mg AM and 20mg in PM to Lasix 40mg BID. She was found to have KATHY on her labs and was admitted for further management.    ED hold course:  - 2/15: hold lasix. LR @ 50ml/hr. RBUS: neg for hydro. nephro following: hold lasix. c/w home metop succinate 200mg qd for afib.   - 2/16: TTE EF 56. severe pHTN. mod TR. mod to severe AS. mod eccentric MR. hold coumadin due to supratherapeutic INR. s/p lasix 60mg ivp* 1 dose  - 2/17: s/p lasix 40mg vi bid      3C course:   - 2/18: POCUS IVC: 1.5cm, collapse. hold lasix (she was on lasix 40mg iv bid prior). hold statin due to elevated LFTs. CXR showed stable b/l opacities. RUQ: mild gallbladder wall edema. suspected stone in fundus.  - 2/19 pm: RR called for hypotension and AMS. BP 70/40 HR 77, sat 85%. EKG NSR. FS 23. Gave 1L LR bolus. started levo. NRB then BiPAP.  Gave 2 pushes of D50. repeat. upgrade to ICU    ICU course: Pt brought to ICU A-line and central line inserted. Pt was put on bicarb drip, hydrocortisone 50 q6. Pt was given Bumex, did not have urine output. Creatinine was elevated, and had hyperkalemic acidosis, was given Lokelma 10g q8h. Patient's family was contacted in regards to poor prognosis, did not want dialysis for patient, wanted palliative measures for patient. On 2/21 pt glucose 24, was given 2 amps D50, insulin, dextrose, and calcium gluconate. Family decided for CMO.  Time of death 1:46 PM 2/21 cardiopulmonary arrest, no pulses, family at bedside and updated.       claudia GUAMAN called, not candidate, nb is [phone]

## 2025-02-21 NOTE — DIETITIAN INITIAL EVALUATION ADULT - PERTINENT LABORATORY DATA
02-21    138  |  97[L]  |  110[HH]  ----------------------------<  16[LL]  7.1[HH]   |  8[LL]  |  5.0[HH]    Ca    7.9[L]      21 Feb 2025 05:10  Phos  7.9     02-19  Mg     2.7     02-21    TPro  5.5[L]  /  Alb  3.4[L]  /  TBili  2.7[H]  /  DBili  1.7[H]  /  AST  1871[H]  /  ALT  1591[H]  /  AlkPhos  135[H]  02-20  POCT Blood Glucose.: 147 mg/dL (02-21-25 @ 09:21)

## 2025-02-21 NOTE — DIETITIAN INITIAL EVALUATION ADULT - PERTINENT MEDS FT
MEDICATIONS  (STANDING):  cefepime   IVPB 1000 milliGRAM(s) IV Intermittent every 24 hours  cefepime   IVPB      chlorhexidine 2% Cloths 1 Application(s) Topical daily  famotidine    Tablet 20 milliGRAM(s) Oral daily  ferrous    sulfate 325 milliGRAM(s) Oral daily  glycopyrrolate Injectable 0.2 milliGRAM(s) IV Push every 6 hours  hydrocortisone sodium succinate Injectable 50 milliGRAM(s) IV Push every 6 hours  lactated ringers Bolus 500 milliLiter(s) IV Bolus once  melatonin 5 milliGRAM(s) Oral at bedtime  norepinephrine Infusion 0.05 MICROgram(s)/kG/Min (3.08 mL/Hr) IV Continuous <Continuous>  sodium bicarbonate  Infusion 0.137 mEq/kG/Hr (60 mL/Hr) IV Continuous <Continuous>    MEDICATIONS  (PRN):  acetaminophen     Tablet .. 650 milliGRAM(s) Oral every 6 hours PRN Temp greater or equal to 38C (100.4F), Mild Pain (1 - 3), Moderate Pain (4 - 6)  HYDROmorphone  Injectable 0.5 milliGRAM(s) IV Push every 15 minutes PRN pain/dyspnea/tachypnea  LORazepam   Injectable 0.5 milliGRAM(s) IV Push every 30 minutes PRN anxiety/agitation

## 2025-02-21 NOTE — PROGRESS NOTE ADULT - ASSESSMENT
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 Hospital course complicated by shock and respiratory failure.     2/20: Discussed with patient's nephew/HCP, Dr. Ahsan Bucio. He is familiar with palliative care. He understands the severity of patient's illness. At this time, he is most concerned about how patient's sister, Betty, will react to the news that patient is critically ill. Based on her response, Ahsan will decided on the next steps. No questions for the palliative care team at this time.     2/21: Met with Ahsan and his wife, Brigette, at bedside. They would like for patient to transition to CMO later today. They would like for patient's sister to be able to visit and spend time with the patient first. Ahsan and Brigette would not want Betty to see the patient on bipap, but they also do not want her to see the patient die. We discussed that we can transition bipap to HFNC for Betty to visit, and subsequently discontinue all life-prolonging interventions after Betty leaves (they do not anticipate that she will be present for a long time).     Plan:  - IV dilaudid 0.5mg q15min PRN for pain/dyspnea/tachypnea, IV ativan 0.5mg q30min PRN for anxiety/agitation, and IV glycopyrrolate 0.2mg q6hr standing    - provide first dose of dilaudid and ativan 10-15min prior to transitioning from bipap to hfnc  - once family is ready to fully transition to CMO (after Betty leaves):    - administer dilaudid 10-15min prior to removal of HFNC    - can remove HFNC, pressors, and other life prolonging interventions at the same time  - DNR/DNI    Discussed with ICU team.     Palliative care will continue to follow.   Please call [phone] with questions or concerns 24/7.   _____________  Liu Jackson MD  Palliative Medicine  [facility]   of Geriatric and Palliative Medicine  [phone]

## 2025-02-21 NOTE — DIETITIAN INITIAL EVALUATION ADULT - ORAL INTAKE PTA/DIET HISTORY
Pt unable to participate in nutrition assessment interview at time of visit; unable to speak, lethargic, confused per flow sheet. No family seen at bedside. Unable to reach emergency contact to obtain nutrition hx this morning. Will attempt to reach out again at follow up. Hx limited to medical chart.

## 2025-02-21 NOTE — PROGRESS NOTE ADULT - ASSESSMENT
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  Noted recent adjustment in outpatient diuretic regimen.  Nephrology consulted for mild hyperkalemia and KATHY on CKD.  #HFmrEF  #prosthetic MV  #PAF  #Dyslipidemia  #Hyperkalemia  #KATHY on CKD stage 3a  Etiology likely 2/2 intravascular volume depletion from recent increase in Lasix dose  Possible type 2 CRS (chronic heart failure affecting kidney function)  RBUS noting small kidneys likely reflecting CKD, no hydro  now ATN with worsening kidney function / in resp distress   - off diuretics / started on bicarb drip   - still with severe acidosis and severe hyperkalemia now   - oligoanuric   - cr up hyperkalemic acidotic  - lokelma 10 g q8h   - echo : moderate to severe AS / moderate MR / cardiology CHF  f/up noted   - check ph level  - may be CMO / DNR DNI in effect patient is terminal and not a candidate for RRT   - dose medications for eGFR <15 ml/min   -prognosis poor   will follow

## 2025-02-21 NOTE — DIETITIAN INITIAL EVALUATION ADULT - NAME AND PHONE
Alma [phone] or TEAMS    Nutrition Interventions: meals, snacks; Nutrition Monitoring: Diet order, PO intake, weights, labs, NFPF, body composition, BM, GOC

## 2025-02-21 NOTE — PROGRESS NOTE ADULT - ASSESSMENT
IMPRESSION:    Acute hypoxemic resp failure on NIV  shock/ on levophed possible septic/ cardiogenic  Valvular disease/ multiorgan failure  Lactic Acidosis  renal failure/ hyperkalemia  Metabolic acidosis  hypoglycemia      PLAN:    CNS: Avoid CNS depressant    HEENT:  Oral care    PULMONARY:  HOB @ 45 degrees, keep NIV, decrease FIO2, aspiration precaution    CARDIOVASCULAR: bicarb drip, Hold diuretics, trend LA    GI: GI prophylaxis                                          Feeding  npo RUQ sono noted    RENAL:  F/u  lytes.  Correct as needed. accurate I/O, quevedo catheter, bicarb push/ insulin d50, not candidate for RRT    INFECTIOUS DISEASE: abx, pancx, Procal    HEMATOLOGICAL:  DVT prophylaxis. Fup CBC, INR    ENDOCRINE:  Follow up FS.  Insulin protocol if needed. HC 50 Q 6      very poor prognosis  palliative care eval  fem line/ a line no changes  Condition futile

## 2025-02-22 LAB
HAV IGM SER-ACNC: SIGNIFICANT CHANGE UP
HBV CORE IGM SER-ACNC: SIGNIFICANT CHANGE UP
HBV SURFACE AG SER-ACNC: SIGNIFICANT CHANGE UP
HCV AB S/CO SERPL IA: 0.07 S/CO — SIGNIFICANT CHANGE UP (ref 0–0.79)
HCV AB SERPL-IMP: SIGNIFICANT CHANGE UP

## 2025-02-25 DIAGNOSIS — Z88.2 ALLERGY STATUS TO SULFONAMIDES: ICD-10-CM

## 2025-02-25 DIAGNOSIS — E16.2 HYPOGLYCEMIA, UNSPECIFIED: ICD-10-CM

## 2025-02-25 DIAGNOSIS — Z66 DO NOT RESUSCITATE: ICD-10-CM

## 2025-02-25 DIAGNOSIS — Z79.01 LONG TERM (CURRENT) USE OF ANTICOAGULANTS: ICD-10-CM

## 2025-02-25 DIAGNOSIS — E87.5 HYPERKALEMIA: ICD-10-CM

## 2025-02-25 DIAGNOSIS — I48.0 PAROXYSMAL ATRIAL FIBRILLATION: ICD-10-CM

## 2025-02-25 DIAGNOSIS — R57.0 CARDIOGENIC SHOCK: ICD-10-CM

## 2025-02-25 DIAGNOSIS — A41.9 SEPSIS, UNSPECIFIED ORGANISM: ICD-10-CM

## 2025-02-25 DIAGNOSIS — I48.20 CHRONIC ATRIAL FIBRILLATION, UNSPECIFIED: ICD-10-CM

## 2025-02-25 DIAGNOSIS — J96.01 ACUTE RESPIRATORY FAILURE WITH HYPOXIA: ICD-10-CM

## 2025-02-25 DIAGNOSIS — N17.0 ACUTE KIDNEY FAILURE WITH TUBULAR NECROSIS: ICD-10-CM

## 2025-02-25 DIAGNOSIS — I50.33 ACUTE ON CHRONIC DIASTOLIC (CONGESTIVE) HEART FAILURE: ICD-10-CM

## 2025-02-25 DIAGNOSIS — G92.8 OTHER TOXIC ENCEPHALOPATHY: ICD-10-CM

## 2025-02-25 DIAGNOSIS — Z95.810 PRESENCE OF AUTOMATIC (IMPLANTABLE) CARDIAC DEFIBRILLATOR: ICD-10-CM

## 2025-02-25 DIAGNOSIS — E86.0 DEHYDRATION: ICD-10-CM

## 2025-02-25 DIAGNOSIS — E87.20 ACIDOSIS, UNSPECIFIED: ICD-10-CM

## 2025-02-25 DIAGNOSIS — I27.20 PULMONARY HYPERTENSION, UNSPECIFIED: ICD-10-CM

## 2025-02-25 DIAGNOSIS — Z51.5 ENCOUNTER FOR PALLIATIVE CARE: ICD-10-CM

## 2025-02-25 DIAGNOSIS — R65.21 SEVERE SEPSIS WITH SEPTIC SHOCK: ICD-10-CM

## 2025-03-24 ENCOUNTER — APPOINTMENT (OUTPATIENT)
Dept: CARDIOLOGY | Facility: CLINIC | Age: 89
End: 2025-03-24

## 2025-04-15 NOTE — PHYSICAL THERAPY INITIAL EVALUATION ADULT - PATIENT PROFILE REVIEW, REHAB EVAL
[facility] Approved    Filling Pharmacy: [facility]  Additional Information: Pharmacy contacted, left detailed message with approval information.    
Chart review completed/yes
Chart review completed/yes
7:38-8:04am/yes

## 2025-05-12 ENCOUNTER — APPOINTMENT (OUTPATIENT)
Dept: HEART FAILURE | Facility: CLINIC | Age: 89
End: 2025-05-12

## 2025-06-17 NOTE — PATIENT PROFILE ADULT - NS PRO AD ANY ON CHART
Pt states she lives alone in an apartment, [address] with b/l HRs followed by an elevator to take her to 2nd floor. Pt states she has a RW, cane, raised toilet seat with arm rests and grab bars in the bathroom.
Yes

## 2025-07-02 PROBLEM — I50.20 HEART FAILURE WITH MILDLY REDUCED EJECTION FRACTION (HFMREF): Status: ACTIVE | Noted: 2024-01-16
